# Patient Record
Sex: FEMALE | Race: BLACK OR AFRICAN AMERICAN | Employment: FULL TIME | URBAN - METROPOLITAN AREA
[De-identification: names, ages, dates, MRNs, and addresses within clinical notes are randomized per-mention and may not be internally consistent; named-entity substitution may affect disease eponyms.]

---

## 2017-09-07 ENCOUNTER — OFFICE VISIT (OUTPATIENT)
Dept: INTERNAL MEDICINE CLINIC | Age: 52
End: 2017-09-07

## 2017-09-07 VITALS
WEIGHT: 141 LBS | BODY MASS INDEX: 25.95 KG/M2 | RESPIRATION RATE: 18 BRPM | SYSTOLIC BLOOD PRESSURE: 122 MMHG | TEMPERATURE: 98.4 F | HEIGHT: 62 IN | DIASTOLIC BLOOD PRESSURE: 80 MMHG | OXYGEN SATURATION: 98 % | HEART RATE: 85 BPM

## 2017-09-07 DIAGNOSIS — M62.830 BACK SPASM: ICD-10-CM

## 2017-09-07 DIAGNOSIS — J45.20 MILD INTERMITTENT ASTHMA WITHOUT COMPLICATION: ICD-10-CM

## 2017-09-07 DIAGNOSIS — Z00.00 ROUTINE GENERAL MEDICAL EXAMINATION AT A HEALTH CARE FACILITY: Primary | ICD-10-CM

## 2017-09-07 DIAGNOSIS — Z12.11 COLON CANCER SCREENING: ICD-10-CM

## 2017-09-07 DIAGNOSIS — Z12.31 VISIT FOR SCREENING MAMMOGRAM: ICD-10-CM

## 2017-09-07 DIAGNOSIS — M25.562 KNEE MENISCUS PAIN, LEFT: ICD-10-CM

## 2017-09-07 DIAGNOSIS — M41.9 SCOLIOSIS, UNSPECIFIED SCOLIOSIS TYPE, UNSPECIFIED SPINAL REGION: ICD-10-CM

## 2017-09-07 PROBLEM — M25.569 KNEE MENISCUS PAIN: Status: ACTIVE | Noted: 2017-09-07

## 2017-09-07 RX ORDER — FLUTICASONE PROPIONATE 220 UG/1
1 AEROSOL, METERED RESPIRATORY (INHALATION) 2 TIMES DAILY
Qty: 1 INHALER | Refills: 12 | Status: SHIPPED | OUTPATIENT
Start: 2017-09-07 | End: 2018-11-02 | Stop reason: SDUPTHER

## 2017-09-07 RX ORDER — ALBUTEROL SULFATE 90 UG/1
2 AEROSOL, METERED RESPIRATORY (INHALATION)
Qty: 1 INHALER | Refills: 5 | Status: SHIPPED | OUTPATIENT
Start: 2017-09-07 | End: 2018-05-29 | Stop reason: ALTCHOICE

## 2017-09-07 RX ORDER — FLUTICASONE PROPIONATE 220 UG/1
AEROSOL, METERED RESPIRATORY (INHALATION)
COMMUNITY
End: 2017-09-07 | Stop reason: SDUPTHER

## 2017-09-07 NOTE — PROGRESS NOTES
Complete Physical       HPI:  Katey Herr is a 45y.o. year old female who is here to establish care. She  had her medical care: Works at [employer]  Not been doctor 6 years  PCT   Living in [facility] and moved here for brother- throat cancer  Sister 2015- lung cancer. Brother- lung cancer      She reports the following history and medical concerns:      Asthma- using flovent. Left torn meniscus s/p repair. Draining. Baker's cyst.  Tried compression. Can't take NSAIDS. Back pain- scoliosis. Pain on left side tightness. Assessment and Plan        1. Routine general medical examination at a health care facility  Well exam.  Back and feet issues her main issue. Needs preventive tests done. - CBC WITH AUTOMATED DIFF  - TSH REFLEX TO T4  - METABOLIC PANEL, COMPREHENSIVE  - VITAMIN D, 25 HYDROXY  - UA/M W/RFLX CULTURE, ROUTINE  - LIPID PANEL    2. Mild intermittent asthma without complication  Use albuterol for rescue only. flovent as prevention. - albuterol (PROVENTIL HFA, VENTOLIN HFA, PROAIR HFA) 90 mcg/actuation inhaler; Take 2 Puffs by inhalation every four (4) hours as needed for Wheezing. Dispense: 1 Inhaler; Refill: 5  - fluticasone (FLOVENT HFA) 220 mcg/actuation inhaler; Take 1 Puff by inhalation two (2) times a day. Dispense: 1 Inhaler; Refill: 12    3. Colon cancer screening  Referral given to patient and emphasized importance for patient to schedule the appointment by calling the number on the paperwork. If there is any difficulty getting an appointment, please let us know. Not making this appointment can have serious consequences of delayed diagnosis or irreversible health defects.   - REFERRAL FOR COLONOSCOPY    4. Visit for screening mammogram  ordered  - [facility] MAMMO BI SCREENING INCL CAD; Future    5. Knee meniscus pain, left  There is some instability on exam and swelling palpated in back - suspect cyst.  Pt uses lidocaine and it helps.   I strongly suggest getting knee looked at again.   - lidocaine-vit e-aloe vera-vipul 2 % topical gel; Apply  to affected area daily as needed. Dispense: 28.33 g; Refill: 0  - REFERRAL TO ORTHOPEDICS    6. Scoliosis, unspecified scoliosis type, unspecified spinal region  Etiology for possible back pain but she also has spasm. 7. Back spasm  TIME OUT performed immediately prior to start of procedure:   I, Lauren Pathak MD, have performed the following reviews on Saskia Varela   prior to the start of the procedure:     * Patient was identified by name and date of birth   * Agreement on procedure being performed was verified   * Risks and Benefits explained to the patient   * Procedure site verified and marked as necessary   * Patient was positioned for comfort   * Consent was given by patient    Date of procedure: 9/7/2017  Procedure performed by:Sheldon Wiley MD   Patient assisted by: self   How tolerated by patient: tolerated the procedure well with no complications   Comments: none         Patient explained the risks and benefits of acupuncture to help with the acute problem. There can be some soreness afterwards and the effect can be immediate to slightly delayed (2-3 days). If the problem persists or gets worse, please call back or send a my chart message. If you experience shortness of breath, please let me know immediately. Patient agreed to proceed with treatment. Seirin packaged needle used No. 5 (0.25) 30 mm  QTY 2  Points palpated and inserted 5-10 mm at marked points  Patient tolerated procedure and felt great relief.                 Visit Vitals    /80 (BP 1 Location: Left arm, BP Patient Position: Sitting)    Pulse 85    Temp 98.4 °F (36.9 °C) (Oral)    Resp 18    Ht 5' 2\" (1.575 m)    Wt 141 lb (64 kg)    SpO2 98%    BMI 25.79 kg/m2       Historical Data    Past Medical History:   Diagnosis Date    Asthma     SOB    Chronic pain     joint pain    Knee meniscus pain 9/7/2017    Mild intermittent asthma without complication 4/5/2864    Sinus congestion        Past Surgical History:   Procedure Laterality Date    HX GI      recloser cloystomy       Outpatient Encounter Prescriptions as of 9/7/2017   Medication Sig Dispense Refill    albuterol (PROVENTIL HFA, VENTOLIN HFA, PROAIR HFA) 90 mcg/actuation inhaler Take 2 Puffs by inhalation every four (4) hours as needed for Wheezing. 1 Inhaler 5    fluticasone (FLOVENT HFA) 220 mcg/actuation inhaler Take 1 Puff by inhalation two (2) times a day. 1 Inhaler 12    lidocaine-vit e-aloe vera-vipul 2 % topical gel Apply  to affected area daily as needed. 28.33 g 0    [DISCONTINUED] fluticasone (FLOVENT HFA) 220 mcg/actuation inhaler Take  by inhalation. No facility-administered encounter medications on file as of 9/7/2017. Allergies   Allergen Reactions    Nsaids (Non-Steroidal Anti-Inflammatory Drug) Hives, Itching and Nausea Only        Social History     Social History    Marital status: SINGLE     Spouse name: N/A    Number of children: N/A    Years of education: N/A     Occupational History    Not on file. Social History Main Topics    Smoking status: Never Smoker    Smokeless tobacco: Never Used    Alcohol use Yes      Comment: social     Drug use: No    Sexual activity: No     Other Topics Concern    Not on file     Social History Narrative    No narrative on file        family history is not on file. Review of Systems   Constitutional: Negative for weight loss. Eyes: Negative for blurred vision. Respiratory: Negative for shortness of breath. Cardiovascular: Negative for chest pain. Gastrointestinal: Negative for abdominal pain. Genitourinary: Negative for dysuria and frequency. Musculoskeletal: Positive for back pain and neck pain. Negative for falls and joint pain. Skin: Negative for rash. Neurological: Negative for dizziness, focal weakness, weakness and headaches.    Endo/Heme/Allergies: Negative for environmental allergies. Does not bruise/bleed easily. Psychiatric/Behavioral: Negative for depression. The patient is not nervous/anxious. Physical Exam   Musculoskeletal:        Lumbar back: She exhibits deformity, pain and spasm. She exhibits normal range of motion, no tenderness, no bony tenderness, no swelling and no edema. Back:       Ortho Exam       Orders Placed This Encounter    [facility] MAMMO BI SCREENING INCL CAD     Standing Status:   Future     Standing Expiration Date:   10/7/2018     Order Specific Question:   Reason for Exam     Answer:   screening    CBC WITH AUTOMATED DIFF    TSH REFLEX TO T4    METABOLIC PANEL, COMPREHENSIVE    VITAMIN D, 25 HYDROXY    UA/M W/RFLX CULTURE, ROUTINE    LIPID PANEL    REFERRAL FOR COLONOSCOPY     Referral Priority:   Routine     Referral Type:   Consultation     Referral Reason:   Specialty Services Required     Referral Location:   [facility]     Referred to Provider:   Magnolia Hollis MD    REFERRAL TO ORTHOPEDICS     Referral Priority:   Routine     Referral Type:   Consultation     Referral Reason:   Specialty Services Required     Referral Location:   [facility]     Referred to Provider:   Joleen Heredia MD    DISCONTD: fluticasone (FLOVENT HFA) 220 mcg/actuation inhaler     Sig: Take  by inhalation.  albuterol (PROVENTIL HFA, VENTOLIN HFA, PROAIR HFA) 90 mcg/actuation inhaler     Sig: Take 2 Puffs by inhalation every four (4) hours as needed for Wheezing. Dispense:  1 Inhaler     Refill:  5    fluticasone (FLOVENT HFA) 220 mcg/actuation inhaler     Sig: Take 1 Puff by inhalation two (2) times a day. Dispense:  1 Inhaler     Refill:  12    lidocaine-vit e-aloe vera-viupl 2 % topical gel     Sig: Apply  to affected area daily as needed. Dispense:  28.33 g     Refill:  0        I have reviewed the patient's medical history in detail and updated the computerized patient record.      We had a prolonged discussion about these complex clinical issues and went over the various important aspects to consider. All questions were answered. Advised her to call back or return to office if symptoms do not improve, change in nature, or persist.    She was given an after visit summary or informed of [facility] which includes patient instructions, diagnoses, current medications, & vitals. She expressed understanding with the diagnosis and plan.

## 2017-09-07 NOTE — MR AVS SNAPSHOT
Visit Information Date & Time Provider Department Dept. Phone Encounter #  
 9/7/2017 12:30 PM Lars Luke MD [facility] [phone] Follow-up Instructions Return in about 8 weeks (around 11/2/2017) for Follow up. Upcoming Health Maintenance Date Due Hepatitis C Screening 1965 DTaP/Tdap/Td series (1 - Tdap) 5/3/1984 PAP AKA CERVICAL CYTOLOGY 5/3/1984 BREAST CANCER SCRN MAMMOGRAM 5/3/2013 FOBT Q 1 YEAR AGE 50-75 5/3/2013 INFLUENZA AGE 9 TO ADULT 8/1/2017 Allergies as of 9/7/2017  Review Complete On: 9/7/2017 By: Lars Luke MD  
  
 Severity Noted Reaction Type Reactions Nsaids (Non-steroidal Anti-inflammatory Drug) Medium 09/07/2017    Hives, Itching, Nausea Only Current Immunizations  Never Reviewed No immunizations on file. Not reviewed this visit You Were Diagnosed With   
  
 Codes Comments Routine general medical examination at a health care facility    -  Primary ICD-10-CM: Z00.00 ICD-9-CM: V70.0 Mild intermittent asthma without complication     ZII-38-QQ: J45.20 ICD-9-CM: 493.90 Colon cancer screening     ICD-10-CM: Z12.11 ICD-9-CM: V76.51 Visit for screening mammogram     ICD-10-CM: Z12.31 
ICD-9-CM: V76.12 Knee meniscus pain, left     ICD-10-CM: W24.035 ICD-9-CM: 719.46 Vitals BP Pulse Temp Resp Height(growth percentile) Weight(growth percentile) 122/80 (BP 1 Location: Left arm, BP Patient Position: Sitting) 85 98.4 °F (36.9 °C) (Oral) 18 5' 2\" (1.575 m) 141 lb (64 kg) SpO2 BMI OB Status Smoking Status 98% 25.79 kg/m2 Postmenopausal Never Smoker Vitals History BMI and BSA Data Body Mass Index Body Surface Area 25.79 kg/m 2 1.67 m 2 Preferred Pharmacy Pharmacy Name Phone [facility] [phone] Your Updated Medication List  
  
   
 This list is accurate as of: 9/7/17 12:47 PM.  Always use your most recent med list.  
  
  
  
  
 albuterol 90 mcg/actuation inhaler Commonly known as:  PROVENTIL HFA, VENTOLIN HFA, PROAIR HFA Take 2 Puffs by inhalation every four (4) hours as needed for Wheezing. fluticasone 220 mcg/actuation inhaler Commonly known as:  FLOVENT HFA Take 1 Puff by inhalation two (2) times a day. lidocaine-vit e-aloe vera-vipul 2 % topical gel Apply  to affected area daily as needed. Prescriptions Sent to Pharmacy Refills  
 albuterol (PROVENTIL HFA, VENTOLIN HFA, PROAIR HFA) 90 mcg/actuation inhaler 5 Sig: Take 2 Puffs by inhalation every four (4) hours as needed for Wheezing. Class: Normal  
 [facility] [address] Ph #: [phone] Route: Inhalation  
 fluticasone (FLOVENT HFA) 220 mcg/actuation inhaler 12 Sig: Take 1 Puff by inhalation two (2) times a day. Class: Normal  
 [facility] [address] Ph #: [phone] Route: Inhalation  
 lidocaine-vit e-aloe vera-vipul 2 % topical gel 0 Sig: Apply  to affected area daily as needed. Class: Normal  
 Pharmacy: [place], [address] Ph #: [phone] Route: Topical  
  
We Performed the Following CBC WITH AUTOMATED DIFF [30456 CPT(R)] LIPID PANEL [59580 CPT(R)] METABOLIC PANEL, COMPREHENSIVE [59514 CPT(R)] REFERRAL FOR COLONOSCOPY [[id number] Custom] REFERRAL TO ORTHOPEDICS [[id number] Custom] Comments:  
 Please evaluate patient for left knee pain. TSH REFLEX TO T4 [[id number] Custom] UA/M W/RFLX CULTURE, ROUTINE [[id number] Custom] VITAMIN D, 25 HYDROXY [id number] CPT(R)] Follow-up Instructions Return in about 8 weeks (around 11/2/2017) for Follow up. To-Do List   
 09/07/2017   Imaging:  [facility] MAMMO BI SCREENING INCL CAD   
  
  
 Referral Information Referral ID Referred By Referred To  
  
 [id number] LEATHA, [address]   
   [address] [address] [facility] [place] [address], [address] Visits Status Start Date End Date 1 New Request 9/7/17 9/7/18 If your referral has a status of pending review or denied, additional information will be sent to support the outcome of this decision. Referral ID Referred By Referred To  
 [id number] [facility] [place], [address] Phone: [phone] Fax: [phone] Visits Status Start Date End Date 1 New Request 9/7/17 9/7/18 If your referral has a status of pending review or denied, additional information will be sent to support the outcome of this decision. Patient Instructions [facility] for sleep. 
 
orin Sauceda [employer] Introducing \[facility] [place] [facility]\"" & HEALTH SERVICES! Dear Nilam Osorio: Thank you for requesting a [employer] account. Our records indicate that you already have an active [employer] account. You can access your account anytime at [email or web address] Did you know that you can access your hospital and ER discharge instructions at any time in [employer]? You can also review all of your test results from your hospital stay or ER visit. Additional Information If you have questions, please visit the Frequently Asked Questions section of the [employer] website at [email or web address] Remember, [employer] is NOT to be used for urgent needs. For medical emergencies, dial 911. Now available from your iPhone and Android! Please provide this summary of care documentation to your next provider. If you have any questions after today's visit, please call [phone].

## 2017-09-07 NOTE — PROGRESS NOTES
Chief Complaint   Patient presents with    Complete Physical        1. Have you been to the ER, urgent care clinic since your last visit? No  Hospitalized since your last visit? No    2. Have you seen or consulted any other health care providers outside of the [facility] [place] [facility] since your last visit? No  Include any pap smears or colon screening.  No

## 2017-09-08 ENCOUNTER — TELEPHONE (OUTPATIENT)
Dept: INTERNAL MEDICINE CLINIC | Age: 52
End: 2017-09-08

## 2017-09-08 LAB
25(OH)D3+25(OH)D2 SERPL-MCNC: 39.6 NG/ML (ref 30–100)
ALBUMIN SERPL-MCNC: 4.4 G/DL (ref 3.5–5.5)
ALBUMIN/GLOB SERPL: 1.5 {RATIO} (ref 1.2–2.2)
ALP SERPL-CCNC: 91 IU/L (ref 39–117)
ALT SERPL-CCNC: 21 IU/L (ref 0–32)
APPEARANCE UR: CLEAR
AST SERPL-CCNC: 21 IU/L (ref 0–40)
BACTERIA #/AREA URNS HPF: ABNORMAL /[HPF]
BASOPHILS # BLD AUTO: 0.1 X10E3/UL (ref 0–0.2)
BASOPHILS NFR BLD AUTO: 1 %
BILIRUB SERPL-MCNC: 0.2 MG/DL (ref 0–1.2)
BILIRUB UR QL STRIP: NEGATIVE
BUN SERPL-MCNC: 10 MG/DL (ref 6–24)
BUN/CREAT SERPL: 14 (ref 9–23)
CALCIUM SERPL-MCNC: 9.5 MG/DL (ref 8.7–10.2)
CASTS URNS QL MICRO: ABNORMAL /LPF
CHLORIDE SERPL-SCNC: 100 MMOL/L (ref 96–106)
CHOLEST SERPL-MCNC: 209 MG/DL (ref 100–199)
CO2 SERPL-SCNC: 25 MMOL/L (ref 18–29)
COLOR UR: YELLOW
CREAT SERPL-MCNC: 0.74 MG/DL (ref 0.57–1)
EOSINOPHIL # BLD AUTO: 0.2 X10E3/UL (ref 0–0.4)
EOSINOPHIL NFR BLD AUTO: 4 %
EPI CELLS #/AREA URNS HPF: ABNORMAL /HPF
ERYTHROCYTE [DISTWIDTH] IN BLOOD BY AUTOMATED COUNT: 14.8 % (ref 12.3–15.4)
GLOBULIN SER CALC-MCNC: 3 G/DL (ref 1.5–4.5)
GLUCOSE SERPL-MCNC: 81 MG/DL (ref 65–99)
GLUCOSE UR QL: NEGATIVE
HCT VFR BLD AUTO: 40 % (ref 34–46.6)
HDLC SERPL-MCNC: 59 MG/DL
HGB BLD-MCNC: 13.8 G/DL (ref 11.1–15.9)
HGB UR QL STRIP: ABNORMAL
IMM GRANULOCYTES # BLD: 0 X10E3/UL (ref 0–0.1)
IMM GRANULOCYTES NFR BLD: 0 %
INTERPRETATION, 910389: NORMAL
KETONES UR QL STRIP: NEGATIVE
LDLC SERPL CALC-MCNC: 107 MG/DL (ref 0–99)
LEUKOCYTE ESTERASE UR QL STRIP: NEGATIVE
LYMPHOCYTES # BLD AUTO: 2 X10E3/UL (ref 0.7–3.1)
LYMPHOCYTES NFR BLD AUTO: 32 %
MCH RBC QN AUTO: 33.7 PG (ref 26.6–33)
MCHC RBC AUTO-ENTMCNC: 34.5 G/DL (ref 31.5–35.7)
MCV RBC AUTO: 98 FL (ref 79–97)
MICRO URNS: ABNORMAL
MONOCYTES # BLD AUTO: 0.5 X10E3/UL (ref 0.1–0.9)
MONOCYTES NFR BLD AUTO: 8 %
MUCOUS THREADS URNS QL MICRO: PRESENT
NEUTROPHILS # BLD AUTO: 3.5 X10E3/UL (ref 1.4–7)
NEUTROPHILS NFR BLD AUTO: 55 %
NITRITE UR QL STRIP: NEGATIVE
PH UR STRIP: 6.5 [PH] (ref 5–7.5)
PLATELET # BLD AUTO: 270 X10E3/UL (ref 150–379)
POTASSIUM SERPL-SCNC: 4.2 MMOL/L (ref 3.5–5.2)
PROT SERPL-MCNC: 7.4 G/DL (ref 6–8.5)
PROT UR QL STRIP: NEGATIVE
RBC # BLD AUTO: 4.1 X10E6/UL (ref 3.77–5.28)
RBC #/AREA URNS HPF: ABNORMAL /HPF
SODIUM SERPL-SCNC: 142 MMOL/L (ref 134–144)
SP GR UR: 1.02 (ref 1–1.03)
TRIGL SERPL-MCNC: 215 MG/DL (ref 0–149)
TSH SERPL DL<=0.005 MIU/L-ACNC: 0.96 UIU/ML (ref 0.45–4.5)
URINALYSIS REFLEX, 377202: ABNORMAL
UROBILINOGEN UR STRIP-MCNC: 0.2 MG/DL (ref 0.2–1)
VLDLC SERPL CALC-MCNC: 43 MG/DL (ref 5–40)
WBC # BLD AUTO: 6.2 X10E3/UL (ref 3.4–10.8)
WBC #/AREA URNS HPF: ABNORMAL /HPF

## 2017-09-08 NOTE — TELEPHONE ENCOUNTER
Pharmacist from [facility] pharmacy called needing clarification on script for lidocaine that was sent yesterday. Please call back at [phone]

## 2017-09-09 NOTE — PROGRESS NOTES
Your labs looked good but you had a little blood in your urine. Have you ever had a kidney stone? Have you noticed any vaginal bleeding? Your cholesterol was ok but your triglycerides were a little high, meaning you are eating too many carbs at night.   Message sent to patient via [employer]:  September 9, 2017

## 2017-09-11 NOTE — TELEPHONE ENCOUNTER
Inform patient to ask pharmacy to show her something over the counter that has some lidocaine for itching. She doesn't need a prescription for what she wants it to do.

## 2017-09-11 NOTE — TELEPHONE ENCOUNTER
Patient advised and expressed understanding. She reports that back pain is gone since acupuncture however she now has pain in right shoulder. She states that the site she got the acupuncture in in swollen and tight. Patient reports no redness or fever.  Please advise

## 2017-09-11 NOTE — TELEPHONE ENCOUNTER
[facility]'s pharmacy called wanting to know if they could have an alternate medication to the lidocaine as they are unable to get it. Please call the pharmacy back at [phone]

## 2017-09-12 ENCOUNTER — TELEPHONE (OUTPATIENT)
Dept: INTERNAL MEDICINE CLINIC | Age: 52
End: 2017-09-12

## 2017-09-12 NOTE — TELEPHONE ENCOUNTER
Yes ok to do.   Tell her to ask pharmacist to direct her to the otc cream for hemorrhoids that can be used for itching

## 2017-09-12 NOTE — TELEPHONE ENCOUNTER
Please advise if pt can use just Lidocaine as an alternative for the Lidocaine with Vit. E & aloe vera.

## 2017-09-12 NOTE — TELEPHONE ENCOUNTER
Zackary Tse 1965     Pharmacy called stating that they do not get the lidocaine with Vitamin E and Aloe Vera and would like to know if they can give the patient just lidocaine. Pharmacist states that they have called 3 times and the patient has been waiting since Thursday for script.

## 2017-09-12 NOTE — TELEPHONE ENCOUNTER
Call to pharmacy - spoke to Bridgett/pharmacist and advised per Dr. Gualberto Padgett it was ok to use the Lidocaine 2% topical gel and also requested the pt be provided with the OTC cream for hemorrhoid itching. Therese Isaac notes she will direct the pt to the Preparation H cream. Pharmacist acknowledged understanding & has no further questions and/or concerns at this time.

## 2017-09-13 ENCOUNTER — TELEPHONE (OUTPATIENT)
Dept: INTERNAL MEDICINE CLINIC | Age: 52
End: 2017-09-13

## 2017-09-13 NOTE — TELEPHONE ENCOUNTER
Attempted to contact to schedule appointment without success.  Left voicemail message requesting a call back to the office

## 2017-09-13 NOTE — TELEPHONE ENCOUNTER
Pt wants  To know  When she has to come in to have accupunture on her shoulder  Can someone call pt back at [phone] she is at this # until 7:00pm today

## 2017-09-25 ENCOUNTER — HOSPITAL ENCOUNTER (OUTPATIENT)
Dept: MAMMOGRAPHY | Age: 52
Discharge: HOME | End: 2017-09-25
Attending: INTERNAL MEDICINE
Payer: COMMERCIAL

## 2017-09-25 DIAGNOSIS — Z12.31 VISIT FOR SCREENING MAMMOGRAM: ICD-10-CM

## 2017-09-25 PROCEDURE — 77067 SCR MAMMO BI INCL CAD: CPT

## 2017-09-27 DIAGNOSIS — R31.9 HEMATURIA: Primary | ICD-10-CM

## 2017-09-27 NOTE — PROGRESS NOTES
I sent the patient a note but she did not respond. She has to repeat her urine again since there was blood.

## 2017-09-28 ENCOUNTER — TELEPHONE (OUTPATIENT)
Dept: INTERNAL MEDICINE CLINIC | Age: 52
End: 2017-09-28

## 2017-09-28 NOTE — PROGRESS NOTES
Patient stated she did see the note regarding her lab and urine. Patient states she is not having symptoms. She will repeat urine on November visit. Gave her the number to [facility].

## 2017-12-02 ENCOUNTER — APPOINTMENT (OUTPATIENT)
Dept: CT IMAGING | Age: 52
End: 2017-12-02
Attending: EMERGENCY MEDICINE
Payer: COMMERCIAL

## 2017-12-02 ENCOUNTER — HOSPITAL ENCOUNTER (EMERGENCY)
Age: 52
Discharge: HOME | End: 2017-12-02
Attending: EMERGENCY MEDICINE
Payer: COMMERCIAL

## 2017-12-02 VITALS
RESPIRATION RATE: 16 BRPM | TEMPERATURE: 98.6 F | SYSTOLIC BLOOD PRESSURE: 174 MMHG | BODY MASS INDEX: 28.7 KG/M2 | OXYGEN SATURATION: 100 % | HEART RATE: 84 BPM | HEIGHT: 60 IN | WEIGHT: 146.2 LBS | DIASTOLIC BLOOD PRESSURE: 101 MMHG

## 2017-12-02 DIAGNOSIS — L02.01 FACIAL ABSCESS: Primary | ICD-10-CM

## 2017-12-02 LAB
ALBUMIN SERPL-MCNC: 3.8 G/DL (ref 3.5–5)
ALBUMIN/GLOB SERPL: 0.8 {RATIO} (ref 1.1–2.2)
ALP SERPL-CCNC: 89 U/L (ref 45–117)
ALT SERPL-CCNC: 21 U/L (ref 12–78)
ANION GAP SERPL CALC-SCNC: 2 MMOL/L (ref 5–15)
AST SERPL-CCNC: 47 U/L (ref 15–37)
BASOPHILS # BLD: 0 K/UL (ref 0–0.1)
BASOPHILS NFR BLD: 0 % (ref 0–1)
BILIRUB SERPL-MCNC: 0.6 MG/DL (ref 0.2–1)
BUN SERPL-MCNC: 16 MG/DL (ref 6–20)
BUN/CREAT SERPL: 18 (ref 12–20)
CALCIUM SERPL-MCNC: 9.3 MG/DL (ref 8.5–10.1)
CHLORIDE SERPL-SCNC: 104 MMOL/L (ref 97–108)
CO2 SERPL-SCNC: 28 MMOL/L (ref 21–32)
CREAT SERPL-MCNC: 0.9 MG/DL (ref 0.55–1.02)
CRP SERPL-MCNC: 3.13 MG/DL (ref 0–0.6)
EOSINOPHIL # BLD: 0.2 K/UL (ref 0–0.4)
EOSINOPHIL NFR BLD: 2 % (ref 0–7)
ERYTHROCYTE [DISTWIDTH] IN BLOOD BY AUTOMATED COUNT: 13.6 % (ref 11.5–14.5)
ERYTHROCYTE [SEDIMENTATION RATE] IN BLOOD: 39 MM/HR (ref 0–30)
GLOBULIN SER CALC-MCNC: 5 G/DL (ref 2–4)
GLUCOSE SERPL-MCNC: 83 MG/DL (ref 65–100)
HCT VFR BLD AUTO: 40.4 % (ref 35–47)
HGB BLD-MCNC: 13.5 G/DL (ref 11.5–16)
LYMPHOCYTES # BLD: 3 K/UL (ref 0.8–3.5)
LYMPHOCYTES NFR BLD: 41 % (ref 12–49)
MCH RBC QN AUTO: 29.9 PG (ref 26–34)
MCHC RBC AUTO-ENTMCNC: 33.4 G/DL (ref 30–36.5)
MCV RBC AUTO: 89.4 FL (ref 80–99)
MONOCYTES # BLD: 0.6 K/UL (ref 0–1)
MONOCYTES NFR BLD: 8 % (ref 5–13)
NEUTS SEG # BLD: 3.4 K/UL (ref 1.8–8)
NEUTS SEG NFR BLD: 49 % (ref 32–75)
PLATELET # BLD AUTO: 339 K/UL (ref 150–400)
POTASSIUM SERPL-SCNC: 6.4 MMOL/L (ref 3.5–5.1)
PROT SERPL-MCNC: 8.8 G/DL (ref 6.4–8.2)
RBC # BLD AUTO: 4.52 M/UL (ref 3.8–5.2)
SODIUM SERPL-SCNC: 134 MMOL/L (ref 136–145)
WBC # BLD AUTO: 7.2 K/UL (ref 3.6–11)

## 2017-12-02 PROCEDURE — 75810000289 HC I&D ABSCESS SIMP/COMP/MULT

## 2017-12-02 PROCEDURE — 74011000250 HC RX REV CODE- 250: Performed by: EMERGENCY MEDICINE

## 2017-12-02 PROCEDURE — 70487 CT MAXILLOFACIAL W/DYE: CPT

## 2017-12-02 PROCEDURE — 74011636320 HC RX REV CODE- 636/320: Performed by: EMERGENCY MEDICINE

## 2017-12-02 PROCEDURE — 99283 EMERGENCY DEPT VISIT LOW MDM: CPT

## 2017-12-02 PROCEDURE — 80053 COMPREHEN METABOLIC PANEL: CPT | Performed by: EMERGENCY MEDICINE

## 2017-12-02 PROCEDURE — 36415 COLL VENOUS BLD VENIPUNCTURE: CPT | Performed by: EMERGENCY MEDICINE

## 2017-12-02 PROCEDURE — 74011000258 HC RX REV CODE- 258: Performed by: EMERGENCY MEDICINE

## 2017-12-02 PROCEDURE — 85025 COMPLETE CBC W/AUTO DIFF WBC: CPT | Performed by: EMERGENCY MEDICINE

## 2017-12-02 PROCEDURE — 85652 RBC SED RATE AUTOMATED: CPT | Performed by: EMERGENCY MEDICINE

## 2017-12-02 PROCEDURE — 86140 C-REACTIVE PROTEIN: CPT | Performed by: EMERGENCY MEDICINE

## 2017-12-02 PROCEDURE — 74011250637 HC RX REV CODE- 250/637: Performed by: EMERGENCY MEDICINE

## 2017-12-02 RX ORDER — SULFAMETHOXAZOLE AND TRIMETHOPRIM 800; 160 MG/1; MG/1
1 TABLET ORAL 2 TIMES DAILY
Qty: 14 TAB | Refills: 0 | Status: SHIPPED | OUTPATIENT
Start: 2017-12-02 | End: 2017-12-12

## 2017-12-02 RX ORDER — LIDOCAINE HYDROCHLORIDE AND EPINEPHRINE 10; 10 MG/ML; UG/ML
1.5 INJECTION, SOLUTION INFILTRATION; PERINEURAL ONCE
Status: COMPLETED | OUTPATIENT
Start: 2017-12-02 | End: 2017-12-02

## 2017-12-02 RX ORDER — OXYCODONE AND ACETAMINOPHEN 5; 325 MG/1; MG/1
1 TABLET ORAL
Status: COMPLETED | OUTPATIENT
Start: 2017-12-02 | End: 2017-12-02

## 2017-12-02 RX ORDER — ACETAMINOPHEN 500 MG
1000 TABLET ORAL
Status: DISCONTINUED | OUTPATIENT
Start: 2017-12-02 | End: 2017-12-02

## 2017-12-02 RX ORDER — SULFAMETHOXAZOLE AND TRIMETHOPRIM 800; 160 MG/1; MG/1
1 TABLET ORAL
Status: COMPLETED | OUTPATIENT
Start: 2017-12-02 | End: 2017-12-02

## 2017-12-02 RX ORDER — HYDROCODONE BITARTRATE AND ACETAMINOPHEN 5; 325 MG/1; MG/1
1 TABLET ORAL
Qty: 12 TAB | Refills: 0 | Status: SHIPPED | OUTPATIENT
Start: 2017-12-02 | End: 2018-05-29 | Stop reason: ALTCHOICE

## 2017-12-02 RX ORDER — SODIUM CHLORIDE 0.9 % (FLUSH) 0.9 %
10 SYRINGE (ML) INJECTION
Status: COMPLETED | OUTPATIENT
Start: 2017-12-02 | End: 2017-12-02

## 2017-12-02 RX ADMIN — Medication 2 ML: at 08:24

## 2017-12-02 RX ADMIN — SULFAMETHOXAZOLE AND TRIMETHOPRIM 1 TABLET: 800; 160 TABLET ORAL at 10:13

## 2017-12-02 RX ADMIN — SODIUM CHLORIDE 100 ML: 900 INJECTION, SOLUTION INTRAVENOUS at 08:51

## 2017-12-02 RX ADMIN — OXYCODONE HYDROCHLORIDE AND ACETAMINOPHEN 1 TABLET: 5; 325 TABLET ORAL at 10:13

## 2017-12-02 RX ADMIN — Medication 10 ML: at 08:51

## 2017-12-02 RX ADMIN — LIDOCAINE HYDROCHLORIDE,EPINEPHRINE BITARTRATE 15 MG: 10; .01 INJECTION, SOLUTION INFILTRATION; PERINEURAL at 09:40

## 2017-12-02 RX ADMIN — IOPAMIDOL 100 ML: 612 INJECTION, SOLUTION INTRAVENOUS at 08:51

## 2017-12-02 NOTE — ED TRIAGE NOTES
Triage: Patient arrives ambulatory from home with c/o abscess and swelling to left eye area extending into forehead. Patient reports noticing this on Wednesday and as of Thursday her vision has been obstructed. Area hot to touch.

## 2017-12-02 NOTE — ED PROVIDER NOTES
HPI Comments: 45 y.o. female with past medical history significant for chronic pain, asthma who presents from home with chief complaint of abscess. Pt complains of worsening abscess to the L side of her nose onset 3 days ago, accompanied by L eye swelling. Pt reports that the abscess is itchy. Denies that it has been draining any fluid. Pt denies being on abx recently. Denies taking medicine to try to relieve sx. There are no other acute medical concerns at this time. Social hx: denies tobacco use, +EtOH    PCP: Riri Mcbride MD    Note written by Maxwell Álvarez, as dictated by Iva Means MD 7:15 AM      The history is provided by the patient. No  was used. Past Medical History:   Diagnosis Date    Asthma     SOB    Chronic pain     joint pain    Knee meniscus pain 9/7/2017    Mild intermittent asthma without complication 7/5/2471    Sinus congestion        Past Surgical History:   Procedure Laterality Date    HX GI      recloser cloystomy         History reviewed. No pertinent family history. Social History     Social History    Marital status: SINGLE     Spouse name: N/A    Number of children: N/A    Years of education: N/A     Occupational History    Not on file. Social History Main Topics    Smoking status: Never Smoker    Smokeless tobacco: Never Used    Alcohol use Yes      Comment: social     Drug use: No    Sexual activity: No     Other Topics Concern    Not on file     Social History Narrative         ALLERGIES: Nsaids (non-steroidal anti-inflammatory drug)    Review of Systems   Constitutional: Negative for fever. HENT: Positive for facial swelling. Negative for dental problem. Eyes: Negative for visual disturbance. Respiratory: Negative for chest tightness. Cardiovascular: Negative for chest pain. Gastrointestinal: Negative for abdominal pain. Genitourinary: Negative for dysuria.    Musculoskeletal: Negative for arthralgias. Skin: Negative for rash. Neurological: Negative for dizziness. Hematological: Negative for adenopathy. Psychiatric/Behavioral: Negative for suicidal ideas. Vitals:    12/02/17 0647   BP: (!) 157/99   Pulse: 88   Resp: 18   Temp: 99 °F (37.2 °C)   SpO2: 99%   Weight: 66.3 kg (146 lb 3.2 oz)   Height: 5' (1.524 m)            Physical Exam   Constitutional: She is oriented to person, place, and time. She appears well-developed and well-nourished. No distress. HENT:   Head: Normocephalic and atraumatic. Mouth/Throat: Oropharynx is clear and moist.   2 cm area of erythema and fluctuance on the L nose bridge. Mild swelling around the L eye. Eyes: Pupils are equal, round, and reactive to light. No scleral icterus. Neck: Normal range of motion. Neck supple. No thyromegaly present. Cardiovascular: Normal rate, regular rhythm, normal heart sounds and intact distal pulses. No murmur heard. Pulmonary/Chest: Effort normal and breath sounds normal. No respiratory distress. Abdominal: Soft. Bowel sounds are normal. She exhibits no distension. There is no tenderness. Musculoskeletal: Normal range of motion. She exhibits no edema. Neurological: She is alert and oriented to person, place, and time. Skin: Skin is warm and dry. No rash noted. She is not diaphoretic. Nursing note and vitals reviewed. Note written by Maxwell Carrillo, as dictated by Shawnee Verde MD 7:22 AM        [facility]  ED Course       Procedures    A:  Small abscess near bridge of nose with assoc swelling around L eye. VS stable. Ct shows pre-septal swelling. P:  I&D  Discharge on Bactrim  Hydrocodone for pain    Procedure Note - Incision and Drainage:   Performed by Shawnee Verde MD .     Verbal consent was obtained. Immediately prior to the procedure, the patient was reevaluated and found suitable for the planned procedure and any planned medications.     Immediately prior to the procedure a time out was called to verify the correct patient, procedure, equipment, staff, and marking as appropriate. The site prepped with ChloraPrep. Anesthesia was obtained via local infiltration with 1% lidocaine and with epinephrine. A 1 cm incision was made using a #11 scalpel blade to incise the abscess cavity. Minimal amount of purulent drainage was expressed. A packing of None was placed. The procedure was tolerated well.

## 2017-12-02 NOTE — LETTER
[address] 
[address] [facility] [address] [phone] 
[phone] Work/School Note Date: 12/2/2017 To Whom It May concern: 
 
Alba Tierney was seen and treated today in the emergency room by the following provider(s): 
Attending Provider: Poornima Williamson MD.   
 
Alba Tierney may return to work on 12/5/17.  
 
Sincerely, 
 
 
 
 
Poornima Williamson MD

## 2017-12-02 NOTE — DISCHARGE INSTRUCTIONS
We hope that we have addressed all of your medical concerns. The examination and treatment you received in the Emergency Department were for an emergent problem and were not intended as complete care. It is important that you follow up with your healthcare provider(s) for ongoing care. If your symptoms worsen or do not improve as expected, and you are unable to reach your usual health care provider(s), you should return to the Emergency Department. Today's healthcare is undergoing tremendous change, and patient satisfaction surveys are one of the many tools to assess the quality of medical care. You may receive a survey from the [employer] organization regarding your experience in the Emergency Department. I hope that your experience has been completely positive, particularly the medical care that I provided. As such, please participate in the survey; anything less than excellent does not meet my expectations or intentions. [facility] and 8 [facility] participate in nationally recognized quality of care measures. If your blood pressure is greater than 120/80, as reported below, we urge that you seek medical care to address the potential of high blood pressure, commonly known as hypertension. Hypertension can be hereditary or can be caused by certain medical conditions, pain, stress, or \"white coat syndrome. \"       Please make an appointment with your health care provider(s) for follow up of your Emergency Department visit. VITALS:   Patient Vitals for the past 8 hrs:   Temp Pulse Resp BP SpO2   12/02/17 0647 99 °F (37.2 °C) 88 18 (!) 157/99 99 %          Thank you for allowing us to provide you with medical care today. We realize that you have many choices for your emergency care needs. Please choose us in the future for any continued health care needs.       Danny Guallpa MD    [facility]   Office: [phone]            Recent Results (from the past 24 hour(s))   CBC WITH AUTOMATED DIFF    Collection Time: 12/02/17  7:25 AM   Result Value Ref Range    WBC 7.2 3.6 - 11.0 K/uL    RBC 4.52 3.80 - 5.20 M/uL    HGB 13.5 11.5 - 16.0 g/dL    HCT 40.4 35.0 - 47.0 %    MCV 89.4 80.0 - 99.0 FL    MCH 29.9 26.0 - 34.0 PG    MCHC 33.4 30.0 - 36.5 g/dL    RDW 13.6 11.5 - 14.5 %    PLATELET 969 607 - 450 K/uL    NEUTROPHILS 49 32 - 75 %    LYMPHOCYTES 41 12 - 49 %    MONOCYTES 8 5 - 13 %    EOSINOPHILS 2 0 - 7 %    BASOPHILS 0 0 - 1 %    ABS. NEUTROPHILS 3.4 1.8 - 8.0 K/UL    ABS. LYMPHOCYTES 3.0 0.8 - 3.5 K/UL    ABS. MONOCYTES 0.6 0.0 - 1.0 K/UL    ABS. EOSINOPHILS 0.2 0.0 - 0.4 K/UL    ABS. BASOPHILS 0.0 0.0 - 0.1 K/UL   METABOLIC PANEL, COMPREHENSIVE    Collection Time: 12/02/17  7:25 AM   Result Value Ref Range    Sodium 134 (L) 136 - 145 mmol/L    Potassium 6.4 (H) 3.5 - 5.1 mmol/L    Chloride 104 97 - 108 mmol/L    CO2 28 21 - 32 mmol/L    Anion gap 2 (L) 5 - 15 mmol/L    Glucose 83 65 - 100 mg/dL    BUN 16 6 - 20 MG/DL    Creatinine 0.90 0.55 - 1.02 MG/DL    BUN/Creatinine ratio 18 12 - 20      GFR est AA >60 >60 ml/min/1.73m2    GFR est non-AA >60 >60 ml/min/1.73m2    Calcium 9.3 8.5 - 10.1 MG/DL    Bilirubin, total 0.6 0.2 - 1.0 MG/DL    ALT (SGPT) 21 12 - 78 U/L    AST (SGOT) 47 (H) 15 - 37 U/L    Alk. phosphatase 89 45 - 117 U/L    Protein, total 8.8 (H) 6.4 - 8.2 g/dL    Albumin 3.8 3.5 - 5.0 g/dL    Globulin 5.0 (H) 2.0 - 4.0 g/dL    A-G Ratio 0.8 (L) 1.1 - 2.2     SED RATE (ESR)    Collection Time: 12/02/17  7:25 AM   Result Value Ref Range    Sed rate, automated 39 (H) 0 - 30 mm/hr   C REACTIVE PROTEIN, QT    Collection Time: 12/02/17  7:25 AM   Result Value Ref Range    C-Reactive protein 3.13 (H) 0.00 - 0.60 mg/dL       Ct Maxillofacial W Cont    Result Date: 12/2/2017  EXAM:  CT MAXILLOFACIAL W CONT INDICATION:   pls eval for orbital cellulitis COMPARISON:  None.  CONTRAST:   100 mL of Isovue-300 TECHNIQUE:  Multislice helical CT of the facial bones was performed in the axial plane during uneventful rapid bolus intravenous contrast administration. Coronal and sagittal reformations were generated. CT dose reduction was achieved through use of a standardized protocol tailored for this examination and automatic exposure control for dose modulation. FINDINGS: There is no facial fracture or other osseous abnormality. The visualized paranasal sinuses and mastoid air cells are clear. The globes, optic nerves and extraocular muscles are normal. There is, however, subcutaneous soft tissue swelling, skin thickening and a small fluid collection in the subcutaneous tissues medial to the left orbit, likely corresponding to the physical findings. This is accompanied by mild skin thickening throughout the preseptal space. The soft tissue mass described above measures 1.3 x 0.8 cm, and the central low density fluid collection measures 0.7 x 0.5 cm. Prominent lymph nodes in the cervical and submandibular chains. No abnormalities are identified within the visualized portions of the brain or nasopharynx. IMPRESSION: 1. Small abscess collection in the subcutaneous tissues medial to the left orbit, confined to the preseptal space.

## 2017-12-02 NOTE — PROGRESS NOTES
Prior to Admission Medications   Prescriptions Last Dose Informant Patient Reported? Taking? MULTIVITAMIN WITH MINERALS (HAIR,SKIN AND NAILS PO) 12/1/2017 at 0600  Yes Yes   Sig: Take 1 Tab by mouth. albuterol (PROVENTIL HFA, VENTOLIN HFA, PROAIR HFA) 90 mcg/actuation inhaler Not Taking at Unknown time  No No   Sig: Take 2 Puffs by inhalation every four (4) hours as needed for Wheezing. fluticasone (FLOVENT HFA) 220 mcg/actuation inhaler Not Taking at Unknown time  No No   Sig: Take 1 Puff by inhalation two (2) times a day. Facility-Administered Medications: None   Self: List updated per patient interview. Skin, hair, nail vitamin added. Lidocaine gel removed.

## 2018-05-25 ENCOUNTER — OFFICE VISIT (OUTPATIENT)
Dept: FAMILY MEDICINE CLINIC | Age: 53
End: 2018-05-25

## 2018-05-25 VITALS
HEART RATE: 85 BPM | HEIGHT: 60 IN | RESPIRATION RATE: 16 BRPM | BODY MASS INDEX: 29.72 KG/M2 | OXYGEN SATURATION: 95 % | WEIGHT: 151.4 LBS | TEMPERATURE: 97.8 F | SYSTOLIC BLOOD PRESSURE: 177 MMHG | DIASTOLIC BLOOD PRESSURE: 109 MMHG

## 2018-05-25 DIAGNOSIS — M54.50 ACUTE MIDLINE LOW BACK PAIN WITHOUT SCIATICA: Primary | ICD-10-CM

## 2018-05-25 RX ORDER — CYCLOBENZAPRINE HCL 10 MG
10 TABLET ORAL
Qty: 30 TAB | Refills: 0 | Status: SHIPPED | OUTPATIENT
Start: 2018-05-25 | End: 2018-05-31

## 2018-05-25 NOTE — PATIENT INSTRUCTIONS
Learning About Relief for Back Pain  What is back tension and strain? Back strain happens when you overstretch, or pull, a muscle in your back. You may hurt your back in an accident or when you exercise or lift something. Most back pain will get better with rest and time. You can take care of yourself at home to help your back heal.  What can you do first to relieve back pain? When you first feel back pain, try these steps:  · Walk. Take a short walk (10 to 20 minutes) on a level surface (no slopes, hills, or stairs) every 2 to 3 hours. Walk only distances you can manage without pain, especially leg pain. · Relax. Find a comfortable position for rest. Some people are comfortable on the floor or a medium-firm bed with a small pillow under their head and another under their knees. Some people prefer to lie on their side with a pillow between their knees. Don't stay in one position for too long. · Try heat or ice. Try using a heating pad on a low or medium setting, or take a warm shower, for 15 to 20 minutes every 2 to 3 hours. Or you can buy single-use heat wraps that last up to 8 hours. You can also try an ice pack for 10 to 15 minutes every 2 to 3 hours. You can use an ice pack or a bag of frozen vegetables wrapped in a thin towel. There is not strong evidence that either heat or ice will help, but you can try them to see if they help. You may also want to try switching between heat and cold. · Take pain medicine exactly as directed. ¨ If the doctor gave you a prescription medicine for pain, take it as prescribed. ¨ If you are not taking a prescription pain medicine, ask your doctor if you can take an over-the-counter medicine. What else can you do? · Stretch and exercise. Exercises that increase flexibility may relieve your pain and make it easier for your muscles to keep your spine in a good, neutral position. And don't forget to keep walking. · Do self-massage.  You can use self-massage to unwind after work or school or to energize yourself in the morning. You can easily massage your feet, hands, or neck. Self-massage works best if you are in comfortable clothes and are sitting or lying in a comfortable position. Use oil or lotion to massage bare skin. · Reduce stress. Back pain can lead to a vicious [place]: Distress about the pain tenses the muscles in your back, which in turn causes more pain. Learn how to relax your mind and your muscles to lower your stress. Where can you learn more? Go to http://les-francisco.info/. Enter D694 in the search box to learn more about \"Learning About Relief for Back Pain. \"  Current as of: March 21, 2017  Content Version: 11.4  © 0361-1776 [employer]wise, [employer]. Care instructions adapted under license by [employer] (which disclaims liability or warranty for this information). If you have questions about a medical condition or this instruction, always ask your healthcare professional. [place] [address] any warranty or liability for your use of this information.

## 2018-05-25 NOTE — MR AVS SNAPSHOT
[address] [facility], [place] 
 
 
 [address] [facility], [facility] [address] 1400 [address] [place] [address] 
[phone] Patient: Siobhan Ugalde MRN: [id number] ZOK:[birth year] Visit Information Date & Time Provider Department Dept. Phone Encounter #  
 5/25/2018  5:30 PM Margaret [place], [address] [facility] [phone] [id number] Follow-up Instructions Return if symptoms worsen or fail to improve. Upcoming Health Maintenance Date Due Hepatitis C Screening 1965 Pneumococcal 19-64 Medium Risk (1 of 1 - PPSV23) 5/3/1982 DTaP/Tdap/Td series (1 - Tdap) 5/3/1984 PAP AKA CERVICAL CYTOLOGY 5/3/1984 FOBT Q 1 YEAR AGE 50-75 5/3/2013 Influenza Age 5 to Adult 8/1/2018 BREAST CANCER SCRN MAMMOGRAM 9/25/2019 Allergies as of 5/25/2018  Review Complete On: 5/25/2018 By: Que Kong LPN Severity Noted Reaction Type Reactions Nsaids (Non-steroidal Anti-inflammatory Drug) Medium 09/07/2017    Hives, Itching, Nausea Only Current Immunizations  Never Reviewed No immunizations on file. Not reviewed this visit You Were Diagnosed With   
  
 Codes Comments Acute midline low back pain without sciatica    -  Primary ICD-10-CM: M54.5 ICD-9-CM: 724.2 Vitals BP Pulse Temp Resp Height(growth percentile) Weight(growth percentile) (!) 177/109 (BP 1 Location: Left arm, BP Patient Position: Sitting) 85 97.8 °F (36.6 °C) (Oral) 16 5' (1.524 m) 151 lb 6.4 oz (68.7 kg) SpO2 BMI OB Status Smoking Status 95% 29.57 kg/m2 Postmenopausal Never Smoker BMI and BSA Data Body Mass Index Body Surface Area  
 29.57 kg/m 2 1.71 m 2 Preferred Pharmacy Pharmacy Name Phone [facility] [phone] Your Updated Medication List  
  
   
This list is accurate as of 5/25/18  5:48 PM.  Always use your most recent med list.  
  
  
  
  
 albuterol 90 mcg/actuation inhaler Commonly known as:  PROVENTIL HFA, VENTOLIN HFA, PROAIR HFA Take 2 Puffs by inhalation every four (4) hours as needed for Wheezing. cyclobenzaprine 10 mg tablet Commonly known as:  FLEXERIL Take 1 Tab by mouth three (3) times daily as needed for Muscle Spasm(s). fluticasone 220 mcg/actuation inhaler Commonly known as:  FLOVENT HFA Take 1 Puff by inhalation two (2) times a day. HAIR,SKIN AND NAILS PO Take 1 Tab by mouth. HYDROcodone-acetaminophen 5-325 mg per tablet Commonly known as:  [place] Take 1 Tab by mouth every four (4) hours as needed for Pain. Max Daily Amount: 6 Tabs. Prescriptions Sent to Pharmacy Refills  
 cyclobenzaprine (FLEXERIL) 10 mg tablet 0 Sig: Take 1 Tab by mouth three (3) times daily as needed for Muscle Spasm(s). Class: Normal  
 Pharmacy: [place], [address]  #: [phone] Route: Oral  
  
Follow-up Instructions Return if symptoms worsen or fail to improve. Patient Instructions Learning About Relief for Back Pain What is back tension and strain? Back strain happens when you overstretch, or pull, a muscle in your back. You may hurt your back in an accident or when you exercise or lift something. Most back pain will get better with rest and time. You can take care of yourself at home to help your back heal. 
What can you do first to relieve back pain? When you first feel back pain, try these steps: 
· Walk. Take a short walk (10 to 20 minutes) on a level surface (no slopes, hills, or stairs) every 2 to 3 hours. Walk only distances you can manage without pain, especially leg pain. · Relax. Find a comfortable position for rest. Some people are comfortable on the floor or a medium-firm bed with a small pillow under their head and another under their knees.  Some people prefer to lie on their side with a pillow between their knees. Don't stay in one position for too long. · Try heat or ice. Try using a heating pad on a low or medium setting, or take a warm shower, for 15 to 20 minutes every 2 to 3 hours. Or you can buy single-use heat wraps that last up to 8 hours. You can also try an ice pack for 10 to 15 minutes every 2 to 3 hours. You can use an ice pack or a bag of frozen vegetables wrapped in a thin towel. There is not strong evidence that either heat or ice will help, but you can try them to see if they help. You may also want to try switching between heat and cold. · Take pain medicine exactly as directed. ¨ If the doctor gave you a prescription medicine for pain, take it as prescribed. ¨ If you are not taking a prescription pain medicine, ask your doctor if you can take an over-the-counter medicine. What else can you do? · Stretch and exercise. Exercises that increase flexibility may relieve your pain and make it easier for your muscles to keep your spine in a good, neutral position. And don't forget to keep walking. · Do self-massage. You can use self-massage to unwind after work or school or to energize yourself in the morning. You can easily massage your feet, hands, or neck. Self-massage works best if you are in comfortable clothes and are sitting or lying in a comfortable position. Use oil or lotion to massage bare skin. · Reduce stress. Back pain can lead to a vicious [place]: Distress about the pain tenses the muscles in your back, which in turn causes more pain. Learn how to relax your mind and your muscles to lower your stress. Where can you learn more? Go to http://les-francisco.info/. Enter B853 in the search box to learn more about \"Learning About Relief for Back Pain. \" Current as of: March 21, 2017 Content Version: 11.4 © 7295-8419 [employer].  Care instructions adapted under license by [employer] (which disclaims liability or warranty for this information). If you have questions about a medical condition or this instruction, always ask your healthcare professional. [address] any warranty or liability for your use of this information. Introducing [facility]! Dear Tosha Saul: Thank you for requesting a Practice Management e-Tools account. Our records indicate that you already have an active Practice Management e-Tools account. You can access your account anytime at [email or web address] Did you know that you can access your hospital and ER discharge instructions at any time in Practice Management e-Tools? You can also review all of your test results from your hospital stay or ER visit. Additional Information If you have questions, please visit the Frequently Asked Questions section of the Practice Management e-Tools website at [email or web address] Remember, Practice Management e-Tools is NOT to be used for urgent needs. For medical emergencies, dial 911. Now available from your iPhone and Android! Please provide this summary of care documentation to your next provider. Your primary care clinician is listed as Delroy Alvarez. If you have any questions after today's visit, please call [phone].

## 2018-05-25 NOTE — PROGRESS NOTES
Subjective: Shelby Weiss is a 52 y.o. female who complains of low back pain for 1 hour, positional with bending or lifting, without radiation down the legs. Precipitating factors: recent injury at work. 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 She immediately needed to sit down, and after sitting for 45 min she walked over here with a co worker. Prior history of back problems: no prior back problems. There is no numbness in the legs. Symptoms are worst: continuously 8/10. Alleviating factors identifiable by patient are standing/walking. Exacerbating factors identifiable by patient are sitting. Patient Active Problem List   Diagnosis Code    Mild intermittent asthma without complication H90.89    Knee meniscus pain M25.569    Scoliosis M41.9     Patient Active Problem List    Diagnosis Date Noted    Mild intermittent asthma without complication 69/74/1065    Knee meniscus pain 09/07/2017    Scoliosis 09/07/2017     Current Outpatient Prescriptions   Medication Sig Dispense Refill    cyclobenzaprine (FLEXERIL) 10 mg tablet Take 1 Tab by mouth three (3) times daily as needed for Muscle Spasm(s). 30 Tab 0    albuterol (PROVENTIL HFA, VENTOLIN HFA, PROAIR HFA) 90 mcg/actuation inhaler Take 2 Puffs by inhalation every four (4) hours as needed for Wheezing. 1 Inhaler 5    MULTIVITAMIN WITH MINERALS (HAIR,SKIN AND NAILS PO) Take 1 Tab by mouth.  HYDROcodone-acetaminophen (NORCO) 5-325 mg per tablet Take 1 Tab by mouth every four (4) hours as needed for Pain. Max Daily Amount: 6 Tabs. 12 Tab 0    fluticasone (FLOVENT HFA) 220 mcg/actuation inhaler Take 1 Puff by inhalation two (2) times a day.  1 Inhaler 12     Allergies   Allergen Reactions    Nsaids (Non-Steroidal Anti-Inflammatory Drug) Hives, Itching and Nausea Only     Past Medical History:   Diagnosis Date    Asthma     SOB  Chronic pain     joint pain    Knee meniscus pain 9/7/2017    Mild intermittent asthma without complication 1/5/4819    Sinus congestion      Past Surgical History:   Procedure Laterality Date    HX GI      recloser cloystomy     Family History   Problem Relation Age of Onset    Liver Disease Mother     Heart Attack Father      Social History   Substance Use Topics    Smoking status: Never Smoker    Smokeless tobacco: Never Used    Alcohol use Yes      Comment: social         Review of Systems  A comprehensive review of systems was negative except for that written in the HPI. Objective:     Visit Vitals    BP (!) 177/109 (BP 1 Location: Left arm, BP Patient Position: Sitting)    Pulse 85    Temp 97.8 °F (36.6 °C) (Oral)    Resp 16    Ht 5' (1.524 m)    Wt 151 lb 6.4 oz (68.7 kg)    SpO2 95%    BMI 29.57 kg/m2      Patient appears to be in mild to moderate pain, antalgic gait noted. Lumbosacral spine area reveals no local tenderness or mass. Painful and reduced LS ROM noted. Straight leg raise is unable to be assessed due to pain. DTR's, motor strength and sensation normal, including heel and toe gait. Peripheral pulses are palpable. X-Ray: not indicated. Assessment/Plan:     lumbar strain    For acute pain, rest, intermittent application of heat (do not sleep on heating pad), analgesics and muscle relaxants are recommended. Discussed longer term treatment plan of prn muscle relaxant and discussed a home back care exercise program with flexion exercise routine. Proper lifting with avoidance of heavy lifting discussed. Consider Physical Therapy and XRay studies if not improving. Call or return to clinic prn if these symptoms worsen or fail to improve as anticipated. ICD-10-CM ICD-9-CM    1. Acute midline low back pain without sciatica M54.5 724.2 cyclobenzaprine (FLEXERIL) 10 mg tablet   Elevated blood pressure discussed, patient advised to go to the ed for immediate care.  Her pain level is 8/10 currently and she is tearful. She states she will have family member recheck her blood pressure, and if not significantly improving, she is aware of risk of stroke. She is aware and declined to go to the ED now.

## 2018-05-25 NOTE — PROGRESS NOTES
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

## 2018-05-29 ENCOUNTER — OFFICE VISIT (OUTPATIENT)
Dept: FAMILY MEDICINE CLINIC | Age: 53
End: 2018-05-29

## 2018-05-29 VITALS
BODY MASS INDEX: 29.64 KG/M2 | RESPIRATION RATE: 12 BRPM | OXYGEN SATURATION: 99 % | HEIGHT: 60 IN | HEART RATE: 85 BPM | DIASTOLIC BLOOD PRESSURE: 86 MMHG | TEMPERATURE: 96.9 F | SYSTOLIC BLOOD PRESSURE: 147 MMHG | WEIGHT: 151 LBS

## 2018-05-29 DIAGNOSIS — R03.0 ELEVATED BLOOD PRESSURE READING: ICD-10-CM

## 2018-05-29 DIAGNOSIS — S33.5XXD SPRAIN OF LOW BACK, SUBSEQUENT ENCOUNTER: Primary | ICD-10-CM

## 2018-05-29 RX ORDER — PREDNISONE 10 MG/1
10 TABLET ORAL SEE ADMIN INSTRUCTIONS
Qty: 21 TAB | Refills: 0 | Status: SHIPPED | OUTPATIENT
Start: 2018-05-29 | End: 2018-07-18 | Stop reason: ALTCHOICE

## 2018-05-29 RX ORDER — HYDROCODONE BITARTRATE AND ACETAMINOPHEN 5; 325 MG/1; MG/1
TABLET ORAL
Refills: 0 | COMMUNITY
Start: 2018-05-27 | End: 2018-05-31 | Stop reason: ALTCHOICE

## 2018-05-29 RX ORDER — LIDOCAINE 50 MG/G
1 PATCH TOPICAL EVERY 12 HOURS
Qty: 3 EACH | Refills: 0 | Status: SHIPPED | OUTPATIENT
Start: 2018-05-29 | End: 2018-06-05 | Stop reason: SDUPTHER

## 2018-05-29 NOTE — MR AVS SNAPSHOT
[facility] 
 
 
 [address] [facility], [facility] [place] 104 [facility] 13 
[phone] Patient: Aurelio Celeste MRN: [id number] CAM:8[birth year] Visit Information Date & Time Provider Department Dept. Phone Encounter #  
 5/29/2018  8:30 AM Andriy Vargas NP [address] [facility] [phone] [id number] Follow-up Instructions Return in about 3 days (around 6/1/2018). Upcoming Health Maintenance Date Due Hepatitis C Screening 1965 Pneumococcal 19-64 Medium Risk (1 of 1 - PPSV23) 5/3/1982 DTaP/Tdap/Td series (1 - Tdap) 5/3/1984 PAP AKA CERVICAL CYTOLOGY 5/3/1984 FOBT Q 1 YEAR AGE 50-75 5/3/2013 Influenza Age 5 to Adult 8/1/2018 BREAST CANCER SCRN MAMMOGRAM 9/25/2019 Allergies as of 5/29/2018  Review Complete On: 5/29/2018 By: Andriy Vargas NP Severity Noted Reaction Type Reactions Nsaids (Non-steroidal Anti-inflammatory Drug) Medium 09/07/2017    Hives, Itching, Nausea Only Current Immunizations  Never Reviewed No immunizations on file. Not reviewed this visit You Were Diagnosed With   
  
 Codes Comments Sprain of low back, subsequent encounter    -  Primary ICD-10-CM: S33. 9XXD ICD-9-CM: V58.89, 846.9 Vitals BP Pulse Temp Resp Height(growth percentile) Weight(growth percentile) (!) 161/96 85 96.9 °F (36.1 °C) (Oral) 12 5' (1.524 m) 151 lb (68.5 kg) SpO2 BMI OB Status Smoking Status 99% 29.49 kg/m2 Postmenopausal Never Smoker Vitals History BMI and BSA Data Body Mass Index Body Surface Area  
 29.49 kg/m 2 1.7 m 2 Preferred Pharmacy Pharmacy Name Phone [facility] [phone] Your Updated Medication List  
  
   
This list is accurate as of 5/29/18 10:17 AM.  Always use your most recent med list.  
  
  
  
  
 cyclobenzaprine 10 mg tablet Commonly known as:  FLEXERIL  
 Take 1 Tab by mouth three (3) times daily as needed for Muscle Spasm(s). fluticasone 220 mcg/actuation inhaler Commonly known as:  FLOVENT HFA Take 1 Puff by inhalation two (2) times a day. HAIR,SKIN AND NAILS PO Take 1 Tab by mouth. HYDROcodone-acetaminophen 5-325 mg per tablet Commonly known as:  NORCO  
TAKE 1 TABLET BY MOUTH EVERY 4 HOURS AS NEEDED FOR PAIN  
  
 lidocaine 5 % Commonly known as:  LIDODERM  
1 Patch by TransDERmal route every twelve (12) hours. Apply patch to the affected area for 12 hours a day and remove for 12 hours a day. predniSONE 10 mg dose pack Commonly known as:  STERAPRED DS Take 1 Tab by mouth See Admin Instructions. See administration instruction per 10mg dose pack Prescriptions Sent to Pharmacy Refills  
 predniSONE (STERAPRED DS) 10 mg dose pack 0 Sig: Take 1 Tab by mouth See Admin Instructions. See administration instruction per 10mg dose pack Class: Normal  
 [facility] #: [phone] Route: Oral  
 lidocaine (LIDODERM) 5 % 0 Si Patch by TransDERmal route every twelve (12) hours. Apply patch to the affected area for 12 hours a day and remove for 12 hours a day. Class: Normal  
 [facility] #: [phone] Route: TransDERmal  
  
Follow-up Instructions Return in about 3 days (around 2018). Patient Instructions Back Pain: Care Instructions Your Care Instructions Back pain has many possible causes. It is often related to problems with muscles and ligaments of the back. It may also be related to problems with the nerves, discs, or bones of the back. Moving, lifting, standing, sitting, or sleeping in an awkward way can strain the back. Sometimes you don't notice the injury until later. Arthritis is another common cause of back pain. Although it may hurt a lot, back pain usually improves on its own within several weeks. Most people recover in 12 weeks or less. Using good home treatment and being careful not to stress your back can help you feel better sooner. Follow-up care is a key part of your treatment and safety. Be sure to make and go to all appointments, and call your doctor if you are having problems. It's also a good idea to know your test results and keep a list of the medicines you take. How can you care for yourself at home? · Sit or lie in positions that are most comfortable and reduce your pain. Try one of these positions when you lie down: ¨ Lie on your back with your knees bent and supported by large pillows. ¨ Lie on the floor with your legs on the seat of a sofa or chair. [place] on your side with your knees and hips bent and a pillow between your legs. ¨ Lie on your stomach if it does not make pain worse. · Do not sit up in bed, and avoid soft couches and twisted positions. Bed rest can help relieve pain at first, but it delays healing. Avoid bed rest after the first day of back pain. · Change positions every 30 minutes. If you must sit for long periods of time, take breaks from sitting. Get up and walk around, or lie in a comfortable position. · Try using a heating pad on a low or medium setting for 15 to 20 minutes every 2 or 3 hours. Try a warm shower in place of one session with the heating pad. · You can also try an ice pack for 10 to 15 minutes every 2 to 3 hours. Put a thin cloth between the ice pack and your skin. · Take pain medicines exactly as directed. ¨ If the doctor gave you a prescription medicine for pain, take it as prescribed. ¨ If you are not taking a prescription pain medicine, ask your doctor if you can take an over-the-counter medicine. · Take short walks several times a day. You can start with 5 to 10 minutes, 3 or 4 times a day, and work up to longer walks.  Walk on level surfaces and avoid hills and stairs until your back is better. · Return to work and other activities as soon as you can. Continued rest without activity is usually not good for your back. · To prevent future back pain, do exercises to stretch and strengthen your back and stomach. Learn how to use good posture, safe lifting techniques, and proper body mechanics. When should you call for help? Call your doctor now or seek immediate medical care if: 
? · You have new or worsening numbness in your legs. ? · You have new or worsening weakness in your legs. (This could make it hard to stand up.) ? · You lose control of your bladder or bowels. ? Watch closely for changes in your health, and be sure to contact your doctor if: 
? · Your pain gets worse. ? · You are not getting better after 2 weeks. Where can you learn more? Go to http://les-francisco.info/. Enter N931 in the search box to learn more about \"Back Pain: Care Instructions. \" Current as of: March 21, 2017 Content Version: 11.4 © 2788-6911 [employer]. Care instructions adapted under license by Semm[employer] (which disclaims liability or warranty for this information). If you have questions about a medical condition or this instruction, always ask your healthcare professional. [address] any warranty or liability for your use of this information. Introducing [facility]! Dear Tatum Wagner: Thank you for requesting a CartoDB account. Our records indicate that you already have an active CartoDB account. You can access your account anytime at [email or web address] Did you know that you can access your hospital and ER discharge instructions at any time in CartoDB? You can also review all of your test results from your hospital stay or ER visit. Additional Information If you have questions, please visit the Frequently Asked Questions section of the Votigo website at [email or web address]. New [email or web address] Remember, Votigo is NOT to be used for urgent needs. For medical emergencies, dial 911. Now available from your iPhone and Android! Please provide this summary of care documentation to your next provider. Your primary care clinician is listed as Tequila Weiss. If you have any questions after today's visit, please call [phone].

## 2018-05-29 NOTE — PROGRESS NOTES
Subjective: This note will not be viewable in [address] Shelby Weiss is a 52 y.o. female who complains of low back pain for 4 days, positional with bending or lifting, without radiation down the legs. Precipitating factors: recent heavy lifting. Pt injured her back 4 days ago while sliding a patient from a stretcher to the bed. 4 people were assisting with the transfer but pt was resisting. She pushed the pt and felt pain to right lower back. Prior history of back problems: no prior back problems. There is not numbness in the legs. Denies any loss of bowel/bladder. Denies any weakness in lower extremities. Pt states she's in severe pain today. 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 Pt was seen here in clinic last week for evaluation. She was started on flexeril which pt states only causes drowsiness. She also tried hydrocodone for pain but didn't seem to help, only caused drowsiness. She is allergic to NSAID's - hives/nausea. Review of Systems  Pertinent items are noted in HPI. Objective:     Visit Vitals    BP (!) 161/96  Comment: pt is in pain    Pulse 85    Temp 96.9 °F (36.1 °C) (Oral)    Resp 12    Ht 5' (1.524 m)    Wt 151 lb (68.5 kg)    SpO2 99%    BMI 29.49 kg/m2      Patient appears to be in moderate pain, antalgic gait noted. Tearful at times. Lumbosacral spine area reveals localized  Tenderness to lumbar paraspinal muscles, no mass. Painful and reduced LS ROM noted. Straight leg raise is positive at 15 degrees on bilateral. DTR's, motor strength and sensation normal, including heel and toe gait. Peripheral pulses are palpable. Assessment/Plan:       ICD-10-CM ICD-9-CM    1. Sprain of low back, subsequent encounter S33. 9XXD V58.89      846.9    2. Elevated blood pressure reading R03.0 796.2      BP noted, likely due to acute pain. Repeat /86. Pt denies hx HTN. Will recheck at next o.v. In 48-72 hours.   Orders Placed This Encounter    predniSONE (STERAPRED DS) 10 mg dose pack    lidocaine (LIDODERM) 5 %   Pt would like to try lidocaine patch. For acute pain, rest, intermittent application of heat (do not sleep on heating pad), analgesics (Tylenol ES) and muscle relaxants (decrease Flexeril to 5mg TID prn) are recommended. Discussed longer term treatment plan of prn NSAID's and discussed a home back care exercise program with flexion exercise routine. Proper lifting with avoidance of heavy lifting discussed. Consider Physical Therapy and XRay studies if not improving. No work for next 2 days at least, WSR completed. Follow-up here in 2-3 days. Lenore Bernheim, NP  This note will not be viewable in [address]

## 2018-05-29 NOTE — PATIENT INSTRUCTIONS

## 2018-05-29 NOTE — PROGRESS NOTES
Chief Complaint   Patient presents with    Back Injury     follow up for back injured at work on 5/25/18, still in pain

## 2018-05-31 ENCOUNTER — OFFICE VISIT (OUTPATIENT)
Dept: FAMILY MEDICINE CLINIC | Age: 53
End: 2018-05-31

## 2018-05-31 VITALS
DIASTOLIC BLOOD PRESSURE: 95 MMHG | HEART RATE: 94 BPM | TEMPERATURE: 98.7 F | SYSTOLIC BLOOD PRESSURE: 163 MMHG | HEIGHT: 60 IN | WEIGHT: 150.4 LBS | OXYGEN SATURATION: 99 % | BODY MASS INDEX: 29.53 KG/M2 | RESPIRATION RATE: 16 BRPM

## 2018-05-31 DIAGNOSIS — M62.830 SPASM OF MUSCLE OF LOWER BACK: Primary | ICD-10-CM

## 2018-05-31 DIAGNOSIS — M62.838 TRAPEZIUS MUSCLE SPASM: ICD-10-CM

## 2018-05-31 RX ORDER — METAXALONE 800 MG/1
800 TABLET ORAL 3 TIMES DAILY
Qty: 60 TAB | Refills: 1 | Status: SHIPPED | OUTPATIENT
Start: 2018-05-31 | End: 2018-07-18 | Stop reason: ALTCHOICE

## 2018-05-31 RX ORDER — TRAMADOL HYDROCHLORIDE 50 MG/1
50 TABLET ORAL 3 TIMES DAILY
Qty: 20 TAB | Refills: 0 | Status: SHIPPED | OUTPATIENT
Start: 2018-05-31 | End: 2018-06-05 | Stop reason: SDUPTHER

## 2018-05-31 NOTE — PROGRESS NOTES
Chief Complaint   Patient presents with    Follow-up     Follow up from back injury on May 25, 2018.  Back pain at 5 and now with swelling of bialteral shoulders

## 2018-05-31 NOTE — MR AVS SNAPSHOT
[address] [facility], [place] 
 
 
 [address], [place] [address] 13 
[phone] Patient: Kike Macedo MRN: [id number] BQC:[birth year] Visit Information Date & Time Provider Department Dept. Phone Encounter #  
 5/31/2018  1:30 PM Malika Laureano, [address] [facility] [phone] [id number] Your Appointments 6/1/2018  8:45 AM  
Workers Comp F/U with Leidy Snyder, CHRISTEN [address] [facility] ([facility]) Appt Note: Follow-up/WC/Back/5.25.18  
 [address], [place] [address] [facility] [address]  
[phone] [address] [facility] [place] . [address] Upcoming Health Maintenance Date Due Hepatitis C Screening 1965 Pneumococcal 19-64 Medium Risk (1 of 1 - PPSV23) 5/3/1982 DTaP/Tdap/Td series (1 - Tdap) 5/3/1984 PAP AKA CERVICAL CYTOLOGY 5/3/1984 FOBT Q 1 YEAR AGE 50-75 5/3/2013 Influenza Age 5 to Adult 8/1/2018 BREAST CANCER SCRN MAMMOGRAM 9/25/2019 Allergies as of 5/31/2018  Review Complete On: 5/31/2018 By: Reva Ken LPN Severity Noted Reaction Type Reactions Nsaids (Non-steroidal Anti-inflammatory Drug) Medium 09/07/2017    Hives, Itching, Nausea Only Current Immunizations  Never Reviewed No immunizations on file. Not reviewed this visit You Were Diagnosed With   
  
 Codes Comments Spasm of muscle of lower back    -  Primary ICD-10-CM: F87.535 ICD-9-CM: 724.8 Trapezius muscle spasm     ICD-10-CM: L73.064 ICD-9-CM: 728.85 Vitals BP Pulse Temp Resp Height(growth percentile) Weight(growth percentile) (!) 163/95 94 98.7 °F (37.1 °C) (Oral) 16 5' (1.524 m) 150 lb 6.4 oz (68.2 kg) SpO2 BMI OB Status Smoking Status 99% 29.37 kg/m2 Postmenopausal Never Smoker BMI and BSA Data Body Mass Index Body Surface Area  
 29.37 kg/m 2 1.7 m 2 Preferred Pharmacy Pharmacy Name Phone [facility] [phone] Your Updated Medication List  
  
   
This list is accurate as of 5/31/18  2:11 PM.  Always use your most recent med list.  
  
  
  
  
 fluticasone 220 mcg/actuation inhaler Commonly known as:  FLOVENT HFA Take 1 Puff by inhalation two (2) times a day. HAIR,SKIN AND NAILS PO Take 1 Tab by mouth.  
  
 lidocaine 5 % Commonly known as:  LIDODERM  
1 Patch by TransDERmal route every twelve (12) hours. Apply patch to the affected area for 12 hours a day and remove for 12 hours a day. metaxalone 800 mg tablet Commonly known as:  SKELAXIN Take 1 Tab by mouth three (3) times daily. AS NEEDED FOR SPASM  
  
 predniSONE 10 mg dose pack Commonly known as:  STERAPRED DS Take 1 Tab by mouth See Admin Instructions. See administration instruction per 10mg dose pack  
  
 traMADol 50 mg tablet Commonly known as:  ULTRAM  
Take 1 Tab by mouth three (3) times daily. Max Daily Amount: 150 mg. As needed for pain Prescriptions Printed Refills  
 traMADol (ULTRAM) 50 mg tablet 0 Sig: Take 1 Tab by mouth three (3) times daily. Max Daily Amount: 150 mg. As needed for pain  
 Class: Print Route: Oral  
  
Prescriptions Sent to Pharmacy Refills  
 metaxalone (SKELAXIN) 800 mg tablet 1 Sig: Take 1 Tab by mouth three (3) times daily. AS NEEDED FOR SPASM Class: Normal  
 Pharmacy: [place] [facility] Ph #: [phone] Route: Oral  
  
We Performed the Following REFERRAL TO PHYSICAL THERAPY [GMP99 Custom] Referral Information Referral ID Referred By Referred To  
  
 [id number] [facility] OP PT [facility]   
   [address], [address] Phone: [phone] Fax: [phone] Visits Status Start Date End Date 1 New Request 5/31/18 5/31/19 If your referral has a status of pending review or denied, additional information will be sent to support the outcome of this decision. Introducing [facility] SERVICES! Dear Arturo Chang: Thank you for requesting a Bookmate account. Our records indicate that you already have an active Bookmate account. You can access your account anytime at [email or web address] Did you know that you can access your hospital and ER discharge instructions at any time in Bookmate? You can also review all of your test results from your hospital stay or ER visit. Additional Information If you have questions, please visit the Frequently Asked Questions section of the Bookmate website at [email or web address] Remember, Bookmate is NOT to be used for urgent needs. For medical emergencies, dial 911. Now available from your iPhone and Android! Please provide this summary of care documentation to your next provider. Your primary care clinician is listed as Nicole Mensah. If you have any questions after today's visit, please call [phone].

## 2018-05-31 NOTE — PROGRESS NOTES
HISTORY OF PRESENT ILLNESS  Barbara Bojorquez is a 52 y.o. female. HPI  Patient was seen initially on 5/25 and given flexeril for low back strain/spasms  Returned on 5/31 with progressing sx, given pred pack, lidoderm patch and took some of her own norco she had at home that has not helped much  Back has been so tight she has not been able to lay in the bed and sleeping in recliner  Woke up this am and upper shoulders gelacio so tight it brings her to tears, could not get ready this am due to pain with raising her arms  No weakness of gelacio arms noted, pushing and pulling motion hurts the shoulders/upper back gelacio  Denies any new injury or heavy lifting at home, \"all I have done is lay around and sleep due to the fatigue of the flexeril\". ROS  A comprehensive review of system was obtained and negative except findings in the HPI    Visit Vitals    BP (!) 163/95    Pulse 94    Temp 98.7 °F (37.1 °C) (Oral)    Resp 16    Ht 5' (1.524 m)    Wt 150 lb 6.4 oz (68.2 kg)    SpO2 99%    BMI 29.37 kg/m2     Physical Exam   Constitutional: She is oriented to person, place, and time. She appears well-developed and well-nourished. Neck: No JVD present. Cardiovascular: Normal rate, regular rhythm and intact distal pulses. Exam reveals no gallop and no friction rub. No murmur heard. Pulmonary/Chest: Effort normal and breath sounds normal. No respiratory distress. She has no wheezes. Musculoskeletal: She exhibits tenderness. She exhibits no edema. Pain on ROM of the gelacio shoulders; located at the upper trapezius gelacio, pain on even light touch of the upper back and shoulders, equal  gelacio hands, pain with flexion/extension of the gelacio arms at the shoulder but equal strength noted   Neurological: She is alert and oriented to person, place, and time. Skin: Skin is warm. Nursing note and vitals reviewed. ASSESSMENT and PLAN  Encounter Diagnoses   Name Primary?     Spasm of muscle of lower back Yes    Trapezius muscle spasm      Orders Placed This Encounter    [facility] [place]    metaxalone (SKELAXIN) 800 mg tablet    traMADol (ULTRAM) 50 mg tablet     Change flexeril to skelaxin for improved fatigue during the day from med s/e  Add tramadol to use sparingly and reviewed precautions - allergic to all NSAIDS  Cont use of pred pack and lidoderm  Order for PT given as well for therapy  Recheck 6/11/18 for progress    I have discussed the diagnosis with the patient and the intended plan as seen in the above orders. The patient has received an after-visit summary and questions were answered concerning future plans. Patient conveyed understanding of the plan at the time of the visit.     Clifford Enriquez, MSN, ANP  5/31/2018

## 2018-06-05 ENCOUNTER — TELEPHONE (OUTPATIENT)
Dept: FAMILY MEDICINE CLINIC | Age: 53
End: 2018-06-05

## 2018-06-05 ENCOUNTER — HOSPITAL ENCOUNTER (OUTPATIENT)
Dept: GENERAL RADIOLOGY | Age: 53
Discharge: HOME | End: 2018-06-05
Payer: OTHER MISCELLANEOUS

## 2018-06-05 ENCOUNTER — OFFICE VISIT (OUTPATIENT)
Dept: FAMILY MEDICINE CLINIC | Age: 53
End: 2018-06-05

## 2018-06-05 VITALS
HEIGHT: 60 IN | TEMPERATURE: 97 F | SYSTOLIC BLOOD PRESSURE: 144 MMHG | HEART RATE: 95 BPM | OXYGEN SATURATION: 96 % | RESPIRATION RATE: 16 BRPM | DIASTOLIC BLOOD PRESSURE: 105 MMHG | BODY MASS INDEX: 30.31 KG/M2 | WEIGHT: 154.4 LBS

## 2018-06-05 DIAGNOSIS — M62.830 SPASM OF MUSCLE OF LOWER BACK: ICD-10-CM

## 2018-06-05 DIAGNOSIS — R03.0 ELEVATED BLOOD PRESSURE READING: ICD-10-CM

## 2018-06-05 DIAGNOSIS — M54.2 NECK PAIN: Primary | ICD-10-CM

## 2018-06-05 DIAGNOSIS — M62.838 TRAPEZIUS MUSCLE SPASM: ICD-10-CM

## 2018-06-05 DIAGNOSIS — M54.50 ACUTE BILATERAL LOW BACK PAIN WITHOUT SCIATICA: ICD-10-CM

## 2018-06-05 DIAGNOSIS — M54.2 NECK PAIN: ICD-10-CM

## 2018-06-05 PROCEDURE — 72100 X-RAY EXAM L-S SPINE 2/3 VWS: CPT

## 2018-06-05 PROCEDURE — 72050 X-RAY EXAM NECK SPINE 4/5VWS: CPT

## 2018-06-05 RX ORDER — TRAMADOL HYDROCHLORIDE 50 MG/1
50 TABLET ORAL 3 TIMES DAILY
Qty: 20 TAB | Refills: 0 | Status: SHIPPED | OUTPATIENT
Start: 2018-06-05 | End: 2018-07-18 | Stop reason: ALTCHOICE

## 2018-06-05 RX ORDER — LIDOCAINE 50 MG/G
1 PATCH TOPICAL EVERY 12 HOURS
Qty: 6 EACH | Refills: 1 | Status: SHIPPED | OUTPATIENT
Start: 2018-06-05 | End: 2018-07-18 | Stop reason: ALTCHOICE

## 2018-06-05 NOTE — TELEPHONE ENCOUNTER
----- Message from Talia Brooke NP sent at 6/5/2018  5:19 PM EDT -----  Please inform pt that there is no acute abnormality on cervical spine xray. There is spondylosis C5-6 (age related degenerative changes). Thanks.

## 2018-06-05 NOTE — PATIENT INSTRUCTIONS
Neck Pain: Care Instructions  Your Care Instructions    You can have neck pain anywhere from the bottom of your head to the top of your shoulders. It can spread to the upper back or arms. Injuries, painting a ceiling, sleeping with your neck twisted, staying in one position for too long, and many other activities can cause neck pain. Most neck pain gets better with home care. Your doctor may recommend medicine to relieve pain or relax your muscles. He or she may suggest exercise and physical therapy to increase flexibility and relieve stress. You may need to wear a special (cervical) collar to support your neck for a day or two. Follow-up care is a key part of your treatment and safety. Be sure to make and go to all appointments, and call your doctor if you are having problems. It's also a good idea to know your test results and keep a list of the medicines you take. How can you care for yourself at home? · Try using a heating pad on a low or medium setting for 15 to 20 minutes every 2 or 3 hours. Try a warm shower in place of one session with the heating pad. · You can also try an ice pack for 10 to 15 minutes every 2 to 3 hours. Put a thin cloth between the ice and your skin. · Take pain medicines exactly as directed. ¨ If the doctor gave you a prescription medicine for pain, take it as prescribed. ¨ If you are not taking a prescription pain medicine, ask your doctor if you can take an over-the-counter medicine. · If your doctor recommends a cervical collar, wear it exactly as directed. When should you call for help? Call your doctor now or seek immediate medical care if:  ? · You have new or worsening numbness in your arms, buttocks or legs. ? · You have new or worsening weakness in your arms or legs. (This could make it hard to stand up.)   ? · You lose control of your bladder or bowels. ? Watch closely for changes in your health, and be sure to contact your doctor if:  ? · Your neck pain is getting worse. ? · You are not getting better after 1 week. ? · You do not get better as expected. Where can you learn more? Go to http://les-francisco.info/. Enter [id number] in the search box to learn more about \"Neck Pain: Care Instructions. \"  Current as of: March 21, 2017  Content Version: 11.4  © 8530-7997 [employer]. Care instructions adapted under license by Deltek (which disclaims liability or warranty for this information). If you have questions about a medical condition or this instruction, always ask your healthcare professional. [address] any warranty or liability for your use of this information.

## 2018-06-05 NOTE — PROGRESS NOTES
Chief Complaint   Patient presents with    Follow-up     Follow up from injury.  Still with pain in back and shoulders

## 2018-06-05 NOTE — MR AVS SNAPSHOT
[address] [facility] 
 
 
 [address], [address] [place] [address] 
[phone] Patient: Catalino Vargas MRN: [id number] XNL:3/4/8136 Visit Information Date & Time Provider Department Dept. Phone Encounter #  
 6/5/2018  2:30 [place] [address] [facility] [phone] [id number] Follow-up Instructions Return in about 6 days (around 6/11/2018). Your Appointments 6/11/2018 10:00 AM  
Workers [place] [address] [facility] ([facility]) Appt Note: Follow-up/ WC/Back/5/25/18  
 [address], [address] [facility] [address]  
[phone]  
  
   
 [address] [facility], [facility] [address] Upcoming Health Maintenance Date Due Hepatitis C Screening 1965 Pneumococcal 19-64 Medium Risk (1 of 1 - PPSV23) 5/3/1982 DTaP/Tdap/Td series (1 - Tdap) 5/3/1984 PAP AKA CERVICAL CYTOLOGY 5/3/1984 FOBT Q 1 YEAR AGE 50-75 5/3/2013 Influenza Age 5 to Adult 8/1/2018 BREAST CANCER SCRN MAMMOGRAM 9/25/2019 Allergies as of 6/5/2018  Review Complete On: 6/5/2018 By: José Quick NP Severity Noted Reaction Type Reactions Nsaids (Non-steroidal Anti-inflammatory Drug) Medium 09/07/2017    Hives, Itching, Nausea Only Current Immunizations  Never Reviewed No immunizations on file. Not reviewed this visit You Were Diagnosed With   
  
 Codes Comments Neck pain    -  Primary ICD-10-CM: M54.2 ICD-9-CM: 723.1 Trapezius muscle spasm     ICD-10-CM: M13.019 ICD-9-CM: 728.85 Acute bilateral low back pain without sciatica     ICD-10-CM: M54.5 ICD-9-CM: 724.2, 338.19 Vitals BP Pulse Temp Resp Height(growth percentile) Weight(growth percentile) (!) 165/102 (BP 1 Location: Right arm, BP Patient Position: Sitting) 91 97 °F (36.1 °C) (Oral) 16 5' (1.524 m) 154 lb 6.4 oz (70 kg) SpO2 BMI OB Status Smoking Status 96% 30.15 kg/m2 Postmenopausal Never Smoker BMI and BSA Data Body Mass Index Body Surface Area  
 30.15 kg/m 2 1.72 m 2 Preferred Pharmacy Pharmacy Name Phone [facility] [phone] Your Updated Medication List  
  
   
This list is accurate as of 18  2:58 PM.  Always use your most recent med list.  
  
  
  
  
 fluticasone 220 mcg/actuation inhaler Commonly known as:  FLOVENT HFA Take 1 Puff by inhalation two (2) times a day. HAIR,SKIN AND NAILS PO Take 1 Tab by mouth.  
  
 lidocaine 5 % Commonly known as:  LIDODERM  
1 Patch by TransDERmal route every twelve (12) hours. Apply patch to the affected area for 12 hours a day and remove for 12 hours a day. metaxalone 800 mg tablet Commonly known as:  SKELAXIN Take 1 Tab by mouth three (3) times daily. AS NEEDED FOR SPASM  
  
 predniSONE 10 mg dose pack Commonly known as:  STERAPRED DS Take 1 Tab by mouth See Admin Instructions. See administration instruction per 10mg dose pack  
  
 traMADol 50 mg tablet Commonly known as:  ULTRAM  
Take 1 Tab by mouth three (3) times daily. Max Daily Amount: 150 mg. As needed for pain Prescriptions Sent to Pharmacy Refills  
 lidocaine (LIDODERM) 5 % 1 Si Patch by TransDERmal route every twelve (12) hours. Apply patch to the affected area for 12 hours a day and remove for 12 hours a day. Class: Normal  
 Pharmacy: [place], [address]  #: [phone] Route: TransDERmal  
  
Follow-up Instructions Return in about 6 days (around 2018). To-Do List   
 2018 Imaging:  XR SPINE CERV FLEX/EXT MAX 3 V   
  
 2018 Imaging:  XR SPINE LUMB 2 OR 3 V   
  
 06/15/2018 9:00 AM  
  Appointment with Johny Rowland at [facility] ([phone]) Patient Instructions Neck Pain: Care Instructions Your Care Instructions You can have neck pain anywhere from the bottom of your head to the top of your shoulders. It can spread to the upper back or arms. Injuries, painting a ceiling, sleeping with your neck twisted, staying in one position for too long, and many other activities can cause neck pain. Most neck pain gets better with home care. Your doctor may recommend medicine to relieve pain or relax your muscles. He or she may suggest exercise and physical therapy to increase flexibility and relieve stress. You may need to wear a special (cervical) collar to support your neck for a day or two. Follow-up care is a key part of your treatment and safety. Be sure to make and go to all appointments, and call your doctor if you are having problems. It's also a good idea to know your test results and keep a list of the medicines you take. How can you care for yourself at home? · Try using a heating pad on a low or medium setting for 15 to 20 minutes every 2 or 3 hours. Try a warm shower in place of one session with the heating pad. · You can also try an ice pack for 10 to 15 minutes every 2 to 3 hours. Put a thin cloth between the ice and your skin. · Take pain medicines exactly as directed. ¨ If the doctor gave you a prescription medicine for pain, take it as prescribed. ¨ If you are not taking a prescription pain medicine, ask your doctor if you can take an over-the-counter medicine. · If your doctor recommends a cervical collar, wear it exactly as directed. When should you call for help? Call your doctor now or seek immediate medical care if: 
? · You have new or worsening numbness in your arms, buttocks or legs. ? · You have new or worsening weakness in your arms or legs. (This could make it hard to stand up.) ? · You lose control of your bladder or bowels. ? Watch closely for changes in your health, and be sure to contact your doctor if: 
? · Your neck pain is getting worse. ? · You are not getting better after 1 week. ? · You do not get better as expected. Where can you learn more? Go to http://les-francisco.info/. Enter [id number] in the search box to learn more about \"Neck Pain: Care Instructions. \" Current as of: March 21, 2017 Content Version: 11.4 © 5609-1035 [employer]. Care instructions adapted under license by [employer] (which disclaims liability or warranty for this information). If you have questions about a medical condition or this instruction, always ask your healthcare professional. [place] [address] any warranty or liability for your use of this information. Introducing [facility]! Dear Mariano Maria: Thank you for requesting a [employer] account. Our records indicate that you already have an active [employer] account. You can access your account anytime at [email or web address] Did you know that you can access your hospital and ER discharge instructions at any time in [employer]? You can also review all of your test results from your hospital stay or ER visit. Additional Information If you have questions, please visit the Frequently Asked Questions section of the [employer] website at [email or web address] Remember, [employer] is NOT to be used for urgent needs. For medical emergencies, dial 911. Now available from your iPhone and Android! Please provide this summary of care documentation to your next provider. Your primary care clinician is listed as Ulysses Guillermo. If you have any questions after today's visit, please call [phone].

## 2018-06-05 NOTE — TELEPHONE ENCOUNTER
----- Message from Wanda Cardozo NP sent at 6/5/2018  5:20 PM EDT -----  Please inform pt that lumbar spine xray shows no acute process. Mild multilevel lumbar degenerative disc disease (age related degenerative changes). Was she able to get her meds today? Thanks.

## 2018-06-05 NOTE — PROGRESS NOTES
Please inform pt that lumbar spine xray shows no acute process. Mild multilevel lumbar degenerative disc disease (age related degenerative changes). Was she able to get her meds today? Thanks.

## 2018-06-05 NOTE — PROGRESS NOTES
Subjective: Gavin Resendiz is a 52 y.o. female who presents today at the request of her work comp , [employer]. Office visit notes reviewed. Pt has been having severe pain and apparently has been unable to get her meds as prescribed at the last office visit on 5/31. Pt was prescribed tramadol and skelaxin but there was a problem filling the prescriptions through the [facility] insurance. Pt returned the tramadol rx here stating she didn't want it so rx was destroyed. She was also referred to PT but hasn't been scheduled for this yet. Pt is crying today with complaints mostly of neck pain (bilateral trapezius area). She also has complaints of bilateral low back pain. However the neck pain is the most severe. C/o tight muscles to neck. Denies any radiation of back pain, numbness or tingling. She's using lidocaine patches to both sides of neck and low back. States it helps some. She's also taking the flexeril three times daily which only causes drowsiness and still taking steroids. She was driven here today. Past Medical History:   Diagnosis Date    Asthma     SOB    Chronic pain     joint pain    Knee meniscus pain 9/7/2017    Mild intermittent asthma without complication 3/2/1012    Sinus congestion      Past Surgical History:   Procedure Laterality Date    HX GI      recloser cloystomy     Allergies   Allergen Reactions    Nsaids (Non-Steroidal Anti-Inflammatory Drug) Hives, Itching and Nausea Only        Review of Systems  Pertinent items are noted in HPI. Objective:     Visit Vitals    BP (!) 165/102 (BP 1 Location: Right arm, BP Patient Position: Sitting)    Pulse 91    Temp 97 °F (36.1 °C) (Oral)    Resp 16    Ht 5' (1.524 m)    Wt 154 lb 6.4 oz (70 kg)    SpO2 96%    BMI 30.15 kg/m2      Patient appears to be in mild to moderate pain (crying at times), antalgic gait noted. Limited exam due to pain.   Pt has to lay back on side then roll to back due to pain in neck and back. Lumbosacral spine area reveals no bilateral lumbar tenderness, no mass. Painful and reduced LS ROM noted. Straight leg raise is positive at 15 degrees on bilateral. DTR's intact. Peripheral pulses are palpable. Neck with prominent trapezius muscles, very tender to touch, decreased ROM. X-Ray: ordered, pending. Assessment/Plan:       ICD-10-CM ICD-9-CM    1. Neck pain M54.2 723.1 CANCELED: XR SPINE CERV FLEX/EXT MAX 3 V   2. Trapezius muscle spasm M62.838 728.85 traMADol (ULTRAM) 50 mg tablet   3. Acute bilateral low back pain without sciatica M54.5 724.2 XR SPINE LUMB 2 OR 3 V     338.19    4. Spasm of muscle of lower back M62.830 724.8 traMADol (ULTRAM) 50 mg tablet   5. Elevated blood pressure reading R03.0 796.2    -noted, in acute pain today      Pt was given new rx for tramadol and will try again to get skelaxin filled. Discussed with , can change meds if needed to an alternative as pt needs something to alleviate her pain. CM and/or pt will let me know if any changes to meds is needed. CM will also help facilitate PT referral which was given on 5/31. No work at this time, [employer] completed and will see her here in f/u in 6 days for evaluation to RTW modified duty. Laura Thompson NP  This note will not be viewable in [address]

## 2018-06-05 NOTE — PROGRESS NOTES
Please inform pt that there is no acute abnormality on cervical spine xray. There is spondylosis C5-6 (age related degenerative changes). Thanks.

## 2018-06-07 ENCOUNTER — HOSPITAL ENCOUNTER (OUTPATIENT)
Dept: PHYSICAL THERAPY | Age: 53
Discharge: HOME | End: 2018-06-07
Payer: OTHER MISCELLANEOUS

## 2018-06-07 PROCEDURE — 97162 PT EVAL MOD COMPLEX 30 MIN: CPT | Performed by: PHYSICAL THERAPIST

## 2018-06-07 PROCEDURE — 97110 THERAPEUTIC EXERCISES: CPT | Performed by: PHYSICAL THERAPIST

## 2018-06-07 NOTE — PROGRESS NOTES
[place] [place] [address] (MOB IV), [address] [facility]  [address]  Phone: [phone] Fax: [phone]    Plan of Care/Statement of Necessity for Physical Therapy Services  2-15    Patient name: Sanford Mcknight  : 1965  Provider#: [id number]  Referral source: Shu Adame NP      Medical/Treatment Diagnosis: Neck pain [M54.2]  Low back pain [M54.5]     Prior Hospitalization: see medical history     Comorbidities: asthma, back pain, BMI over 30, HA  Prior Level of Function: 20min of exercise seldom or never  Medications: Verified on Patient Summary List    Start of Care: 18      Onset Date: s/p injury 18       The Plan of Care and following information is based on the information from the initial evaluation. 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 She had noticeable and palpable lumbar paraspinal muscle spasms, and very tight left upper trap. She has significantly decreased cervical ROM, shoulder ROM, and lumbar ROM (particularly with forward flexion). X-rays show some DDD in both cervical and lumbar spine, but she has no radicular symptoms and appears to be more muscular in nature. The patient will benefit from guided therapeutic interventions such as therex for strengthening and neuromuscular re-eduction, manual techniques for joint mobility and soft tissue extensibility, and modalities for pain management in order to improve functional mobility with daily activities.     Evaluation Complexity History MEDIUM  Complexity : 1-2 comorbidities / personal factors will impact the outcome/ POC ; Examination HIGH Complexity : 4+ Standardized tests and measures addressing body structure, function, activity limitation and / or participation in recreation  ;Presentation MEDIUM Complexity : Evolving with changing characteristics  ; Clinical Decision Making MEDIUM Complexity : FOTO score of 26-74  Overall Complexity Rating: MEDIUM    Problem List: pain affecting function, decrease ROM, decrease strength, edema affecting function, impaired gait/ balance, decrease ADL/ functional abilitiies, decrease activity tolerance, decrease flexibility/ joint mobility and decrease transfer abilities   Treatment Plan may include any combination of the following: Therapeutic exercise, Therapeutic activities, Neuromuscular re-education, Physical agent/modality, Gait/balance training, Manual therapy, Patient education, Self Care training and Functional mobility training  Patient / Family readiness to learn indicated by: asking questions, trying to perform skills and interest  Persons(s) to be included in education: patient (P)  Barriers to Learning/Limitations: None  Patient Goal (s): no more tension in my shoulder and back  Patient Self Reported Health Status: fair  Rehabilitation Potential: fair/good    Short Term Goals: To be accomplished in 2 treatments:   1.) The patient will be independent with her HEP consistently for at least one week. Long Term Goals: To be accomplished in 12 treatments:   1.) The patient will improve her cervical AROM rotation to at least 45 degrees bilaterally to assist with driving and daily activities. 2.) The patient will have at most 4/10 lumbar pain with daily activities. 3.) The patient will be able to lift her left arm overhead to get dressed with at most 4/10 pain. 4.) The patient will improve her FOTO score from 47 to at least 53 to show improvement in functional mobility. Frequency / Duration: Patient to be seen 2 times per week for 12 treatments.     Patient/ Caregiver education and instruction: self care, activity modification and exercises    [x]  Plan of care has been reviewed with YURIDIA Marsh PT 6/7/2018 11:41 AM    ________________________________________________________________________    I certify that the above Therapy Services are being furnished while the patient is under my care. I agree with the treatment plan and certify that this therapy is necessary.     [de-identified] Signature:____________________  Date:____________Time: _________

## 2018-06-07 NOTE — PROGRESS NOTES
PT INITIAL EVALUATION NOTE 2-15    Patient Name: Kalyn Seth  Date:2018  : 1965  [x]  Patient  Verified  Payor: Shiraz Burnhammaribell Ohgerald [id number] / Plan: [address] / Product Type: Workers Comp /    In time:  7:35am  Out time:8:35am  Total Treatment Time (min): 60  Visit #: 1     Treatment Area: Neck pain [M54.2]  Low back pain [M54.5]    SUBJECTIVE  Pain Level (0-10 scale): neck: 8 (5-9/10); back: 4/10 (4-9/10)  Any medication changes, allergies to medications, adverse drug reactions, diagnosis change, or new procedure performed?: [] No    [x] Yes (see summary sheet for update)  Subjective: The patient reports that 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 Her left upper trap also hurts a lot. Laying on her back is worse than sitting, it hurts a little while driving (neck is worse with that), and getting dressed hurts. OBJECTIVE    Posture: Forward head, scapular protraction bilaterally  Other Observations: guarded posture; increased lumbar lordosis  Gait and Functional Mobility:  Slight antalgic  Palpation: very tender to palpate lumbar paraspinals and cervical/UTs        Lumbar AROM:          R  L    Flexion    To thighs (25%), p! Extension   Limited, decreased pain      Side Bending   Limited, p! Bilaterally with a stretch      Rotation   Limited bilaterally, pulling in low back            Cervical AROM:        R   L    Flexion    25, p! Extension   25, p! Side Bending   nt   nt    Rotation   20, p! On left  30, p!  On right      Shoulder Flexion= 125 on left with pain; 130 on right with pain    LOWER QUARTER   MUSCLE STRENGTH  KEY       R  L  0 - No Contraction  L1, L2 Psoas  4  4  1 - Trace   L3 Quads  5  5  2 - Poor   L4 Tib Ant  5  5  3 - Fair    L5 EHL  5  5  4 - Good   S1 Peroneals  5  5  5 - Normal   S2 Hams  5  5      UPPER QUARTER   MUSCLE STRENGTH  KEY       R  L  0 - No Contraction  C1, C2 Neck Flex nt  nt  1 - Trace   C3 Side Flex  nt  nt  2 - Poor   C4 Sh Elev  4,p!  4,p!  3 - Fair    C5 Deltoid/Biceps 4,p!  4,p!  4 - Good   C6 Wrist Ext  nt  nt  5 - Normal   C7 Triceps  5,p!  5,p!      C8 Thumb Ext  nt  nt      T1 Hand Inst  nt  nt    Flexibility: tight paraspinals and UT  Mobility Assessment: hypomobile lumbar and cervical spine      MMT:               HIP Abd: 3+/5 bilaterally  Neurological: Reflexes / Sensations: WFL  Special Tests:    H.S. SLR: + for lumbar pain bilaterally         30 min Therapeutic Exercise:  [x] See flow sheet :   Rationale: increase ROM, increase strength and improve coordination to improve the patients ability to perform daily activities.           With   [x] TE   [] TA   [] neuro   [] other: Patient Education: [x] Review HEP    [x] Progressed/Changed HEP based on:   [x] positioning   [x] body mechanics   [] transfers   [] heat/ice application    [] other:        Other Objective/Functional Measures: FOTO= 47    Pain Level (0-10 scale) post treatment: neck/lumbar: 10/7      ASSESSMENT:      [x]  See Plan of [address], PT 6/7/2018  7:39 AM

## 2018-06-08 ENCOUNTER — DOCUMENTATION ONLY (OUTPATIENT)
Dept: FAMILY MEDICINE CLINIC | Age: 53
End: 2018-06-08

## 2018-06-11 ENCOUNTER — OFFICE VISIT (OUTPATIENT)
Dept: FAMILY MEDICINE CLINIC | Age: 53
End: 2018-06-11

## 2018-06-11 VITALS
DIASTOLIC BLOOD PRESSURE: 97 MMHG | OXYGEN SATURATION: 98 % | HEART RATE: 101 BPM | TEMPERATURE: 97.7 F | RESPIRATION RATE: 16 BRPM | HEIGHT: 60 IN | BODY MASS INDEX: 29.53 KG/M2 | WEIGHT: 150.4 LBS | SYSTOLIC BLOOD PRESSURE: 166 MMHG

## 2018-06-11 DIAGNOSIS — Z76.5 MALINGERING: Primary | ICD-10-CM

## 2018-06-11 DIAGNOSIS — M54.50 ACUTE BILATERAL LOW BACK PAIN WITHOUT SCIATICA: ICD-10-CM

## 2018-06-11 NOTE — PROGRESS NOTES
Chief Complaint   Patient presents with    Follow-up     Follow up for back pain from injury at work.

## 2018-06-11 NOTE — PROGRESS NOTES
Catalino Vargas is a 52 y.o. female   Chief Complaint   Patient presents with    Follow-up     Follow up for back pain from injury at work. Shoulder swelling gone down but pain still in back with pulling more in mid-upper back. 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 States she was pulling the patient and pt felt she was falling, this pt states she was at the patient she was moving's hip region pulling her. States she felt a pulling in her low back b/l. States the mattress \"folded up\"  States the mattress is on velcro, then states the patient grabbed the bed and caused the mattress to fold up. Pt immediately stopped pulling and went to the nurses station and sat down. Pt states Antonieta Mace one of the RN's on the unit and [facility]. Pt walked down to the clinic and was seen, this note from 5/25 was reviewed. Pt was given flexeril and gave her dry mouth and made her sleepy and states was still in pain but did not take tylenol or anything for the pain. Did not use any topicals. Pt returned on the 29th and states she came back because she was having swelling in her shoulders. States she does not know if this was from her back bothering. Pt states she just laid in bed all day between the 25th and 29th. Pt flexeril was lowered to 5mg due to side effects and given a prednisone taper back and states no difference. Lidocaine patches did help. Pt returned on 5/31 and had advised CHRISTEN Medina that she had taken some norco she had left over and was taking this tid as the bottle said from a prior ED visit for a different issue and states this did not help at all. Pt was also given skelaxin and thinks it has helped her shoulders some. Pt was given tramadol and was not taking this with the norco and states this did not help either. Pt saw PT one time and has another appt scheduled for the 13th. Pt has been compliant with treatments.     Pt did go to one day of PT on 6/7/18 and was told to  her scapulas toward each other and now her low thoracic back is hurting worse pointing to both sides. Pt denies if any shooting pains down legs and states \"not yet. \"  When questioned what she means by not yet she states she does not want this to happen. Pt was also given exercises to do at home and states has been doing these everyday. States these have been making her hurt even more. Pt states her pain is an 8/10 b/l paraspinal region low thoracic upper lumbar, however she does not appear in pain at all. Pt was sitting comfortably. Then later asked  Patient is not sure if it radiates. Pt states it is better of she sleeps in a fetal position. Pt is sleeping in a recliner. Pt states she tries to do the exercises in her bed but makes her uncomfortable so gets in her recliner and pot spends the majority of her day sitting in the recliner trying to get comfortable. Pt does walk but this is just going to the bathroom or to her room etc.  Thus far the lidocaine patches have been the only thing that helped her pain. Pt has a listed NSAID allergy and states naprosyn, asa, and ibuprofen make her \"throw up and break out in bumps. \"  Neck pain is a 5/10 currently. We did go over her x-ray reports and these do show mild arthritis at cervical and lumbar levels however this is likely age related and not related to her work occurrence. she is a 52y.o. year old female who presents for evalution. Reviewed PmHx, RxHx, FmHx, SocHx, AllgHx and updated and dated in the chart.     Review of Systems - negative except as listed above in the HPI    Objective:     Vitals:    06/11/18 1502   BP: (!) 166/97   Pulse: (!) 101   Resp: 16   Temp: 97.7 °F (36.5 °C)   TempSrc: Oral   SpO2: 98%   Weight: 150 lb 6.4 oz (68.2 kg)   Height: 5' (1.524 m)       Current Outpatient Prescriptions   Medication Sig    lidocaine (LIDODERM) 5 % 1 Patch by TransDERmal route every twelve (12) hours. Apply patch to the affected area for 12 hours a day and remove for 12 hours a day.  traMADol (ULTRAM) 50 mg tablet Take 1 Tab by mouth three (3) times daily. Max Daily Amount: 150 mg. As needed for pain    metaxalone (SKELAXIN) 800 mg tablet Take 1 Tab by mouth three (3) times daily. AS NEEDED FOR SPASM    predniSONE (STERAPRED DS) 10 mg dose pack Take 1 Tab by mouth See Admin Instructions. See administration instruction per 10mg dose pack    MULTIVITAMIN WITH MINERALS (HAIR,SKIN AND NAILS PO) Take 1 Tab by mouth.  fluticasone (FLOVENT HFA) 220 mcg/actuation inhaler Take 1 Puff by inhalation two (2) times a day. No current facility-administered medications for this visit. Physical Examination: General appearance - alert, well appearing, and in no distress  Chest - clear to auscultation, no wheezes, rales or rhonchi, symmetric air entry  Heart - normal rate, regular rhythm, normal S1, S2, no murmurs, rubs, clicks or gallops  Musculoskeletal - + Nani sign, Using my stethescope I did deep palpation while auscultating lungs and there was no pain response elicited, however once I used my fingers to lightly palpate over same spot on b/l trapezius pt began to writhe in pain. With extremely light palpation not even enough to indent the skin on b/l paraspinals from T10 region to L5 region pt was again moving away from my hand indicating I was causing and stating I was inflicting severe muscle pain. However, I had never had the opportunity to palpate to her muscle level. This was the same over her superior iliac crests and over her inferior latissimus dorsi region b/l. Pt also had a pos SLR b/l at approx 5-210degrees of hip flexion with mild dorsiflexion b/l stating I was causing pain to shoot into her back. Although this was repeated as a sitting test and pt was afvised I was just checking ROm of her knees b/l and the sitting slr test was completely negative b/l.       Assessment/ Plan: Diagnoses and all orders for this visit:    1. Acute bilateral low back pain without sciatica     suspect malingering return to work full duty. No further work up at this time is indicated. Pt stated after I told her that I suspected malingering that she would \"I will just get hurt at work again then. \"  Follow-up Disposition:  Return if symptoms worsen or fail to improve. I have discussed the diagnosis with the patient and the intended plan as seen in the above orders. The patient has received an after-visit summary and questions were answered concerning future plans. Pt conveyed understanding of plan.     Medication Side Effects and Warnings were discussed with patient      Michelle Miller DO

## 2018-06-20 ENCOUNTER — APPOINTMENT (OUTPATIENT)
Dept: PHYSICAL THERAPY | Age: 53
End: 2018-06-20
Payer: OTHER MISCELLANEOUS

## 2018-06-25 ENCOUNTER — APPOINTMENT (OUTPATIENT)
Dept: PHYSICAL THERAPY | Age: 53
End: 2018-06-25
Payer: OTHER MISCELLANEOUS

## 2018-06-27 ENCOUNTER — APPOINTMENT (OUTPATIENT)
Dept: PHYSICAL THERAPY | Age: 53
End: 2018-06-27
Payer: OTHER MISCELLANEOUS

## 2018-06-28 ENCOUNTER — OFFICE VISIT (OUTPATIENT)
Dept: INTERNAL MEDICINE CLINIC | Age: 53
End: 2018-06-28

## 2018-06-28 VITALS
TEMPERATURE: 97.1 F | DIASTOLIC BLOOD PRESSURE: 110 MMHG | HEIGHT: 60 IN | SYSTOLIC BLOOD PRESSURE: 190 MMHG | OXYGEN SATURATION: 98 % | HEART RATE: 84 BPM | BODY MASS INDEX: 30.22 KG/M2 | RESPIRATION RATE: 15 BRPM | WEIGHT: 153.9 LBS

## 2018-06-28 DIAGNOSIS — M62.830 BACK SPASM: ICD-10-CM

## 2018-06-28 DIAGNOSIS — I10 ESSENTIAL HYPERTENSION: Primary | ICD-10-CM

## 2018-06-28 RX ORDER — AMLODIPINE BESYLATE 5 MG/1
5 TABLET ORAL DAILY
Qty: 90 TAB | Refills: 1 | Status: SHIPPED | OUTPATIENT
Start: 2018-06-28 | End: 2018-07-18 | Stop reason: SDUPTHER

## 2018-06-28 RX ORDER — LIDOCAINE HCL 3 %
LOTION (ML) TOPICAL
Qty: 1 BOTTLE | Refills: 0 | Status: SHIPPED | OUTPATIENT
Start: 2018-06-28 | End: 2018-07-18 | Stop reason: ALTCHOICE

## 2018-06-28 NOTE — PROGRESS NOTES
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 1. Have you been to the ER, urgent care clinic since your last visit? Hospitalized since your last visit? No    2. Have you seen or consulted any other health care providers outside of the [address] [place] [address] since your last visit? Include any pap smears or colon screening.  No

## 2018-06-28 NOTE — PROGRESS NOTES
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 year old female who is here for an acute care visit and has the following concerns:    5/25 hurt back at work    [employer] flexeril, skelaxin, tramadol and it didn't help    Repeat /100          Assessment and Plan        1. Essential hypertension  The risks and benefits of the new medication were discussed as well as possible side effects. Patient is instructed to call if any new symptoms arise that are listed in the AVS printed or available on [employer] or discussed:  Swelling and fatigue. Call with readings measured by nurse at work. It may not be enough and will have to double if still up in 2 days. High risk for stroke and pt aware of the dangers of this BP. Repeat BP reading still high after her pain was relieved. - amLODIPine (NORVASC) 5 mg tablet; Take 1 Tab by mouth daily. Indications: hypertension  Dispense: 90 Tab; Refill: 1    2. Back spasm  TIME OUT performed immediately prior to start of procedure:   I, Paris Garcia MD, have performed the following reviews on Zackary Tse   prior to the start of the procedure:     * Patient was identified by name and date of birth   * Agreement on procedure being performed was verified   * Risks and Benefits explained to the patient   * Procedure site verified and marked as necessary   * Patient was positioned for comfort   * Consent was given by patient    Date of procedure: 6/28/2018  Procedure performed by:Sheldon Drummond MD   Patient assisted by: self   How tolerated by patient: tolerated the procedure well with no complications   Comments: none         Patient explained the risks and benefits of acupuncture to help with the acute problem. There can be some soreness afterwards and the effect can be immediate to slightly delayed (2-3 days).   If the problem persists or gets worse, please call back or send a my chart message. If you experience shortness of breath, please let me know immediately. Patient agreed to proceed with treatment. Seirin packaged needle used No. 5 (0.25) 30 mm  QTY 10  Points palpated and inserted 5-10 mm at marked points  Patient tolerated procedure and felt marked  relief. - lidocaine hcl 3 % lotion; Apply  to affected area two (2) times daily as needed. Dispense: 1 Bottle; Refill: 0      Pt did bend completely down to floor without pain after treatment. Visit Vitals    BP (!) 190/110 (BP 1 Location: Left arm, BP Patient Position: Sitting)    Pulse 84    Temp 97.1 °F (36.2 °C) (Oral)    Resp 15    Ht 5' (1.524 m)    Wt 153 lb 14.4 oz (69.8 kg)    SpO2 98%    BMI 30.06 kg/m2       Historical Data    Past Medical History:   Diagnosis Date    Asthma     SOB    Chronic pain     joint pain    Knee meniscus pain 9/7/2017    Mild intermittent asthma without complication 5/4/2868    Sinus congestion        Past Surgical History:   Procedure Laterality Date    HX GI      recloser cloystomy       Outpatient Encounter Prescriptions as of 6/28/2018   Medication Sig Dispense Refill    lidocaine (LIDODERM) 5 % 1 Patch by TransDERmal route every twelve (12) hours. Apply patch to the affected area for 12 hours a day and remove for 12 hours a day. 6 Each 1    MULTIVITAMIN WITH MINERALS (HAIR,SKIN AND NAILS PO) Take 1 Tab by mouth.  fluticasone (FLOVENT HFA) 220 mcg/actuation inhaler Take 1 Puff by inhalation two (2) times a day. 1 Inhaler 12    traMADol (ULTRAM) 50 mg tablet Take 1 Tab by mouth three (3) times daily. Max Daily Amount: 150 mg. As needed for pain 20 Tab 0    metaxalone (SKELAXIN) 800 mg tablet Take 1 Tab by mouth three (3) times daily. AS NEEDED FOR SPASM 60 Tab 1    predniSONE (STERAPRED DS) 10 mg dose pack Take 1 Tab by mouth See Admin Instructions.  See administration instruction per 10mg dose pack 21 Tab 0     No facility-administered encounter medications on file as of 6/28/2018. Allergies   Allergen Reactions    Nsaids (Non-Steroidal Anti-Inflammatory Drug) Hives, Itching and Nausea Only        Social History     Social History    Marital status: SINGLE     Spouse name: N/A    Number of children: N/A    Years of education: N/A     Occupational History    Not on file. Social History Main Topics    Smoking status: Never Smoker    Smokeless tobacco: Never Used    Alcohol use Yes      Comment: social     Drug use: No    Sexual activity: No     Other Topics Concern    Not on file     Social History Narrative        family history includes Heart Attack in her father; Liver Disease in her mother. Review of Systems   Constitutional: Negative for weight loss. Eyes: Negative for blurred vision. Respiratory: Negative for shortness of breath. Cardiovascular: Negative for chest pain. Gastrointestinal: Negative for abdominal pain. Genitourinary: Negative for dysuria and frequency. Skin: Negative for rash. Neurological: Negative for dizziness, focal weakness, weakness and headaches. Endo/Heme/Allergies: Negative for environmental allergies. Does not bruise/bleed easily. Physical Exam   Constitutional: She is oriented to person, place, and time. She appears distressed. Neck: Neck supple. Cardiovascular: Normal rate and regular rhythm. Musculoskeletal: She exhibits tenderness. She exhibits no edema. Lumbar back: She exhibits tenderness and spasm. She exhibits normal range of motion and no edema. Back:    Lymphadenopathy:     She has no cervical adenopathy. Neurological: She is oriented to person, place, and time. Skin: Skin is warm and dry. Ortho Exam           I have reviewed the patient's medical history in detail and updated the computerized patient record.      We had a prolonged discussion about these complex clinical issues and went over the various important aspects to consider. All questions were answered. Advised her to call back or return to office if symptoms do not improve, change in nature, or persist.    She was given an after visit summary or informed of [facility] which includes patient instructions, diagnoses, current medications, & vitals. She expressed understanding with the diagnosis and plan.

## 2018-06-29 ENCOUNTER — TELEPHONE (OUTPATIENT)
Dept: INTERNAL MEDICINE CLINIC | Age: 53
End: 2018-06-29

## 2018-06-29 NOTE — TELEPHONE ENCOUNTER
Regarding: Update Medical Information  Contact: [phone]  ----- Message from Alma Michel MD sent at 6/29/2018  9:26 AM EDT -----       ----- Message from Brice Pascual to Alma Michel MD sent at 6/29/2018  7:10 AM -----   [address] [facility]  DR. ZHANG TOOK MY BP. [email or web address] RIGHT ARM /85,LEFT ARM /81 BUT NOW I'M  HAVING HEADACES

## 2018-06-29 NOTE — TELEPHONE ENCOUNTER
Contacted patient for further detail  Patient states that she is feeling better just tiered. She states that she read the information that came with the medication from the pharmacy and the warning states that fatigue may be a side effect. She will take medication at night instead of in the mornings from now on. Any issues with chest pain or shortness of breath patient will report to urgent care, contact provider on call,or emergency room.

## 2018-06-29 NOTE — TELEPHONE ENCOUNTER
Regarding: Update Medical Information  Contact: [phone]  ----- Message from Jazmín Frazier MD sent at 6/29/2018  9:26 AM EDT -----       ----- Message from Moisés Martinez to Jazmín Frazier MD sent at 6/29/2018  7:10 AM -----   [address] [facility]  DR. ZHANG TOOK MY BP. LEIGHTON [email or web address] BP RIGHT ARM /85,LEFT ARM /81 BUT NOW I'M  HAVING HEADACES

## 2018-07-18 ENCOUNTER — OFFICE VISIT (OUTPATIENT)
Dept: INTERNAL MEDICINE CLINIC | Age: 53
End: 2018-07-18

## 2018-07-18 VITALS
RESPIRATION RATE: 16 BRPM | BODY MASS INDEX: 30.02 KG/M2 | TEMPERATURE: 98.2 F | HEART RATE: 82 BPM | OXYGEN SATURATION: 97 % | SYSTOLIC BLOOD PRESSURE: 132 MMHG | WEIGHT: 152.9 LBS | DIASTOLIC BLOOD PRESSURE: 82 MMHG | HEIGHT: 60 IN

## 2018-07-18 DIAGNOSIS — Z12.11 COLON CANCER SCREENING: ICD-10-CM

## 2018-07-18 DIAGNOSIS — J45.909 UNCOMPLICATED ASTHMA, UNSPECIFIED ASTHMA SEVERITY, UNSPECIFIED WHETHER PERSISTENT: Primary | ICD-10-CM

## 2018-07-18 DIAGNOSIS — I10 ESSENTIAL HYPERTENSION: ICD-10-CM

## 2018-07-18 RX ORDER — ALBUTEROL SULFATE 90 UG/1
2 AEROSOL, METERED RESPIRATORY (INHALATION)
Qty: 1 INHALER | Refills: 5 | Status: SHIPPED | OUTPATIENT
Start: 2018-07-18 | End: 2018-11-02 | Stop reason: SDUPTHER

## 2018-07-18 RX ORDER — PREDNISONE 20 MG/1
20 TABLET ORAL
Qty: 7 TAB | Refills: 0 | Status: SHIPPED | OUTPATIENT
Start: 2018-07-18 | End: 2019-03-21

## 2018-07-18 RX ORDER — AMLODIPINE BESYLATE 5 MG/1
5 TABLET ORAL DAILY
Qty: 90 TAB | Refills: 3 | Status: SHIPPED | OUTPATIENT
Start: 2018-07-18 | End: 2018-08-24 | Stop reason: DRUGHIGH

## 2018-07-18 NOTE — PROGRESS NOTES
Hypertension (2nweek f/up ); Cough (2 weeks ); and Fatigue (2 weeks )       HPI:  Ifeanyi Meza is a 52y.o. year old female who is here for a follow up visit. She was last seen by me on 6/28/2018. She reports the following:    Cough (phlegm) 2 weeks  Clear. Hx of asthma    Acupuncture really helped    Hypertension    Patient has a history of HTN. The patient is taking hypertensive medications compliantly without side effects. Denies chest pain, dyspnea, edema, or symptoms of TIA's. BP Readings from Last 3 Encounters:   07/18/18 132/82   06/28/18 (!) 190/110   06/11/18 (!) 166/97             Assessment and Plan        1. Essential hypertension  Continue present management. No changes made to regimen. Tolerating medication. Denies dizziness that is positional, SOB, or chest pain. Understands the importance of compliance to reduce risk of future heart failure. Agreed to call if any of above symptoms develop and  stay on current regimen of      - amLODIPine (NORVASC) 5 mg tablet; Take 1 Tab by mouth daily. Indications: hypertension  Dispense: 90 Tab; Refill: 3    2. Uncomplicated asthma, unspecified asthma severity, unspecified whether persistent  Prednisone 20 mg once daily. Use albuterol prn instead of her steroid inhaler. Call if not better. No wheezing at visit. 3. Colon cancer screening  Referral given to patient and emphasized importance for patient to schedule the appointment by calling the number on the paperwork. If there is any difficulty getting an appointment, please let us know.   Not making this appointment can have serious consequences of delayed diagnosis or irreversible health defects.   - REFERRAL TO GASTROENTEROLOGY          Visit Vitals    /82 (BP 1 Location: Left arm, BP Patient Position: Sitting)    Pulse 82    Temp 98.2 °F (36.8 °C) (Oral)    Resp 16    Ht 5' (1.524 m)    Wt 152 lb 14.4 oz (69.4 kg)    SpO2 97%    BMI 29.86 kg/m2       Historical Data    Past Medical History:   Diagnosis Date    Asthma     SOB    Chronic pain     joint pain    Knee meniscus pain 9/7/2017    Mild intermittent asthma without complication 2/4/2574    Sinus congestion        Past Surgical History:   Procedure Laterality Date    HX GI      recloser cloystomy       Outpatient Encounter Prescriptions as of 7/18/2018   Medication Sig Dispense Refill    guaifenesin/dextromethorphan (ROBITUSSIN DM MAX PO) Take  by mouth.  amLODIPine (NORVASC) 5 mg tablet Take 1 Tab by mouth daily. Indications: hypertension 90 Tab 1    MULTIVITAMIN WITH MINERALS (HAIR,SKIN AND NAILS PO) Take 1 Tab by mouth.  fluticasone (FLOVENT HFA) 220 mcg/actuation inhaler Take 1 Puff by inhalation two (2) times a day. 1 Inhaler 12    lidocaine hcl 3 % lotion Apply  to affected area two (2) times daily as needed. 1 Bottle 0    lidocaine (LIDODERM) 5 % 1 Patch by TransDERmal route every twelve (12) hours. Apply patch to the affected area for 12 hours a day and remove for 12 hours a day. 6 Each 1    traMADol (ULTRAM) 50 mg tablet Take 1 Tab by mouth three (3) times daily. Max Daily Amount: 150 mg. As needed for pain 20 Tab 0    metaxalone (SKELAXIN) 800 mg tablet Take 1 Tab by mouth three (3) times daily. AS NEEDED FOR SPASM 60 Tab 1    predniSONE (STERAPRED DS) 10 mg dose pack Take 1 Tab by mouth See Admin Instructions. See administration instruction per 10mg dose pack 21 Tab 0     No facility-administered encounter medications on file as of 7/18/2018. Allergies   Allergen Reactions    Nsaids (Non-Steroidal Anti-Inflammatory Drug) Hives, Itching and Nausea Only        Social History     Social History    Marital status: SINGLE     Spouse name: N/A    Number of children: N/A    Years of education: N/A     Occupational History    Not on file. Social History Main Topics    Smoking status: Never Smoker    Smokeless tobacco: Never Used    Alcohol use Yes      Comment: social     Drug use:  No  Sexual activity: No     Other Topics Concern    Not on file     Social History Narrative        family history includes Heart Attack in her father; Liver Disease in her mother. Review of Systems   Constitutional: Negative for weight loss. Eyes: Negative for blurred vision. Respiratory: Positive for cough. Negative for sputum production and shortness of breath. Cardiovascular: Negative for chest pain. Gastrointestinal: Negative for abdominal pain. Genitourinary: Negative for dysuria and frequency. Skin: Negative for rash. Neurological: Negative for dizziness, focal weakness, weakness and headaches. Endo/Heme/Allergies: Negative for environmental allergies. Does not bruise/bleed easily. Physical Exam   Constitutional: She is oriented to person, place, and time. She appears well-nourished. No distress. Neck: Carotid bruit is not present. No thyromegaly present. Cardiovascular: Normal rate, regular rhythm and normal heart sounds. Pulmonary/Chest: Effort normal and breath sounds normal. No respiratory distress. She has no wheezes. Abdominal: Soft. Bowel sounds are normal. She exhibits no mass. There is no tenderness. Musculoskeletal: She exhibits no edema or tenderness. Lymphadenopathy:     She has no cervical adenopathy. Neurological: She is alert and oriented to person, place, and time. Skin: Skin is warm and dry. No rash noted. No erythema. Psychiatric: She has a normal mood and affect. Thought content normal.   Nursing note and vitals reviewed. Ortho Exam      Orders Placed This Encounter    guaifenesin/dextromethorphan (ROBITUSSIN DM MAX PO)     Sig: Take  by mouth. I have reviewed the patient's medical history in detail and updated the computerized patient record. We had a prolonged discussion about these complex clinical issues and went over the various important aspects to consider. All questions were answered.      Advised her to call back or return to office if symptoms do not improve, change in nature, or persist.    She was given an after visit summary or informed of [facility] [facility] which includes patient instructions, diagnoses, current medications, & vitals. She expressed understanding with the diagnosis and plan.

## 2018-07-18 NOTE — MR AVS SNAPSHOT
[address] [facility], [address] [place] [address] 
[phone] Patient: Melania Baig MRN: [id number] VGN:5[birth year]9/[birth year]11 Visit Information Date & Time Provider Department Dept. Phone Encounter #  
 7/18/2018  8:45 AM MD [facility] [phone] Follow-up Instructions Return in about 6 months (around 1/18/2019) for Follow up 15 minutes. Upcoming Health Maintenance Date Due Hepatitis C Screening 1965 Pneumococcal 19-64 Medium Risk (1 of 1 - PPSV23) 5/3/1982 DTaP/Tdap/Td series (1 - Tdap) 5/3/1984 PAP AKA CERVICAL CYTOLOGY 5/3/1984 FOBT Q 1 YEAR AGE 50-75 5/3/2013 Influenza Age 5 to Adult 8/1/2018 BREAST CANCER SCRN MAMMOGRAM 9/25/2019 Allergies as of 7/18/2018  Review Complete On: 7/18/2018 By: Shruti Barnett RN Severity Noted Reaction Type Reactions Nsaids (Non-steroidal Anti-inflammatory Drug) Medium 09/07/2017    Hives, Itching, Nausea Only Current Immunizations  Never Reviewed No immunizations on file. Not reviewed this visit You Were Diagnosed With   
  
 Codes Comments Uncomplicated asthma, unspecified asthma severity, unspecified whether persistent    -  Primary ICD-10-CM: J45.909 ICD-9-CM: 493.90 Essential hypertension     ICD-10-CM: I10 
ICD-9-CM: 401.9 Vitals BP Pulse Temp Resp Height(growth percentile) Weight(growth percentile) 132/82 (BP 1 Location: Left arm, BP Patient Position: Sitting) 82 98.2 °F (36.8 °C) (Oral) 16 5' (1.524 m) 152 lb 14.4 oz (69.4 kg) SpO2 BMI OB Status Smoking Status 97% 29.86 kg/m2 Postmenopausal Never Smoker Vitals History BMI and BSA Data Body Mass Index Body Surface Area  
 29.86 kg/m 2 1.71 m 2 Preferred Pharmacy Pharmacy Name Phone [facility] [phone] Your Updated Medication List  
  
   
This list is accurate as of 7/18/18  9:23 AM.  Always use your most recent med list.  
  
  
  
  
 albuterol 90 mcg/actuation inhaler Commonly known as:  PROVENTIL HFA, VENTOLIN HFA, PROAIR HFA Take 2 Puffs by inhalation every four (4) hours as needed for Wheezing. amLODIPine 5 mg tablet Commonly known as:  Sourav Chafe Take 1 Tab by mouth daily. Indications: hypertension  
  
 fluticasone 220 mcg/actuation inhaler Commonly known as:  FLOVENT HFA Take 1 Puff by inhalation two (2) times a day. HAIR,SKIN AND NAILS PO Take 1 Tab by mouth.  
  
 predniSONE 20 mg tablet Commonly known as:  Leslie Mons Take 1 Tab by mouth daily (with breakfast). ROBITUSSIN DM MAX PO Take  by mouth. Prescriptions Sent to Pharmacy Refills  
 amLODIPine (NORVASC) 5 mg tablet 3 Sig: Take 1 Tab by mouth daily. Indications: hypertension Class: Normal  
 Pharmacy: [address] #: [phone] Route: Oral  
 albuterol (PROVENTIL HFA, VENTOLIN HFA, PROAIR HFA) 90 mcg/actuation inhaler 5 Sig: Take 2 Puffs by inhalation every four (4) hours as needed for Wheezing. Class: Normal  
 Pharmacy: [place], [address] Ph #: [phone] Route: Inhalation  
 predniSONE (DELTASONE) 20 mg tablet 0 Sig: Take 1 Tab by mouth daily (with breakfast). Class: Normal  
 Pharmacy: [place], [address] Ph #: [phone] Route: Oral  
  
Follow-up Instructions Return in about 6 months (around 1/18/2019) for Follow up 15 minutes. Introducing [facility]! Dear Rainer Boykin: Thank you for requesting a [employer] account. Our records indicate that you already have an active [employer] account. You can access your account anytime at [email or web address] Did you know that you can access your hospital and ER discharge instructions at any time in [employer]? You can also review all of your test results from your hospital stay or ER visit. Additional Information If you have questions, please visit the Frequently Asked Questions section of the [employer] website at [email or web address]pre[email or web address]/[email or web address])/. Remember, [employer] is NOT to be used for urgent needs. For medical emergencies, dial 911. Now available from your iPhone and Android! Please provide this summary of care documentation to your next provider. Your primary care clinician is listed as Riri Mcbride. If you have any questions after today's visit, please call [phone].

## 2018-07-18 NOTE — PROGRESS NOTES
Reviewed record in preparation for visit and have obtained necessary documentation. Identified pt with two pt identifiers(name and ).     Chief Complaint   Patient presents with    Hypertension     2nweek f/up     Cough     2 weeks     Fatigue     2 weeks        Vitals:    18 0841   BP: 132/82   Pulse: 82   Resp: 16   Temp: 98.2 °F (36.8 °C)   TempSrc: Oral   SpO2: 97%   Weight: 152 lb 14.4 oz (69.4 kg)   Height: 5' (1.524 m)   PainSc:   0 - No pain       Coordination of Care Questionnaire:  :     1) Have you been to an emergency room, urgent care clinic since your last visit? no   Hospitalized since your last visit? no             2) Have you seen or consulted any other health care providers outside of [facility] since your last visit? no  (Include any pap smears or colon screenings in this section.)    Health Maintenance Due   Topic Date Due    Hepatitis C Screening  1965    Pneumococcal 19-64 Medium Risk (1 of 1 - PPSV23) 1982    DTaP/Tdap/Td series (1 - Tdap) 1984    PAP AKA CERVICAL CYTOLOGY  1984    FOBT Q 1 YEAR AGE 50-75  2013         Carmelita Sosa RN

## 2018-07-27 ENCOUNTER — TELEPHONE (OUTPATIENT)
Dept: INTERNAL MEDICINE CLINIC | Age: 53
End: 2018-07-27

## 2018-07-27 NOTE — TELEPHONE ENCOUNTER
----- Message from Thiago Quinonez sent at 7/27/2018  2:43 PM EDT -----  Regarding: Dr. Guy Grace  : Sharyn Michaud, is requesting the status for a order form that was faxed on 07/24/18  for pt back brace. Best contact [phone].

## 2018-08-24 DIAGNOSIS — I10 ESSENTIAL HYPERTENSION: ICD-10-CM

## 2018-08-24 RX ORDER — AMLODIPINE BESYLATE 10 MG/1
10 TABLET ORAL DAILY
Qty: 90 TAB | Refills: 1 | Status: SHIPPED | OUTPATIENT
Start: 2018-08-24 | End: 2020-05-06 | Stop reason: SDUPTHER

## 2018-08-24 RX ORDER — AMLODIPINE BESYLATE 5 MG/1
5 TABLET ORAL DAILY
Qty: 90 TAB | Refills: 3 | Status: CANCELLED | OUTPATIENT
Start: 2018-08-24

## 2018-08-24 NOTE — TELEPHONE ENCOUNTER
Requested Prescriptions     Pending Prescriptions Disp Refills    amLODIPine (NORVASC) 5 mg tablet 90 Tab 3     Sig: Take 1 Tab by mouth daily. Indications: hypertension     Patient sent  an email stating that she had been taking two at night instead of one.  She would like to dosage increased to 10 mg since that seems to work better    06/28/2018  No upcoming

## 2018-09-28 ENCOUNTER — OFFICE VISIT (OUTPATIENT)
Dept: INTERNAL MEDICINE CLINIC | Age: 53
End: 2018-09-28

## 2018-09-28 VITALS
RESPIRATION RATE: 18 BRPM | DIASTOLIC BLOOD PRESSURE: 82 MMHG | HEIGHT: 60 IN | OXYGEN SATURATION: 98 % | SYSTOLIC BLOOD PRESSURE: 138 MMHG | TEMPERATURE: 98.2 F | WEIGHT: 167.6 LBS | BODY MASS INDEX: 32.9 KG/M2 | HEART RATE: 85 BPM

## 2018-09-28 DIAGNOSIS — M79.89 LEFT LEG SWELLING: Primary | ICD-10-CM

## 2018-09-28 DIAGNOSIS — G47.9 DIFFICULTY SLEEPING: ICD-10-CM

## 2018-09-28 DIAGNOSIS — Z23 ENCOUNTER FOR IMMUNIZATION: ICD-10-CM

## 2018-09-28 RX ORDER — MELOXICAM 15 MG/1
15 TABLET ORAL DAILY
Qty: 20 TAB | Refills: 0 | Status: SHIPPED | OUTPATIENT
Start: 2018-09-28 | End: 2019-03-21 | Stop reason: SINTOL

## 2018-09-28 NOTE — PROGRESS NOTES
Primary Care Doctor is:  Rosalinda Mcdaniel MD 
 
Back Pain and Insomnia LAST VISIT: 7/18/2018 HPI: 
Sheri Elizondo is a 52y.o. year old female who is here for an acute care visit and has the following concerns: 
 
Left knee pain- epps's cyst.  Dr. Lucero Dumont. Had injection and it helped 2 days. She did not give him feedback. His PA injected knee Back pain was gone with acupuncture. Issues falling asleep. Staying asleep is ok. Discussed routine before bedtime Assessment and Plan 1. Left leg swelling Hx of DVT. Low suspicion for DVT. Leg is more swollen since injection and is warm to touch. No calf tenderness TIME OUT performed immediately prior to start of procedure:  
Speedy South MD, have performed the following reviews on Sheri Elizondo 
 prior to the start of the procedure:  
 
* Patient was identified by name and date of birth * Agreement on procedure being performed was verified * Risks and Benefits explained to the patient * Procedure site verified and marked as necessary * Patient was positioned for comfort * Consent was given by patient Date of procedure: 9/28/2018 Procedure performed by:Sheldon Nguyen MD  
Patient assisted by: self How tolerated by patient: tolerated the procedure well with no complications Comments: none Patient explained the risks and benefits of acupuncture to help with the acute problem. There can be some soreness afterwards and the effect can be immediate to slightly delayed (2-3 days). If the problem persists or gets worse, please call back or send a my chart message. If you experience shortness of breath, please let me know immediately. Patient agreed to proceed with treatment. Seirin packaged needle used No. 5 (0.25) 30 mm  QTY 2 Points palpated and inserted 5-10 mm at marked points Patient tolerated procedure and felt partial relief. - DUPLEX LOWER EXT VENOUS LEFT; Future - meloxicam (MOBIC) 15 mg tablet; Take 1 Tab by mouth daily. Dispense: 20 Tab; Refill: 0 Watch for possible reaction but had only had localized hives 2. Difficulty sleeping Try max greco milk 3. Encounter for immunization Immunization given. Discussed risks and benefits. Side effects. VIS given through visit summary via Mychart or paper copy if not on Mychart - Influenza virus vaccine (QUADRIVALENT PRES FREE SYRINGE) IM ([id number]) - MD IMMUNIZ ADMIN,1 SINGLE/COMB VAC/TOXOID Visit Vitals  /82 (BP 1 Location: Left arm, BP Patient Position: Sitting)  Pulse 85  Temp 98.2 °F (36.8 °C) (Oral)  Resp 18  Ht 5' (1.524 m)  Wt 167 lb 9.6 oz (76 kg)  SpO2 98%  BMI 32.73 kg/m2 Historical Data Past Medical History:  
Diagnosis Date  Asthma SOB  Chronic pain   
 joint pain  Knee meniscus pain 9/7/2017  Mild intermittent asthma without complication 2/6/9933  Sinus congestion Past Surgical History:  
Procedure Laterality Date  HX GI    
 recloser cloystomy Outpatient Encounter Prescriptions as of 9/28/2018 Medication Sig Dispense Refill  amLODIPine (NORVASC) 10 mg tablet Take 1 Tab by mouth daily. 90 Tab 1  
 albuterol (PROVENTIL HFA, VENTOLIN HFA, PROAIR HFA) 90 mcg/actuation inhaler Take 2 Puffs by inhalation every four (4) hours as needed for Wheezing. 1 Inhaler 5  MULTIVITAMIN WITH MINERALS (HAIR,SKIN AND NAILS PO) Take 1 Tab by mouth.  fluticasone (FLOVENT HFA) 220 mcg/actuation inhaler Take 1 Puff by inhalation two (2) times a day. 1 Inhaler 12  
 guaifenesin/dextromethorphan (ROBITUSSIN DM MAX PO) Take  by mouth.  predniSONE (DELTASONE) 20 mg tablet Take 1 Tab by mouth daily (with breakfast). 7 Tab 0 No facility-administered encounter medications on file as of 9/28/2018. Allergies Allergen Reactions  Nsaids (Non-Steroidal Anti-Inflammatory Drug) Hives, Itching and Nausea Only Social History Social History  Marital status: SINGLE Spouse name: N/A  
 Number of children: N/A  
 Years of education: N/A Occupational History  Not on file. Social History Main Topics  Smoking status: Never Smoker  Smokeless tobacco: Never Used  Alcohol use Yes Comment: social   
 Drug use: No  
 Sexual activity: No  
 
Other Topics Concern  Not on file Social History Narrative  
  
 
family history includes Heart Attack in her father; Liver Disease in her mother. Review of Systems Constitutional: Negative for weight loss. Eyes: Negative for blurred vision. Respiratory: Negative for cough and shortness of breath. Cardiovascular: Positive for leg swelling. Negative for chest pain and palpitations. Gastrointestinal: Negative for abdominal pain. Genitourinary: Negative for dysuria and frequency. Musculoskeletal: Positive for joint pain. Skin: Negative for rash. Neurological: Negative for dizziness, focal weakness, weakness and headaches. Endo/Heme/Allergies: Negative for environmental allergies. Does not bruise/bleed easily. Psychiatric/Behavioral: Negative for depression. The patient has insomnia. The patient is not nervous/anxious. Physical Exam  
Constitutional: She is oriented to person, place, and time and well-developed, well-nourished, and in no distress. Vital signs are normal. She appears not dehydrated. She appears healthy. Non-toxic appearance. She does not have a sickly appearance. No distress. Eyes: Conjunctivae are normal.  
Cardiovascular: Normal rate and regular rhythm. Pulmonary/Chest: Effort normal and breath sounds normal.  
Abdominal: Soft. Bowel sounds are normal. She exhibits no distension. There is no tenderness. Musculoskeletal: She exhibits no edema or deformity. Left knee: Lateral joint line tenderness noted. Neurological: She is alert and oriented to person, place, and time.  Gait normal.  
 Skin: Skin is warm and dry. Psychiatric: Mood and affect normal.  
 
Left Knee Exam  
Swelling: Moderate Effusion: Yes Tenderness The patient is experiencing tenderness in the lateral joint line. Comments:  Knee and calf swelling, warm to touch. No redness. I have reviewed the patient's medical history in detail and updated the computerized patient record. We had a prolonged discussion about these complex clinical issues and went over the various important aspects to consider. All questions were answered. Advised her to call back or return to office if symptoms do not improve, change in nature, or persist. 
 
She was given an after visit summary or informed of [employer] Access which includes patient instructions, diagnoses, current medications, & vitals. She expressed understanding with the diagnosis and plan.

## 2018-09-28 NOTE — MR AVS SNAPSHOT
[address] [facility], [address] [facility] [address] 
[phone] Patient: Douglas Orta MRN: [id number] FBX:9/7/3318 Visit Information Date & Time Provider Department Dept. Phone Encounter #  
 9/28/2018  3:30 PM MD [facility] [phone] [id number] Upcoming Health Maintenance Date Due Hepatitis C Screening 1965 Pneumococcal 19-64 Medium Risk (1 of 1 - PPSV23) 5/3/1982 DTaP/Tdap/Td series (1 - Tdap) 5/3/1984 PAP AKA CERVICAL CYTOLOGY 5/3/1984 Shingrix Vaccine Age 50> (1 of 2) 5/3/2013 FOBT Q 1 YEAR AGE 50-75 5/3/2013 Influenza Age 5 to Adult 8/1/2018 BREAST CANCER SCRN MAMMOGRAM 9/25/2019 Allergies as of 9/28/2018  Review Complete On: 9/28/2018 By: Sharri Jay LPN Severity Noted Reaction Type Reactions Nsaids (Non-steroidal Anti-inflammatory Drug) Medium 09/07/2017    Hives, Itching, Nausea Only Current Immunizations  Never Reviewed No immunizations on file. Not reviewed this visit You Were Diagnosed With   
  
 Codes Comments Left leg swelling    -  Primary ICD-10-CM: M79.89 ICD-9-CM: 729.81 Vitals BP Pulse Temp Resp Height(growth percentile) Weight(growth percentile) 138/82 (BP 1 Location: Left arm, BP Patient Position: Sitting) 85 98.2 °F (36.8 °C) (Oral) 18 5' (1.524 m) 167 lb 9.6 oz (76 kg) SpO2 BMI OB Status Smoking Status 98% 32.73 kg/m2 Postmenopausal Never Smoker Vitals History BMI and BSA Data Body Mass Index Body Surface Area 32.73 kg/m 2 1.79 m 2 Preferred Pharmacy Pharmacy Name Phone [facility] [phone] Your Updated Medication List  
  
   
This list is accurate as of 9/28/18  4:03 PM.  Always use your most recent med list.  
  
  
  
  
 albuterol 90 mcg/actuation inhaler Commonly known as:  PROVENTIL HFA, VENTOLIN HFA, PROAIR HFA Take 2 Puffs by inhalation every four (4) hours as needed for Wheezing. amLODIPine 10 mg tablet Commonly known as:  Choudhury Oscar Take 1 Tab by mouth daily. fluticasone 220 mcg/actuation inhaler Commonly known as:  FLOVENT HFA Take 1 Puff by inhalation two (2) times a day. HAIR,SKIN AND NAILS PO Take 1 Tab by mouth.  
  
 meloxicam 15 mg tablet Commonly known as:  MOBIC Take 1 Tab by mouth daily. predniSONE 20 mg tablet Commonly known as:  Reading Freddy Take 1 Tab by mouth daily (with breakfast). ROBITUSSIN DM MAX PO Take  by mouth. Prescriptions Sent to Pharmacy Refills  
 meloxicam (MOBIC) 15 mg tablet 0 Sig: Take 1 Tab by mouth daily. Class: Normal  
 Pharmacy: [place], [address]  #: [phone] Route: Oral  
  
To-Do List   
 09/28/2018 Imaging:  DUPLEX LOWER EXT VENOUS LEFT Patient Instructions Meloxicam (By mouth) Meloxicam ([id number]-WA-[id number]-brandon) Treats symptoms of osteoarthritis (OA) and rheumatoid arthritis (RA). This medicine is an NSAID. Brand Name(s): Comfort Pac with Meloxicam, Mobic, Vivlodex There may be other brand names for this medicine. When This Medicine Should Not Be Used: This medicine is not right for everyone. Do not use it if you had an allergic reaction to meloxicam or another NSAID drug, including aspirin. How to Use This Medicine:  
Capsule, Liquid, Tablet · Take your medicine as directed. Your dose may need to be changed several times to find what works best for you. · You may take this medicine with or without food. If this medicine upsets your stomach, take it with food or milk. · Capsule or tablet: Swallow whole. Do not crush, break, or chew it. · Oral liquid: Shake the bottle gently before use.  Measure the oral liquid medicine with a marked measuring spoon, oral syringe, or medicine cup. · This medicine should come with a Medication Guide. Ask your pharmacist for a copy if you do not have one. · Missed dose: Take a dose as soon as you remember. If it is almost time for your next dose, wait until then and take a regular dose. Do not take extra medicine to make up for a missed dose. · Store the medicine in a closed container at room temperature, away from heat, moisture, and direct light. Drugs and Foods to Avoid: Ask your doctor or pharmacist before using any other medicine, including over-the-counter medicines, vitamins, and herbal products. · Do not use aspirin or any other NSAID medicine (including diflunisal, salsalate) unless your doctor says it is okay. · Do not take the oral liquid together with sodium polystyrene sulfonate. Meloxicam oral liquid contains sorbitol, which may cause a serious bowel problem if taken with sodium polystyrene sulfonate. · Some medicines can affect how meloxicam works. Tell your doctor if you are using any of the following: ¨ Cholestyramine, cyclosporine, digoxin, lithium, methotrexate, pemetrexed ¨ Blood pressure medicine ¨ Blood thinner (including warfarin) ¨ Diuretic (water pill) ¨ Medicine to treat depression ¨ Steroid medicine (including hydrocortisone, methylprednisolone, prednisolone, prednisone) Warnings While Using This Medicine: · Tell your doctor if you are pregnant or breastfeeding. Do not use this medicine during the later part of a pregnancy unless your doctor tells you to. · Tell your doctor if you have kidney disease, liver disease, asthma, blood circulation problems, heart disease, high blood pressure, heart failure, or a history of ulcers or other stomach problems. Tell your doctor if you smoke or drink alcohol regularly. · This medicine may cause the following problems: 
¨ High risk of blood clots, heart attack, stroke, or heart failure ¨ High risk of stomach or bowel bleeding ¨ High blood pressure ¨ Liver or kidney problems ¨ High potassium levels in the blood ¨ Serious skin reactions · Tell your doctor if you are trying to get pregnant. Some women who take this medicine have trouble getting pregnant. · Your doctor will do lab tests at regular visits to check on the effects of this medicine. Keep all appointments. · Keep all medicine out of the reach of children. Never share your medicine with anyone. Possible Side Effects While Using This Medicine:  
Call your doctor right away if you notice any of these side effects: · Allergic reaction: Itching or hives, swelling in your face or hands, swelling or tingling in your mouth or throat, chest tightness, trouble breathing · Blistering, peeling, red skin rash · Change in how much or how often you urinate, painful urination · Chest pain, trouble breathing, coughing up blood · Dark urine or pale stools, nausea, vomiting, loss of appetite, stomach pain, yellow skin or eyes · Numbness or weakness on one side of your body, sudden or severe headache, problems with vision, speech, or walking · Rapid weight gain, swelling in your hands, ankles, or feet · Severe stomach pain, vomiting blood or material that looks like coffee grounds, bloody or black, tarry stools · Unusual bleeding, bruising, or weakness If you notice these less serious side effects, talk with your doctor: · Mild nausea, diarrhea, heartburn If you notice other side effects that you think are caused by this medicine, tell your doctor. Call your doctor for medical advice about side effects. You may report side effects to FDA at 3-837-NZN-5625 © 2017 [address] Information is for End User's use only and may not be sold, redistributed or otherwise used for commercial purposes. The above information is an  only.  It is not intended as medical advice for individual conditions or treatments. Talk to your doctor, nurse or pharmacist before following any medical regimen to see if it is safe and effective for you. Introducing [facility]! Dear Ernst Hernandez: Thank you for requesting a [employer] account. Our records indicate that you already have an active [employer] account. You can access your account anytime at [email or web address] Did you know that you can access your hospital and ER discharge instructions at any time in [employer]? You can also review all of your test results from your hospital stay or ER visit. Additional Information If you have questions, please visit the Frequently Asked Questions section of the [employer] website at [email or web address] Remember, [employer] is NOT to be used for urgent needs. For medical emergencies, dial 911. Now available from your iPhone and Android! Please provide this summary of care documentation to your next provider. Your primary care clinician is listed as Angelica Hogan. If you have any questions after today's visit, please call [phone].

## 2018-09-28 NOTE — PATIENT INSTRUCTIONS
Meloxicam (By mouth) Meloxicam ([id number]-m-brandon) Treats symptoms of osteoarthritis (OA) and rheumatoid arthritis (RA). This medicine is an NSAID. Brand Name(s): Comfort Pac with Meloxicam, Mobic, Vivlodex There may be other brand names for this medicine. When This Medicine Should Not Be Used: This medicine is not right for everyone. Do not use it if you had an allergic reaction to meloxicam or another NSAID drug, including aspirin. How to Use This Medicine:  
Capsule, Liquid, Tablet · Take your medicine as directed. Your dose may need to be changed several times to find what works best for you. · You may take this medicine with or without food. If this medicine upsets your stomach, take it with food or milk. · Capsule or tablet: Swallow whole. Do not crush, break, or chew it. · Oral liquid: Shake the bottle gently before use. Measure the oral liquid medicine with a marked measuring spoon, oral syringe, or medicine cup. · This medicine should come with a Medication Guide. Ask your pharmacist for a copy if you do not have one. · Missed dose: Take a dose as soon as you remember. If it is almost time for your next dose, wait until then and take a regular dose. Do not take extra medicine to make up for a missed dose. · Store the medicine in a closed container at room temperature, away from heat, moisture, and direct light. Drugs and Foods to Avoid: Ask your doctor or pharmacist before using any other medicine, including over-the-counter medicines, vitamins, and herbal products. · Do not use aspirin or any other NSAID medicine (including diflunisal, salsalate) unless your doctor says it is okay. · Do not take the oral liquid together with sodium polystyrene sulfonate. Meloxicam oral liquid contains sorbitol, which may cause a serious bowel problem if taken with sodium polystyrene sulfonate. · Some medicines can affect how meloxicam works. Tell your doctor if you are using any of the following: ¨ Cholestyramine, cyclosporine, digoxin, lithium, methotrexate, pemetrexed ¨ Blood pressure medicine ¨ Blood thinner (including warfarin) ¨ Diuretic (water pill) ¨ Medicine to treat depression ¨ Steroid medicine (including hydrocortisone, methylprednisolone, prednisolone, prednisone) Warnings While Using This Medicine: · Tell your doctor if you are pregnant or breastfeeding. Do not use this medicine during the later part of a pregnancy unless your doctor tells you to. · Tell your doctor if you have kidney disease, liver disease, asthma, blood circulation problems, heart disease, high blood pressure, heart failure, or a history of ulcers or other stomach problems. Tell your doctor if you smoke or drink alcohol regularly. · This medicine may cause the following problems: 
¨ High risk of blood clots, heart attack, stroke, or heart failure ¨ High risk of stomach or bowel bleeding ¨ High blood pressure ¨ Liver or kidney problems ¨ High potassium levels in the blood ¨ Serious skin reactions · Tell your doctor if you are trying to get pregnant. Some women who take this medicine have trouble getting pregnant. · Your doctor will do lab tests at regular visits to check on the effects of this medicine. Keep all appointments. · Keep all medicine out of the reach of children. Never share your medicine with anyone. Possible Side Effects While Using This Medicine:  
Call your doctor right away if you notice any of these side effects: · Allergic reaction: Itching or hives, swelling in your face or hands, swelling or tingling in your mouth or throat, chest tightness, trouble breathing · Blistering, peeling, red skin rash · Change in how much or how often you urinate, painful urination · Chest pain, trouble breathing, coughing up blood · Dark urine or pale stools, nausea, vomiting, loss of appetite, stomach pain, yellow skin or eyes · Numbness or weakness on one side of your body, sudden or severe headache, problems with vision, speech, or walking · Rapid weight gain, swelling in your hands, ankles, or feet · Severe stomach pain, vomiting blood or material that looks like coffee grounds, bloody or black, tarry stools · Unusual bleeding, bruising, or weakness If you notice these less serious side effects, talk with your doctor: · Mild nausea, diarrhea, heartburn If you notice other side effects that you think are caused by this medicine, tell your doctor. Call your doctor for medical advice about side effects. You may report side effects to FDA at 0-320-NRT-9254 © 2017 [address]  Information is for End User's use only and may not be sold, redistributed or otherwise used for commercial purposes. The above information is an  only. It is not intended as medical advice for individual conditions or treatments. Talk to your doctor, nurse or pharmacist before following any medical regimen to see if it is safe and effective for you. Vaccine Information Statement Influenza (Flu) Vaccine (Inactivated or Recombinant): What you need to know Many Vaccine Information Statements are available in [place] and other languages. See www.immunize.org/vis Hojas de Información Sobre Vacunas están disponibles en Español y en muchos otros idiomas. Visite www.immunize.org/vis 1. Why get vaccinated? Influenza (flu) is a contagious disease that spreads around the [place] every year, usually between October and May. Flu is caused by influenza viruses, and is spread mainly by coughing, sneezing, and close contact. Anyone can get flu. Flu strikes suddenly and can last several days. Symptoms vary by age, but can include: 
 fever/chills  sore throat  muscle aches  fatigue  cough  headache  runny or stuffy nose Flu can also lead to pneumonia and blood infections, and cause diarrhea and seizures in children. If you have a medical condition, such as heart or lung disease, flu can make it worse. Flu is more dangerous for some people. Infants and young children, people 72years of age and older, pregnant women, and people with certain health conditions or a weakened immune system are at greatest risk. Each year thousands of people in the [facility] die from flu, and many more are hospitalized. Flu vaccine can: 
 keep you from getting flu, 
 make flu less severe if you do get it, and 
 keep you from spreading flu to your family and other people. 2. Inactivated and recombinant flu vaccines A dose of flu vaccine is recommended every flu season. Children 6 months through 6years of age may need two doses during the same flu season. Everyone else needs only one dose each flu season. Some inactivated flu vaccines contain a very small amount of a mercury-based preservative called thimerosal. Studies have not shown thimerosal in vaccines to be harmful, but flu vaccines that do not contain thimerosal are available. There is no live flu virus in flu shots. They cannot cause the flu. There are many flu viruses, and they are always changing. Each year a new flu vaccine is made to protect against three or four viruses that are likely to cause disease in the upcoming flu season. But even when the vaccine doesnt exactly match these viruses, it may still provide some protection Flu vaccine cannot prevent: 
 flu that is caused by a virus not covered by the vaccine, or 
 illnesses that look like flu but are not. It takes about 2 weeks for protection to develop after vaccination, and protection lasts through the flu season. 3. Some people should not get this vaccine Tell the person who is giving you the vaccine:  If you have any severe, life-threatening allergies.    
If you ever had a life-threatening allergic reaction after a dose of flu vaccine, or have a severe allergy to any part of this vaccine, you may be advised not to get vaccinated. Most, but not all, types of flu vaccine contain a small amount of egg protein.  If you ever had Guillain-Barré Syndrome (also called GBS). Some people with a history of GBS should not get this vaccine. This should be discussed with your doctor.  If you are not feeling well. It is usually okay to get flu vaccine when you have a mild illness, but you might be asked to come back when you feel better. 4. Risks of a vaccine reaction With any medicine, including vaccines, there is a chance of reactions. These are usually mild and go away on their own, but serious reactions are also possible. Most people who get a flu shot do not have any problems with it. Minor problems following a flu shot include:  
 soreness, redness, or swelling where the shot was given  hoarseness  sore, red or itchy eyes  cough  fever  aches  headache  itching  fatigue If these problems occur, they usually begin soon after the shot and last 1 or 2 days. More serious problems following a flu shot can include the following:  There may be a small increased risk of Guillain-Barré Syndrome (GBS) after inactivated flu vaccine. This risk has been estimated at 1 or 2 additional cases per million people vaccinated. This is much lower than the risk of severe complications from flu, which can be prevented by flu vaccine.  Young children who get the flu shot along with pneumococcal vaccine (PCV13) and/or DTaP vaccine at the same time might be slightly more likely to have a seizure caused by fever. Ask your doctor for more information. Tell your doctor if a child who is getting flu vaccine has ever had a seizure. Problems that could happen after any injected vaccine:  People sometimes faint after a medical procedure, including vaccination. Sitting or lying down for about 15 minutes can help prevent fainting, and injuries caused by a fall. Tell your doctor if you feel dizzy, or have vision changes or ringing in the ears.  Some people get severe pain in the shoulder and have difficulty moving the arm where a shot was given. This happens very rarely.  Any medication can cause a severe allergic reaction. Such reactions from a vaccine are very rare, estimated at about 1 in a million doses, and would happen within a few minutes to a few hours after the vaccination. As with any medicine, there is a very remote chance of a vaccine causing a serious injury or death. The safety of vaccines is always being monitored. For more information, visit: www.cdc.gov/vaccinesafety/ 
 
 
The [facility] Vaccine Injury Compensation Program (VICP) is a federal program that was created to compensate people who may have been injured by certain vaccines.  
 
Persons who believe they may have been injured by a vaccine can learn about the program and about filing a claim by calling 4-325.356.7225 or visiting the [address] website at [email or web address].gov/vaccinecompensation. There is a time limit to file a claim for compensation. 7. How can I learn more?  Ask your healthcare provider. He or she can give you the vaccine package insert or suggest other sources of information.  Call your local or state health department.  Contact the Centers for Disease Control and Prevention (CDC): 
- Call 9-417.570.3261 (1-800-CDC-INFO) or 
- Visit CDCs website at www.cdc.gov/flu Vaccine Information Statement Inactivated Influenza Vaccine 8/7/2015 
42 SONIDO Xavier [id number]-81 Department of [facility] [employer] Centers for Disease Control and Prevention Office Use Only

## 2018-09-28 NOTE — PROGRESS NOTES
Reviewed record in preparation for visit and have obtained necessary documentation. Identified pt with two pt identifiers(name and ). Health Maintenance Due Topic  Hepatitis C Screening  Pneumococcal 19-64 Medium Risk (1 of 1 - PPSV23)  DTaP/Tdap/Td series (1 - Tdap)  PAP AKA CERVICAL CYTOLOGY  Shingrix Vaccine Age 50> (1 of 2)  FOBT Q 1 YEAR AGE 50-75  Influenza Age 5 to Adult Chief Complaint Patient presents with  Back Pain  Insomnia Wt Readings from Last 3 Encounters:  
18 167 lb 9.6 oz (76 kg) 18 152 lb 14.4 oz (69.4 kg) 18 153 lb 14.4 oz (69.8 kg) Temp Readings from Last 3 Encounters:  
18 98.2 °F (36.8 °C) (Oral) 18 97.1 °F (36.2 °C) (Oral) 18 97.7 °F (36.5 °C) (Oral) BP Readings from Last 3 Encounters:  
18 132/82  
18 (!) 190/110  
18 (!) 166/97 Pulse Readings from Last 3 Encounters:  
18 82  
18 84  
18 (!) 101 Learning Assessment: 
:  
 
Learning Assessment 2018 PRIMARY LEARNER Patient Patient HIGHEST LEVEL OF EDUCATION - PRIMARY LEARNER  4 YEARS OF COLLEGE -  
BARRIERS PRIMARY LEARNER NONE -  
CO-LEARNER CAREGIVER No - PRIMARY LANGUAGE ENGLISH ENGLISH  
LEARNER PREFERENCE PRIMARY DEMONSTRATION DEMONSTRATION  
ANSWERED BY patient  Patient RELATIONSHIP SELF SELF Depression Screening: 
:  
 
PHQ over the last two weeks 2018 Little interest or pleasure in doing things Not at all Feeling down, depressed, irritable, or hopeless Not at all Total Score PHQ 2 0 Fall Risk Assessment: 
:  
 
Fall Risk Assessment, last 12 mths 2018 Able to walk? Yes Fall in past 12 months? No  
 
 
Abuse Screening: 
:  
 
Abuse Screening Questionnaire 2018 Do you ever feel afraid of your partner? Munir Douglass Are you in a relationship with someone who physically or mentally threatens you?  Munir Douglass  
 Is it safe for you to go home? Claudia Decker Coordination of Care Questionnaire: 
:  
 
1) Have you been to an emergency room, urgent care clinic since your last visit? no  
Hospitalized since your last visit? no          
 
2) Have you seen or consulted any other health care providers outside of [address] [place] [address] since your last visit? no  (Include any pap smears or colon screenings in this section.) 3) Do you have an Advance Directive on file? no 
 
4) Are you interested in receiving information on Advance Directives? NO Patient is accompanied by self I have received verbal consent from Felicita Zaragoza to discuss any/all medical information while they are present in the room.

## 2018-10-03 ENCOUNTER — HOSPITAL ENCOUNTER (OUTPATIENT)
Dept: VASCULAR SURGERY | Age: 53
Discharge: HOME | End: 2018-10-03
Attending: INTERNAL MEDICINE
Payer: COMMERCIAL

## 2018-10-03 ENCOUNTER — HOSPITAL ENCOUNTER (OUTPATIENT)
Dept: MAMMOGRAPHY | Age: 53
Discharge: HOME | End: 2018-10-03
Attending: INTERNAL MEDICINE
Payer: COMMERCIAL

## 2018-10-03 DIAGNOSIS — M79.89 LEFT LEG SWELLING: ICD-10-CM

## 2018-10-03 DIAGNOSIS — Z12.39 SCREENING BREAST EXAMINATION: ICD-10-CM

## 2018-10-03 PROCEDURE — 77067 SCR MAMMO BI INCL CAD: CPT

## 2018-10-03 PROCEDURE — 93971 EXTREMITY STUDY: CPT

## 2018-10-03 NOTE — PROCEDURES
[facility]  *** FINAL REPORT ***    Name: Janeth Ching  MRN: [id number]    Outpatient  : 03 May 1965  HIS Order #: [id number]  [address] [facility] Visit #: [id number]  Date: 03 Oct 2018    TYPE OF TEST: Peripheral Venous Testing    REASON FOR TEST  Pain in limb, Limb swelling    Left Leg:-  Deep venous thrombosis:           No  Superficial venous thrombosis:    No  Deep venous insufficiency:        Not examined  Superficial venous insufficiency: Not examined      INTERPRETATION/FINDINGS  PROCEDURE:  Color duplex ultrasound imaging of lower extremity veins. FINDINGS:       Right: The common femoral vein is patent and without evidence of  thrombus. Phasic flow is observed. This extremity was not otherwise  evaluated. Left:   The common femoral, deep femoral, femoral, popliteal,  posterior tibial, peroneal, and great saphenous are patent and without   evidence of thrombus;  each is fully compressible and there is no  narrowing of the flow channel on color Doppler imaging. Phasic flow  is observed in the common femoral vein. There is an incidental  finding of a popliteal fossa fluid collection measuring 1.6cm x 1.2cm. IMPRESSION:  No evidence of left lower extremity vein thrombosis. There is an incidental finding of a left popliteal fossa fluid  collection as described above. ADDITIONAL COMMENTS    I have personally reviewed the data relevant to the interpretation of  this  study.     TECHNOLOGIST: Renetta Salter  Signed: 10/03/2018 05:18 PM    PHYSICIAN: Geovanni Anne MD  Signed: 10/03/2018 05:40 PM

## 2018-10-04 NOTE — PROGRESS NOTES
The test showed no blood clot. It did show only the cyst behind the knee Message sent to patient via [employer]: 
October 3, 2018

## 2018-10-09 ENCOUNTER — TELEPHONE (OUTPATIENT)
Dept: INTERNAL MEDICINE CLINIC | Age: 53
End: 2018-10-09

## 2018-10-09 NOTE — TELEPHONE ENCOUNTER
Spoke with patient. She took meloxicam for 2 days and notice it was causing whelps on her neck. She took benadryl and the whelps went away. She has not taken any medication since 10/1/18.  She would like Dr Paris Giron to prescribe something that doesn't have any Asprin in it to help with the pain she is having in her back

## 2018-10-09 NOTE — TELEPHONE ENCOUNTER
It was mostly for her leg and all of the anti-inflammatories can cause that reaction to reduce the inflammation in the cyst in her knee. Has she ever tried tramadol, it is part of the narcotic family but it doesn't have any aspirin. It can be a little sedating.

## 2018-10-09 NOTE — TELEPHONE ENCOUNTER
Has tried tramadol in the past and it did not help. She has see orthopedics and done physical therapy with no relief. Wants to know if she can have cyst cut out. I advised her I would send a message to Dr Shashi Grimes and see how he feels about generating a referral to a surgeon.

## 2018-10-09 NOTE — TELEPHONE ENCOUNTER
Patient called stating that she is taking the medication that  prescribed for her but she is having welts on the back of her neck. She would like something prescribed for the pain. Please call her back at [phone]

## 2018-10-10 NOTE — TELEPHONE ENCOUNTER
I sent her a mychart reply to her message that it is better for her to contact ortho about the cyst as medications she can't tolerate to help it.

## 2018-11-02 DIAGNOSIS — J45.20 MILD INTERMITTENT ASTHMA WITHOUT COMPLICATION: ICD-10-CM

## 2018-11-02 NOTE — TELEPHONE ENCOUNTER
Requested Prescriptions     Pending Prescriptions Disp Refills    fluticasone (FLOVENT HFA) 220 mcg/actuation inhaler 1 Inhaler 12     Sig: Take 1 Puff by inhalation two (2) times a day.  albuterol (PROVENTIL HFA, VENTOLIN HFA, PROAIR HFA) 90 mcg/actuation inhaler 1 Inhaler 5     Sig: Take 2 Puffs by inhalation every four (4) hours as needed for Wheezing.      07/18/18  No upcoming appt    [address]es, [address] [place] [address] W

## 2018-11-03 RX ORDER — FLUTICASONE PROPIONATE 220 UG/1
1 AEROSOL, METERED RESPIRATORY (INHALATION) 2 TIMES DAILY
Qty: 1 INHALER | Refills: 12 | Status: SHIPPED | OUTPATIENT
Start: 2018-11-03 | End: 2020-05-06 | Stop reason: SDUPTHER

## 2018-11-03 RX ORDER — ALBUTEROL SULFATE 90 UG/1
2 AEROSOL, METERED RESPIRATORY (INHALATION)
Qty: 1 INHALER | Refills: 5 | Status: SHIPPED | OUTPATIENT
Start: 2018-11-03 | End: 2019-03-21 | Stop reason: SDUPTHER

## 2018-11-05 NOTE — ANCILLARY DISCHARGE INSTRUCTIONS
[facility]  [address] [facility] [address] [address] [facility] Chiara Marquez  Phone: [phone] Fax: [phone]    Discharge Summary  2-15    Patient name: Meeta Loomis  : 1965  Provider#: [id number]  Referral source: [facility], NP      Medical/Treatment Diagnosis: Neck pain [M54.2]  Low back pain [M54.5]     Prior Hospitalization: see medical history     Comorbidities: See Plan of Care  Prior Level of Function:See Plan of Care  Medications: Verified on Patient Summary List    Start of Care: 18      Onset Date:s/p injury 18   Visits from Start of Care: 1     Missed Visits: 1  Reporting Period : 18 to 18      ASSESSMENT/SUMMARY OF CARE: Pt. No-showed her one scheduled follow up appointment after the initial evaluation. Pt is discharged today, 2018, as they have stopped attending therapy. Final objective data and outcomes were unable to be obtained.       RECOMMENDATIONS:  [x]Discontinue therapy: []Patient has reached or is progressing toward set goals      [x]Patient is non-compliant or has abdicated      []Due to lack of appreciable progress towards set goals      []Danyel Lopes PT 2018 4:03 PM

## 2018-11-16 ENCOUNTER — TELEPHONE (OUTPATIENT)
Dept: INTERNAL MEDICINE CLINIC | Age: 53
End: 2018-11-16

## 2018-11-16 NOTE — TELEPHONE ENCOUNTER
Hilary Encarnacion, [address] [facility]             Pt is requesting to know if you could fax her job a copy of her flu shot she had a few weeks ago. Pts number is work [phone] or cell [phone] and work fax [phone] att [address] [place] [address].

## 2018-12-31 ENCOUNTER — OFFICE VISIT (OUTPATIENT)
Dept: INTERNAL MEDICINE CLINIC | Age: 53
End: 2018-12-31

## 2018-12-31 VITALS
RESPIRATION RATE: 16 BRPM | HEART RATE: 94 BPM | HEIGHT: 60 IN | DIASTOLIC BLOOD PRESSURE: 110 MMHG | TEMPERATURE: 99.6 F | BODY MASS INDEX: 31.39 KG/M2 | SYSTOLIC BLOOD PRESSURE: 180 MMHG | WEIGHT: 159.9 LBS | OXYGEN SATURATION: 98 %

## 2018-12-31 DIAGNOSIS — R49.0 HOARSENESS: ICD-10-CM

## 2018-12-31 DIAGNOSIS — J40 BRONCHITIS: Primary | ICD-10-CM

## 2018-12-31 RX ORDER — FLUTICASONE PROPIONATE 50 MCG
2 SPRAY, SUSPENSION (ML) NASAL DAILY
Qty: 1 BOTTLE | Refills: 0 | Status: SHIPPED | OUTPATIENT
Start: 2018-12-31 | End: 2019-03-21 | Stop reason: SDDI

## 2018-12-31 RX ORDER — METHYLPREDNISOLONE 4 MG/1
TABLET ORAL
Qty: 1 DOSE PACK | Refills: 0 | Status: SHIPPED | OUTPATIENT
Start: 2018-12-31 | End: 2019-03-21

## 2018-12-31 NOTE — PROGRESS NOTES
Chief Complaint   Patient presents with    Croup    Cough     mostly at night     Reviewed record in preparation for visit and have obtained necessary documentation. Identified pt with two pt identifiers(name and ). Health Maintenance Due   Topic    Hepatitis C Screening     Pneumococcal 19-64 Medium Risk (1 of 1 - PPSV23)    DTaP/Tdap/Td series (1 - Tdap)    PAP AKA CERVICAL CYTOLOGY     Shingrix Vaccine Age 50> (1 of 2)    FOBT Q 1 YEAR AGE 50-75          Chief Complaint   Patient presents with    Croup    Cough     mostly at night        Wt Readings from Last 3 Encounters:   18 159 lb 14.4 oz (72.5 kg)   18 167 lb 9.6 oz (76 kg)   18 152 lb 14.4 oz (69.4 kg)     Temp Readings from Last 3 Encounters:   18 99.6 °F (37.6 °C) (Oral)   18 98.2 °F (36.8 °C) (Oral)   18 98.2 °F (36.8 °C) (Oral)     BP Readings from Last 3 Encounters:   18 (!) 180/110   18 138/82   18 132/82     Pulse Readings from Last 3 Encounters:   18 94   18 85   18 82           Learning Assessment:  :     Learning Assessment 2018   PRIMARY LEARNER Patient Patient   HIGHEST LEVEL OF EDUCATION - PRIMARY LEARNER  4 YEARS OF COLLEGE -   BARRIERS PRIMARY LEARNER NONE -   CO-LEARNER CAREGIVER No -   PRIMARY LANGUAGE ENGLISH ENGLISH   LEARNER PREFERENCE PRIMARY DEMONSTRATION DEMONSTRATION   ANSWERED BY patient  Patient   RELATIONSHIP SELF SELF       Depression Screening:  :     PHQ over the last two weeks 2018   Little interest or pleasure in doing things Not at all   Feeling down, depressed, irritable, or hopeless Not at all   Total Score PHQ 2 0       Fall Risk Assessment:  :     Fall Risk Assessment, last 12 mths 2018   Able to walk? Yes   Fall in past 12 months? No       Abuse Screening:  :     Abuse Screening Questionnaire 2018   Do you ever feel afraid of your partner?  N N   Are you in a relationship with someone who physically or mentally threatens you? N N   Is it safe for you to go home? Y Y       Coordination of Care Questionnaire:  :     1) Have you been to an emergency room, urgent care clinic since your last visit? no   Hospitalized since your last visit? no             2) Have you seen or consulted any other health care providers outside of [address] [place] [address] since your last visit? no  (Include any pap smears or colon screenings in this section.)    3) Do you have an Advance Directive on file? no    4) Are you interested in receiving information on Advance Directives? NO      Patient is accompanied by self I have received verbal consent from Colleen Vanegas to discuss any/all medical information while they are present in the room. Reviewed record in preparation for visit and have obtained necessary documentation. Identified pt with two pt identifiers(name and ). Reviewed record  In preparation for visit and have obtained necessary documentation.        Health Maintenance Due   Topic    Hepatitis C Screening     Pneumococcal 19-64 Medium Risk (1 of 1 - PPSV23)    DTaP/Tdap/Td series (1 - Tdap)    PAP AKA CERVICAL CYTOLOGY     Shingrix Vaccine Age 50> (1 of 2)    FOBT Q 1 YEAR AGE 50-75          Chief Complaint   Patient presents with    Croup    Cough     mostly at night        Wt Readings from Last 3 Encounters:   18 159 lb 14.4 oz (72.5 kg)   18 167 lb 9.6 oz (76 kg)   18 152 lb 14.4 oz (69.4 kg)     Temp Readings from Last 3 Encounters:   18 99.6 °F (37.6 °C) (Oral)   18 98.2 °F (36.8 °C) (Oral)   18 98.2 °F (36.8 °C) (Oral)     BP Readings from Last 3 Encounters:   18 138/82   18 132/82   18 (!) 190/110     Pulse Readings from Last 3 Encounters:   18 85   18 82   18 84           Learning Assessment:  :     Learning Assessment 2018   PRIMARY LEARNER Patient Patient   HIGHEST LEVEL OF EDUCATION - PRIMARY LEARNER [facility] PRIMARY LEARNER NONE -   CO-LEARNER CAREGIVER No -   PRIMARY LANGUAGE ENGLISH ENGLISH   LEARNER PREFERENCE PRIMARY DEMONSTRATION DEMONSTRATION   ANSWERED BY patient  Patient   RELATIONSHIP SELF SELF       Depression Screening:  :     PHQ over the last two weeks 9/28/2018   Little interest or pleasure in doing things Not at all   Feeling down, depressed, irritable, or hopeless Not at all   Total Score PHQ 2 0       Fall Risk Assessment:  :     Fall Risk Assessment, last 12 mths 9/28/2018   Able to walk? Yes   Fall in past 12 months? No       Abuse Screening:  :     Abuse Screening Questionnaire 7/18/2018 9/7/2017   Do you ever feel afraid of your partner? N N   Are you in a relationship with someone who physically or mentally threatens you? N N   Is it safe for you to go home? Y Y       Coordination of Care Questionnaire:  :     1) Have you been to an emergency room, urgent care clinic since your last visit? no   Hospitalized since your last visit? no             2) Have you seen or consulted any other health care providers outside of [address] [place] [address] since your last visit? no  (Include any pap smears or colon screenings in this section.)    3) Do you have an Advance Directive on file? no    4) Are you interested in receiving information on Advance Directives? NO      Patient is accompanied by self I have received verbal consent from Corrinne Money to discuss any/all medical information while they are present in the room. Reviewed record  In preparation for visit and have obtained necessary documentation.

## 2018-12-31 NOTE — PATIENT INSTRUCTIONS
Bronchitis: Care Instructions  Your Care Instructions    Bronchitis is inflammation of the bronchial tubes, which carry air to the lungs. The tubes swell and produce mucus, or phlegm. The mucus and inflamed bronchial tubes make you cough. You may have trouble breathing. Most cases of bronchitis are caused by viruses like those that cause colds. Antibiotics usually do not help and they may be harmful. Bronchitis usually develops rapidly and lasts about 2 to 3 weeks in otherwise healthy people. Follow-up care is a key part of your treatment and safety. Be sure to make and go to all appointments, and call your doctor if you are having problems. It's also a good idea to know your test results and keep a list of the medicines you take. How can you care for yourself at home? · Take all medicines exactly as prescribed. Call your doctor if you think you are having a problem with your medicine. · Get some extra rest.  · Take an over-the-counter pain medicine, such as acetaminophen (Tylenol), ibuprofen (Advil, Motrin), or naproxen (Aleve) to reduce fever and relieve body aches. Read and follow all instructions on the label. · Do not take two or more pain medicines at the same time unless the doctor told you to. Many pain medicines have acetaminophen, which is Tylenol. Too much acetaminophen (Tylenol) can be harmful. · Take an over-the-counter cough medicine that contains dextromethorphan to help quiet a dry, hacking cough so that you can sleep. Avoid cough medicines that have more than one active ingredient. Read and follow all instructions on the label. · Breathe moist air from a humidifier, hot shower, or sink filled with hot water. The heat and moisture will thin mucus so you can cough it out. · Do not smoke. Smoking can make bronchitis worse. If you need help quitting, talk to your doctor about stop-smoking programs and medicines. These can increase your chances of quitting for good.   When should you call for help? Call 911 anytime you think you may need emergency care. For example, call if:    · You have severe trouble breathing.    Call your doctor now or seek immediate medical care if:    · You have new or worse trouble breathing.     · You cough up dark brown or bloody mucus (sputum).     · You have a new or higher fever.     · You have a new rash.    Watch closely for changes in your health, and be sure to contact your doctor if:    · You cough more deeply or more often, especially if you notice more mucus or a change in the color of your mucus.     · You are not getting better as expected. Where can you learn more? Go to http://les-francisco.info/. Enter H333 in the search box to learn more about \"Bronchitis: Care Instructions. \"  Current as of: December 6, 2017  Content Version: 11.8  © 4561-8967 [employer]. Care instructions adapted under license by [employer] (which disclaims liability or warranty for this information). If you have questions about a medical condition or this instruction, always ask your healthcare professional. [address] any warranty or liability for your use of this information.

## 2018-12-31 NOTE — PROGRESS NOTES
HISTORY OF PRESENT ILLNESS  Althea Fragoso is a 52 y.o. female. Patient reports hoarseness and dry cough for a couple weeks. She has some nasal congestion and occasionally produces cloudy sputum. No fever, chills, or body aches. She is overall feeling better, but hoarseness and cough will not go away. She has noticed wheezing and shortness of breath at times. She has been using her albuterol, but she has been out of her flovent. She plans to  a refill today. She has used OTC cough syrup, ipratropium nasal spray, honey, and nasal saline with no relief. She has not taken her blood pressure medication for a few days, but was picking up her refill today. She has history of asthma. Visit Vitals  BP (!) 180/110 (BP 1 Location: Left arm, BP Patient Position: Sitting)   Pulse 94   Temp 99.6 °F (37.6 °C) (Oral)   Resp 16   Ht 5' (1.524 m)   Wt 159 lb 14.4 oz (72.5 kg)   SpO2 98%   BMI 31.23 kg/m²       HPI    Review of Systems   Respiratory: Positive for cough and wheezing. Physical Exam   Constitutional: She is oriented to person, place, and time. She appears well-developed and well-nourished. HENT:   Head: Normocephalic. Right Ear: Hearing, tympanic membrane, external ear and ear canal normal.   Left Ear: Hearing, tympanic membrane, external ear and ear canal normal.   Nose: Mucosal edema and rhinorrhea present. Mouth/Throat: Uvula is midline, oropharynx is clear and moist and mucous membranes are normal.   Neck: Normal range of motion. Neck supple. Cardiovascular: Normal rate, regular rhythm and normal heart sounds. Pulmonary/Chest: She has wheezes in the right upper field and the right middle field. Neurological: She is alert and oriented to person, place, and time. Skin: Skin is warm and dry. Psychiatric: She has a normal mood and affect. ASSESSMENT and PLAN    ICD-10-CM ICD-9-CM    1. Bronchitis J40 490    2.  Hoarseness R49.0 784.42      Orders Placed This Encounter    methylPREDNISolone (MEDROL DOSEPACK) 4 mg tablet    fluticasone (FLONASE) 50 mcg/actuation nasal spray   monitor blood pressure at home-take medication ASAP; follow-up if no improvement after taking medication  Follow-up if no improvement over the next week or fever develops

## 2019-01-18 ENCOUNTER — TELEPHONE (OUTPATIENT)
Dept: INTERNAL MEDICINE CLINIC | Age: 54
End: 2019-01-18

## 2019-01-18 NOTE — TELEPHONE ENCOUNTER
Patient called stating that she finished the steroids that chino gave her. She says that she has had numbness off and on since taking the steroids. She also said that her throat was tingling. I offered her an appointment with Hari Vaughn on Monday and with  on Thursday. She declined both appointments due to her work schedule.

## 2019-01-22 ENCOUNTER — OFFICE VISIT (OUTPATIENT)
Dept: INTERNAL MEDICINE CLINIC | Age: 54
End: 2019-01-22

## 2019-01-22 VITALS
SYSTOLIC BLOOD PRESSURE: 130 MMHG | DIASTOLIC BLOOD PRESSURE: 84 MMHG | OXYGEN SATURATION: 98 % | WEIGHT: 167.6 LBS | BODY MASS INDEX: 32.9 KG/M2 | RESPIRATION RATE: 16 BRPM | TEMPERATURE: 98.1 F | HEIGHT: 60 IN | HEART RATE: 98 BPM

## 2019-01-22 DIAGNOSIS — M71.22 BAKER'S CYST OF KNEE, LEFT: Primary | ICD-10-CM

## 2019-01-22 DIAGNOSIS — M62.838 MUSCLE SPASMS OF NECK: ICD-10-CM

## 2019-01-22 DIAGNOSIS — R20.2 NUMBNESS AND TINGLING IN LEFT HAND: ICD-10-CM

## 2019-01-22 DIAGNOSIS — R07.9 CHEST PAIN, UNSPECIFIED TYPE: ICD-10-CM

## 2019-01-22 DIAGNOSIS — R20.0 NUMBNESS AND TINGLING IN LEFT HAND: ICD-10-CM

## 2019-01-22 RX ORDER — CYCLOBENZAPRINE HCL 10 MG
10 TABLET ORAL
Qty: 12 TAB | Refills: 0 | Status: SHIPPED | OUTPATIENT
Start: 2019-01-22 | End: 2019-03-21

## 2019-01-22 NOTE — PATIENT INSTRUCTIONS
Neck: Exercises  Your Care Instructions  Here are some examples of typical rehabilitation exercises for your condition. Start each exercise slowly. Ease off the exercise if you start to have pain. Your doctor or physical therapist will tell you when you can start these exercises and which ones will work best for you. How to do the exercises  Neck stretch    1. This stretch works best if you keep your shoulder down as you lean away from it. To help you remember to do this, start by relaxing your shoulders and lightly holding on to your thighs or your chair. 2. Tilt your head toward your shoulder and hold for 15 to 30 seconds. Let the weight of your head stretch your muscles. 3. If you would like a little added stretch, use your hand to gently and steadily pull your head toward your shoulder. For example, keeping your right shoulder down, lean your head to the left. 4. Repeat 2 to 4 times toward each shoulder. Diagonal neck stretch    1. Turn your head slightly toward the direction you will be stretching, and tilt your head diagonally toward your chest and hold for 15 to 30 seconds. 2. If you would like a little added stretch, use your hand to gently and steadily pull your head forward on the diagonal.  3. Repeat 2 to 4 times toward each side. Dorsal glide stretch    1. Sit or stand tall and look straight ahead. 2. Slowly tuck your chin as you glide your head backward over your body  3. Hold for a count of 6, and then relax for up to 10 seconds. 4. Repeat 8 to 12 times. Chest and shoulder stretch    1. Sit or stand tall and glide your head backward as in the dorsal glide stretch. 2. Raise both arms so that your hands are next to your ears. 3. Take a deep breath, and as you breathe out, lower your elbows down and behind your back. You will feel your shoulder blades slide down and together, and at the same time you will feel a stretch across your chest and the front of your shoulders.   4. Hold for about 6 seconds, and then relax for up to 10 seconds. 5. Repeat 8 to 12 times. Strengthening: Hands on head    1. Move your head backward, forward, and side to side against gentle pressure from your hands, holding each position for about 6 seconds. 2. Repeat 8 to 12 times. Follow-up care is a key part of your treatment and safety. Be sure to make and go to all appointments, and call your doctor if you are having problems. It's also a good idea to know your test results and keep a list of the medicines you take. Where can you learn more? Go to http://les-francisco.info/. Enter P975 in the search box to learn more about \"Neck: Exercises. \"  Current as of: September 20, 2018  Content Version: 11.9  © 8620-8966 [employer]. Care instructions adapted under license by [employer] (which disclaims liability or warranty for this information). If you have questions about a medical condition or this instruction, always ask your healthcare professional. [place] [address] any warranty or liability for your use of this information. Baker's Cyst: Care Instructions  Your Care Instructions    A Baker's cyst is a swelling behind the knee. It may cause pain or stiffness when you bend your knee or straighten it all the way. Baker's cysts are also called popliteal cysts. If you have arthritis or another condition that is the cause of the Baker's cyst, your doctor may treat that condition. A Baker's cyst may go away on its own. If not, or if it is causing a lot of discomfort, your doctor may drain the fluid that has built up behind the knee. In some cases, a Baker's cyst is removed in surgery. There are things you can do at home, such as staying off your leg, to reduce the swelling and pain. Follow-up care is a key part of your treatment and safety. Be sure to make and go to all appointments, and call your doctor if you are having problems.  It's also a good idea to know your test results and keep a list of the medicines you take. How can you care for yourself at home? · Rest your knee as much as possible. · Ask your doctor if you can take an over-the-counter pain medicine, such as acetaminophen (Tylenol), ibuprofen (Advil, Motrin), or naproxen (Aleve). Be safe with medicines. Read and follow all instructions on the label. · Use a cane, a crutch, a walker, or another device if you need help to get around. These can help rest your knees. · If you have an elastic bandage, make sure it is snug but not so tight that your leg is numb, tingles, or swells below the bandage. Ask your doctor if you can loosen the bandage if it is too tight. · Follow your doctor's instructions about how much weight you can put on your knee. · Stay at a healthy weight. Being overweight puts extra strain on your knee. When should you call for help? Call 911 anytime you think you may need emergency care. For example, call if:    · You have chest pain, are short of breath, or you cough up blood.    Call your doctor now or seek immediate medical care if:    · You have new or worse pain.     · Your foot is cool or pale or changes color.     · You have tingling, weakness, or numbness in your foot or toes.     · You have signs of a blood clot in your leg (called a deep vein thrombosis), such as:  ? Pain in your calf, back of the knee, thigh, or groin. ? Redness or swelling in your leg.    Watch closely for changes in your health, and be sure to contact your doctor if:    · You do not get better as expected. Where can you learn more? Go to http://les-francisco.info/. Enter B846 in the search box to learn more about \"Baker's Cyst: Care Instructions. \"  Current as of: September 20, 2018  Content Version: 11.9  © 3927-1647 [employer].  Care instructions adapted under license by [employer] (which disclaims liability or warranty for this information). If you have questions about a medical condition or this instruction, always ask your healthcare professional. [place] [address] any warranty or liability for your use of this information.

## 2019-01-22 NOTE — PROGRESS NOTES
HISTORY OF PRESENT ILLNESS  Gavin Resendiz is a 52 y.o. female. Patient reports severe pain behind left knee with pain radiating to left foot due to worsening baker's cyst over the last month. She had steroid injection 3 months ago by Dr. Corinne Forts. She reports having it drained about 5 years ago, but it is significantly worse now. She also reports tingling of left hand and spasms of neck muscles x 3 weeks. She has tried ice/heat with no relief. Patient has also noticed left sided epigastric pain x 2-3 weeks since URI. She reports no known cause of pain, but did have a lot of coughing prior to the start of this pain. She reports the pain did not start until she completed the course of steroids. She does not attribute eating or movement to making it worse. Pain occurs a few times per week and lasts only a few seconds. HPI    Review of Systems   Cardiovascular: Positive for chest pain. Musculoskeletal: Positive for neck pain. Left knee pain       Physical Exam   Constitutional: She is oriented to person, place, and time. She appears well-developed and well-nourished. Cardiovascular: Normal rate, regular rhythm and normal heart sounds. Pulmonary/Chest: Effort normal and breath sounds normal.   Musculoskeletal:   Enlarged, painful cyst noted behind left knee; pain radiates to anterior portion of left foot; left and right paraspinal cervical pain into trapezius; very tender to palpation with spasms noted, worse on left; no affect on range of motion of neck   Neurological: She is alert and oriented to person, place, and time. Skin: Skin is warm and dry. Psychiatric: She has a normal mood and affect. ASSESSMENT and PLAN    ICD-10-CM ICD-9-CM    1. Baker's cyst of knee, left M71.22 727.51    2. Chest pain, unspecified type R07.9 786.50 AMB POC EKG ROUTINE W/ 12 LEADS, INTER & REP   3. Numbness and tingling in left hand R20.0 782.0     R20.2     4.  Muscle spasms of neck M62.838 728.85      Orders Placed This Encounter    AMB POC EKG ROUTINE W/ 12 LEADS, INTER & REP    cyclobenzaprine (FLEXERIL) 10 mg tablet   consider epigastric pain may be muscular vs. GERD  Will trial zantac this week and see if it improves  Appointment scheduled tomorrow with [facility] to evaluate baker's cyst  Continue ice/heat to neck  Stretching exercises advised

## 2019-01-22 NOTE — PROGRESS NOTES
Chief Complaint   Patient presents with    Tingling     hands    Leg Swelling     left  popping,      Reviewed record in preparation for visit and have obtained necessary documentation. Identified pt with two pt identifiers(name and ). Health Maintenance Due   Topic    Hepatitis C Screening     Pneumococcal 19-64 Medium Risk (1 of 1 - PPSV23)    DTaP/Tdap/Td series (1 - Tdap)    PAP AKA CERVICAL CYTOLOGY     Shingrix Vaccine Age 50> (1 of 2)    FOBT Q 1 YEAR AGE 50-75          Chief Complaint   Patient presents with    Tingling     hands    Leg Swelling     left  popping,         Wt Readings from Last 3 Encounters:   19 167 lb 9.6 oz (76 kg)   18 159 lb 14.4 oz (72.5 kg)   18 167 lb 9.6 oz (76 kg)     Temp Readings from Last 3 Encounters:   19 98.1 °F (36.7 °C) (Oral)   18 99.6 °F (37.6 °C) (Oral)   18 98.2 °F (36.8 °C) (Oral)     BP Readings from Last 3 Encounters:   19 130/84   18 (!) 180/110   18 138/82     Pulse Readings from Last 3 Encounters:   19 98   18 94   18 85           Learning Assessment:  :     Learning Assessment 2019   PRIMARY LEARNER Patient Patient Patient   HIGHEST LEVEL OF EDUCATION - PRIMARY LEARNER  4 YEARS OF COLLEGE 4 YEARS OF COLLEGE -   BARRIERS PRIMARY LEARNER NONE NONE -   CO-LEARNER CAREGIVER No No -   PRIMARY LANGUAGE ENGLISH ENGLISH ENGLISH   LEARNER PREFERENCE PRIMARY DEMONSTRATION DEMONSTRATION DEMONSTRATION   ANSWERED BY patient patient  Patient   RELATIONSHIP SELF SELF SELF       Depression Screening:  :     PHQ over the last two weeks 2018   Little interest or pleasure in doing things Not at all   Feeling down, depressed, irritable, or hopeless Not at all   Total Score PHQ 2 0       Fall Risk Assessment:  :     Fall Risk Assessment, last 12 mths 2018   Able to walk? Yes   Fall in past 12 months?  No       Abuse Screening:  :     Abuse Screening Questionnaire 1/22/2019 7/18/2018 9/7/2017   Do you ever feel afraid of your partner? N N N   Are you in a relationship with someone who physically or mentally threatens you? N N N   Is it safe for you to go home? Y Y Y       Coordination of Care Questionnaire:  :     1) Have you been to an emergency room, urgent care clinic since your last visit? no   Hospitalized since your last visit? no             2) Have you seen or consulted any other health care providers outside of [address] [place] [address] since your last visit? no  (Include any pap smears or colon screenings in this section.)    3) Do you have an Advance Directive on file? no    4) Are you interested in receiving information on Advance Directives? NO      Patient is accompanied by self I have received verbal consent from Halle Frank to discuss any/all medical information while they are present in the room. Reviewed record  In preparation for visit and have obtained necessary documentation.

## 2019-03-21 ENCOUNTER — OFFICE VISIT (OUTPATIENT)
Dept: INTERNAL MEDICINE CLINIC | Age: 54
End: 2019-03-21

## 2019-03-21 VITALS
DIASTOLIC BLOOD PRESSURE: 108 MMHG | WEIGHT: 167 LBS | OXYGEN SATURATION: 97 % | TEMPERATURE: 99.2 F | BODY MASS INDEX: 32.79 KG/M2 | HEIGHT: 60 IN | RESPIRATION RATE: 18 BRPM | SYSTOLIC BLOOD PRESSURE: 180 MMHG | HEART RATE: 112 BPM

## 2019-03-21 DIAGNOSIS — R68.89 FLU-LIKE SYMPTOMS: Primary | ICD-10-CM

## 2019-03-21 DIAGNOSIS — J06.9 URI WITH COUGH AND CONGESTION: ICD-10-CM

## 2019-03-21 DIAGNOSIS — R03.0 BLOOD PRESSURE ELEVATED WITHOUT HISTORY OF HTN: ICD-10-CM

## 2019-03-21 LAB
FLUAV+FLUBV AG NOSE QL IA.RAPID: NEGATIVE POS/NEG
FLUAV+FLUBV AG NOSE QL IA.RAPID: NEGATIVE POS/NEG
VALID INTERNAL CONTROL?: YES

## 2019-03-21 RX ORDER — AZITHROMYCIN 250 MG/1
250 TABLET, FILM COATED ORAL SEE ADMIN INSTRUCTIONS
Qty: 6 TAB | Refills: 0 | Status: SHIPPED | OUTPATIENT
Start: 2019-03-21 | End: 2019-03-26

## 2019-03-21 RX ORDER — GUAIFENESIN 600 MG/1
600 TABLET, EXTENDED RELEASE ORAL 2 TIMES DAILY
Qty: 20 TAB | Refills: 0 | Status: SHIPPED | OUTPATIENT
Start: 2019-03-21 | End: 2019-03-31

## 2019-03-21 RX ORDER — ALBUTEROL SULFATE 90 UG/1
2 AEROSOL, METERED RESPIRATORY (INHALATION)
Qty: 1 INHALER | Refills: 5 | Status: SHIPPED | OUTPATIENT
Start: 2019-03-21 | End: 2020-05-06 | Stop reason: SDUPTHER

## 2019-03-21 NOTE — PATIENT INSTRUCTIONS
1. Mucinex (Guaifenesen) plain-Blue Box 600 mg. Take one twice daily with full glass water   Take for 10 days    2. Saline Nasal Spray - use liberally to flush post-nasal area; use as many times a day as desired. Keep spraying with head tilted back until you feel need to swallow    3. Drink lots of fluids (mainly water) to keep mucus thinner    4. If needed, for cough, we recommend Robitussin cough syrup    5. Decongestants should not be used as they actually contribute to overdrying/thickening of the mucus, and can raise the BP and overstimulate the heart    6. Tylenol as needed for fever and aching    7.  Warm salt water gargle - 1/4 tsp salt in 1 cup warm water

## 2019-03-21 NOTE — PROGRESS NOTES
Reviewed record in preparation for visit and have obtained necessary documentation. Identified pt with two pt identifiers(name and ). Health Maintenance Due   Topic    Hepatitis C Screening     Pneumococcal 0-64 years (1 of 1 - PPSV23)    DTaP/Tdap/Td series (1 - Tdap)    PAP AKA CERVICAL CYTOLOGY     Shingrix Vaccine Age 50> (1 of 2)    FOBT Q 1 YEAR AGE 54-65          Chief Complaint   Patient presents with    Sore Throat     Patient state x 1 day    Generalized Body Aches     X 1 week         Wt Readings from Last 3 Encounters:   19 167 lb (75.8 kg)   19 167 lb 9.6 oz (76 kg)   18 159 lb 14.4 oz (72.5 kg)     Temp Readings from Last 3 Encounters:   19 98.1 °F (36.7 °C) (Oral)   18 99.6 °F (37.6 °C) (Oral)   18 98.2 °F (36.8 °C) (Oral)     BP Readings from Last 3 Encounters:   19 130/84   18 (!) 180/110   18 138/82     Pulse Readings from Last 3 Encounters:   19 98   18 94   18 85           Learning Assessment:  :     Learning Assessment 2019   PRIMARY LEARNER Patient Patient Patient   HIGHEST LEVEL OF EDUCATION - PRIMARY LEARNER  4 YEARS OF COLLEGE 4 YEARS OF COLLEGE -   BARRIERS PRIMARY LEARNER NONE NONE -   CO-LEARNER CAREGIVER No No -   PRIMARY LANGUAGE ENGLISH ENGLISH ENGLISH   LEARNER PREFERENCE PRIMARY DEMONSTRATION DEMONSTRATION DEMONSTRATION   ANSWERED BY patient patient  Patient   RELATIONSHIP SELF SELF SELF       Depression Screening:  :     3 most recent PHQ Screens 3/21/2019   Little interest or pleasure in doing things Not at all   Feeling down, depressed, irritable, or hopeless Not at all   Total Score PHQ 2 0       Fall Risk Assessment:  :     Fall Risk Assessment, last 12 mths 2018   Able to walk? Yes   Fall in past 12 months? No       Abuse Screening:  :     Abuse Screening Questionnaire 2019   Do you ever feel afraid of your partner?  N N N   Are you in a relationship with someone who physically or mentally threatens you? N N N   Is it safe for you to go home? Y Y Y       Coordination of Care Questionnaire:  :     1) Have you been to an emergency room, urgent care clinic since your last visit? no   Hospitalized since your last visit? no             2) Have you seen or consulted any other health care providers outside of [address] [place] [address] since your last visit? no  (Include any pap smears or colon screenings in this section.)    3) Do you have an Advance Directive on file? no    4) Are you interested in receiving information on Advance Directives? NO      Patient is accompanied by self I have received verbal consent from [place] to discuss any/all medical information while they are present in the room.
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
## 2020-05-06 ENCOUNTER — VIRTUAL VISIT (OUTPATIENT)
Dept: INTERNAL MEDICINE CLINIC | Age: 55
End: 2020-05-06

## 2020-05-06 VITALS — WEIGHT: 140 LBS | BODY MASS INDEX: 27.48 KG/M2 | HEIGHT: 60 IN

## 2020-05-06 DIAGNOSIS — M25.562 PAIN AND SWELLING OF LEFT KNEE: ICD-10-CM

## 2020-05-06 DIAGNOSIS — I10 HYPERTENSION, UNSPECIFIED TYPE: ICD-10-CM

## 2020-05-06 DIAGNOSIS — G47.00 INSOMNIA, UNSPECIFIED TYPE: ICD-10-CM

## 2020-05-06 DIAGNOSIS — J45.20 MILD INTERMITTENT ASTHMA WITHOUT COMPLICATION: ICD-10-CM

## 2020-05-06 DIAGNOSIS — Z00.00 ENCOUNTER FOR MEDICAL EXAMINATION TO ESTABLISH CARE: Primary | ICD-10-CM

## 2020-05-06 DIAGNOSIS — M25.462 PAIN AND SWELLING OF LEFT KNEE: ICD-10-CM

## 2020-05-06 DIAGNOSIS — M62.830 BACK SPASM: ICD-10-CM

## 2020-05-06 DIAGNOSIS — M62.838 MUSCLE SPASMS OF NECK: ICD-10-CM

## 2020-05-06 DIAGNOSIS — M71.22 BAKER'S CYST OF KNEE, LEFT: ICD-10-CM

## 2020-05-06 RX ORDER — ALBUTEROL SULFATE 90 UG/1
2 AEROSOL, METERED RESPIRATORY (INHALATION)
Qty: 1 INHALER | Refills: 5 | Status: SHIPPED | OUTPATIENT
Start: 2020-05-06 | End: 2021-07-09

## 2020-05-06 RX ORDER — ZOLPIDEM TARTRATE 5 MG/1
5 TABLET ORAL
Qty: 30 TAB | Refills: 1 | Status: SHIPPED | OUTPATIENT
Start: 2020-05-06 | End: 2020-06-02 | Stop reason: SDUPTHER

## 2020-05-06 RX ORDER — TIZANIDINE 4 MG/1
4 TABLET ORAL 2 TIMES DAILY
Qty: 60 TAB | Refills: 0 | Status: SHIPPED | OUTPATIENT
Start: 2020-05-06 | End: 2020-06-11

## 2020-05-06 RX ORDER — FLUTICASONE PROPIONATE 220 UG/1
1 AEROSOL, METERED RESPIRATORY (INHALATION) 2 TIMES DAILY
Qty: 1 INHALER | Refills: 12 | Status: SHIPPED | OUTPATIENT
Start: 2020-05-06 | End: 2021-07-09

## 2020-05-06 RX ORDER — AMLODIPINE BESYLATE 10 MG/1
10 TABLET ORAL DAILY
Qty: 90 TAB | Refills: 1 | Status: SHIPPED | OUTPATIENT
Start: 2020-05-06 | End: 2020-08-26
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
## 2020-06-01 ENCOUNTER — HOSPITAL ENCOUNTER (OUTPATIENT)
Dept: GENERAL RADIOLOGY | Age: 55
Discharge: HOME | End: 2020-06-01
Payer: MEDICAID

## 2020-06-01 DIAGNOSIS — M25.562 PAIN AND SWELLING OF LEFT KNEE: ICD-10-CM

## 2020-06-01 DIAGNOSIS — M25.462 PAIN AND SWELLING OF LEFT KNEE: ICD-10-CM

## 2020-06-01 PROCEDURE — 73562 X-RAY EXAM OF KNEE 3: CPT

## 2020-06-02 DIAGNOSIS — G47.00 INSOMNIA, UNSPECIFIED TYPE: ICD-10-CM

## 2020-06-04 LAB
ALBUMIN SERPL-MCNC: 4.3 G/DL (ref 3.8–4.9)
ALBUMIN/GLOB SERPL: 1.4 {RATIO} (ref 1.2–2.2)
ALP SERPL-CCNC: 97 IU/L (ref 39–117)
ALT SERPL-CCNC: 8 IU/L (ref 0–32)
AST SERPL-CCNC: 11 IU/L (ref 0–40)
BASOPHILS # BLD AUTO: 0 X10E3/UL (ref 0–0.2)
BASOPHILS NFR BLD AUTO: 1 %
BILIRUB SERPL-MCNC: <0.2 MG/DL (ref 0–1.2)
BUN SERPL-MCNC: 11 MG/DL (ref 6–24)
BUN/CREAT SERPL: 12 (ref 9–23)
CALCIUM SERPL-MCNC: 9.8 MG/DL (ref 8.7–10.2)
CHLORIDE SERPL-SCNC: 103 MMOL/L (ref 96–106)
CHOLEST SERPL-MCNC: 234 MG/DL (ref 100–199)
CO2 SERPL-SCNC: 21 MMOL/L (ref 20–29)
CREAT SERPL-MCNC: 0.94 MG/DL (ref 0.57–1)
EOSINOPHIL # BLD AUTO: 0.1 X10E3/UL (ref 0–0.4)
EOSINOPHIL NFR BLD AUTO: 2 %
ERYTHROCYTE [DISTWIDTH] IN BLOOD BY AUTOMATED COUNT: 13.3 % (ref 11.7–15.4)
EST. AVERAGE GLUCOSE BLD GHB EST-MCNC: 120 MG/DL
GLOBULIN SER CALC-MCNC: 3 G/DL (ref 1.5–4.5)
GLUCOSE SERPL-MCNC: 98 MG/DL (ref 65–99)
HBA1C MFR BLD: 5.8 % (ref 4.8–5.6)
HCT VFR BLD AUTO: 40 % (ref 34–46.6)
HDLC SERPL-MCNC: 69 MG/DL
HGB BLD-MCNC: 12.8 G/DL (ref 11.1–15.9)
IMM GRANULOCYTES # BLD AUTO: 0.1 X10E3/UL (ref 0–0.1)
IMM GRANULOCYTES NFR BLD AUTO: 1 %
INTERPRETATION, 910389: NORMAL
LDLC SERPL CALC-MCNC: 134 MG/DL (ref 0–99)
LYMPHOCYTES # BLD AUTO: 2.6 X10E3/UL (ref 0.7–3.1)
LYMPHOCYTES NFR BLD AUTO: 45 %
MCH RBC QN AUTO: 29.4 PG (ref 26.6–33)
MCHC RBC AUTO-ENTMCNC: 32 G/DL (ref 31.5–35.7)
MCV RBC AUTO: 92 FL (ref 79–97)
MONOCYTES # BLD AUTO: 0.3 X10E3/UL (ref 0.1–0.9)
MONOCYTES NFR BLD AUTO: 6 %
NEUTROPHILS # BLD AUTO: 2.6 X10E3/UL (ref 1.4–7)
NEUTROPHILS NFR BLD AUTO: 45 %
PLATELET # BLD AUTO: 317 X10E3/UL (ref 150–450)
POTASSIUM SERPL-SCNC: 4.4 MMOL/L (ref 3.5–5.2)
PROT SERPL-MCNC: 7.3 G/DL (ref 6–8.5)
RBC # BLD AUTO: 4.36 X10E6/UL (ref 3.77–5.28)
SODIUM SERPL-SCNC: 143 MMOL/L (ref 134–144)
TRIGL SERPL-MCNC: 154 MG/DL (ref 0–149)
TSH SERPL DL<=0.005 MIU/L-ACNC: 2.02 UIU/ML (ref 0.45–4.5)
VLDLC SERPL CALC-MCNC: 31 MG/DL (ref 5–40)
WBC # BLD AUTO: 5.8 X10E3/UL (ref 3.4–10.8)

## 2020-06-04 RX ORDER — ZOLPIDEM TARTRATE 5 MG/1
5 TABLET ORAL
Qty: 30 TAB | Refills: 1 | Status: SHIPPED | OUTPATIENT
Start: 2020-06-04 | End: 2020-07-20

## 2020-06-04 NOTE — PROGRESS NOTES
X ray normal. There is an osteophyte, but it would not contributed to the swelling.  See is she would like ortho

## 2020-06-04 NOTE — PROGRESS NOTES
Lipids are high. Will start changes to diet with low carb, low salt and processed foods  A1c is elevated slightly.  Needs to watch sugar and car intake

## 2020-06-08 ENCOUNTER — TELEPHONE (OUTPATIENT)
Dept: INTERNAL MEDICINE CLINIC | Age: 55
End: 2020-06-08

## 2020-06-10 DIAGNOSIS — M62.830 BACK SPASM: ICD-10-CM

## 2020-06-11 RX ORDER — TIZANIDINE 4 MG/1
TABLET ORAL
Qty: 60 TAB | Refills: 0 | Status: SHIPPED | OUTPATIENT
Start: 2020-06-11 | End: 2020-07-09 | Stop reason: SDUPTHER

## 2020-07-09 ENCOUNTER — OFFICE VISIT (OUTPATIENT)
Dept: INTERNAL MEDICINE CLINIC | Age: 55
End: 2020-07-09

## 2020-07-09 VITALS
OXYGEN SATURATION: 97 % | HEIGHT: 60 IN | BODY MASS INDEX: 30.27 KG/M2 | RESPIRATION RATE: 18 BRPM | SYSTOLIC BLOOD PRESSURE: 122 MMHG | DIASTOLIC BLOOD PRESSURE: 84 MMHG | WEIGHT: 154.2 LBS | HEART RATE: 84 BPM

## 2020-07-09 DIAGNOSIS — J45.20 MILD INTERMITTENT ASTHMA WITHOUT COMPLICATION: ICD-10-CM

## 2020-07-09 DIAGNOSIS — M25.562 PAIN OF MENISCUS OF LEFT KNEE: ICD-10-CM

## 2020-07-09 DIAGNOSIS — M62.830 BACK SPASM: Primary | ICD-10-CM

## 2020-07-09 DIAGNOSIS — M54.50 LOW BACK PAIN WITHOUT SCIATICA, UNSPECIFIED BACK PAIN LATERALITY, UNSPECIFIED CHRONICITY: ICD-10-CM

## 2020-07-09 RX ORDER — OXYCODONE AND ACETAMINOPHEN 5; 325 MG/1; MG/1
1 TABLET ORAL
Qty: 21 TAB | Refills: 0 | Status: SHIPPED | OUTPATIENT
Start: 2020-07-09 | End: 2020-07-16

## 2020-07-09 RX ORDER — TIZANIDINE 4 MG/1
TABLET ORAL
Qty: 60 TAB | Refills: 0 | Status: SHIPPED | OUTPATIENT
Start: 2020-07-09 | End: 2020-07-20

## 2020-07-09 NOTE — PROGRESS NOTES
Health Maintenance Due   Topic Date Due    Hepatitis C Screening  1965    Pneumococcal 0-64 years (1 of 1 - PPSV23) 05/03/1969    DTaP/Tdap/Td series (1 - Tdap) 05/03/1984    PAP AKA CERVICAL CYTOLOGY  05/03/1984    Shingrix Vaccine Age 50> (1 of 2) 05/03/2013    FOBT Q1Y Age 50-75  05/03/2013       Chief Complaint   Patient presents with    Hypertension    Back Pain     mid back 8/10       1. Have you been to the ER, urgent care clinic since your last visit? Hospitalized since your last visit? No    2. Have you seen or consulted any other health care providers outside of the [address] [place] [address] since your last visit? Include any pap smears or colon screening. No    3) Do you have an Advance Directive on file? no    4) Are you interested in receiving information on Advance Directives? NO      Patient is accompanied by self I have received verbal consent from Duyen Horne to discuss any/all medical information while they are present in the room.

## 2020-07-09 NOTE — LETTER
NOTIFICATION RETURN TO WORK / SCHOOL 
 
7/9/2020 11:48 AM 
 
Ms. Neal Aase [address] [facility] [address] [facility] [address] To Whom It May Concern: 
 
Neal Aase is currently under the care of [address]. She will return to work/school on: 7/27/20 If there are questions or concerns please have the patient contact our office. Sincerely, Leona Horne NP

## 2020-07-09 NOTE — PROGRESS NOTES
HISTORY OF PRESENT ILLNESS  Sandee Frankel is a 60 y.o. female. Patient was seen today for a follow up. Patient reports that her BP and headaches are doing well and less. Reports that her back pain has become worse and sometimes inhibits her doing her job. States that the Zanaflex helped for the spasm, but not the pain in the mid back. Describes it as someone\"pulling her back\". Denies any numbness or tingling   Denies CP, SOB  Is able to bend, but not lean forward   Insomnia: has improved.  Sleeping well     Visit Vitals  /84 (BP 1 Location: Left arm, BP Patient Position: Sitting)   Pulse 84   Resp 18   Ht 5' (1.524 m)   Wt 154 lb 3.2 oz (69.9 kg)   SpO2 97%   BMI 30.12 kg/m²     Past Medical History:   Diagnosis Date    Asthma     SOB    Chronic pain     joint pain    Knee meniscus pain 9/7/2017    Mild intermittent asthma without complication 8/9/0419    Sinus congestion      Past Surgical History:   Procedure Laterality Date    HX GI      recloser cloystomy     Social History     Socioeconomic History    Marital status: SINGLE     Spouse name: Not on file    Number of children: Not on file    Years of education: Not on file    Highest education level: Not on file   Occupational History    Not on file   Social Needs    Financial resource strain: Not on file    Food insecurity     Worry: Not on file     Inability: Not on file   [employer] needs     Medical: Not on file     Non-medical: Not on file   Tobacco Use    Smoking status: Never Smoker    Smokeless tobacco: Never Used   Substance and Sexual Activity    Alcohol use: Yes     Comment: social     Drug use: No    Sexual activity: Never   Lifestyle    Physical activity     Days per week: Not on file     Minutes per session: Not on file    Stress: Not on file   Relationships    Social connections     Talks on phone: Not on file     Gets together: Not on file     Attends Yazidi service: Not on file     Active member of club or organization: Not on file     Attends meetings of clubs or organizations: Not on file     Relationship status: Not on file    Intimate partner violence     Fear of current or ex partner: Not on file     Emotionally abused: Not on file     Physically abused: Not on file     Forced sexual activity: Not on file   Other Topics Concern    Not on file   Social History Narrative    Not on file     Family History   Problem Relation Age of Onset    Liver Disease Mother     No Known Problems Father     Lung Cancer Sister     Lung Cancer Brother      Outpatient Encounter Medications as of 7/9/2020   Medication Sig Dispense Refill    tiZANidine (ZANAFLEX) 4 mg tablet TAKE 1 TABLET BY MOUTH TWICE A DAY 60 Tab 0    oxyCODONE-acetaminophen (PERCOCET) 5-325 mg per tablet Take 1 Tab by mouth every eight (8) hours as needed for Pain for up to 7 days. Max Daily Amount: 3 Tabs. 21 Tab 0    zolpidem (AMBIEN) 5 mg tablet Take 1 Tab by mouth nightly as needed for Sleep. Max Daily Amount: 5 mg. 30 Tab 1    amLODIPine (NORVASC) 10 mg tablet Take 1 Tab by mouth daily. 90 Tab 1    albuterol (PROVENTIL HFA, VENTOLIN HFA, PROAIR HFA) 90 mcg/actuation inhaler Take 2 Puffs by inhalation every four (4) hours as needed for Wheezing. 1 Inhaler 5    multivitamin (Hair,Skin and Nails) tablet Take 1 Tab by mouth daily. 90 Tab 0    fluticasone propionate (Flovent HFA) 220 mcg/actuation inhaler Take 1 Puff by inhalation two (2) times a day. 1 Inhaler 12    guaifenesin/dextromethorphan (ROBITUSSIN DM MAX PO) Take  by mouth.  [DISCONTINUED] tiZANidine (ZANAFLEX) 4 mg tablet TAKE 1 TABLET BY MOUTH TWICE A DAY 60 Tab 0     No facility-administered encounter medications on file as of 7/9/2020. HPI    Review of Systems   Constitutional: Negative for chills and fever. Respiratory: Negative. Cardiovascular: Negative. Gastrointestinal: Negative. Genitourinary: Negative. Musculoskeletal: Positive for back pain and joint pain. Negative for falls. Neurological: Negative. Physical Exam  Vitals signs and nursing note reviewed. Constitutional:       Appearance: Normal appearance. HENT:      Head: Normocephalic and atraumatic. Neck:      Musculoskeletal: Neck supple. Cardiovascular:      Rate and Rhythm: Normal rate and regular rhythm. Pulmonary:      Effort: Pulmonary effort is normal.      Breath sounds: Normal breath sounds. Abdominal:      General: Bowel sounds are normal.      Palpations: Abdomen is soft. Tenderness: There is no right CVA tenderness or left CVA tenderness. Musculoskeletal:      Thoracic back: She exhibits pain. Lumbar back: She exhibits pain. She exhibits no spasm. Right lower leg: No edema. Left lower leg: No edema. Skin:     General: Skin is warm. Neurological:      Mental Status: She is oriented to person, place, and time. Motor: No weakness. Gait: Gait normal.   Psychiatric:         Mood and Affect: Mood normal.         ASSESSMENT and PLAN  Diagnoses and all orders for this visit:    1. Other chronic pain    2. Back spasm  -     tiZANidine (ZANAFLEX) 4 mg tablet; TAKE 1 TABLET BY MOUTH TWICE A DAY    3. Mild intermittent asthma without complication    4. Pain of meniscus of left knee    5. Low back pain without sciatica, unspecified back pain laterality, unspecified chronicity  -     oxyCODONE-acetaminophen (PERCOCET) 5-325 mg per tablet; Take 1 Tab by mouth every eight (8) hours as needed for Pain for up to 7 days. Max Daily Amount: 3 Tabs.           current treatment plan is effective, no change in therapy  reviewed diet, exercise and weight control  reviewed medications and side effects in detail

## 2020-07-18 DIAGNOSIS — G47.00 INSOMNIA, UNSPECIFIED TYPE: ICD-10-CM

## 2020-07-18 DIAGNOSIS — M62.830 BACK SPASM: ICD-10-CM

## 2020-07-20 DIAGNOSIS — M54.50 LOW BACK PAIN WITHOUT SCIATICA, UNSPECIFIED BACK PAIN LATERALITY, UNSPECIFIED CHRONICITY: Primary | ICD-10-CM

## 2020-07-20 RX ORDER — TIZANIDINE 4 MG/1
TABLET ORAL
Qty: 60 TAB | Refills: 0 | Status: SHIPPED | OUTPATIENT
Start: 2020-07-20 | End: 2020-08-25 | Stop reason: SDUPTHER

## 2020-07-20 RX ORDER — LIDOCAINE 50 MG/G
1 PATCH TOPICAL EVERY 24 HOURS
Qty: 15 EACH | Refills: 2 | Status: SHIPPED | OUTPATIENT
Start: 2020-07-20 | End: 2020-08-25 | Stop reason: SDUPTHER

## 2020-07-20 RX ORDER — ZOLPIDEM TARTRATE 5 MG/1
5 TABLET ORAL
Qty: 30 TAB | Refills: 1 | Status: SHIPPED | OUTPATIENT
Start: 2020-07-20 | End: 2020-08-25 | Stop reason: SDUPTHER

## 2020-07-28 ENCOUNTER — VIRTUAL VISIT (OUTPATIENT)
Dept: INTERNAL MEDICINE CLINIC | Age: 55
End: 2020-07-28

## 2020-07-28 DIAGNOSIS — M54.50 LOW BACK PAIN WITHOUT SCIATICA, UNSPECIFIED BACK PAIN LATERALITY, UNSPECIFIED CHRONICITY: Primary | ICD-10-CM

## 2020-07-28 DIAGNOSIS — I10 HYPERTENSION, UNSPECIFIED TYPE: ICD-10-CM

## 2020-07-28 DIAGNOSIS — J45.20 MILD INTERMITTENT ASTHMA WITHOUT COMPLICATION: ICD-10-CM

## 2020-07-28 RX ORDER — PREDNISONE 10 MG/1
10 TABLET ORAL DAILY
Qty: 10 TAB | Refills: 0 | Status: SHIPPED | OUTPATIENT
Start: 2020-07-28 | End: 2020-08-03 | Stop reason: ALTCHOICE

## 2020-07-28 NOTE — PROGRESS NOTES
Hieu Salazar is a 60 y.o. female who was seen by synchronous (real-time) audio-video technology on 7/28/2020 for Back Pain (lower left side knot/ swollen, painful)        Assessment & Plan:   Diagnoses and all orders for this visit:    1. Low back pain without sciatica, unspecified back pain laterality, unspecified chronicity  -     predniSONE (DELTASONE) 10 mg tablet; Take 10 mg by mouth daily. 2. Hypertension, unspecified type    3. Mild intermittent asthma without complication          Subjective:     Saw patient for a follow up today. Reports that she is having some back swelling and pain. Has been using the muscle relaxer and it helps. Is going to  the lidocaine patches today. Reports that she is going to see a Chiropractor Aug 4 th through work and disability. Denies any CP, SOB, fever     HTN:stable and taking all medications and monitoring salt     Insomnia has become a lot better since we last spoke     Prior to Admission medications    Medication Sig Start Date End Date Taking? Authorizing Provider   tiZANidine (ZANAFLEX) 4 mg tablet TAKE 1 TABLET BY MOUTH TWICE A DAY 7/20/20  Yes Ritu Orr NP   zolpidem (AMBIEN) 5 mg tablet TAKE 1 TAB BY MOUTH NIGHTLY AS NEEDED FOR SLEEP. MAX DAILY AMOUNT: 5 MG. 7/20/20  Yes Jerica Orr NP   lidocaine (LIDODERM) 5 % 1 Patch by TransDERmal route every twenty-four (24) hours. Apply patch to the affected area for 12 hours a day and remove for 12 hours a day. 7/20/20  Yes Jerica Orr NP   amLODIPine (NORVASC) 10 mg tablet Take 1 Tab by mouth daily. 5/6/20  Yes Jerica Orr NP   albuterol (PROVENTIL HFA, VENTOLIN HFA, PROAIR HFA) 90 mcg/actuation inhaler Take 2 Puffs by inhalation every four (4) hours as needed for Wheezing. 5/6/20  Yes Jerica Orr NP   multivitamin (Hair,Skin and Nails) tablet Take 1 Tab by mouth daily.  5/6/20  Yes Jerica Orr NP   fluticasone propionate (Flovent HFA) 220 mcg/actuation inhaler Take 1 Puff by inhalation two (2) times a day. 20  Yes Stefan Orr NP   guaifenesin/dextromethorphan (ROBITUSSIN DM MAX PO) Take  by mouth. Provider, Historical     Patient Active Problem List   Diagnosis Code    Mild intermittent asthma without complication U84.72    Knee meniscus pain M25.569    Scoliosis M41.9     Patient Active Problem List    Diagnosis Date Noted    Mild intermittent asthma without complication     Knee meniscus pain 2017    Scoliosis 2017     Current Outpatient Medications   Medication Sig Dispense Refill    predniSONE (DELTASONE) 10 mg tablet Take 10 mg by mouth daily. 10 Tab 0    tiZANidine (ZANAFLEX) 4 mg tablet TAKE 1 TABLET BY MOUTH TWICE A DAY 60 Tab 0    zolpidem (AMBIEN) 5 mg tablet TAKE 1 TAB BY MOUTH NIGHTLY AS NEEDED FOR SLEEP. MAX DAILY AMOUNT: 5 MG. 30 Tab 1    lidocaine (LIDODERM) 5 % 1 Patch by TransDERmal route every twenty-four (24) hours. Apply patch to the affected area for 12 hours a day and remove for 12 hours a day. 15 Each 2    amLODIPine (NORVASC) 10 mg tablet Take 1 Tab by mouth daily. 90 Tab 1    albuterol (PROVENTIL HFA, VENTOLIN HFA, PROAIR HFA) 90 mcg/actuation inhaler Take 2 Puffs by inhalation every four (4) hours as needed for Wheezing. 1 Inhaler 5    multivitamin (Hair,Skin and Nails) tablet Take 1 Tab by mouth daily. 90 Tab 0    fluticasone propionate (Flovent HFA) 220 mcg/actuation inhaler Take 1 Puff by inhalation two (2) times a day. 1 Inhaler 12    guaifenesin/dextromethorphan (ROBITUSSIN DM MAX PO) Take  by mouth.        Allergies   Allergen Reactions    Meloxicam Hives    Nsaids (Non-Steroidal Anti-Inflammatory Drug) Hives, Itching and Nausea Only     Past Medical History:   Diagnosis Date    Asthma     SOB    Chronic pain     joint pain    Knee meniscus pain 2017    Mild intermittent asthma without complication     Sinus congestion      Past Surgical History:   Procedure Laterality Date    HX GI      recloser cloystomy     Family History   Problem Relation Age of Onset    Liver Disease Mother     No Known Problems Father     Lung Cancer Sister     Lung Cancer Brother      Social History     Tobacco Use    Smoking status: Never Smoker    Smokeless tobacco: Never Used   Substance Use Topics    Alcohol use: Yes     Comment: social  1/2 on weekends       Review of Systems   Constitutional: Negative for chills and fever. Respiratory: Negative. Cardiovascular: Negative. Genitourinary: Negative. Musculoskeletal: Positive for back pain and joint pain. Negative for falls. Neurological: Negative for dizziness and headaches. Psychiatric/Behavioral: Negative.         Objective:     Patient-Reported Vitals 7/28/2020   Patient-Reported Systolic  111   Patient-Reported Diastolic 60        [INSTRUCTIONS:  \"[x]\" Indicates a positive item  \"[]\" Indicates a negative item  -- DELETE ALL ITEMS NOT EXAMINED]    Constitutional: [x] Appears well-developed and well-nourished [x] No apparent distress      [] Abnormal -     Mental status: [x] Alert and awake  [x] Oriented to person/place/time [x] Able to follow commands    [] Abnormal -       Neck: [x] No visualized mass [] Abnormal -     Pulmonary/Chest: [x] Respiratory effort normal   [x] No visualized signs of difficulty breathing or respiratory distress        [] Abnormal -      Musculoskeletal:   [x] Normal gait with no signs of ataxia         [x] Normal range of motion of neck        [] Abnormal -     Neurological:        [x] No Facial Asymmetry (Cranial nerve 7 motor function) (limited exam due to video visit)          [x] No gaze palsy        [] Abnormal -          Skin:        [x] No significant exanthematous lesions or discoloration noted on facial skin         [] Abnormal -            Psychiatric:       [x] Normal Affect [] Abnormal -        [x] No Hallucinations    Other pertinent observable physical exam findings:-        We discussed the expected course, resolution and complications of the diagnosis(es) in detail. Medication risks, benefits, costs, interactions, and alternatives were discussed as indicated. I advised her to contact the office if her condition worsens, changes or fails to improve as anticipated. She expressed understanding with the diagnosis(es) and plan. Carin Little, who was evaluated through a patient-initiated, synchronous (real-time) audio-video encounter, and/or her healthcare decision maker, is aware that it is a billable service, with coverage as determined by her insurance carrier. She provided verbal consent to proceed: Yes, and patient identification was verified. It was conducted pursuant to the emergency declaration under the [address] [place] [address] authority and the [employer] Act. A caregiver was present when appropriate. Ability to conduct physical exam was limited. I was in the office. The patient was at home.       Olga Magdaleno NP

## 2020-07-28 NOTE — PROGRESS NOTES
Chief Complaint   Patient presents with    Back Pain     lower left side knot/ swollen, painful       1. Have you been to the ER, urgent care clinic since your last visit? Hospitalized since your last visit? no    2. Have you seen or consulted any other health care providers outside of the [address] [place] [address] since your last visit? Include any pap smears or colon screening.   no

## 2020-08-03 ENCOUNTER — VIRTUAL VISIT (OUTPATIENT)
Dept: INTERNAL MEDICINE CLINIC | Age: 55
End: 2020-08-03
Payer: MEDICAID

## 2020-08-03 DIAGNOSIS — T78.40XA ALLERGIC REACTION, INITIAL ENCOUNTER: Primary | ICD-10-CM

## 2020-08-03 DIAGNOSIS — J45.20 MILD INTERMITTENT ASTHMA WITHOUT COMPLICATION: ICD-10-CM

## 2020-08-03 DIAGNOSIS — M54.50 ACUTE MIDLINE LOW BACK PAIN, UNSPECIFIED WHETHER SCIATICA PRESENT: ICD-10-CM

## 2020-08-03 DIAGNOSIS — Z12.39 SCREENING BREAST EXAMINATION: ICD-10-CM

## 2020-08-03 PROCEDURE — 99214 OFFICE O/P EST MOD 30 MIN: CPT | Performed by: INTERNAL MEDICINE

## 2020-08-03 RX ORDER — HYDROXYZINE 25 MG/1
25 TABLET, FILM COATED ORAL
Qty: 30 TAB | Refills: 0 | Status: SHIPPED | OUTPATIENT
Start: 2020-08-03 | End: 2020-09-14

## 2020-08-03 RX ORDER — DIPHENHYDRAMINE HCL 25 MG
25 CAPSULE ORAL
COMMUNITY
End: 2020-08-03 | Stop reason: ALTCHOICE

## 2020-08-03 NOTE — PROGRESS NOTES
Petra Conrad is a 60 y.o. female who was seen by synchronous (real-time) audio-video technology on 8/3/2020 for Medication Reaction (hives; itching and painful; tried Benadryl oral and topical with no relief); Rash (& itching); and Back Pain        Assessment & Plan:   Diagnoses and all orders for this visit:    1. Allergic reaction, initial encounter  She is allergic to prednisone. Prednisone is discontinued. She was also not able to tolerate Benadryl. Will try,    -     hydrOXYzine HCL (ATARAX) 25 mg tablet; Take 1 Tab by mouth three (3) times daily as needed for Itching (hives) for up to 10 days. She needs to see allergy specialist for further testing. 2. Screening breast examination    Will order,  -     XIMENA MAMMO BI SCREENING INCL CAD; Future    3. Lower back pain    Prednisone discontinued. Advised her to take Zanaflex 3 times a day as needed along with Lidoderm patch. She needs to do heating pad and massage. I spent 25 minutes for coordinated care. Subjective:     Ms. Nghia Arana reports hives all over her trunk for past 3 days. She was started on prednisone for lower back pain since she started on prednisone she started noticing hives. Hives spreading all over her trunk. She is itching a lot. She tried Benadryl which did not help, made it little worse. No shortness of breath no throat closing. Back pain has improved since patient is on lidocaine patch and muscle relaxer. Not able to tolerate NSAID, causing heartburn. Prednisone was discontinued. Prior to Admission medications    Medication Sig Start Date End Date Taking? Authorizing Provider   hydrOXYzine HCL (ATARAX) 25 mg tablet Take 1 Tab by mouth three (3) times daily as needed for Itching (hives) for up to 10 days. 8/3/20 8/13/20 Yes Kathrin Alvarez MD   tiZANidine (ZANAFLEX) 4 mg tablet TAKE 1 TABLET BY MOUTH TWICE A DAY 7/20/20  Yes Emma Orr NP   zolpidem (AMBIEN) 5 mg tablet TAKE 1 TAB BY MOUTH NIGHTLY AS NEEDED FOR SLEEP. MAX DAILY AMOUNT: 5 MG. 7/20/20  Yes Michelle Orr NP   lidocaine (LIDODERM) 5 % 1 Patch by TransDERmal route every twenty-four (24) hours. Apply patch to the affected area for 12 hours a day and remove for 12 hours a day. 7/20/20  Yes Michelle Orr NP   amLODIPine (NORVASC) 10 mg tablet Take 1 Tab by mouth daily. 5/6/20  Yes Michelle Orr NP   albuterol (PROVENTIL HFA, VENTOLIN HFA, PROAIR HFA) 90 mcg/actuation inhaler Take 2 Puffs by inhalation every four (4) hours as needed for Wheezing. 5/6/20  Yes Michelle Orr NP   multivitamin (Hair,Skin and Nails) tablet Take 1 Tab by mouth daily. 5/6/20  Yes Michelle Orr NP   fluticasone propionate (Flovent HFA) 220 mcg/actuation inhaler Take 1 Puff by inhalation two (2) times a day. 5/6/20  Yes Michelle Orr NP   diphenhydrAMINE (BenadryL) 25 mg capsule Take 25 mg by mouth every six (6) hours as needed. 8/3/20  Provider, Historical   predniSONE (DELTASONE) 10 mg tablet Take 10 mg by mouth daily. 7/28/20 8/3/20  Michelle Orr NP   guaifenesin/dextromethorphan (ROBITUSSIN DM MAX PO) Take  by mouth. Provider, Historical     Past Medical History:   Diagnosis Date    Asthma     SOB    Chronic pain     joint pain    Knee meniscus pain 9/7/2017    Mild intermittent asthma without complication 3/0/1551    Sinus congestion        ROS significant for hives and itching.     Objective:     Patient-Reported Vitals 8/3/2020   Patient-Reported Weight 154lb   Patient-Reported Height 5f   Patient-Reported Systolic  -   Patient-Reported Diastolic -        Constitutional: [x] Appears well-developed and well-nourished [x] No apparent distress      [] Abnormal -     Mental status: [x] Alert and awake  [x] Oriented to person/place/time [x] Able to follow commands    [] Abnormal -     HENT: [x] Normocephalic, atraumatic  [] Abnormal -   [x] Mouth/Throat: Mucous membranes are moist    External Ears [x] Normal  [] Abnormal -    Neck: [x] No visualized mass [] Abnormal -     Pulmonary/Chest: [x] Respiratory effort normal   [x] No visualized signs of difficulty breathing or respiratory distress        [] Abnormal -      Musculoskeletal:   Lower back: Spine looks okay. Pain present. Muscle stiffness present. Range of motion restricted. Neurological:        [x] No Facial Asymmetry (Cranial nerve 7 motor function) (limited exam due to video visit)          [x] No gaze palsy        [] Abnormal -          Skin:      Hives all over  trunk itching. Psychiatric:       [x] Normal Affect [] Abnormal -        [x] No Hallucinations    Other pertinent observable physical exam findings:-        We discussed the expected course, resolution and complications of the diagnosis(es) in detail. Medication risks, benefits, costs, interactions, and alternatives were discussed as indicated. I advised her to contact the office if her condition worsens, changes or fails to improve as anticipated. She expressed understanding with the diagnosis(es) and plan. Jodie Lizarraga, who was evaluated through a patient-initiated, synchronous (real-time) audio-video encounter, and/or her healthcare decision maker, is aware that it is a billable service, with coverage as determined by her insurance carrier. She provided verbal consent to proceed: Yes, and patient identification was verified. It was conducted pursuant to the emergency declaration under the [address] [place] [address] authority and the [employer] General Act. A caregiver was present when appropriate. Ability to conduct physical exam was limited. I was in the office. The patient was at home.       Olga Hart MD

## 2020-08-03 NOTE — PROGRESS NOTES
Health Maintenance Due   Topic Date Due    Hepatitis C Screening  1965    Pneumococcal 0-64 years (1 of 1 - PPSV23) 05/03/1969    DTaP/Tdap/Td series (1 - Tdap) 05/03/1984    PAP AKA CERVICAL CYTOLOGY  05/03/1984    Shingrix Vaccine Age 50> (1 of 2) 05/03/2013    FOBT Q1Y Age 54-65  05/03/2013    Influenza Age 5 to Adult  08/01/2020       Chief Complaint   Patient presents with    Medication Reaction       1. Have you been to the ER, urgent care clinic since your last visit? Hospitalized since your last visit? No    2. Have you seen or consulted any other health care providers outside of the [address] [place] [address] since your last visit? Include any pap smears or colon screening. No    3) Do you have an Advance Directive on file? no    4) Are you interested in receiving information on Advance Directives? NO      Patient is accompanied by self I have received verbal consent from Jose Carlos Nava to discuss any/all medical information while they are present in the room.

## 2020-08-25 ENCOUNTER — VIRTUAL VISIT (OUTPATIENT)
Dept: INTERNAL MEDICINE CLINIC | Age: 55
End: 2020-08-25

## 2020-08-25 DIAGNOSIS — M62.830 BACK SPASM: ICD-10-CM

## 2020-08-25 DIAGNOSIS — M54.50 LOW BACK PAIN WITHOUT SCIATICA, UNSPECIFIED BACK PAIN LATERALITY, UNSPECIFIED CHRONICITY: Primary | ICD-10-CM

## 2020-08-25 DIAGNOSIS — M54.50 LOW BACK PAIN WITHOUT SCIATICA, UNSPECIFIED BACK PAIN LATERALITY, UNSPECIFIED CHRONICITY: ICD-10-CM

## 2020-08-25 DIAGNOSIS — I10 HYPERTENSION, UNSPECIFIED TYPE: ICD-10-CM

## 2020-08-25 DIAGNOSIS — M50.20 SLIPPED CERVICAL DISC: ICD-10-CM

## 2020-08-25 PROCEDURE — 99213 OFFICE O/P EST LOW 20 MIN: CPT | Performed by: INTERNAL MEDICINE

## 2020-08-25 RX ORDER — TIZANIDINE 4 MG/1
TABLET ORAL
Qty: 60 TAB | Refills: 0 | Status: SHIPPED | OUTPATIENT
Start: 2020-08-25 | End: 2020-09-30

## 2020-08-25 RX ORDER — LIDOCAINE 50 MG/G
2 PATCH TOPICAL EVERY 24 HOURS
Qty: 30 EACH | Refills: 2 | Status: SHIPPED | OUTPATIENT
Start: 2020-08-25 | End: 2020-11-25

## 2020-08-25 NOTE — PROGRESS NOTES
Myke Sanchez is a 60 y.o. female who was seen by synchronous (real-time) audio-video technology on 8/25/2020 for Hand Pain (left hand tingling) and Back Pain        Assessment & Plan:   Diagnoses and all orders for this visit:    1. Low back pain without sciatica, unspecified back pain laterality, unspecified chronicity    2. Hypertension, unspecified type    3. Slipped cervical disc  -     REFERRAL TO ORTHOPEDIC SURGERY        Subjective:     Patient was seen for complaints of ongoing back pain. Has been seeing the chiropractor and is out on disability. Reports that she was seen a [place] for the pain and had a scan that showed a herniated and slipped disc. Reports that she is now having tingling in the right arm. States that it is not painful, but nagging. There is still heat to the arm no coolness. Reports that prednisone, flexeril help a little but not able to bend and turn at times. Denies any CP, SOB,fever, headaches or vision concerns     Prior to Admission medications    Medication Sig Start Date End Date Taking? Authorizing Provider   tiZANidine (ZANAFLEX) 4 mg tablet TAKE 1 TABLET BY MOUTH TWICE A DAY 7/20/20  Yes Ritu Orr NP   zolpidem (AMBIEN) 5 mg tablet TAKE 1 TAB BY MOUTH NIGHTLY AS NEEDED FOR SLEEP. MAX DAILY AMOUNT: 5 MG. 7/20/20  Yes Mackenzie Orr NP   lidocaine (LIDODERM) 5 % 1 Patch by TransDERmal route every twenty-four (24) hours. Apply patch to the affected area for 12 hours a day and remove for 12 hours a day. 7/20/20  Yes Mackenzie Orr NP   amLODIPine (NORVASC) 10 mg tablet Take 1 Tab by mouth daily. 5/6/20  Yes Mackenzie Orr NP   albuterol (PROVENTIL HFA, VENTOLIN HFA, PROAIR HFA) 90 mcg/actuation inhaler Take 2 Puffs by inhalation every four (4) hours as needed for Wheezing. 5/6/20  Yes Mackenzie Orr NP   multivitamin (Hair,Skin and Nails) tablet Take 1 Tab by mouth daily.  5/6/20  Yes Mackenzie Orr NP   fluticasone propionate (Flovent HFA) 220 mcg/actuation inhaler Take 1 Puff by inhalation two (2) times a day. 5/6/20  Yes Bubba Orr NP   guaifenesin/dextromethorphan (ROBITUSSIN DM MAX PO) Take  by mouth. Provider, Historical     Patient Active Problem List   Diagnosis Code    Mild intermittent asthma without complication Y86.74    Knee meniscus pain M25.569    Scoliosis M41.9     Patient Active Problem List    Diagnosis Date Noted    Mild intermittent asthma without complication 21/53/8980    Knee meniscus pain 09/07/2017    Scoliosis 09/07/2017     Current Outpatient Medications   Medication Sig Dispense Refill    tiZANidine (ZANAFLEX) 4 mg tablet TAKE 1 TABLET BY MOUTH TWICE A DAY 60 Tab 0    zolpidem (AMBIEN) 5 mg tablet TAKE 1 TAB BY MOUTH NIGHTLY AS NEEDED FOR SLEEP. MAX DAILY AMOUNT: 5 MG. 30 Tab 1    lidocaine (LIDODERM) 5 % 1 Patch by TransDERmal route every twenty-four (24) hours. Apply patch to the affected area for 12 hours a day and remove for 12 hours a day. 15 Each 2    amLODIPine (NORVASC) 10 mg tablet Take 1 Tab by mouth daily. 90 Tab 1    albuterol (PROVENTIL HFA, VENTOLIN HFA, PROAIR HFA) 90 mcg/actuation inhaler Take 2 Puffs by inhalation every four (4) hours as needed for Wheezing. 1 Inhaler 5    multivitamin (Hair,Skin and Nails) tablet Take 1 Tab by mouth daily. 90 Tab 0    fluticasone propionate (Flovent HFA) 220 mcg/actuation inhaler Take 1 Puff by inhalation two (2) times a day. 1 Inhaler 12    guaifenesin/dextromethorphan (ROBITUSSIN DM MAX PO) Take  by mouth.        Allergies   Allergen Reactions    Meloxicam Hives    Nsaids (Non-Steroidal Anti-Inflammatory Drug) Hives, Itching and Nausea Only    Prednisone Hives, Rash and Itching     Past Medical History:   Diagnosis Date    Asthma     SOB    Chronic pain     joint pain    Knee meniscus pain 9/7/2017    Mild intermittent asthma without complication 0/9/6523    Sinus congestion      Past Surgical History:   Procedure Laterality Date    HX GI recloser cloystomy     Family History   Problem Relation Age of Onset    Liver Disease Mother     No Known Problems Father     Lung Cancer Sister     Lung Cancer Brother      Social History     Tobacco Use    Smoking status: Never Smoker    Smokeless tobacco: Never Used   Substance Use Topics    Alcohol use: Yes     Comment: social  1/2 on weekends       Review of Systems   Constitutional: Negative for chills and fever. Respiratory: Negative. Cardiovascular: Negative. Genitourinary: Negative. Musculoskeletal: Positive for back pain and joint pain. Negative for falls and neck pain. Neurological: Positive for tingling. Negative for dizziness and sensory change. Psychiatric/Behavioral: Negative.         Objective:     Patient-Reported Vitals 8/3/2020   Patient-Reported Weight 154lb   Patient-Reported Height 5f   Patient-Reported Systolic  -   Patient-Reported Diastolic -        [INSTRUCTIONS:  \"[x]\" Indicates a positive item  \"[]\" Indicates a negative item  -- DELETE ALL ITEMS NOT EXAMINED]    Constitutional: [x] Appears well-developed and well-nourished [x] No apparent distress      [] Abnormal -     Mental status: [x] Alert and awake  [x] Oriented to person/place/time [x] Able to follow commands    [] Abnormal -     Neck: [x] No visualized mass [] Abnormal -     Pulmonary/Chest: [x] Respiratory effort normal   [x] No visualized signs of difficulty breathing or respiratory distress        [] Abnormal -      Musculoskeletal:   [x] Normal gait with no signs of ataxia         [x] Normal range of motion of neck        [] Abnormal -     Neurological:        [x] No Facial Asymmetry (Cranial nerve 7 motor function) (limited exam due to video visit)          [x] No gaze palsy        [] Abnormal -          Skin:        [x] No significant exanthematous lesions or discoloration noted on facial skin         [] Abnormal -            Psychiatric:       [x] Normal Affect [] Abnormal -        [x] No Hallucinations    Other pertinent observable physical exam findings:-        We discussed the expected course, resolution and complications of the diagnosis(es) in detail. Medication risks, benefits, costs, interactions, and alternatives were discussed as indicated. I advised her to contact the office if her condition worsens, changes or fails to improve as anticipated. She expressed understanding with the diagnosis(es) and plan. Corie Mcgrath, who was evaluated through a patient-initiated, synchronous (real-time) audio-video encounter, and/or her healthcare decision maker, is aware that it is a billable service, with coverage as determined by her insurance carrier. She provided verbal consent to proceed: Yes, and patient identification was verified. It was conducted pursuant to the emergency declaration under the [address] [place] [address] authority and the [employer] General Act. A caregiver was present when appropriate. Ability to conduct physical exam was limited. I was in the office. The patient was at home.       Joseph Delatorre NP

## 2020-08-25 NOTE — PROGRESS NOTES
Health Maintenance Due   Topic Date Due    Hepatitis C Screening  1965    Pneumococcal 0-64 years (1 of 1 - PPSV23) 05/03/1969    DTaP/Tdap/Td series (1 - Tdap) 05/03/1984    PAP AKA CERVICAL CYTOLOGY  05/03/1984    Shingrix Vaccine Age 50> (1 of 2) 05/03/2013    FOBT Q1Y Age 50-75  05/03/2013       Chief Complaint   Patient presents with    Hand Pain     left hand tingling    Back Pain       1. Have you been to the ER, urgent care clinic since your last visit? Hospitalized since your last visit? No    2. Have you seen or consulted any other health care providers outside of the [address] [place] [address] since your last visit? Include any pap smears or colon screening. No    3) Do you have an Advance Directive on file? no    4) Are you interested in receiving information on Advance Directives? NO      Patient is accompanied by self I have received verbal consent from Maxwell Mccullough to discuss any/all medical information while they are present in the room.

## 2020-08-26 DIAGNOSIS — I10 HYPERTENSION, UNSPECIFIED TYPE: ICD-10-CM

## 2020-08-26 RX ORDER — AMLODIPINE BESYLATE 10 MG/1
TABLET ORAL
Qty: 90 TAB | Refills: 1 | Status: SHIPPED | OUTPATIENT
Start: 2020-08-26 | End: 2021-09-07 | Stop reason: SDUPTHER

## 2020-09-14 DIAGNOSIS — T78.40XA ALLERGIC REACTION, INITIAL ENCOUNTER: ICD-10-CM

## 2020-09-14 DIAGNOSIS — M54.50 LOW BACK PAIN WITHOUT SCIATICA, UNSPECIFIED BACK PAIN LATERALITY, UNSPECIFIED CHRONICITY: ICD-10-CM

## 2020-09-14 RX ORDER — HYDROXYZINE 25 MG/1
25 TABLET, FILM COATED ORAL
Qty: 30 TAB | Refills: 0 | Status: SHIPPED | OUTPATIENT
Start: 2020-09-14 | End: 2020-10-29

## 2020-09-14 RX ORDER — PREDNISONE 10 MG/1
10 TABLET ORAL DAILY
Qty: 10 TAB | Refills: 0 | OUTPATIENT
Start: 2020-09-14

## 2020-09-15 NOTE — TELEPHONE ENCOUNTER
I called and spoke with pt, She states the request for prednisone was an accident. She does not wants that.

## 2020-09-30 DIAGNOSIS — M62.830 BACK SPASM: ICD-10-CM

## 2020-09-30 RX ORDER — TIZANIDINE 4 MG/1
TABLET ORAL
Qty: 60 TAB | Refills: 0 | Status: SHIPPED | OUTPATIENT
Start: 2020-09-30 | End: 2020-10-29

## 2020-10-28 ENCOUNTER — TRANSCRIBE ORDER (OUTPATIENT)
Dept: SCHEDULING | Age: 55
End: 2020-10-28

## 2020-10-28 DIAGNOSIS — M54.50 LOW BACK PAIN WITHOUT SCIATICA: Primary | ICD-10-CM

## 2020-10-28 DIAGNOSIS — M51.36 DDD (DEGENERATIVE DISC DISEASE), LUMBAR: ICD-10-CM

## 2020-10-28 DIAGNOSIS — T78.40XA ALLERGIC REACTION, INITIAL ENCOUNTER: ICD-10-CM

## 2020-10-29 RX ORDER — HYDROXYZINE 25 MG/1
25 TABLET, FILM COATED ORAL
Qty: 30 TAB | Refills: 0 | Status: SHIPPED | OUTPATIENT
Start: 2020-10-29 | End: 2020-11-08

## 2020-11-14 ENCOUNTER — HOSPITAL ENCOUNTER (OUTPATIENT)
Dept: MAMMOGRAPHY | Age: 55
Discharge: HOME | End: 2020-11-14
Attending: INTERNAL MEDICINE
Payer: MEDICAID

## 2020-11-14 ENCOUNTER — HOSPITAL ENCOUNTER (OUTPATIENT)
Dept: MRI IMAGING | Age: 55
Discharge: HOME | End: 2020-11-14
Attending: PHYSICIAN ASSISTANT
Payer: MEDICAID

## 2020-11-14 DIAGNOSIS — M54.50 LOW BACK PAIN WITHOUT SCIATICA: ICD-10-CM

## 2020-11-14 DIAGNOSIS — Z12.39 SCREENING BREAST EXAMINATION: ICD-10-CM

## 2020-11-14 DIAGNOSIS — M51.36 DDD (DEGENERATIVE DISC DISEASE), LUMBAR: ICD-10-CM

## 2020-11-14 PROCEDURE — 72148 MRI LUMBAR SPINE W/O DYE: CPT

## 2020-11-25 DIAGNOSIS — M54.50 LOW BACK PAIN WITHOUT SCIATICA, UNSPECIFIED BACK PAIN LATERALITY, UNSPECIFIED CHRONICITY: ICD-10-CM

## 2020-11-25 DIAGNOSIS — M62.830 BACK SPASM: ICD-10-CM

## 2020-11-25 RX ORDER — LIDOCAINE 50 MG/G
2 PATCH TOPICAL EVERY 24 HOURS
Qty: 30 PATCH | Refills: 2 | Status: SHIPPED | OUTPATIENT
Start: 2020-11-25 | End: 2021-01-27 | Stop reason: SDUPTHER

## 2020-11-25 RX ORDER — TIZANIDINE 4 MG/1
TABLET ORAL
Qty: 60 TAB | Refills: 0 | Status: SHIPPED | OUTPATIENT
Start: 2020-11-25 | End: 2020-12-28

## 2020-11-28 DIAGNOSIS — G47.00 INSOMNIA, UNSPECIFIED TYPE: ICD-10-CM

## 2020-11-28 RX ORDER — ZOLPIDEM TARTRATE 5 MG/1
5 TABLET ORAL
Qty: 30 TAB | Refills: 0 | Status: SHIPPED | OUTPATIENT
Start: 2020-11-28 | End: 2020-12-28

## 2020-12-28 DIAGNOSIS — M62.830 BACK SPASM: ICD-10-CM

## 2020-12-28 RX ORDER — TIZANIDINE 4 MG/1
TABLET ORAL
Qty: 60 TAB | Refills: 0 | Status: SHIPPED | OUTPATIENT
Start: 2020-12-28 | End: 2021-02-09

## 2020-12-30 ENCOUNTER — TELEPHONE (OUTPATIENT)
Dept: INTERNAL MEDICINE CLINIC | Age: 55
End: 2020-12-30

## 2020-12-30 NOTE — TELEPHONE ENCOUNTER
Pt stated that she needed 30 tabs of the Ambien instead of 15. Please advise.      Pt would also like to have her prescriptions sent to a pharmacy closer to her temporary address:     [address]    If there are any questions, please give her a call back at [phone]

## 2021-01-10 DIAGNOSIS — G47.00 INSOMNIA, UNSPECIFIED TYPE: ICD-10-CM

## 2021-01-13 RX ORDER — ZOLPIDEM TARTRATE 5 MG/1
5 TABLET ORAL
Qty: 30 TAB | Refills: 0 | Status: SHIPPED | OUTPATIENT
Start: 2021-01-13 | End: 2021-01-14 | Stop reason: SDUPTHER

## 2021-01-14 RX ORDER — ZOLPIDEM TARTRATE 5 MG/1
5 TABLET ORAL
Qty: 30 TAB | Refills: 0 | Status: SHIPPED | OUTPATIENT
Start: 2021-01-14 | End: 2021-02-11

## 2021-01-19 ENCOUNTER — VIRTUAL VISIT (OUTPATIENT)
Dept: INTERNAL MEDICINE CLINIC | Age: 56
End: 2021-01-19
Payer: MEDICAID

## 2021-01-19 DIAGNOSIS — R20.0 NUMBNESS AND TINGLING IN LEFT HAND: Primary | ICD-10-CM

## 2021-01-19 DIAGNOSIS — R20.2 NUMBNESS AND TINGLING IN LEFT HAND: Primary | ICD-10-CM

## 2021-01-19 DIAGNOSIS — I10 HYPERTENSION, UNSPECIFIED TYPE: ICD-10-CM

## 2021-01-19 DIAGNOSIS — M50.20 SLIPPED CERVICAL DISC: ICD-10-CM

## 2021-01-19 PROCEDURE — 99214 OFFICE O/P EST MOD 30 MIN: CPT | Performed by: INTERNAL MEDICINE

## 2021-01-19 RX ORDER — GABAPENTIN 300 MG/1
300 CAPSULE ORAL 2 TIMES DAILY
Qty: 60 CAP | Refills: 1 | Status: SHIPPED | OUTPATIENT
Start: 2021-01-19 | End: 2021-03-10

## 2021-01-19 NOTE — PROGRESS NOTES
Health Maintenance Due   Topic Date Due    Hepatitis C Screening  1965    Pneumococcal 0-64 years (1 of 1 - PPSV23) 05/03/1969    DTaP/Tdap/Td series (1 - Tdap) 05/03/1984    PAP AKA CERVICAL CYTOLOGY  05/03/1984    Shingrix Vaccine Age 50> (1 of 2) 05/03/2013    Colorectal Cancer Screening Combo  05/03/2013    Flu Vaccine (1) 09/01/2020    Breast Cancer Screen Mammogram  10/03/2020       Chief Complaint   Patient presents with    Side Pain     Left side     Knee Pain    Ankle Pain       1. Have you been to the ER, urgent care clinic since your last visit? Hospitalized since your last visit? No    2. Have you seen or consulted any other health care providers outside of the [address] [place] [address] since your last visit? Include any pap smears or colon screening. No    3) Do you have an Advance Directive on file? no    4) Are you interested in receiving information on Advance Directives? NO      Patient is accompanied by self I have received verbal consent from Alvaro Riddle to discuss any/all medical information while they are present in the room.

## 2021-01-19 NOTE — PROGRESS NOTES
Shad Lazo is a 60 y.o. female who was seen by synchronous (real-time) audio-video technology on 1/19/2021 for Side Pain (Left side ), Knee Pain, and Ankle Pain        Assessment & Plan:   Diagnoses and all orders for this visit:    1. Numbness and tingling in left hand  -     gabapentin (NEURONTIN) 300 mg capsule; Take 1 Cap by mouth two (2) times a day. Max Daily Amount: 600 mg.    2. Slipped cervical disc    3. Hypertension, unspecified type      Recommending take gabapentin 2 times a day. If this does not help will see neurologist     Subjective:     Patient saw Bella Galeana last week and got an injection and fluids removed from the knee. Reports that the back pain is better, but now her hand is numb and tingling. Reports pain below the shin. No swelling, no tenderness and SOB/ CP. Patient reports that she was placed on gabapentin 300 mg daily for her back by a neurologist. States that it helped but got better once the injection occurred. States that the gabapentin was started when the numbness and tingling began several months ago. Denies any trauma or falls and only to the left hand. Denies any CP, SOB, fever or loss of sensation     Prior to Admission medications    Medication Sig Start Date End Date Taking? Authorizing Provider   zolpidem (AMBIEN) 5 mg tablet Take 1 Tab by mouth nightly as needed for Sleep. Max Daily Amount: 5 mg. 1/14/21  Yes Loyd Orr NP   tiZANidine (ZANAFLEX) 4 mg tablet TAKE 1 TABLET BY MOUTH TWICE A DAY 12/28/20  Yes Nicci Hendricks NP   lidocaine (LIDODERM) 5 % 2 PATCHES BY TRANSDERMAL ROUTE EVERY TWENTY-FOUR (24) HOURS. APPLY PATCH TO THE AFFECTED AREA FOR 12 HOURS A DAY AND REMOVE FOR 12 HOURS A DAY.  11/25/20  Yes Nicci Hendricks NP   amLODIPine (NORVASC) 10 mg tablet TAKE 1 TABLET BY MOUTH EVERY DAY 8/26/20  Yes Loyd Orr NP   albuterol (PROVENTIL HFA, VENTOLIN HFA, PROAIR HFA) 90 mcg/actuation inhaler Take 2 Puffs by inhalation every four (4) hours as needed for Wheezing. 5/6/20  Yes David Orr, NP   multivitamin (Hair,Skin and Nails) tablet Take 1 Tab by mouth daily. 5/6/20  Yes David Orr, CHRISTEN   fluticasone propionate (Flovent HFA) 220 mcg/actuation inhaler Take 1 Puff by inhalation two (2) times a day. 5/6/20  Yes David Orr NP   guaifenesin/dextromethorphan (ROBITUSSIN DM MAX PO) Take  by mouth. Provider, Historical     Patient Active Problem List   Diagnosis Code    Mild intermittent asthma without complication M51.53    Knee meniscus pain M25.569    Scoliosis M41.9     Patient Active Problem List    Diagnosis Date Noted    Mild intermittent asthma without complication 26/67/1592    Knee meniscus pain 09/07/2017    Scoliosis 09/07/2017     Current Outpatient Medications   Medication Sig Dispense Refill    gabapentin (NEURONTIN) 300 mg capsule Take 1 Cap by mouth two (2) times a day. Max Daily Amount: 600 mg. 60 Cap 1    zolpidem (AMBIEN) 5 mg tablet Take 1 Tab by mouth nightly as needed for Sleep. Max Daily Amount: 5 mg. 30 Tab 0    tiZANidine (ZANAFLEX) 4 mg tablet TAKE 1 TABLET BY MOUTH TWICE A DAY 60 Tab 0    lidocaine (LIDODERM) 5 % 2 PATCHES BY TRANSDERMAL ROUTE EVERY TWENTY-FOUR (24) HOURS. APPLY PATCH TO THE AFFECTED AREA FOR 12 HOURS A DAY AND REMOVE FOR 12 HOURS A DAY. 30 Patch 2    amLODIPine (NORVASC) 10 mg tablet TAKE 1 TABLET BY MOUTH EVERY DAY 90 Tab 1    albuterol (PROVENTIL HFA, VENTOLIN HFA, PROAIR HFA) 90 mcg/actuation inhaler Take 2 Puffs by inhalation every four (4) hours as needed for Wheezing. 1 Inhaler 5    multivitamin (Hair,Skin and Nails) tablet Take 1 Tab by mouth daily. 90 Tab 0    fluticasone propionate (Flovent HFA) 220 mcg/actuation inhaler Take 1 Puff by inhalation two (2) times a day. 1 Inhaler 12    guaifenesin/dextromethorphan (ROBITUSSIN DM MAX PO) Take  by mouth.        Allergies   Allergen Reactions    Meloxicam Hives    Nsaids (Non-Steroidal Anti-Inflammatory Drug) Hives, Itching and Nausea Only    Prednisone Hives, Rash and Itching     Past Medical History:   Diagnosis Date    Asthma     SOB    Chronic pain     joint pain    Knee meniscus pain 9/7/2017    Mild intermittent asthma without complication 6/0/4521    Sinus congestion      Past Surgical History:   Procedure Laterality Date    HX GI      recloser cloystomy     Family History   Problem Relation Age of Onset    Liver Disease Mother     No Known Problems Father     Lung Cancer Sister     Lung Cancer Brother      Social History     Tobacco Use    Smoking status: Never Smoker    Smokeless tobacco: Never Used   Substance Use Topics    Alcohol use: Yes     Comment: social  1/2 on weekends       Review of Systems   Constitutional: Negative. Respiratory: Negative. Cardiovascular: Negative. Gastrointestinal: Negative. Musculoskeletal: Positive for joint pain. Neurological: Positive for tingling. Negative for weakness.        Objective:     Patient-Reported Vitals 8/3/2020   Patient-Reported Weight 154lb   Patient-Reported Height 5f   Patient-Reported Systolic  -   Patient-Reported Diastolic -        [INSTRUCTIONS:  \"[x]\" Indicates a positive item  \"[]\" Indicates a negative item  -- DELETE ALL ITEMS NOT EXAMINED]    Constitutional: [x] Appears well-developed and well-nourished [x] No apparent distress      [] Abnormal -     Mental status: [x] Alert and awake  [x] Oriented to person/place/time [x] Able to follow commands    [] Abnormal -     Eyes:   EOM    [x]  Normal    [] Abnormal -   Sclera  [x]  Normal    [] Abnormal -          Discharge [x]  None visible   [] Abnormal -     HENT: [x] Normocephalic, atraumatic  [] Abnormal -   [x] Mouth/Throat: Mucous membranes are moist    External Ears [x] Normal  [] Abnormal -    Neck: [x] No visualized mass [] Abnormal -     Pulmonary/Chest: [x] Respiratory effort normal   [x] No visualized signs of difficulty breathing or respiratory distress        [] Abnormal - Musculoskeletal:   [x] Normal gait with no signs of ataxia         [x] Normal range of motion of neck        [] Abnormal -     Neurological:        [x] No Facial Asymmetry (Cranial nerve 7 motor function) (limited exam due to video visit)          [x] No gaze palsy        [] Abnormal -          Skin:        [x] No significant exanthematous lesions or discoloration noted on facial skin         [] Abnormal -            Psychiatric:       [x] Normal Affect [] Abnormal -        [x] No Hallucinations    Other pertinent observable physical exam findings:-        We discussed the expected course, resolution and complications of the diagnosis(es) in detail. Medication risks, benefits, costs, interactions, and alternatives were discussed as indicated. I advised her to contact the office if her condition worsens, changes or fails to improve as anticipated. She expressed understanding with the diagnosis(es) and plan. Mario Alberto Parker, who was evaluated through a patient-initiated, synchronous (real-time) audio-video encounter, and/or her healthcare decision maker, is aware that it is a billable service, with coverage as determined by her insurance carrier. She provided verbal consent to proceed: Yes, and patient identification was verified. It was conducted pursuant to the emergency declaration under the [facility], [address] [place] [address] authority and the [employer] Act. A caregiver was present when appropriate. Ability to conduct physical exam was limited. I was in the office. The patient was at home.       Nguyễn Argueta NP

## 2021-02-08 DIAGNOSIS — M62.830 BACK SPASM: ICD-10-CM

## 2021-02-09 RX ORDER — TIZANIDINE 4 MG/1
TABLET ORAL
Qty: 60 TAB | Refills: 0 | Status: SHIPPED | OUTPATIENT
Start: 2021-02-09 | End: 2021-04-28 | Stop reason: SDUPTHER

## 2021-02-11 DIAGNOSIS — G47.00 INSOMNIA, UNSPECIFIED TYPE: ICD-10-CM

## 2021-02-11 RX ORDER — ZOLPIDEM TARTRATE 5 MG/1
5 TABLET ORAL
Qty: 30 TAB | Refills: 0 | Status: SHIPPED | OUTPATIENT
Start: 2021-02-11 | End: 2021-03-12 | Stop reason: SDUPTHER

## 2021-03-10 DIAGNOSIS — R20.0 NUMBNESS AND TINGLING IN LEFT HAND: ICD-10-CM

## 2021-03-10 DIAGNOSIS — R20.2 NUMBNESS AND TINGLING IN LEFT HAND: ICD-10-CM

## 2021-03-10 RX ORDER — GABAPENTIN 300 MG/1
300 CAPSULE ORAL 2 TIMES DAILY
Qty: 60 CAP | Refills: 1 | Status: SHIPPED | OUTPATIENT
Start: 2021-03-10 | End: 2021-04-28 | Stop reason: SDUPTHER

## 2021-03-12 DIAGNOSIS — G47.00 INSOMNIA, UNSPECIFIED TYPE: ICD-10-CM

## 2021-03-12 RX ORDER — ZOLPIDEM TARTRATE 5 MG/1
5 TABLET ORAL
Qty: 30 TAB | Refills: 0 | Status: SHIPPED | OUTPATIENT
Start: 2021-03-12 | End: 2021-04-06 | Stop reason: SDUPTHER

## 2021-04-05 DIAGNOSIS — M54.50 LOW BACK PAIN WITHOUT SCIATICA, UNSPECIFIED BACK PAIN LATERALITY, UNSPECIFIED CHRONICITY: ICD-10-CM

## 2021-04-05 RX ORDER — LIDOCAINE 50 MG/G
2 PATCH TOPICAL EVERY 24 HOURS
Qty: 60 PATCH | Refills: 1 | Status: SHIPPED | OUTPATIENT
Start: 2021-04-05 | End: 2021-06-01

## 2021-04-06 DIAGNOSIS — G47.00 INSOMNIA, UNSPECIFIED TYPE: ICD-10-CM

## 2021-04-07 RX ORDER — ZOLPIDEM TARTRATE 5 MG/1
5 TABLET ORAL
Qty: 30 TAB | Refills: 0 | Status: SHIPPED | OUTPATIENT
Start: 2021-04-07 | End: 2021-05-10

## 2021-04-28 DIAGNOSIS — M62.830 BACK SPASM: ICD-10-CM

## 2021-04-28 DIAGNOSIS — R20.2 NUMBNESS AND TINGLING IN LEFT HAND: ICD-10-CM

## 2021-04-28 DIAGNOSIS — R20.0 NUMBNESS AND TINGLING IN LEFT HAND: ICD-10-CM

## 2021-04-28 RX ORDER — GABAPENTIN 300 MG/1
300 CAPSULE ORAL 2 TIMES DAILY
Qty: 60 CAP | Refills: 1 | Status: SHIPPED | OUTPATIENT
Start: 2021-04-28 | End: 2021-07-09 | Stop reason: DRUGHIGH

## 2021-04-28 RX ORDER — TIZANIDINE 4 MG/1
TABLET ORAL
Qty: 60 TAB | Refills: 0 | Status: SHIPPED | OUTPATIENT
Start: 2021-04-28 | End: 2021-07-09 | Stop reason: DRUGHIGH

## 2021-05-09 DIAGNOSIS — G47.00 INSOMNIA, UNSPECIFIED TYPE: ICD-10-CM

## 2021-05-10 RX ORDER — ZOLPIDEM TARTRATE 5 MG/1
5 TABLET ORAL
Qty: 30 TAB | Refills: 0 | Status: SHIPPED | OUTPATIENT
Start: 2021-05-10 | End: 2021-06-13 | Stop reason: SDUPTHER

## 2021-06-01 DIAGNOSIS — M54.50 LOW BACK PAIN WITHOUT SCIATICA, UNSPECIFIED BACK PAIN LATERALITY, UNSPECIFIED CHRONICITY: ICD-10-CM

## 2021-06-01 RX ORDER — LIDOCAINE 50 MG/G
2 PATCH TOPICAL EVERY 24 HOURS
Qty: 60 PATCH | Refills: 1 | Status: SHIPPED | OUTPATIENT
Start: 2021-06-01 | End: 2021-08-11

## 2021-06-13 DIAGNOSIS — G47.00 INSOMNIA, UNSPECIFIED TYPE: ICD-10-CM

## 2021-06-14 RX ORDER — ZOLPIDEM TARTRATE 5 MG/1
5 TABLET ORAL
Qty: 30 TABLET | Refills: 0 | Status: SHIPPED | OUTPATIENT
Start: 2021-06-14 | End: 2021-07-09 | Stop reason: SDUPTHER

## 2021-07-09 ENCOUNTER — VIRTUAL VISIT (OUTPATIENT)
Dept: INTERNAL MEDICINE CLINIC | Age: 56
End: 2021-07-09
Payer: MEDICAID

## 2021-07-09 ENCOUNTER — TELEPHONE (OUTPATIENT)
Dept: INTERNAL MEDICINE CLINIC | Age: 56
End: 2021-07-09

## 2021-07-09 DIAGNOSIS — G62.9 NEUROPATHY: ICD-10-CM

## 2021-07-09 DIAGNOSIS — M50.20 SLIPPED CERVICAL DISC: ICD-10-CM

## 2021-07-09 DIAGNOSIS — M62.838 MUSCLE SPASM: ICD-10-CM

## 2021-07-09 DIAGNOSIS — M19.90 ARTHRITIS: Primary | ICD-10-CM

## 2021-07-09 DIAGNOSIS — G89.29 OTHER CHRONIC PAIN: ICD-10-CM

## 2021-07-09 DIAGNOSIS — G47.00 INSOMNIA, UNSPECIFIED TYPE: ICD-10-CM

## 2021-07-09 DIAGNOSIS — R20.2 LEFT HAND PARESTHESIA: ICD-10-CM

## 2021-07-09 PROCEDURE — 99442 PR PHYS/QHP TELEPHONE EVALUATION 11-20 MIN: CPT | Performed by: NURSE PRACTITIONER

## 2021-07-09 RX ORDER — GABAPENTIN 400 MG/1
400 CAPSULE ORAL 2 TIMES DAILY
Qty: 60 CAPSULE | Refills: 1 | Status: SHIPPED | OUTPATIENT
Start: 2021-07-09 | End: 2021-08-13 | Stop reason: SINTOL

## 2021-07-09 RX ORDER — ACETAMINOPHEN 500 MG
TABLET ORAL DAILY
COMMUNITY
End: 2022-04-28

## 2021-07-09 RX ORDER — ZOLPIDEM TARTRATE 5 MG/1
5 TABLET ORAL
Qty: 30 TABLET | Refills: 1 | Status: SHIPPED | OUTPATIENT
Start: 2021-07-09 | End: 2021-09-07

## 2021-07-09 RX ORDER — TIZANIDINE 2 MG/1
2 TABLET ORAL
Qty: 30 TABLET | Refills: 0 | Status: SHIPPED | OUTPATIENT
Start: 2021-07-09 | End: 2021-09-07 | Stop reason: SDUPTHER

## 2021-07-09 NOTE — TELEPHONE ENCOUNTER
advised pt labs weren't received . Pt states she will fax over recent labs on Monday. There was verbalizing understanding.

## 2021-07-09 NOTE — PROGRESS NOTES
Jeny Arce is a 60 y.o. female, evaluated via audio-only technology on 7/9/2021 for Pain (Chronic), Leg Swelling, and Medication Refill  . Assessment & Plan:   Diagnoses and all orders for this visit:    1. Arthritis  Continue to follow with orthopedics  Low impact exercise as tolerated. 2. Other chronic pain  As above. 3. Neuropathy  Will increase,  -     gabapentin (NEURONTIN) 400 mg capsule; Take 1 Capsule by mouth two (2) times a day. Max Daily Amount: 800 mg, #60, 1 refill  Discussed medication and possible side effects in detail. Do not take in combination with other medications/substances that can cause drowsiness. Advised not to take in combination with tizanidine or Ambien. Patient acknowledged understanding. [facility]  reviewed and appropriate. 4. Muscle spasm  Will refill:  Tizanidine 2 mg po bid PRN muscle spasms, #30, 0 refills  Discussed medication and possible side effects in detail. Do not take in combination with other medications/substances that can cause drowsiness. Advised not to take in combination with Ambien or gabapentin. Patient acknowledged understanding. 5. Insomnia, unspecified type  Will refill:  -     zolpidem (AMBIEN) 5 mg tablet; Take 1 Tablet by mouth nightly as needed for Sleep. Max Daily Amount: 5 mg, #30, 1 refills  Discussed medication and possible side effects in detail. Do not take in combination with other medications/substances that can cause drowsiness. Advised not to take in combination with gabapentin or tizanidine. Patient acknowledged understanding. 6. Slipped cervical disc  As above. 7. Left hand paresthesia  History of slipped cervical disc. Discussed possibility of carpal tunnel, as well. May see orthopedic hand specialist.    Patient encouraged to call or return to office if symptoms do not improve or worsen. Reviewed medications and side effects in detail.   Reviewed plan of care with patient who acknowledges understanding and agrees. 12  Subjective: This patient of Daniel Castrejon NP is seen today for follow-up and medication refill. The patient's history includes arthritis with chronic pain. Patient says she has ongoing pain to bilateral knees. She has seen orthopedics at [facility] in [address] [place] [address], she says, and was advised she needs two knee replacements. She is trying to hold off on surgery. She has been trying to lose weight. She has been using lidocaine patches PRN, which do not seem to help much anymore. She is unable to take NSAIDs and steroids due to allergy. She is taking gabapentin 300 mg po bid which has helped some. She has a history of insomnia, but also has some difficulty sleeping due to pain. She uses Ambien 5 mg po prn for insomnia and requests refill. Patient also uses tizanidine PRN for muscle spasms and would like refill. Patient also notes ongoing numbness and tingling to her left hand for some time. She does have history of neck arthritis. Patient has recently seen GYN and had labs completed. She says her cholesterol was elevated and vitamin D was low. HgbA1c was elevated to 6.5. She has started vitamin D supplement and calcium. Prior to Admission medications    Medication Sig Start Date End Date Taking? Authorizing Provider   cholecalciferol (Vitamin D3) (2,000 UNITS /50 MCG) cap capsule Take  by mouth daily. Yes Provider, Historical   gabapentin (NEURONTIN) 400 mg capsule Take 1 Capsule by mouth two (2) times a day. Max Daily Amount: 800 mg. 7/9/21  Yes Maco Guerrero NP   zolpidem (AMBIEN) 5 mg tablet Take 1 Tablet by mouth nightly as needed for Sleep. Max Daily Amount: 5 mg. 7/9/21  Yes Maco Guerrero NP   lidocaine (LIDODERM) 5 % 2 PATCHES BY TRANSDERMAL ROUTE EVERY TWENTY-FOUR (24) HOURS.  APPLY PATCH TO THE AFFECTED AREA FOR 12 HOURS A DAY AND REMOVE FOR 12 HOURS A DAY. 6/1/21  Yes Ritu Orr NP   tiZANidine (ZANAFLEX) 4 mg tablet TAKE 1 TABLET BY MOUTH TWICE A DAY 4/28/21  Yes Constantine Orr NP   amLODIPine (NORVASC) 10 mg tablet TAKE 1 TABLET BY MOUTH EVERY DAY 8/26/20  Yes Constantine Orr NP   albuterol (PROVENTIL HFA, VENTOLIN HFA, PROAIR HFA) 90 mcg/actuation inhaler Take 2 Puffs by inhalation every four (4) hours as needed for Wheezing. 5/6/20  Yes Constantine Orr NP   fluticasone propionate (Flovent HFA) 220 mcg/actuation inhaler Take 1 Puff by inhalation two (2) times a day. 5/6/20  Yes Constantine Orr NP   zolpidem (AMBIEN) 5 mg tablet Take 1 Tablet by mouth nightly as needed for Sleep. Max Daily Amount: 5 mg. 6/14/21 7/9/21  Jennye Buerger, NP   gabapentin (NEURONTIN) 300 mg capsule Take 1 Cap by mouth two (2) times a day. Max Daily Amount: 600 mg. 4/28/21 7/9/21  Jennye Buerger, NP   multivitamin (Hair,Skin and Nails) tablet Take 1 Tab by mouth daily. 5/6/20   Constantine Orr NP   guaifenesin/dextromethorphan (ROBITUSSIN DM MAX PO) Take  by mouth. Patient not taking: Reported on 7/9/2021 7/9/21  Provider, Historical     Allergies   Allergen Reactions    Meloxicam Hives    Nsaids (Non-Steroidal Anti-Inflammatory Drug) Hives, Itching and Nausea Only    Prednisone Hives, Rash and Itching     Past Medical History:   Diagnosis Date    Asthma     SOB    Chronic pain     joint pain    Knee meniscus pain 9/7/2017    Mild intermittent asthma without complication 7/8/4555    Sinus congestion     Vitamin D deficiency      Past Surgical History:   Procedure Laterality Date    HX GI      recloser cloystomy       Review of Systems   Constitutional: Negative. HENT: Negative. Respiratory: Negative. Cardiovascular: Negative for chest pain and palpitations. Gastrointestinal: Negative. Genitourinary: Negative. Musculoskeletal: Positive for joint pain and myalgias. Skin: Negative for rash. Neurological: Positive for tingling. Negative for tremors, sensory change, speech change and headaches. Endo/Heme/Allergies: Negative. Psychiatric/Behavioral: Negative. Patient-Reported Vitals 7/9/2021   Patient-Reported Weight 167 LBS   Patient-Reported Height -   Patient-Reported Pulse 69   Patient-Reported Systolic  096   Patient-Reported Diastolic 70   Patient-Reported LMP Menopause        Jessica Duron, who was evaluated through a patient-initiated, synchronous (real-time) audio only encounter, and/or her healthcare decision maker, is aware that it is a billable service, with coverage as determined by her insurance carrier. She provided verbal consent to proceed: Yes. She has not had a related appointment within my department in the past 7 days or scheduled within the next 24 hours.       Total Time: minutes: 11-20 minutes    Waylon Albert NP

## 2021-07-09 NOTE — PROGRESS NOTES
Health Maintenance Due   Topic Date Due    Hepatitis C Screening  Never done    Pneumococcal 0-64 years (1 of 2 - PPSV23) Never done    DTaP/Tdap/Td series (1 - Tdap) Never done    PAP AKA CERVICAL CYTOLOGY  Never done    Colorectal Cancer Screening Combo  Never done    Shingrix Vaccine Age 50> (1 of 2) Never done    Breast Cancer Screen Mammogram  10/03/2020       Chief Complaint   Patient presents with    Pain (Chronic)    Leg Swelling    Medication Refill       1. Have you been to the ER, urgent care clinic since your last visit? Hospitalized since your last visit? No    2. Have you seen or consulted any other health care providers outside of the [address] [place] [address] since your last visit? Include any pap smears or colon screening. No    3) Do you have an Advance Directive on file? no    4) Are you interested in receiving information on Advance Directives? NO      Patient is accompanied by self I have received verbal consent from Alaina Gonzalez to discuss any/all medical information while they are present in the room.

## 2021-07-19 ENCOUNTER — TELEPHONE (OUTPATIENT)
Dept: INTERNAL MEDICINE CLINIC | Age: 56
End: 2021-07-19

## 2021-07-19 NOTE — TELEPHONE ENCOUNTER
Pt called this morning, states that she was prescribed Gabapentin. She started taking the medication Friday night states she woke up with her lip arm swollen and itching all over. Pt states she is in [address] I have advised pt to stop the gabapentin, she states she did. She reports no SOB or difficulty breathing. I have advised pt to been seen at urgent care for further evaluation.

## 2021-07-23 ENCOUNTER — TELEPHONE (OUTPATIENT)
Dept: INTERNAL MEDICINE CLINIC | Age: 56
End: 2021-07-23

## 2021-07-23 RX ORDER — FAMOTIDINE 20 MG/1
20 TABLET, FILM COATED ORAL
Qty: 10 TABLET | Refills: 0 | Status: SHIPPED | OUTPATIENT
Start: 2021-07-23 | End: 2021-07-30 | Stop reason: SDUPTHER

## 2021-07-23 RX ORDER — METHYLPREDNISOLONE 4 MG/1
TABLET ORAL
Qty: 1 DOSE PACK | Refills: 0 | Status: SHIPPED | OUTPATIENT
Start: 2021-07-23 | End: 2021-08-13 | Stop reason: ALTCHOICE

## 2021-07-23 NOTE — TELEPHONE ENCOUNTER
Call placed to patient, confirmed name and date of birth. At time of previous visit, patient's gabapentin was increased from 300 mg to 400 mg. Patient states she took two doses of 400 mg gabapentin on last Friday and then woke up Saturday morning with hives to her stomach and legs. She also noted some lip swelling. She has been using OTC Benadryl and taking oatmeal baths for relief. Lip swelling has gone down. She continues with itching and hives to her legs. She has no shortness of breath or wheezing. She denies any other new medications or foods recently. Gabapentin has been discontinued by patient  Patient requests steroid to help. She states the last time she took  Prednisone for back pain she experienced itching and it was added to her allergy list.  She wonders if something else caused the allergy, not prednisone. She says she had previously tolerated it okay. She did not experience any wheezing or swelling of mouth/lips with prednisone. She mentions that she would like to try this or medrol for current itching. She has used medrol in the past without concern. Discussed risk of allergy and immediate stop of steroid if worsening symptoms and presentation to ER for emergency. Patient acknowledged understanding. Will order  Medrol dose pack. May add Pepcid 20 mg po daily. May continue OTC Benadryl PRN, as per packaging instructions. If experiencing severe shortness of breath, chest pain, or other emergency present to the ER.

## 2021-07-23 NOTE — TELEPHONE ENCOUNTER
----- Message from Aman Donald sent at 7/23/2021  8:22 AM EDT -----  Regarding: FW: Visit Follow-Up Question  Contact: [phone]  Please advise   ----- Message -----  From: Pedro Garsia  Sent: 7/23/2021   8:15 AM EDT  To: Norbert Snyder Pool  Subject: Visit Follow-Up Question                         You prescribe the gabapentin 400 mg for me it broke me out and has I been itching now for 2 weeks call the office they told me go to urgent Care Wednesday are they telling me to go back to my primary care doctor I still have the house on my body as of today [phone] I'm sending you this at 8:15 a.m. can you please give me something for this itching in the house thank you

## 2021-07-30 ENCOUNTER — PATIENT MESSAGE (OUTPATIENT)
Dept: INTERNAL MEDICINE CLINIC | Age: 56
End: 2021-07-30

## 2021-07-30 RX ORDER — FAMOTIDINE 20 MG/1
20 TABLET, FILM COATED ORAL
Qty: 30 TABLET | Refills: 0 | Status: SHIPPED | OUTPATIENT
Start: 2021-07-30 | End: 2022-04-28 | Stop reason: ALTCHOICE

## 2021-07-30 RX ORDER — HYDROXYZINE 25 MG/1
25 TABLET, FILM COATED ORAL
Qty: 30 TABLET | Refills: 0 | Status: SHIPPED | OUTPATIENT
Start: 2021-07-30 | End: 2021-08-11

## 2021-07-30 NOTE — TELEPHONE ENCOUNTER
From: Drake Negrete  To: Katiana Smith NP  Sent: 7/30/2021 7:33 AM EDT  Subject: Prescription Question    Good morning Miss Morrison this is Deb Saenz I finished taking the steroids. This morning I noticed when I woke up and I took a shower the hives are back on my side and on my left upper arm.  Am I allowed to get any more it did stop the itching and in the house went away but when I finish taking the medicine they came back can you call me please [phone]

## 2021-07-30 NOTE — TELEPHONE ENCOUNTER
Placed call to patient without answer. Left message for patient to return call to office. Do not recommend any further steroids at this time, as medrol dosepack just completed. May try hydroxyzine PRN. Order placed. Do not take hydroxyzine in combination with Benadryl or other medications/ substances that can cause drowsiness. May continue Famotidine, as well. Patient encouraged to call or return to office if symptoms do not improve or worsen. If experiencing severe shortness of breath or chest pain, present to the ER.

## 2021-08-03 ENCOUNTER — TELEPHONE (OUTPATIENT)
Dept: INTERNAL MEDICINE CLINIC | Age: 56
End: 2021-08-03

## 2021-08-03 NOTE — TELEPHONE ENCOUNTER
Pt requesting a call back from [facility]  about a personal matter.    Phone: [phone]

## 2021-08-03 NOTE — TELEPHONE ENCOUNTER
Call placed to patient and she states that she faxed over some l;ab results from an outside provider on 7/9/2021 and hasnt heard anything back from [facility], will confer with provider

## 2021-08-13 ENCOUNTER — OFFICE VISIT (OUTPATIENT)
Dept: INTERNAL MEDICINE CLINIC | Age: 56
End: 2021-08-13
Payer: MEDICAID

## 2021-08-13 VITALS
BODY MASS INDEX: 31.22 KG/M2 | HEART RATE: 97 BPM | WEIGHT: 159 LBS | TEMPERATURE: 97.9 F | HEIGHT: 60 IN | DIASTOLIC BLOOD PRESSURE: 84 MMHG | SYSTOLIC BLOOD PRESSURE: 142 MMHG | RESPIRATION RATE: 18 BRPM | OXYGEN SATURATION: 97 %

## 2021-08-13 DIAGNOSIS — I10 BENIGN ESSENTIAL HTN: ICD-10-CM

## 2021-08-13 DIAGNOSIS — R20.2 LEFT HAND PARESTHESIA: Primary | ICD-10-CM

## 2021-08-13 DIAGNOSIS — T42.6X5S: ICD-10-CM

## 2021-08-13 DIAGNOSIS — R73.03 PREDIABETES: ICD-10-CM

## 2021-08-13 DIAGNOSIS — M54.2 CHRONIC NECK PAIN: ICD-10-CM

## 2021-08-13 DIAGNOSIS — G89.29 CHRONIC NECK PAIN: ICD-10-CM

## 2021-08-13 DIAGNOSIS — L50.9 FULL BODY HIVES: ICD-10-CM

## 2021-08-13 PROCEDURE — 99214 OFFICE O/P EST MOD 30 MIN: CPT | Performed by: INTERNAL MEDICINE

## 2021-08-13 RX ORDER — AMITRIPTYLINE HYDROCHLORIDE 25 MG/1
25 TABLET, FILM COATED ORAL
Qty: 30 TABLET | Refills: 1 | Status: SHIPPED | OUTPATIENT
Start: 2021-08-13 | End: 2021-09-08

## 2021-08-13 NOTE — PROGRESS NOTES
HISTORY OF PRESENT ILLNESS  Logan Castillo is a 60 y.o. female. Pt. comes in for f/u. Has a few chronic medical issues as documented including HTN, prediabetes, obesity, neck pain left hand numbness/tingling. Patient reports having generalized hives after her gabapentin was increased recently. Also reports some lip swelling. No difficulty breathing. Seen by our NP. Gabapentin has been stopped. Received steroids and antihistamines. Feeling better now. Has had increased left hand numbness and tingling since stopping gabapentin. Asking what else can be done. She is obese with BMI of 31. Denies any other focal neurological issues. Denies any Covid related issues. PMH/PSH/Allergies/Social History/medication list and most recent studies reviewed with patient. Tobacco use: No  Alcohol use: Social    Reports compliance with medications and diet. Trying to be active physically to control weight. Reports no other new c/o. HPI    Review of Systems   Constitutional: Negative. HENT: Negative. Eyes: Negative. Negative for blurred vision. Respiratory: Negative. Negative for shortness of breath. Cardiovascular: Negative. Negative for chest pain and leg swelling. Gastrointestinal: Negative. Negative for abdominal pain and heartburn. Genitourinary: Negative. Negative for dysuria. Musculoskeletal: Positive for joint pain and neck pain. Negative for falls. Skin: Negative. Negative for itching and rash. Recent hives   Neurological: Positive for tingling and sensory change. Negative for dizziness, focal weakness and headaches. Endo/Heme/Allergies: Negative. Negative for polydipsia. Psychiatric/Behavioral: Negative. Negative for depression. The patient is not nervous/anxious and does not have insomnia. Physical Exam  Vitals and nursing note reviewed. Constitutional:       General: She is not in acute distress. Appearance: She is well-developed. She is obese. Comments: Pleasant lady   HENT:      Head: Normocephalic and atraumatic. Mouth/Throat:      Mouth: Mucous membranes are moist.   Eyes:      General: No scleral icterus. Conjunctiva/sclera: Conjunctivae normal.   Neck:      Thyroid: No thyromegaly. Vascular: No carotid bruit or JVD. Cardiovascular:      Rate and Rhythm: Normal rate and regular rhythm. Heart sounds: Normal heart sounds. No murmur heard. Pulmonary:      Effort: Pulmonary effort is normal. No respiratory distress. Breath sounds: Normal breath sounds. No wheezing. Abdominal:      General: Bowel sounds are normal. There is no distension. Palpations: Abdomen is soft. Tenderness: There is no abdominal tenderness. Musculoskeletal:         General: Tenderness (Neck/trapezius muscles) present. Cervical back: Normal range of motion and neck supple. Right lower leg: No edema. Left lower leg: No edema. Lymphadenopathy:      Cervical: No cervical adenopathy. Skin:     General: Skin is warm and dry. Findings: No erythema or rash. Neurological:      Mental Status: She is alert and oriented to person, place, and time. Cranial Nerves: No cranial nerve deficit. Motor: No weakness. Coordination: Coordination normal.      Comments: Negative Tinel's and Phalen's tests   Psychiatric:         Behavior: Behavior normal.         ASSESSMENT and PLAN  Diagnoses and all orders for this visit:    1. Left hand paresthesia  -     REFERRAL TO NEUROLOGY    2. Full body hives  -     REFERRAL TO ALLERGY    3. Adverse effect of gabapentin, sequela  -     REFERRAL TO ALLERGY    4. Chronic neck pain    5. Benign essential HTN    6. Prediabetes    Other orders  -     Start amitriptyline (ELAVIL) 25 mg tablet; Take 1 Tablet by mouth nightly. Follow-up and Dispositions    · Return in about 4 weeks (around 9/10/2021).      lab results and schedule of future lab studies reviewed with patient  reviewed diet, exercise and weight control  reviewed medications and side effects in detail  F/u with other MD's as scheduled  Advised patient to lose weight by watching diet (decreasing sugars/carbs/fat, increasing fruits/vegetables), exercising at least 30 minutes daily, getting 7-8 hours of sleep daily, drinking plenty of water, and decreasing stress  COVID-19 precautions discussed with pt

## 2021-08-13 NOTE — PROGRESS NOTES
Health Maintenance Due   Topic Date Due    Hepatitis C Screening  Never done    DTaP/Tdap/Td series (1 - Tdap) Never done    PAP AKA CERVICAL CYTOLOGY  Never done    Colorectal Cancer Screening Combo  Never done    Shingrix Vaccine Age 50> (1 of 2) Never done    Breast Cancer Screen Mammogram  10/03/2020       Chief Complaint   Patient presents with    Allergic Reaction    Hypertension    Other     prediabetes       1. Have you been to the ER, urgent care clinic since your last visit? Hospitalized since your last visit? No    2. Have you seen or consulted any other health care providers outside of the [address] [place] [address] since your last visit? Include any pap smears or colon screening. No    3) Do you have an Advance Directive on file? no    4) Are you interested in receiving information on Advance Directives? NO      Patient is accompanied by self I have received verbal consent from Lissett Keller to discuss any/all medical information while they are present in the room.

## 2021-08-30 RX ORDER — TIZANIDINE 4 MG/1
TABLET ORAL
Qty: 30 TABLET | Refills: 0 | Status: SHIPPED | OUTPATIENT
Start: 2021-08-30 | End: 2021-12-13 | Stop reason: ALTCHOICE

## 2021-09-07 DIAGNOSIS — G47.00 INSOMNIA, UNSPECIFIED TYPE: ICD-10-CM

## 2021-09-07 DIAGNOSIS — I10 HYPERTENSION, UNSPECIFIED TYPE: ICD-10-CM

## 2021-09-07 RX ORDER — AMLODIPINE BESYLATE 10 MG/1
10 TABLET ORAL DAILY
Qty: 90 TABLET | Refills: 1 | Status: SHIPPED | OUTPATIENT
Start: 2021-09-07 | End: 2021-12-13

## 2021-09-07 RX ORDER — ZOLPIDEM TARTRATE 5 MG/1
5 TABLET ORAL
Qty: 30 TABLET | Refills: 0 | Status: SHIPPED | OUTPATIENT
Start: 2021-09-07 | End: 2021-10-05 | Stop reason: SDUPTHER

## 2021-09-08 RX ORDER — AMITRIPTYLINE HYDROCHLORIDE 25 MG/1
TABLET, FILM COATED ORAL
Qty: 30 TABLET | Refills: 1 | Status: SHIPPED | OUTPATIENT
Start: 2021-09-08 | End: 2021-10-04

## 2021-10-04 DIAGNOSIS — M62.838 MUSCLE SPASM: ICD-10-CM

## 2021-10-04 RX ORDER — AMITRIPTYLINE HYDROCHLORIDE 25 MG/1
TABLET, FILM COATED ORAL
Qty: 30 TABLET | Refills: 1 | Status: SHIPPED | OUTPATIENT
Start: 2021-10-04 | End: 2021-12-13 | Stop reason: SDUPTHER

## 2021-10-05 DIAGNOSIS — G47.00 INSOMNIA, UNSPECIFIED TYPE: ICD-10-CM

## 2021-10-05 RX ORDER — ZOLPIDEM TARTRATE 5 MG/1
5 TABLET ORAL
Qty: 30 TABLET | Refills: 0 | Status: SHIPPED | OUTPATIENT
Start: 2021-10-05 | End: 2021-11-06

## 2021-10-05 RX ORDER — TIZANIDINE 2 MG/1
2 TABLET ORAL
Qty: 30 TABLET | Refills: 0 | Status: SHIPPED | OUTPATIENT
Start: 2021-10-05 | End: 2021-12-06

## 2021-11-06 DIAGNOSIS — G47.00 INSOMNIA, UNSPECIFIED TYPE: ICD-10-CM

## 2021-11-06 RX ORDER — ZOLPIDEM TARTRATE 5 MG/1
5 TABLET ORAL
Qty: 30 TABLET | Refills: 0 | Status: SHIPPED | OUTPATIENT
Start: 2021-11-06 | End: 2021-12-06

## 2021-12-02 DIAGNOSIS — M54.50 LOW BACK PAIN WITHOUT SCIATICA, UNSPECIFIED BACK PAIN LATERALITY, UNSPECIFIED CHRONICITY: ICD-10-CM

## 2021-12-03 RX ORDER — LIDOCAINE 50 MG/G
2 PATCH TOPICAL EVERY 24 HOURS
Qty: 60 PATCH | Refills: 1 | Status: SHIPPED | OUTPATIENT
Start: 2021-12-03 | End: 2022-02-04

## 2021-12-06 DIAGNOSIS — M62.838 MUSCLE SPASM: ICD-10-CM

## 2021-12-06 DIAGNOSIS — G47.00 INSOMNIA, UNSPECIFIED TYPE: ICD-10-CM

## 2021-12-06 RX ORDER — METHYLPREDNISOLONE 4 MG/1
TABLET ORAL
Qty: 1 DOSE PACK | OUTPATIENT
Start: 2021-12-06

## 2021-12-06 RX ORDER — TIZANIDINE 2 MG/1
2 TABLET ORAL
Qty: 20 TABLET | Refills: 0 | Status: SHIPPED | OUTPATIENT
Start: 2021-12-06 | End: 2021-12-13 | Stop reason: SDUPTHER

## 2021-12-06 RX ORDER — ZOLPIDEM TARTRATE 5 MG/1
5 TABLET ORAL
Qty: 10 TABLET | Refills: 0 | Status: SHIPPED | OUTPATIENT
Start: 2021-12-06 | End: 2021-12-13 | Stop reason: SDUPTHER

## 2021-12-13 ENCOUNTER — VIRTUAL VISIT (OUTPATIENT)
Dept: INTERNAL MEDICINE CLINIC | Age: 56
End: 2021-12-13
Payer: MEDICAID

## 2021-12-13 DIAGNOSIS — G89.29 CHRONIC PAIN OF LEFT KNEE: ICD-10-CM

## 2021-12-13 DIAGNOSIS — I10 HYPERTENSION, UNSPECIFIED TYPE: ICD-10-CM

## 2021-12-13 DIAGNOSIS — M54.50 LOW BACK PAIN WITHOUT SCIATICA, UNSPECIFIED BACK PAIN LATERALITY, UNSPECIFIED CHRONICITY: ICD-10-CM

## 2021-12-13 DIAGNOSIS — Z12.11 COLON CANCER SCREENING: ICD-10-CM

## 2021-12-13 DIAGNOSIS — M62.838 MUSCLE SPASM: ICD-10-CM

## 2021-12-13 DIAGNOSIS — G47.00 INSOMNIA, UNSPECIFIED TYPE: ICD-10-CM

## 2021-12-13 DIAGNOSIS — T42.6X5S: ICD-10-CM

## 2021-12-13 DIAGNOSIS — M25.562 CHRONIC PAIN OF LEFT KNEE: ICD-10-CM

## 2021-12-13 DIAGNOSIS — Z11.59 NEED FOR HEPATITIS C SCREENING TEST: ICD-10-CM

## 2021-12-13 DIAGNOSIS — R73.03 PREDIABETES: ICD-10-CM

## 2021-12-13 DIAGNOSIS — I10 BENIGN ESSENTIAL HTN: Primary | ICD-10-CM

## 2021-12-13 PROCEDURE — 99214 OFFICE O/P EST MOD 30 MIN: CPT | Performed by: INTERNAL MEDICINE

## 2021-12-13 RX ORDER — ZOLPIDEM TARTRATE 5 MG/1
5 TABLET ORAL
Qty: 30 TABLET | Refills: 1 | Status: SHIPPED | OUTPATIENT
Start: 2021-12-13 | End: 2022-02-08 | Stop reason: SDUPTHER

## 2021-12-13 RX ORDER — AMLODIPINE BESYLATE 10 MG/1
TABLET ORAL
Qty: 90 TABLET | Refills: 1 | Status: SHIPPED | OUTPATIENT
Start: 2021-12-13

## 2021-12-13 RX ORDER — TIZANIDINE 2 MG/1
2 TABLET ORAL
Qty: 60 TABLET | Refills: 1 | Status: SHIPPED | OUTPATIENT
Start: 2021-12-13 | End: 2022-01-06

## 2021-12-13 RX ORDER — AMITRIPTYLINE HYDROCHLORIDE 25 MG/1
25 TABLET, FILM COATED ORAL
Qty: 30 TABLET | Refills: 1 | Status: SHIPPED | OUTPATIENT
Start: 2021-12-13 | End: 2022-01-06

## 2021-12-13 NOTE — PROGRESS NOTES
Teresita Clemons is a 60 y.o. female who was seen by synchronous (real-time) audio-video technology on 12/13/2021 for Medication Evaluation and Knee Pain        Assessment & Plan:   Diagnoses and all orders for this visit:    1. Benign essential HTN  -     METABOLIC PANEL, COMPREHENSIVE; Future  -     CBC WITH AUTOMATED DIFF; Future  -     LIPID PANEL; Future    2. Low back pain without sciatica, unspecified back pain laterality, unspecified chronicity    3. Chronic pain of left knee        -     Will complete DMV  4. Prediabetes  -     HEMOGLOBIN A1C WITH EAG; Future    5. Need for hepatitis C screening test  -     HEPATITIS C AB; Future    6. Colon cancer screening  -     COLOGUARD TEST (FECAL DNA COLORECTAL CANCER SCREENING)    7. Adverse effect of gabapentin, sequela    8. Muscle spasm  -     tiZANidine (ZANAFLEX) 2 mg tablet; Take 1 Tablet by mouth two (2) times daily as needed for Muscle Spasm(s). 9. Insomnia, unspecified type  -     zolpidem (AMBIEN) 5 mg tablet; Take 1 Tablet by mouth nightly as needed for Sleep. Max Daily Amount: 5 mg.  -     amitriptyline (ELAVIL) 25 mg tablet; Take 1 Tablet by mouth nightly. Follow-up and Dispositions    · Return in 6 months (on 6/13/2022), or if symptoms worsen or fail to improve. Subjective:     Patient was seen for a follow up. States that she continues to travel back and forth for work. Was seen by [facility] and nothing much was done about her hand. continues to have some tingling to it. Stopped the gabapentin as she was having some hives. This has not resolved. Reports that the Zanaflex and the and the patches are helping some with the pain to her back and knees. Is having pain and swelling at times. Often has to rest. Would like a [address] card. Needs medication refills. States that she never got the cologuard to complete it.  No change sin bowels or a family history with concerns     Prior to Admission medications    Medication Sig Start Date End Date Taking? Authorizing Provider   tiZANidine (ZANAFLEX) 2 mg tablet Take 1 Tablet by mouth two (2) times daily as needed for Muscle Spasm(s). 12/13/21  Yes Margarita Orr NP   zolpidem (AMBIEN) 5 mg tablet Take 1 Tablet by mouth nightly as needed for Sleep. Max Daily Amount: 5 mg. 12/13/21  Yes Margarita Orr NP   amitriptyline (ELAVIL) 25 mg tablet Take 1 Tablet by mouth nightly. 12/13/21  Yes Ritu Orr NP   lidocaine (LIDODERM) 5 % 2 PATCHES BY TRANSDERMAL ROUTE EVERY TWENTY-FOUR (24) HOURS. APPLY PATCH TO THE AFFECTED AREA FOR 12 HOURS A DAY AND REMOVE FOR 12 HOURS A DAY. 12/3/21  Yes Navid Weller NP   amLODIPine (NORVASC) 10 mg tablet Take 1 Tablet by mouth daily. 9/7/21  Yes Margarita Orr NP   famotidine (PEPCID) 20 mg tablet Take 1 Tablet by mouth nightly as needed for Heartburn. 7/30/21  Yes Navid Weller NP   cholecalciferol (Vitamin D3) (2,000 UNITS /50 MCG) cap capsule Take  by mouth daily. Yes Provider, Carlos Eduardo   Flovent  mcg/actuation inhaler TAKE 1 PUFF BY INHALATION TWO (2) TIMES A DAY. 7/9/21  Yes Navid Weller NP   ProAir HFA 90 mcg/actuation inhaler INHALE 2 PUFFS BY MOUTH EVERY 4 HOURS AS NEEDED FOR WHEEZE 7/9/21  Yes Navid Weller NP   zolpidem (AMBIEN) 5 mg tablet TAKE 1 TABLET BY MOUTH NIGHTLY AS NEEDED FOR SLEEP. MAX DAILY AMOUNT: 5 MG. 12/6/21 12/13/21  Melida Orr NP   tiZANidine (ZANAFLEX) 2 mg tablet TAKE 1 TABLET BY MOUTH TWO (2) TIMES DAILY AS NEEDED FOR MUSCLE SPASM(S).  12/6/21 12/13/21  Margarita Orr NP   amitriptyline (ELAVIL) 25 mg tablet TAKE 1 TABLET BY MOUTH EVERY DAY AT NIGHT 10/4/21 12/13/21  Margarita Orr NP   tiZANidine (ZANAFLEX) 4 mg tablet TAKE 1 TABLET BY MOUTH TWICE A DAY 8/30/21 12/13/21  Kendrick Wen NP     Patient Active Problem List   Diagnosis Code    Mild intermittent asthma without complication W73.69    Knee meniscus pain M25.569    Scoliosis M41.9    Adverse effect of gabapentin, sequela T42.6X5S    Left hand paresthesia R20.2    Chronic neck pain M54.2, G89.29    Prediabetes R73.03    Benign essential HTN I10     Patient Active Problem List    Diagnosis Date Noted    Adverse effect of gabapentin, sequela 08/13/2021    Left hand paresthesia 08/13/2021    Chronic neck pain 08/13/2021    Prediabetes 08/13/2021    Benign essential HTN 08/13/2021    Mild intermittent asthma without complication 08/34/8317    Knee meniscus pain 09/07/2017    Scoliosis 09/07/2017     Current Outpatient Medications   Medication Sig Dispense Refill    tiZANidine (ZANAFLEX) 2 mg tablet Take 1 Tablet by mouth two (2) times daily as needed for Muscle Spasm(s). 60 Tablet 1    zolpidem (AMBIEN) 5 mg tablet Take 1 Tablet by mouth nightly as needed for Sleep. Max Daily Amount: 5 mg. 30 Tablet 1    amitriptyline (ELAVIL) 25 mg tablet Take 1 Tablet by mouth nightly. 30 Tablet 1    lidocaine (LIDODERM) 5 % 2 PATCHES BY TRANSDERMAL ROUTE EVERY TWENTY-FOUR (24) HOURS. APPLY PATCH TO THE AFFECTED AREA FOR 12 HOURS A DAY AND REMOVE FOR 12 HOURS A DAY. 60 Patch 1    amLODIPine (NORVASC) 10 mg tablet Take 1 Tablet by mouth daily. 90 Tablet 1    famotidine (PEPCID) 20 mg tablet Take 1 Tablet by mouth nightly as needed for Heartburn. 30 Tablet 0    cholecalciferol (Vitamin D3) (2,000 UNITS /50 MCG) cap capsule Take  by mouth daily.  Flovent  mcg/actuation inhaler TAKE 1 PUFF BY INHALATION TWO (2) TIMES A DAY.  12 Inhaler 1    ProAir HFA 90 mcg/actuation inhaler INHALE 2 PUFFS BY MOUTH EVERY 4 HOURS AS NEEDED FOR WHEEZE 8.5 Inhaler 1     Allergies   Allergen Reactions    Meloxicam Hives    Nsaids (Non-Steroidal Anti-Inflammatory Drug) Hives, Itching and Nausea Only    Gabapentin Other (comments)     Hives and itching    Prednisone Hives, Rash and Itching     Past Medical History:   Diagnosis Date    Asthma     SOB    Chronic pain     joint pain    Knee meniscus pain 9/7/2017    Mild intermittent asthma without complication 5/9/1261    Sinus congestion     Vitamin D deficiency      Past Surgical History:   Procedure Laterality Date    HX GI      recloser cloystomy     Family History   Problem Relation Age of Onset    Liver Disease Mother     No Known Problems Father     Lung Cancer Sister     Lung Cancer Brother      Social History     Tobacco Use    Smoking status: Never Smoker    Smokeless tobacco: Never Used   Substance Use Topics    Alcohol use: Yes     Comment: social  1/2 on weekends       Review of Systems   Constitutional: Negative. Respiratory: Negative. Cardiovascular: Negative. Gastrointestinal: Negative. Genitourinary: Negative. Musculoskeletal: Positive for back pain and joint pain. Negative for falls. Neurological: Negative. Psychiatric/Behavioral: Negative.         Objective:     Patient-Reported Vitals 12/13/2021   Patient-Reported Weight 164   Patient-Reported Height 5'0   Patient-Reported Pulse 76   Patient-Reported Temperature 98.6   Patient-Reported SpO2 NA   Patient-Reported Systolic  128   Patient-Reported Diastolic 60   Patient-Reported Peak Flow Na   Patient-Reported LMP NA        [INSTRUCTIONS:  \"[x]\" Indicates a positive item  \"[]\" Indicates a negative item  -- DELETE ALL ITEMS NOT EXAMINED]    Constitutional: [x] Appears well-developed and well-nourished [x] No apparent distress      [] Abnormal -     Mental status: [x] Alert and awake  [x] Oriented to person/place/time [x] Able to follow commands    [] Abnormal -     Eyes:   EOM    [x]  Normal    [] Abnormal -   Sclera  [x]  Normal    [] Abnormal -          Discharge [x]  None visible   [] Abnormal -     HENT: [x] Normocephalic, atraumatic  [] Abnormal -   [x] Mouth/Throat: Mucous membranes are moist    External Ears [x] Normal  [] Abnormal -    Neck: [x] No visualized mass [] Abnormal -     Pulmonary/Chest: [x] Respiratory effort normal   [x] No visualized signs of difficulty breathing or respiratory distress        [] Abnormal -      Musculoskeletal:   [x] Normal gait with no signs of ataxia         [x] Normal range of motion of neck        [] Abnormal -     Neurological:        [x] No Facial Asymmetry (Cranial nerve 7 motor function) (limited exam due to video visit)          [x] No gaze palsy        [] Abnormal -          Skin:        [x] No significant exanthematous lesions or discoloration noted on facial skin         [] Abnormal -            Psychiatric:       [x] Normal Affect [] Abnormal -        [x] No Hallucinations    Other pertinent observable physical exam findings:-        We discussed the expected course, resolution and complications of the diagnosis(es) in detail. Medication risks, benefits, costs, interactions, and alternatives were discussed as indicated. I advised her to contact the office if her condition worsens, changes or fails to improve as anticipated. She expressed understanding with the diagnosis(es) and plan. Brendon Russell, was evaluated through a synchronous (real-time) audio-video encounter. The patient (or guardian if applicable) is aware that this is a billable service. Verbal consent to proceed has been obtained within the past 12 months. The visit was conducted pursuant to the emergency declaration under the [address] [place] [address] authority and the [employer] General Act. Patient identification was verified, and a caregiver was present when appropriate. The patient was located in a state where the provider was credentialed to provide care.       James Rankin NP

## 2021-12-13 NOTE — PROGRESS NOTES
Health Maintenance Due   Topic Date Due    Hepatitis C Screening  Never done    DTaP/Tdap/Td series (1 - Tdap) Never done    Cervical cancer screen  Never done    Colorectal Cancer Screening Combo  Never done    Shingrix Vaccine Age 50> (1 of 2) Never done    Breast Cancer Screen Mammogram  10/03/2020    A1C test (Diabetic or Prediabetic)  06/01/2021    COVID-19 Vaccine (3 - Booster for Pfizer series) 10/16/2021       Chief Complaint   Patient presents with    Medication Evaluation    Knee Pain       1. Have you been to the ER, urgent care clinic since your last visit? Hospitalized since your last visit? No    2. Have you seen or consulted any other health care providers outside of the [address] [place] [address] since your last visit? Include any pap smears or colon screening. No    3) Do you have an Advance Directive on file? no    4) Are you interested in receiving information on Advance Directives? NO      Patient is accompanied by self I have received verbal consent from Octavia Daniel to discuss any/all medical information while they are present in the room.

## 2022-01-06 DIAGNOSIS — M62.838 MUSCLE SPASM: ICD-10-CM

## 2022-01-06 DIAGNOSIS — G47.00 INSOMNIA, UNSPECIFIED TYPE: ICD-10-CM

## 2022-01-06 RX ORDER — TIZANIDINE 2 MG/1
2 TABLET ORAL
Qty: 60 TABLET | Refills: 1 | Status: SHIPPED | OUTPATIENT
Start: 2022-01-06 | End: 2022-02-02

## 2022-01-06 RX ORDER — AMITRIPTYLINE HYDROCHLORIDE 25 MG/1
25 TABLET, FILM COATED ORAL
Qty: 30 TABLET | Refills: 1 | Status: SHIPPED | OUTPATIENT
Start: 2022-01-06 | End: 2022-02-02

## 2022-01-21 ENCOUNTER — TELEPHONE (OUTPATIENT)
Dept: INTERNAL MEDICINE CLINIC | Age: 57
End: 2022-01-21

## 2022-01-21 DIAGNOSIS — Z12.11 COLON CANCER SCREENING: Primary | ICD-10-CM

## 2022-02-02 DIAGNOSIS — M62.838 MUSCLE SPASM: ICD-10-CM

## 2022-02-02 DIAGNOSIS — G47.00 INSOMNIA, UNSPECIFIED TYPE: ICD-10-CM

## 2022-02-02 RX ORDER — AMITRIPTYLINE HYDROCHLORIDE 25 MG/1
25 TABLET, FILM COATED ORAL
Qty: 30 TABLET | Refills: 1 | Status: SHIPPED | OUTPATIENT
Start: 2022-02-02 | End: 2022-03-28

## 2022-02-02 RX ORDER — TIZANIDINE 2 MG/1
2 TABLET ORAL
Qty: 60 TABLET | Refills: 1 | Status: SHIPPED | OUTPATIENT
Start: 2022-02-02 | End: 2022-02-08 | Stop reason: SDUPTHER

## 2022-02-03 DIAGNOSIS — M54.50 LOW BACK PAIN WITHOUT SCIATICA, UNSPECIFIED BACK PAIN LATERALITY, UNSPECIFIED CHRONICITY: ICD-10-CM

## 2022-02-04 RX ORDER — LIDOCAINE 50 MG/G
2 PATCH TOPICAL EVERY 24 HOURS
Qty: 60 PATCH | Refills: 1 | Status: SHIPPED | OUTPATIENT
Start: 2022-02-04 | End: 2022-03-31

## 2022-02-08 DIAGNOSIS — M62.838 MUSCLE SPASM: ICD-10-CM

## 2022-02-08 DIAGNOSIS — G47.00 INSOMNIA, UNSPECIFIED TYPE: ICD-10-CM

## 2022-02-08 RX ORDER — ZOLPIDEM TARTRATE 5 MG/1
5 TABLET ORAL
Qty: 30 TABLET | Refills: 1 | Status: SHIPPED | OUTPATIENT
Start: 2022-02-08 | End: 2022-04-04 | Stop reason: SDUPTHER

## 2022-02-08 RX ORDER — TIZANIDINE 2 MG/1
2 TABLET ORAL
Qty: 60 TABLET | Refills: 1 | Status: SHIPPED | OUTPATIENT
Start: 2022-02-08 | End: 2022-03-02

## 2022-03-02 DIAGNOSIS — M62.838 MUSCLE SPASM: ICD-10-CM

## 2022-03-02 RX ORDER — TIZANIDINE 2 MG/1
2 TABLET ORAL
Qty: 60 TABLET | Refills: 1 | Status: SHIPPED | OUTPATIENT
Start: 2022-03-02 | End: 2022-03-28

## 2022-03-07 RX ORDER — TIZANIDINE 4 MG/1
TABLET ORAL
Qty: 30 TABLET | Refills: 0 | Status: SHIPPED | OUTPATIENT
Start: 2022-03-07 | End: 2022-04-28 | Stop reason: DRUGHIGH

## 2022-03-18 PROBLEM — M25.569 KNEE MENISCUS PAIN: Status: ACTIVE | Noted: 2017-09-07

## 2022-03-18 PROBLEM — R20.2 LEFT HAND PARESTHESIA: Status: ACTIVE | Noted: 2021-08-13

## 2022-03-18 PROBLEM — R73.03 PREDIABETES: Status: ACTIVE | Noted: 2021-08-13

## 2022-03-19 PROBLEM — J45.20 MILD INTERMITTENT ASTHMA WITHOUT COMPLICATION: Status: ACTIVE | Noted: 2017-09-07

## 2022-03-19 PROBLEM — M41.9 SCOLIOSIS: Status: ACTIVE | Noted: 2017-09-07

## 2022-03-19 PROBLEM — G89.29 CHRONIC NECK PAIN: Status: ACTIVE | Noted: 2021-08-13

## 2022-03-19 PROBLEM — T42.6X5S: Status: ACTIVE | Noted: 2021-08-13

## 2022-03-19 PROBLEM — M54.2 CHRONIC NECK PAIN: Status: ACTIVE | Noted: 2021-08-13

## 2022-03-19 PROBLEM — I10 BENIGN ESSENTIAL HTN: Status: ACTIVE | Noted: 2021-08-13

## 2022-03-28 DIAGNOSIS — G47.00 INSOMNIA, UNSPECIFIED TYPE: ICD-10-CM

## 2022-03-28 DIAGNOSIS — M62.838 MUSCLE SPASM: ICD-10-CM

## 2022-03-28 RX ORDER — AMITRIPTYLINE HYDROCHLORIDE 25 MG/1
25 TABLET, FILM COATED ORAL
Qty: 30 TABLET | Refills: 1 | Status: SHIPPED | OUTPATIENT
Start: 2022-03-28 | End: 2022-07-05 | Stop reason: SDUPTHER

## 2022-03-28 RX ORDER — TIZANIDINE 2 MG/1
2 TABLET ORAL
Qty: 60 TABLET | Refills: 1 | Status: SHIPPED | OUTPATIENT
Start: 2022-03-28 | End: 2022-04-28

## 2022-03-30 DIAGNOSIS — M54.50 LOW BACK PAIN WITHOUT SCIATICA, UNSPECIFIED BACK PAIN LATERALITY, UNSPECIFIED CHRONICITY: ICD-10-CM

## 2022-03-31 RX ORDER — LIDOCAINE 50 MG/G
2 PATCH TOPICAL EVERY 24 HOURS
Qty: 60 PATCH | Refills: 1 | Status: SHIPPED | OUTPATIENT
Start: 2022-03-31 | End: 2022-07-25

## 2022-04-04 DIAGNOSIS — G47.00 INSOMNIA, UNSPECIFIED TYPE: ICD-10-CM

## 2022-04-04 RX ORDER — ZOLPIDEM TARTRATE 5 MG/1
5 TABLET ORAL
Qty: 30 TABLET | Refills: 1 | Status: SHIPPED | OUTPATIENT
Start: 2022-04-04 | End: 2022-04-12 | Stop reason: SDUPTHER

## 2022-04-12 DIAGNOSIS — G47.00 INSOMNIA, UNSPECIFIED TYPE: ICD-10-CM

## 2022-04-12 RX ORDER — ZOLPIDEM TARTRATE 5 MG/1
5 TABLET ORAL
Qty: 30 TABLET | Refills: 1 | Status: SHIPPED | OUTPATIENT
Start: 2022-04-12 | End: 2022-06-05 | Stop reason: SDUPTHER

## 2022-04-12 NOTE — TELEPHONE ENCOUNTER
----- Message from Darius Garcia sent at 4/12/2022  9:34 AM EDT -----  Subject: Medication Problem    QUESTIONS  Name of Medication? zolpidem (AMBIEN) 5 mg tablet  Patient-reported dosage and instructions? 5mg 1 tablet nightly as needed   for sleep  What question or problem do you have with the medication? patient needs   prescription that was filled at [facility] in [place] cancelled and sent to [facility]   in [place], as she will be there until the end of month. Preferred Pharmacy? [facility]ACY #[id number] phone number (if available)? [phone]  Additional Information for Provider? patient would like prescription sent   to the above pharmacy as she is currently out of medication and having   trouble sleeping. please call anytime to follow up.   ---------------------------------------------------------------------------  --------------  [address]  What is the best way for the office to contact you? OK to leave message on   voicemail, OK to respond with electronic message via [employer] portal (only   for patients who have registered [employer] account)  Preferred Call Back Phone Number? [phone]  ---------------------------------------------------------------------------  --------------  SCRIPT ANSWERS  Relationship to Patient?  Self

## 2022-04-28 ENCOUNTER — OFFICE VISIT (OUTPATIENT)
Dept: INTERNAL MEDICINE CLINIC | Age: 57
End: 2022-04-28
Payer: MEDICAID

## 2022-04-28 VITALS
HEIGHT: 60 IN | TEMPERATURE: 98.5 F | OXYGEN SATURATION: 100 % | HEART RATE: 98 BPM | WEIGHT: 160 LBS | SYSTOLIC BLOOD PRESSURE: 138 MMHG | DIASTOLIC BLOOD PRESSURE: 88 MMHG | RESPIRATION RATE: 16 BRPM | BODY MASS INDEX: 31.41 KG/M2

## 2022-04-28 DIAGNOSIS — M54.50 LOW BACK PAIN WITHOUT SCIATICA, UNSPECIFIED BACK PAIN LATERALITY, UNSPECIFIED CHRONICITY: Primary | ICD-10-CM

## 2022-04-28 DIAGNOSIS — E55.9 VITAMIN D DEFICIENCY: ICD-10-CM

## 2022-04-28 DIAGNOSIS — R73.03 PREDIABETES: ICD-10-CM

## 2022-04-28 DIAGNOSIS — I10 BENIGN ESSENTIAL HTN: ICD-10-CM

## 2022-04-28 DIAGNOSIS — E78.2 MIXED HYPERLIPIDEMIA: ICD-10-CM

## 2022-04-28 DIAGNOSIS — G89.29 OTHER CHRONIC PAIN: ICD-10-CM

## 2022-04-28 PROCEDURE — 99214 OFFICE O/P EST MOD 30 MIN: CPT | Performed by: INTERNAL MEDICINE

## 2022-04-28 RX ORDER — HYDROCODONE BITARTRATE AND ACETAMINOPHEN 5; 325 MG/1; MG/1
1 TABLET ORAL
Qty: 30 TABLET | Refills: 0 | Status: SHIPPED | OUTPATIENT
Start: 2022-04-28 | End: 2022-05-13

## 2022-04-28 RX ORDER — SIMVASTATIN 5 MG/1
5 TABLET, FILM COATED ORAL
Qty: 90 TABLET | Refills: 1 | Status: SHIPPED | OUTPATIENT
Start: 2022-04-28

## 2022-04-28 RX ORDER — TIZANIDINE HYDROCHLORIDE 6 MG/1
CAPSULE, GELATIN COATED ORAL
Qty: 60 CAPSULE | Refills: 1 | Status: SHIPPED | OUTPATIENT
Start: 2022-04-28 | End: 2022-07-20

## 2022-04-28 RX ORDER — ERGOCALCIFEROL 1.25 MG/1
50000 CAPSULE ORAL
Qty: 12 CAPSULE | Refills: 0 | Status: SHIPPED | OUTPATIENT
Start: 2022-04-28

## 2022-04-28 NOTE — PROGRESS NOTES
ADVISED PATIENT OF THE FOLLOWING HEALTH MAINTAINCE DUE  Health Maintenance Due   Topic Date Due    Hepatitis C Screening  Never done    Pneumococcal 0-64 years (1 - PCV) Never done    Shingrix Vaccine Age 50> (1 of 2) Never done    COVID-19 Vaccine (3 - Booster for Felix Peter series) 09/16/2021      Chief Complaint   Patient presents with    Hypertension    Asthma    Pain (Chronic)       1. Have you been to the ER, urgent care clinic since your last visit? Hospitalized since your last visit? No    2. Have you seen or consulted any other health care providers outside of the [address] [place] [address] since your last visit? Include any DEXA scan, mammography  or colon screening. No    3. Do you have an Advance Directive on file? no    4. Do you have a DNR on file? no    Patient is accompanied by self I have received verbal consent from Cami Sena to discuss any/all medical information while they are present in the room. No flowsheet data found.       [id number] Odalis Ambrosio [facility] [address] [place] [address] AT [address] [place] [address]  Phone: [phone] Fax: [phone]    [address] [place] [address] [address]  [facility] [place] [address]  Phone: [phone] Fax: [phone]

## 2022-04-28 NOTE — PROGRESS NOTES
HISTORY OF PRESENT ILLNESS  Colby Chopra is a 60 y.o. female. Patient was seen for a follow up for her HTN, pre DM and pain. Is currently back and forth to [place] for work. Is working on a cruise ship. This has helped with money, but the pain to her knees and back are still apparent. Reports that wearing the TENS unit at [facility] has helped. Also reports hat she got 2 injections to the knees earlier this week from Darius Lucas. Is also taking muscle relaxer often. No falls. Shows me pictures of her reaction to gabapentin. It was hive like. Labs were done by her OB while back home. Cholesterol and a1c were elevated. She also continues to take her Ambien at night. This has been helpful while away. Visit Vitals  BP (!) 160/100 (BP 1 Location: Right arm, BP Patient Position: Sitting, BP Cuff Size: Adult)   Pulse 98   Temp 98.5 °F (36.9 °C) (Oral)   Resp 16   Ht 5' (1.524 m)   Wt 160 lb (72.6 kg)   SpO2 100%   BMI 31.25 kg/m²     Past Medical History:   Diagnosis Date    Asthma     SOB    Chronic pain     joint pain    Knee meniscus pain 9/7/2017    Mild intermittent asthma without complication 2/1/0283    Sinus congestion     Vitamin D deficiency      Past Surgical History:   Procedure Laterality Date    HX GI      recloser cloystomy     Family History   Problem Relation Age of Onset    Liver Disease Mother     No Known Problems Father     Lung Cancer Sister     Lung Cancer Brother      Outpatient Encounter Medications as of 4/28/2022   Medication Sig Dispense Refill    TURMERIC, BULK, by Does Not Apply route.  tiZANidine (Zanaflex) 6 mg capsule Take 1 tablet two times a day as needed 60 Capsule 1    ergocalciferol (ERGOCALCIFEROL) 1,250 mcg (50,000 unit) capsule Take 1 Capsule by mouth every seven (7) days. 12 Capsule 0    simvastatin (ZOCOR) 5 mg tablet Take 1 Tablet by mouth nightly.  90 Tablet 1    HYDROcodone-acetaminophen (NORCO) 5-325 mg per tablet Take 1 Tablet by mouth every twelve (12) hours as needed for Pain for up to 15 days. Max Daily Amount: 2 Tablets. 30 Tablet 0    zolpidem (AMBIEN) 5 mg tablet Take 1 Tablet by mouth nightly as needed for Sleep. Max Daily Amount: 5 mg. 30 Tablet 1    lidocaine (LIDODERM) 5 % 2 PATCHES BY TRANSDERMAL ROUTE EVERY TWENTY-FOUR (24) HOURS. APPLY PATCH TO THE AFFECTED AREA FOR 12 HOURS A DAY AND REMOVE FOR 12 HOURS A DAY. 60 Patch 1    amitriptyline (ELAVIL) 25 mg tablet TAKE 1 TABLET BY MOUTH NIGHTLY 30 Tablet 1    amLODIPine (NORVASC) 10 mg tablet TAKE 1 TABLET BY MOUTH EVERY DAY 90 Tablet 1    Flovent  mcg/actuation inhaler TAKE 1 PUFF BY INHALATION TWO (2) TIMES A DAY. 12 Inhaler 1    ProAir HFA 90 mcg/actuation inhaler INHALE 2 PUFFS BY MOUTH EVERY 4 HOURS AS NEEDED FOR WHEEZE 8.5 Inhaler 1    [DISCONTINUED] tiZANidine (ZANAFLEX) 2 mg tablet TAKE 1 TABLET BY MOUTH TWO (2) TIMES DAILY AS NEEDED FOR MUSCLE SPASM(S). 60 Tablet 1    [DISCONTINUED] tiZANidine (ZANAFLEX) 4 mg tablet TAKE 1 TABLET BY MOUTH TWICE A DAY 30 Tablet 0    [DISCONTINUED] famotidine (PEPCID) 20 mg tablet Take 1 Tablet by mouth nightly as needed for Heartburn. 30 Tablet 0    [DISCONTINUED] cholecalciferol (Vitamin D3) (2,000 UNITS /50 MCG) cap capsule Take  by mouth daily. No facility-administered encounter medications on file as of 4/28/2022. HPI    Review of Systems   Constitutional: Negative. Respiratory: Negative. Cardiovascular: Negative. Gastrointestinal: Negative. Musculoskeletal: Positive for back pain and joint pain. Negative for falls. Neurological: Negative. Psychiatric/Behavioral: Negative. Physical Exam  Vitals and nursing note reviewed. Cardiovascular:      Rate and Rhythm: Normal rate and regular rhythm. Pulmonary:      Effort: Pulmonary effort is normal.      Breath sounds: Normal breath sounds. Abdominal:      Palpations: Abdomen is soft. Musculoskeletal:      Comments: Has a TENS unit on to her upper back.  Pain when pressing on the area   Skin:     General: Skin is warm. Neurological:      Mental Status: She is alert and oriented to person, place, and time. Psychiatric:         Behavior: Behavior normal.         ASSESSMENT and PLAN  Diagnoses and all orders for this visit:    1. Low back pain without sciatica, unspecified back pain laterality, unspecified chronicity  -     tiZANidine (Zanaflex) 6 mg capsule; Take 1 tablet two times a day as needed    2. Benign essential HTN        -      DASH diet   3. Prediabetes        -      Labs reviewed from OB. Patient was highly encouraged to eat less sugar and carbohydrates   4. Other chronic pain  -     HYDROcodone-acetaminophen (NORCO) 5-325 mg per tablet; Take 1 Tablet by mouth every twelve (12) hours as needed for Pain for up to 15 days. Max Daily Amount: 2 Tablets. -      checked   5. Vitamin D deficiency  -     ergocalciferol (ERGOCALCIFEROL) 1,250 mcg (50,000 unit) capsule; Take 1 Capsule by mouth every seven (7) days. 6. Mixed hyperlipidemia  -     simvastatin (ZOCOR) 5 mg tablet; Take 1 Tablet by mouth nightly.       Follow-up and Dispositions    · Return in about 6 months (around 10/28/2022), or if symptoms worsen or fail to improve.       lab results and schedule of future lab studies reviewed with patient  reviewed diet, exercise and weight control  reviewed medications and side effects in detail

## 2022-04-29 ENCOUNTER — TELEPHONE (OUTPATIENT)
Dept: INTERNAL MEDICINE CLINIC | Age: 57
End: 2022-04-29

## 2022-04-29 NOTE — TELEPHONE ENCOUNTER
PA started and sent to plan. PA status: APPROVED.    [facility]unir Zayas [facility] - [id number]  HYDROcodone-Acetaminophen 5-325MG tablets       Status: PA Response - Approved    Created: April 28th, 2022 [phone]    Sent: April 29th, 2022

## 2022-05-03 ENCOUNTER — DOCUMENTATION ONLY (OUTPATIENT)
Dept: INTERNAL MEDICINE CLINIC | Age: 57
End: 2022-05-03

## 2022-05-03 NOTE — PROGRESS NOTES
Chief Complaint   Patient presents with    Prior Auth     HYDROcodone-Acetaminophen 5-325MG tablets        Approvedon April 29  Your PA request has been approved. Additional information will be provided in the approval communication.

## 2022-05-04 NOTE — TELEPHONE ENCOUNTER
Patient stated that [facility] pharmacy did not get any notification of the prior authorization being approved and they are still holding patients medication.      Please advise

## 2022-05-04 NOTE — TELEPHONE ENCOUNTER
Called and spoke with the pharmacist, the medication was filled and the pt has already picked up the RX.

## 2022-05-25 ENCOUNTER — TELEPHONE (OUTPATIENT)
Dept: INTERNAL MEDICINE CLINIC | Age: 57
End: 2022-05-25

## 2022-05-25 NOTE — TELEPHONE ENCOUNTER
Call placed to pt patient and spoke with her, she will send us the [facility] form to be completed.

## 2022-05-25 NOTE — TELEPHONE ENCOUNTER
----- Message from Elsie Nielsen sent at 5/25/2022  9:13 AM EDT -----  Subject: Message to Provider    QUESTIONS  Information for Provider? Patient needs her handicap pass for car and will   overnight the paperwork IF PCP can send it back overnight ( patient will   include a self addressed stamp envelope) IF PCP can overnight it. Can this   be overnighted asap? Please do not use [employer], only call on cell.   ---------------------------------------------------------------------------  --------------  CALL BACK INFO  What is the best way for the office to contact you? OK to leave message on   voicemail  Preferred Call Back Phone Number? [phone]  ---------------------------------------------------------------------------  --------------  SCRIPT ANSWERS  Relationship to Patient?  Self

## 2022-06-05 DIAGNOSIS — G47.00 INSOMNIA, UNSPECIFIED TYPE: ICD-10-CM

## 2022-06-06 RX ORDER — ZOLPIDEM TARTRATE 5 MG/1
5 TABLET ORAL
Qty: 30 TABLET | Refills: 1 | Status: SHIPPED | OUTPATIENT
Start: 2022-06-06 | End: 2022-09-29

## 2022-07-05 DIAGNOSIS — G47.00 INSOMNIA, UNSPECIFIED TYPE: ICD-10-CM

## 2022-07-05 RX ORDER — AMITRIPTYLINE HYDROCHLORIDE 25 MG/1
25 TABLET, FILM COATED ORAL
Qty: 30 TABLET | Refills: 1 | Status: SHIPPED | OUTPATIENT
Start: 2022-07-05 | End: 2022-07-20 | Stop reason: SDUPTHER

## 2022-07-05 NOTE — TELEPHONE ENCOUNTER
Requested Prescriptions     Pending Prescriptions Disp Refills    amitriptyline (ELAVIL) 25 mg tablet 30 Tablet 1     Sig: Take 1 Tablet by mouth nightly.          [facility]pharmacy #[id number] [facility] [address] [address] [facility]  Phone: [phone] Fax: [phone]     Visit date not found is LAST OFFICE VISIT     Future Appointments   Date Time Provider Trent Howe   10/20/2022  8:30 AM Duong Orr NP [facility]

## 2022-07-19 DIAGNOSIS — M54.50 LOW BACK PAIN WITHOUT SCIATICA, UNSPECIFIED BACK PAIN LATERALITY, UNSPECIFIED CHRONICITY: ICD-10-CM

## 2022-07-20 DIAGNOSIS — G47.00 INSOMNIA, UNSPECIFIED TYPE: ICD-10-CM

## 2022-07-20 RX ORDER — TIZANIDINE HYDROCHLORIDE 6 MG/1
CAPSULE, GELATIN COATED ORAL
Qty: 60 CAPSULE | Refills: 1 | Status: SHIPPED | OUTPATIENT
Start: 2022-07-20 | End: 2022-09-29 | Stop reason: SDUPTHER

## 2022-07-20 RX ORDER — AMITRIPTYLINE HYDROCHLORIDE 25 MG/1
25 TABLET, FILM COATED ORAL
Qty: 30 TABLET | Refills: 1 | Status: SHIPPED | OUTPATIENT
Start: 2022-07-20 | End: 2022-09-29 | Stop reason: SDUPTHER

## 2022-07-20 NOTE — TELEPHONE ENCOUNTER
Requested Prescriptions     Pending Prescriptions Disp Refills    amitriptyline (ELAVIL) 25 mg tablet 30 Tablet 1     Sig: Take 1 Tablet by mouth nightly.          [facility]/pharmacy #[id number] - [address] [place] [address]  Phone: [phone] Fax: [phone]       4/28/2022 is LAST OFFICE VISIT     Future Appointments   Date Time Provider Trent Howe   10/20/2022  8:30 AM Ekta Orr NP [facility]

## 2022-07-20 NOTE — TELEPHONE ENCOUNTER
amitriptyline (ELAVIL) 25 mg tablet [[id number]]     Order Details  Dose: 25 mg Route: Oral Frequency: EVERY BEDTIME   Dispense Quantity: 30 Tablet Refills: 1          Sig: Take 1 Tablet by mouth nightly. Mary Albrecht was called, pharmacy stated that she does NOT have refills.

## 2022-07-20 NOTE — TELEPHONE ENCOUNTER
----- Message from Jennie Hollingsworth sent at 7/20/2022  4:01 PM EDT -----  Subject: Refill Request    QUESTIONS  Name of Medication? amitriptyline (ELAVIL) 25 mg tablet  Patient-reported dosage and instructions? 25 mg  How many days do you have left? 0  [id number]  Pharmacy phone number (if available)? [phone]  ---------------------------------------------------------------------------  --------------  Sharath MCDANIELS  What is the best way for the office to contact you? OK to leave message on   voicemail  Preferred Call Back Phone Number? [phone]  ---------------------------------------------------------------------------  --------------  SCRIPT ANSWERS  Relationship to Patient?  Self

## 2022-07-25 DIAGNOSIS — M54.50 LOW BACK PAIN WITHOUT SCIATICA, UNSPECIFIED BACK PAIN LATERALITY, UNSPECIFIED CHRONICITY: ICD-10-CM

## 2022-07-25 RX ORDER — LIDOCAINE 50 MG/G
PATCH TOPICAL
Qty: 30 PATCH | Refills: 3 | Status: SHIPPED | OUTPATIENT
Start: 2022-07-25 | End: 2022-09-29 | Stop reason: SDUPTHER

## 2022-07-29 DIAGNOSIS — J45.20 MILD INTERMITTENT ASTHMA WITHOUT COMPLICATION: ICD-10-CM

## 2022-07-29 RX ORDER — FLUTICASONE PROPIONATE 220 UG/1
1 AEROSOL, METERED RESPIRATORY (INHALATION) 2 TIMES DAILY
Qty: 12 EACH | Refills: 1 | Status: SHIPPED | OUTPATIENT
Start: 2022-07-29

## 2022-07-29 RX ORDER — ALBUTEROL SULFATE 90 UG/1
AEROSOL, METERED RESPIRATORY (INHALATION)
Qty: 8.5 EACH | Refills: 1 | Status: SHIPPED | OUTPATIENT
Start: 2022-07-29

## 2022-07-29 NOTE — TELEPHONE ENCOUNTER
Requested Prescriptions     Pending Prescriptions Disp Refills    fluticasone propionate (Flovent HFA) 220 mcg/actuation inhaler 12 Each 1     Sig: Take 1 Puff by inhalation two (2) times a day.     albuterol (ProAir HFA) 90 mcg/actuation inhaler 8.5 Each 1         [facility]/pharmacy #[id number]- [place] [address]  Phone: [phone] Fax: [phone]       4/28/2022 is LAST OFFICE VISIT     Future Appointments   Date Time Provider Trent Howe   10/20/2022  8:30 AM Romario Orr NP [facility]

## 2022-09-28 ENCOUNTER — DOCUMENTATION ONLY (OUTPATIENT)
Dept: INTERNAL MEDICINE CLINIC | Age: 57
End: 2022-09-28

## 2022-09-28 NOTE — PROGRESS NOTES
Chief Complaint   Patient presents with    Prior Auth     Lidocaine 5% patches        Status: PA Response - Favorable

## 2022-09-29 DIAGNOSIS — M54.50 LOW BACK PAIN WITHOUT SCIATICA, UNSPECIFIED BACK PAIN LATERALITY, UNSPECIFIED CHRONICITY: ICD-10-CM

## 2022-09-29 DIAGNOSIS — G47.00 INSOMNIA, UNSPECIFIED TYPE: ICD-10-CM

## 2022-09-29 RX ORDER — ZOLPIDEM TARTRATE 5 MG/1
5 TABLET ORAL
Qty: 30 TABLET | Refills: 1 | Status: SHIPPED | OUTPATIENT
Start: 2022-09-29 | End: 2022-10-20 | Stop reason: SDUPTHER

## 2022-09-29 RX ORDER — TIZANIDINE HYDROCHLORIDE 6 MG/1
CAPSULE, GELATIN COATED ORAL
Qty: 60 CAPSULE | Refills: 1 | Status: SHIPPED | OUTPATIENT
Start: 2022-09-29 | End: 2022-10-07 | Stop reason: SDUPTHER

## 2022-09-29 RX ORDER — AMITRIPTYLINE HYDROCHLORIDE 25 MG/1
25 TABLET, FILM COATED ORAL
Qty: 30 TABLET | Refills: 1 | Status: SHIPPED | OUTPATIENT
Start: 2022-09-29

## 2022-09-29 RX ORDER — ZOLPIDEM TARTRATE 5 MG/1
5 TABLET ORAL
Qty: 30 TABLET | Refills: 1 | Status: SHIPPED | OUTPATIENT
Start: 2022-09-29 | End: 2022-09-29 | Stop reason: SDUPTHER

## 2022-09-29 RX ORDER — LIDOCAINE 50 MG/G
PATCH TOPICAL
Qty: 30 PATCH | Refills: 3 | Status: SHIPPED | OUTPATIENT
Start: 2022-09-29

## 2022-10-07 DIAGNOSIS — M54.50 LOW BACK PAIN WITHOUT SCIATICA, UNSPECIFIED BACK PAIN LATERALITY, UNSPECIFIED CHRONICITY: ICD-10-CM

## 2022-10-07 RX ORDER — TIZANIDINE HYDROCHLORIDE 4 MG/1
CAPSULE, GELATIN COATED ORAL
Qty: 30 CAPSULE | Refills: 0 | Status: SHIPPED | OUTPATIENT
Start: 2022-10-07 | End: 2022-10-20 | Stop reason: SDUPTHER

## 2022-10-18 ENCOUNTER — DOCUMENTATION ONLY (OUTPATIENT)
Dept: INTERNAL MEDICINE CLINIC | Age: 57
End: 2022-10-18

## 2022-10-18 ENCOUNTER — TELEPHONE (OUTPATIENT)
Dept: INTERNAL MEDICINE CLINIC | Age: 57
End: 2022-10-18

## 2022-10-18 NOTE — TELEPHONE ENCOUNTER
Prior Authorization needed for tiZANidine (Zanaflex) 4 mg tab.      [facility]: Emi Jarrett, [place]

## 2022-10-18 NOTE — TELEPHONE ENCOUNTER
Spoke with Pt. Informed her on Rx denial and filled out a new request for tablets instead of capsules. Pt voiced understanding and appreciation.

## 2022-10-20 ENCOUNTER — TELEPHONE (OUTPATIENT)
Dept: INTERNAL MEDICINE CLINIC | Age: 57
End: 2022-10-20

## 2022-10-20 ENCOUNTER — OFFICE VISIT (OUTPATIENT)
Dept: INTERNAL MEDICINE CLINIC | Age: 57
End: 2022-10-20
Payer: MEDICAID

## 2022-10-20 VITALS
WEIGHT: 161.6 LBS | DIASTOLIC BLOOD PRESSURE: 88 MMHG | OXYGEN SATURATION: 97 % | TEMPERATURE: 98.9 F | HEART RATE: 96 BPM | BODY MASS INDEX: 31.73 KG/M2 | SYSTOLIC BLOOD PRESSURE: 138 MMHG | HEIGHT: 60 IN | RESPIRATION RATE: 12 BRPM

## 2022-10-20 DIAGNOSIS — E78.2 MIXED HYPERLIPIDEMIA: ICD-10-CM

## 2022-10-20 DIAGNOSIS — Z00.00 WELL ADULT EXAM: Primary | ICD-10-CM

## 2022-10-20 DIAGNOSIS — M54.50 LOW BACK PAIN WITHOUT SCIATICA, UNSPECIFIED BACK PAIN LATERALITY, UNSPECIFIED CHRONICITY: ICD-10-CM

## 2022-10-20 DIAGNOSIS — I10 BENIGN ESSENTIAL HTN: ICD-10-CM

## 2022-10-20 DIAGNOSIS — G47.00 INSOMNIA, UNSPECIFIED TYPE: ICD-10-CM

## 2022-10-20 PROCEDURE — 90688 IIV4 VACCINE SPLT 0.5 ML IM: CPT | Performed by: INTERNAL MEDICINE

## 2022-10-20 PROCEDURE — 99396 PREV VISIT EST AGE 40-64: CPT | Performed by: INTERNAL MEDICINE

## 2022-10-20 RX ORDER — ZOLPIDEM TARTRATE 5 MG/1
5 TABLET ORAL
Qty: 30 TABLET | Refills: 1 | Status: SHIPPED | OUTPATIENT
Start: 2022-10-20

## 2022-10-20 RX ORDER — TIZANIDINE HYDROCHLORIDE 4 MG/1
CAPSULE, GELATIN COATED ORAL
Qty: 30 CAPSULE | Refills: 0 | Status: SHIPPED | OUTPATIENT
Start: 2022-10-20 | End: 2022-10-31

## 2022-10-20 NOTE — PROGRESS NOTES
HISTORY OF PRESENT ILLNESS  Kristian Zaman is a 59 y.o. female. Patient was seen for a follow up. Has been back and forth to [facility]. Has lost several family members recently. Is wanting to come back and settle in [place]. Has gel inserted in [place] for her knees. This has helped tremendously. No falls. Has ongoing spasm and tightness to the right shoulder/neck. The Zanaflex does help this. Had lab work done. Will get this sent. Had her mammogram and colon screen. Visit Vitals  /88   Pulse 96   Temp 98.9 °F (37.2 °C)   Resp 12   Ht 5' (1.524 m)   Wt 161 lb 9.6 oz (73.3 kg)   SpO2 97%   BMI 31.56 kg/m²     Past Medical History:   Diagnosis Date    Asthma     SOB    Chronic pain     joint pain    Knee meniscus pain 9/7/2017    Mild intermittent asthma without complication 8/6/7651    Sinus congestion     Vitamin D deficiency      Past Surgical History:   Procedure Laterality Date    HX GI      recloser cloystomy     Family History   Problem Relation Age of Onset    Liver Disease Mother     No Known Problems Father     Lung Cancer Sister     Lung Cancer Brother      Outpatient Encounter Medications as of 10/20/2022   Medication Sig Dispense Refill    tiZANidine (ZANAFLEX) 4 mg capsule TAKE 1 CAPSULE BY MOUTH TWICE A DAY AS NEEDED 30 Capsule 0    zolpidem (AMBIEN) 5 mg tablet Take 1 Tablet by mouth nightly as needed for Sleep. Max Daily Amount: 5 mg. 30 Tablet 1    amitriptyline (ELAVIL) 25 mg tablet Take 1 Tablet by mouth nightly. 30 Tablet 1    lidocaine (LIDODERM) 5 % Apply patch to the affected area for 12 hours a day and remove for 12 hours a day. 30 Patch 3    fluticasone propionate (Flovent HFA) 220 mcg/actuation inhaler Take 1 Puff by inhalation two (2) times a day.  12 Each 1    albuterol (ProAir HFA) 90 mcg/actuation inhaler INHALE 2 PUFFS BY MOUTH EVERY 4 HOURS AS NEEDED FOR WHEEZE 8.5 Each 1    [DISCONTINUED] tiZANidine (ZANAFLEX) 4 mg capsule TAKE 1 CAPSULE BY MOUTH TWICE A DAY AS NEEDED 30 Capsule 0    [DISCONTINUED] zolpidem (AMBIEN) 5 mg tablet Take 1 Tablet by mouth nightly as needed for Sleep. Max Daily Amount: 5 mg. 30 Tablet 1    TURMERIC, BULK, by Does Not Apply route.      ergocalciferol (ERGOCALCIFEROL) 1,250 mcg (50,000 unit) capsule Take 1 Capsule by mouth every seven (7) days. 12 Capsule 0    simvastatin (ZOCOR) 5 mg tablet Take 1 Tablet by mouth nightly. 90 Tablet 1    amLODIPine (NORVASC) 10 mg tablet TAKE 1 TABLET BY MOUTH EVERY DAY 90 Tablet 1     No facility-administered encounter medications on file as of 10/20/2022. HPI    Review of Systems   Constitutional: Negative. Respiratory: Negative. Cardiovascular: Negative. Gastrointestinal: Negative. Genitourinary: Negative. Musculoskeletal:  Negative for back pain and joint pain. Right neck spasm    Neurological: Negative. Psychiatric/Behavioral:  The patient has insomnia. Physical Exam  Vitals and nursing note reviewed. Neck:      Thyroid: No thyroid mass. Cardiovascular:      Rate and Rhythm: Normal rate and regular rhythm. Pulmonary:      Effort: Pulmonary effort is normal.      Breath sounds: Normal breath sounds. Abdominal:      General: Bowel sounds are normal.      Palpations: Abdomen is soft. Musculoskeletal:      Cervical back: Muscular tenderness present. No pain with movement. Normal range of motion. Right lower leg: No edema. Left lower leg: No edema. Skin:     General: Skin is warm. Neurological:      Mental Status: She is alert and oriented to person, place, and time. Psychiatric:         Behavior: Behavior normal.       ASSESSMENT and PLAN  Diagnoses and all orders for this visit:    1. Well adult exam    2. Benign essential HTN    3. Mixed hyperlipidemia    4. Low back pain without sciatica, unspecified back pain laterality, unspecified chronicity  -     tiZANidine (ZANAFLEX) 4 mg capsule; TAKE 1 CAPSULE BY MOUTH TWICE A DAY AS NEEDED    5.  Insomnia, unspecified type  -     zolpidem (AMBIEN) 5 mg tablet; Take 1 Tablet by mouth nightly as needed for Sleep. Max Daily Amount: 5 mg.     Other orders  -     INFLUENZA, AFLURIA, (AGE 3 Y+), FLUZONE (AGE 6 MO+) IM, MDV, 0.5 ML      Follow-up and Dispositions    Return in about 6 months (around 4/20/2023), or if symptoms worsen or fail to improve.       lab results and schedule of future lab studies reviewed with patient  reviewed diet, exercise and weight control  cardiovascular risk and specific lipid/LDL goals reviewed

## 2022-10-20 NOTE — TELEPHONE ENCOUNTER
----- Message from Sea Granda sent at 10/20/2022  8:33 AM EDT -----  Subject: Message to Provider    QUESTIONS  Information for Provider? Patient is running late due to traffic, she's   about to turn on [facility].  ---------------------------------------------------------------------------  --------------  [address]  [phone]; OK to leave message on voicemail  ---------------------------------------------------------------------------  --------------  SCRIPT ANSWERS  Relationship to Patient?  Self

## 2022-10-20 NOTE — PROGRESS NOTES
Health Maintenance Due   Topic Date Due    Pneumococcal 0-64 years (1 - PCV) Never done    Shingrix Vaccine Age 50> (1 of 2) Never done    COVID-19 Vaccine (3 - Booster for Pfizer series) 09/16/2021    Flu Vaccine (1) 08/01/2022       Chief Complaint   Patient presents with    Hypertension    Follow Up Chronic Condition    Medication Evaluation     [facility]. 1. Have you been to the ER, urgent care clinic since your last visit? Hospitalized since your last visit? No    2. Have you seen or consulted any other health care providers outside of the [address] [place] [address] since your last visit? Include any pap smears or colon screening. No    3) Do you have an Advance Directive on file? no    4) Are you interested in receiving information on Advance Directives? NO      Patient is accompanied by self I have received verbal consent from Apurva Webb to discuss any/all medical information while they are present in the room.

## 2022-10-31 DIAGNOSIS — M54.50 LOW BACK PAIN WITHOUT SCIATICA, UNSPECIFIED BACK PAIN LATERALITY, UNSPECIFIED CHRONICITY: Primary | ICD-10-CM

## 2022-10-31 RX ORDER — TIZANIDINE 4 MG/1
4 TABLET ORAL
Qty: 60 TABLET | Refills: 1 | Status: SHIPPED | OUTPATIENT
Start: 2022-10-31

## 2022-11-13 DIAGNOSIS — G47.00 INSOMNIA, UNSPECIFIED TYPE: ICD-10-CM

## 2022-11-14 RX ORDER — AMITRIPTYLINE HYDROCHLORIDE 25 MG/1
25 TABLET, FILM COATED ORAL
Qty: 30 TABLET | Refills: 1 | Status: SHIPPED | OUTPATIENT
Start: 2022-11-14

## 2022-12-24 DIAGNOSIS — G47.00 INSOMNIA, UNSPECIFIED TYPE: ICD-10-CM

## 2022-12-24 DIAGNOSIS — M54.50 LOW BACK PAIN WITHOUT SCIATICA, UNSPECIFIED BACK PAIN LATERALITY, UNSPECIFIED CHRONICITY: ICD-10-CM

## 2022-12-24 RX ORDER — AMITRIPTYLINE HYDROCHLORIDE 25 MG/1
25 TABLET, FILM COATED ORAL
Qty: 30 TABLET | Refills: 1 | Status: SHIPPED | OUTPATIENT
Start: 2022-12-24

## 2022-12-24 RX ORDER — TIZANIDINE 4 MG/1
4 TABLET ORAL
Qty: 60 TABLET | Refills: 1 | Status: SHIPPED | OUTPATIENT
Start: 2022-12-24

## 2023-01-17 DIAGNOSIS — M54.50 LOW BACK PAIN WITHOUT SCIATICA, UNSPECIFIED BACK PAIN LATERALITY, UNSPECIFIED CHRONICITY: ICD-10-CM

## 2023-01-17 DIAGNOSIS — G47.00 INSOMNIA, UNSPECIFIED TYPE: ICD-10-CM

## 2023-01-17 RX ORDER — LIDOCAINE 50 MG/G
PATCH TOPICAL
Qty: 30 PATCH | Refills: 3 | Status: SHIPPED | OUTPATIENT
Start: 2023-01-17

## 2023-01-17 RX ORDER — AMITRIPTYLINE HYDROCHLORIDE 25 MG/1
25 TABLET, FILM COATED ORAL
Qty: 30 TABLET | Refills: 1 | Status: SHIPPED | OUTPATIENT
Start: 2023-01-17

## 2023-01-17 RX ORDER — TIZANIDINE 4 MG/1
4 TABLET ORAL
Qty: 60 TABLET | Refills: 1 | Status: SHIPPED | OUTPATIENT
Start: 2023-01-17

## 2023-01-24 DIAGNOSIS — G47.00 INSOMNIA, UNSPECIFIED TYPE: ICD-10-CM

## 2023-01-24 RX ORDER — ZOLPIDEM TARTRATE 5 MG/1
5 TABLET ORAL
Qty: 30 TABLET | Refills: 1 | Status: SHIPPED | OUTPATIENT
Start: 2023-01-24

## 2023-01-24 NOTE — TELEPHONE ENCOUNTER
Requested Prescriptions     Pending Prescriptions Disp Refills    zolpidem (AMBIEN) 5 mg tablet 30 Tablet 1     Sig: Take 1 Tablet by mouth nightly as needed for Sleep. Max Daily Amount: 5 mg.      10/20/2022  No upcoming  [facility] on [facility]

## 2023-02-11 DIAGNOSIS — M54.50 LOW BACK PAIN WITHOUT SCIATICA, UNSPECIFIED BACK PAIN LATERALITY, UNSPECIFIED CHRONICITY: ICD-10-CM

## 2023-02-11 DIAGNOSIS — G47.00 INSOMNIA, UNSPECIFIED TYPE: ICD-10-CM

## 2023-02-11 RX ORDER — AMITRIPTYLINE HYDROCHLORIDE 25 MG/1
25 TABLET, FILM COATED ORAL
Qty: 30 TABLET | Refills: 1 | Status: SHIPPED | OUTPATIENT
Start: 2023-02-11

## 2023-02-11 RX ORDER — TIZANIDINE 4 MG/1
4 TABLET ORAL
Qty: 60 TABLET | Refills: 1 | Status: SHIPPED | OUTPATIENT
Start: 2023-02-11

## 2023-02-14 ENCOUNTER — TELEPHONE (OUTPATIENT)
Dept: INTERNAL MEDICINE CLINIC | Age: 58
End: 2023-02-14

## 2023-02-14 NOTE — TELEPHONE ENCOUNTER
----- Message from Jose Pineda sent at 2/14/2023 10:34 AM EST -----  Subject: Message to Provider    QUESTIONS  Information for Provider? Patient called in this morning to let the office   know she will be at the appointment on the 17th, nothing has changed. ---------------------------------------------------------------------------  --------------  Jessa Rodríguez [facility]  [phone]; OK to leave message on voicemail  ---------------------------------------------------------------------------  --------------  SCRIPT ANSWERS  Relationship to Patient?  Self

## 2023-03-09 DIAGNOSIS — G47.00 INSOMNIA, UNSPECIFIED TYPE: ICD-10-CM

## 2023-03-09 DIAGNOSIS — M54.50 LOW BACK PAIN WITHOUT SCIATICA, UNSPECIFIED BACK PAIN LATERALITY, UNSPECIFIED CHRONICITY: ICD-10-CM

## 2023-03-09 RX ORDER — TIZANIDINE 4 MG/1
4 TABLET ORAL
Qty: 60 TABLET | Refills: 1 | Status: SHIPPED | OUTPATIENT
Start: 2023-03-09

## 2023-03-09 RX ORDER — AMITRIPTYLINE HYDROCHLORIDE 25 MG/1
25 TABLET, FILM COATED ORAL
Qty: 30 TABLET | Refills: 1 | Status: SHIPPED | OUTPATIENT
Start: 2023-03-09

## 2023-03-28 ENCOUNTER — OFFICE VISIT (OUTPATIENT)
Dept: INTERNAL MEDICINE CLINIC | Age: 58
End: 2023-03-28
Payer: MEDICAID

## 2023-03-28 VITALS
OXYGEN SATURATION: 98 % | TEMPERATURE: 98.7 F | HEIGHT: 60 IN | WEIGHT: 157 LBS | DIASTOLIC BLOOD PRESSURE: 88 MMHG | RESPIRATION RATE: 16 BRPM | HEART RATE: 100 BPM | SYSTOLIC BLOOD PRESSURE: 138 MMHG | BODY MASS INDEX: 30.82 KG/M2

## 2023-03-28 DIAGNOSIS — M25.561 CHRONIC PAIN OF RIGHT KNEE: ICD-10-CM

## 2023-03-28 DIAGNOSIS — G89.29 CHRONIC PAIN OF RIGHT KNEE: ICD-10-CM

## 2023-03-28 DIAGNOSIS — E55.9 VITAMIN D DEFICIENCY: ICD-10-CM

## 2023-03-28 DIAGNOSIS — I10 BENIGN ESSENTIAL HTN: Primary | ICD-10-CM

## 2023-03-28 DIAGNOSIS — M54.50 LOW BACK PAIN WITHOUT SCIATICA, UNSPECIFIED BACK PAIN LATERALITY, UNSPECIFIED CHRONICITY: ICD-10-CM

## 2023-03-28 DIAGNOSIS — R73.03 PREDIABETES: ICD-10-CM

## 2023-03-28 DIAGNOSIS — E78.2 MIXED HYPERLIPIDEMIA: ICD-10-CM

## 2023-03-28 PROCEDURE — 99214 OFFICE O/P EST MOD 30 MIN: CPT | Performed by: INTERNAL MEDICINE

## 2023-03-28 RX ORDER — SIMVASTATIN 5 MG/1
5 TABLET, FILM COATED ORAL
Qty: 90 TABLET | Refills: 1 | Status: SHIPPED | OUTPATIENT
Start: 2023-03-28

## 2023-03-28 RX ORDER — ACETAMINOPHEN AND CODEINE PHOSPHATE 300; 30 MG/1; MG/1
1 TABLET ORAL
Qty: 30 TABLET | Refills: 0 | Status: SHIPPED | OUTPATIENT
Start: 2023-03-28 | End: 2023-03-30 | Stop reason: SDUPTHER

## 2023-03-28 NOTE — PROGRESS NOTES
HISTORY OF PRESENT ILLNESS  Sonia Owens is a 59 y.o. female who presents today for general chronic care follow up. She reports that she is doing generally well but she would like to address her right knee pain which seems to be worsening. She has been following up with orthopedics and last had a cortisone injection in the knee of osteoarthritis about 1 month ago. She had last had an injection in that joint about 6 months prior. She has also had gel-injections in the knee in the past with little relief. She acknowledges that the next step in her care would be a joint replacement but she is unwilling to have this done. While the steroid injections have helped somewhat, she is requesting something stronger for pain on the days when pain is especially bad. She is already using muscle relaxers every evening, and has tried prednisone in the past with some relief. She reports pain as bad as 10/10. She is also due for routine blood work today. She has a history of HTN, HLD and prediabetes. Blood pressure is a little high today at 140/90 but she has been running around this morning preparing to travel this afternoon. Up to date on mammogram and colonoscopy, completed last year.   Visit Vitals  BP (!) 140/90   Pulse 100   Temp 98.7 °F (37.1 °C)   Resp 16   Ht 5' (1.524 m)   Wt 157 lb (71.2 kg)   SpO2 98%   BMI 30.66 kg/m²     Past Medical History:   Diagnosis Date    Asthma     SOB    Chronic pain     joint pain    Knee meniscus pain 9/7/2017    Mild intermittent asthma without complication 8/1/3378    Sinus congestion     Vitamin D deficiency      Past Surgical History:   Procedure Laterality Date    HX GI      recloser cloystomy     Family History   Problem Relation Age of Onset    Liver Disease Mother     No Known Problems Father     Lung Cancer Sister     Lung Cancer Brother      Outpatient Encounter Medications as of 3/28/2023   Medication Sig Dispense Refill    simvastatin (ZOCOR) 5 mg tablet Take 1 Tablet by mouth nightly. 90 Tablet 1    acetaminophen-codeine (TYLENOL #3) 300-30 mg per tablet Take 1 Tablet by mouth nightly as needed for Pain for up to 30 days. Max Daily Amount: 1 Tablet. 30 Tablet 0    amitriptyline (ELAVIL) 25 mg tablet TAKE 1 TABLET BY MOUTH NIGHTLY 30 Tablet 1    tiZANidine (ZANAFLEX) 4 mg tablet TAKE 1 TABLET BY MOUTH TWO (2) TIMES DAILY AS NEEDED FOR MUSCLE SPASM(S). 60 Tablet 1    zolpidem (AMBIEN) 5 mg tablet Take 1 Tablet by mouth nightly as needed for Sleep. Max Daily Amount: 5 mg. 30 Tablet 1    lidocaine (LIDODERM) 5 % APPLY 1 PATCH TO AFFECTED AREA 12 HOURS ON AND 12 HOURS OFF 30 Patch 3    predniSONE (DELTASONE) 5 mg tablet Take 1 Tablet by mouth two (2) times a day. 10 Tablet 0    fluticasone propionate (Flovent HFA) 220 mcg/actuation inhaler Take 1 Puff by inhalation two (2) times a day. 12 Each 1    albuterol (ProAir HFA) 90 mcg/actuation inhaler INHALE 2 PUFFS BY MOUTH EVERY 4 HOURS AS NEEDED FOR WHEEZE 8.5 Each 1    TURMERIC, BULK, by Does Not Apply route.      ergocalciferol (ERGOCALCIFEROL) 1,250 mcg (50,000 unit) capsule Take 1 Capsule by mouth every seven (7) days. 12 Capsule 0    amLODIPine (NORVASC) 10 mg tablet TAKE 1 TABLET BY MOUTH EVERY DAY 90 Tablet 1    [DISCONTINUED] simvastatin (ZOCOR) 5 mg tablet Take 1 Tablet by mouth nightly. 90 Tablet 1     No facility-administered encounter medications on file as of 3/28/2023. Knee Pain  Pertinent negatives include no chest pain, no abdominal pain, no headaches and no shortness of breath. Review of Systems   Constitutional:  Negative for chills, fever, malaise/fatigue and weight loss. HENT:  Negative for congestion, ear pain, sinus pain and sore throat. Eyes:  Negative for pain and discharge. Respiratory:  Negative for cough, shortness of breath and wheezing. Cardiovascular:  Negative for chest pain, palpitations and leg swelling.    Gastrointestinal:  Negative for abdominal pain, constipation, diarrhea, heartburn, nausea and vomiting. Genitourinary:  Negative for dysuria, frequency and urgency. Musculoskeletal:  Positive for back pain and joint pain. Negative for falls. Skin:  Negative for rash. Neurological:  Negative for dizziness, weakness and headaches. Psychiatric/Behavioral:  Negative for depression. The patient is not nervous/anxious. Physical Exam  Vitals and nursing note reviewed. Constitutional:       General: She is not in acute distress. Appearance: Normal appearance. She is not ill-appearing. HENT:      Head: Normocephalic and atraumatic. Cardiovascular:      Rate and Rhythm: Normal rate and regular rhythm. Pulses: Normal pulses. Heart sounds: Normal heart sounds. No murmur heard. No friction rub. No gallop. Pulmonary:      Effort: Pulmonary effort is normal. No respiratory distress. Breath sounds: Normal breath sounds. No wheezing, rhonchi or rales. Abdominal:      General: Abdomen is flat. Bowel sounds are normal. There is no distension. Palpations: Abdomen is soft. Tenderness: There is no abdominal tenderness. Musculoskeletal:         General: No signs of injury. Cervical back: Normal range of motion. Right knee: Bony tenderness and crepitus present. No swelling, deformity or erythema. Left knee: Normal.      Right lower leg: No swelling. No edema. Left lower leg: No swelling. No edema. Skin:     General: Skin is warm and dry. Neurological:      General: No focal deficit present. Mental Status: She is alert and oriented to person, place, and time. Psychiatric:         Mood and Affect: Mood normal.         Behavior: Behavior normal.     ASSESSMENT and PLAN  Will do routine blood work today and refill the patients simvastatin as requested. Will prescribe Tylenol #3 prn for breakthrough pain. Diagnoses and all orders for this visit:    1. Benign essential HTN  -     METABOLIC PANEL, COMPREHENSIVE;  Future  -     CBC WITH AUTOMATED DIFF; Future  -     TSH 3RD GENERATION; Future    2. Mixed hyperlipidemia  -     LIPID PANEL; Future  -     simvastatin (ZOCOR) 5 mg tablet; Take 1 Tablet by mouth nightly. 3. Prediabetes  -     HEMOGLOBIN A1C WITH EAG; Future    4. Vitamin D deficiency  -     VITAMIN D, 25 HYDROXY; Future    5. Low back pain without sciatica, unspecified back pain laterality, unspecified chronicity  -     acetaminophen-codeine (TYLENOL #3) 300-30 mg per tablet; Take 1 Tablet by mouth nightly as needed for Pain for up to 30 days. Max Daily Amount: 1 Tablet. 6. Chronic pain of right knee  -     acetaminophen-codeine (TYLENOL #3) 300-30 mg per tablet; Take 1 Tablet by mouth nightly as needed for Pain for up to 30 days. Max Daily Amount: 1 Tablet. Follow-up and Dispositions    Return in about 6 months (around 9/28/2023), or if symptoms worsen or fail to improve.       lab results and schedule of future lab studies reviewed with patient  reviewed medications and side effects in detail                                                                                                                Consent For Provider Observation  [address] [place] [address] (\"[facility]\") has agreed to permit a provider employed by [employer] (\"Observer\") to observe care to patients treated in its physician practices. Your physician has agreed to permit such Observer to observe his/her patient care activities. Such observation will not include any direct participation in the care provided to you. This writer has obtained verbal permission from this patient to permit Observer to observe their medical care received at [facility]. This patient agrees that they have been given the opportunity to refuse to give such consent and that they may withdraw their consent at any time, except to the extent [employer] has relied on this consent and an Observer has already observed their medical care.  This consent shall remain in effect for 1 year, unless otherwise withdrawn by this patient.

## 2023-03-28 NOTE — PROGRESS NOTES
Rm    Chief Complaint   Patient presents with    Follow Up Appointment     6 month     Knee Pain     Right         Visit Vitals  BP (!) 140/90   Pulse 100   Temp 98.7 °F (37.1 °C)   Resp 16   Ht 5' (1.524 m)   Wt 157 lb (71.2 kg)   SpO2 98%   BMI 30.66 kg/m²        1. Have you been to the ER, urgent care clinic since your last visit? Hospitalized since your last visit? No    2. Have you seen or consulted any other health care providers outside of the [address] [place] [address] since your last visit? Include any pap smears or colon screening. No     Health Maintenance Due   Topic Date Due    Pneumococcal 0-64 years (1 - PCV) Never done    DTaP/Tdap/Td series (1 - Tdap) Never done    Shingles Vaccine (1 of 2) Never done    COVID-19 Vaccine (3 - Booster for Pfizer series) 06/11/2021    A1C test (Diabetic or Prediabetic)  01/11/2023    Lipid Screen  01/11/2023        3 most recent PHQ Screens 3/28/2023   Little interest or pleasure in doing things Not at all   Feeling down, depressed, irritable, or hopeless Not at all   Total Score PHQ 2 0   Trouble falling or staying asleep, or sleeping too much -   Feeling tired or having little energy -   Poor appetite, weight loss, or overeating -   Feeling bad about yourself - or that you are a failure or have let yourself or your family down -   Trouble concentrating on things such as school, work, reading, or watching TV -   Moving or speaking so slowly that other people could have noticed; or the opposite being so fidgety that others notice -   Thoughts of being better off dead, or hurting yourself in some way -   PHQ 9 Score -        Fall Risk Assessment, last 12 mths 4/28/2022   Able to walk? Yes   Fall in past 12 months? 0   Do you feel unsteady?  0   Are you worried about falling 0       Learning Assessment 1/22/2019   PRIMARY LEARNER Patient   HIGHEST LEVEL OF EDUCATION - PRIMARY LEARNER  4 YEARS OF COLLEGE   BARRIERS PRIMARY LEARNER NONE   CO-LEARNER CAREGIVER No   PRIMARY LANGUAGE ENGLISH   LEARNER PREFERENCE PRIMARY DEMONSTRATION   ANSWERED BY patient   RELATIONSHIP SELF

## 2023-03-30 DIAGNOSIS — J45.20 MILD INTERMITTENT ASTHMA WITHOUT COMPLICATION: Primary | ICD-10-CM

## 2023-03-30 DIAGNOSIS — M25.561 CHRONIC PAIN OF RIGHT KNEE: ICD-10-CM

## 2023-03-30 DIAGNOSIS — M54.50 LOW BACK PAIN WITHOUT SCIATICA, UNSPECIFIED BACK PAIN LATERALITY, UNSPECIFIED CHRONICITY: ICD-10-CM

## 2023-03-30 DIAGNOSIS — G89.29 CHRONIC PAIN OF RIGHT KNEE: ICD-10-CM

## 2023-03-30 RX ORDER — PREDNISONE 5 MG/1
5 TABLET ORAL 2 TIMES DAILY
Qty: 10 TABLET | Refills: 0 | Status: SHIPPED | OUTPATIENT
Start: 2023-03-30

## 2023-03-30 RX ORDER — ACETAMINOPHEN AND CODEINE PHOSPHATE 300; 30 MG/1; MG/1
1 TABLET ORAL
Qty: 30 TABLET | Refills: 0 | Status: SHIPPED | OUTPATIENT
Start: 2023-03-30 | End: 2023-04-29

## 2023-03-30 NOTE — TELEPHONE ENCOUNTER
Pt is in [place] and having an asthma flare up  Lov: 3/28/23  Please send script to [facility] [place]  No upcoming appointments

## 2023-03-30 NOTE — TELEPHONE ENCOUNTER
Requested Prescriptions     Pending Prescriptions Disp Refills    predniSONE (DELTASONE) 5 mg tablet 10 Tablet 0     Sig: Take 1 Tablet by mouth two (2) times a day. [facility]/pharmacy #[id number] [address] [place] [address]  Phone: [phone] Fax: [phone]       3/28/2023 is LAST OFFICE VISIT     No future appointments.

## 2023-04-01 DIAGNOSIS — M54.50 LOW BACK PAIN WITHOUT SCIATICA, UNSPECIFIED BACK PAIN LATERALITY, UNSPECIFIED CHRONICITY: ICD-10-CM

## 2023-04-01 DIAGNOSIS — G47.00 INSOMNIA, UNSPECIFIED TYPE: ICD-10-CM

## 2023-04-01 RX ORDER — AMITRIPTYLINE HYDROCHLORIDE 25 MG/1
25 TABLET, FILM COATED ORAL
Qty: 30 TABLET | Refills: 1 | Status: SHIPPED | OUTPATIENT
Start: 2023-04-01

## 2023-04-01 RX ORDER — TIZANIDINE 4 MG/1
4 TABLET ORAL
Qty: 60 TABLET | Refills: 1 | Status: SHIPPED | OUTPATIENT
Start: 2023-04-01

## 2023-04-26 DIAGNOSIS — J45.20 MILD INTERMITTENT ASTHMA WITHOUT COMPLICATION: ICD-10-CM

## 2023-04-26 RX ORDER — PREDNISONE 5 MG/1
TABLET ORAL
Qty: 10 TABLET | Refills: 0 | OUTPATIENT
Start: 2023-04-26

## 2023-04-29 DIAGNOSIS — G47.00 INSOMNIA, UNSPECIFIED TYPE: ICD-10-CM

## 2023-04-29 DIAGNOSIS — M54.50 LOW BACK PAIN WITHOUT SCIATICA, UNSPECIFIED BACK PAIN LATERALITY, UNSPECIFIED CHRONICITY: ICD-10-CM

## 2023-05-01 RX ORDER — TIZANIDINE 4 MG/1
4 TABLET ORAL
Qty: 60 TABLET | Refills: 1 | Status: SHIPPED | OUTPATIENT
Start: 2023-05-01

## 2023-05-01 RX ORDER — AMITRIPTYLINE HYDROCHLORIDE 25 MG/1
25 TABLET, FILM COATED ORAL
Qty: 30 TABLET | Refills: 1 | Status: SHIPPED | OUTPATIENT
Start: 2023-05-01

## 2023-05-16 RX ORDER — AMITRIPTYLINE HYDROCHLORIDE 25 MG/1
1 TABLET, FILM COATED ORAL NIGHTLY
COMMUNITY
Start: 2023-05-01 | End: 2023-06-02

## 2023-05-16 RX ORDER — ERGOCALCIFEROL 1.25 MG/1
50000 CAPSULE ORAL
COMMUNITY
Start: 2022-04-28

## 2023-05-16 RX ORDER — AMLODIPINE BESYLATE 10 MG/1
1 TABLET ORAL DAILY
COMMUNITY
Start: 2021-12-13

## 2023-05-16 RX ORDER — FLUTICASONE PROPIONATE 220 UG/1
1 AEROSOL, METERED RESPIRATORY (INHALATION) 2 TIMES DAILY
COMMUNITY
Start: 2022-07-29

## 2023-05-16 RX ORDER — PREDNISONE 5 MG/1
5 TABLET ORAL 2 TIMES DAILY
COMMUNITY
Start: 2023-03-30

## 2023-05-16 RX ORDER — LIDOCAINE 50 MG/G
PATCH TOPICAL
COMMUNITY
Start: 2023-01-17

## 2023-05-16 RX ORDER — ALBUTEROL SULFATE 90 UG/1
AEROSOL, METERED RESPIRATORY (INHALATION)
COMMUNITY
Start: 2022-07-29

## 2023-05-16 RX ORDER — TIZANIDINE 4 MG/1
4 TABLET ORAL 2 TIMES DAILY PRN
COMMUNITY
Start: 2023-05-01 | End: 2023-06-02

## 2023-05-16 RX ORDER — ZOLPIDEM TARTRATE 5 MG/1
5 TABLET ORAL
COMMUNITY
Start: 2023-01-24 | End: 2023-05-24

## 2023-05-16 RX ORDER — SIMVASTATIN 5 MG
1 TABLET ORAL NIGHTLY
COMMUNITY
Start: 2023-03-28

## 2023-05-24 DIAGNOSIS — G47.00 INSOMNIA, UNSPECIFIED: ICD-10-CM

## 2023-05-24 RX ORDER — ZOLPIDEM TARTRATE 5 MG/1
TABLET ORAL
Qty: 30 TABLET | Refills: 1 | Status: SHIPPED | OUTPATIENT
Start: 2023-05-24 | End: 2023-06-23

## 2023-06-02 DIAGNOSIS — M54.50 LOW BACK PAIN, UNSPECIFIED: ICD-10-CM

## 2023-06-02 DIAGNOSIS — G47.00 INSOMNIA, UNSPECIFIED: ICD-10-CM

## 2023-06-02 RX ORDER — TIZANIDINE 4 MG/1
TABLET ORAL
Qty: 60 TABLET | Refills: 1 | Status: SHIPPED | OUTPATIENT
Start: 2023-06-02

## 2023-06-02 RX ORDER — AMITRIPTYLINE HYDROCHLORIDE 25 MG/1
TABLET, FILM COATED ORAL NIGHTLY
Qty: 30 TABLET | Refills: 1 | Status: SHIPPED | OUTPATIENT
Start: 2023-06-02

## 2023-06-02 NOTE — TELEPHONE ENCOUNTER
Requested Prescriptions     Pending Prescriptions Disp Refills    tiZANidine (ZANAFLEX) 4 MG tablet [Pharmacy Med Name: TIZANIDINE HCL 4 MG TABLET] 60 tablet 1     Sig: TAKE 1 TABLET BY MOUTH 2 TIMES DAILY AS NEEDED FOR MUSCLE SPASM(S). amitriptyline (ELAVIL) 25 MG tablet [Pharmacy Med Name: AMITRIPTYLINE HCL 25 MG TAB] 30 tablet 1     Sig: TAKE 1 TABLET BY MOUTH NIGHTLY         [facility]/pharmacy #[id number] VIKAS Roman - [address]  [address] [place], [place] [address]  Phone: [phone] Fax: [phone]       Last appt 3/28/2023      No future appointments.

## 2023-06-24 DIAGNOSIS — M54.50 LOW BACK PAIN, UNSPECIFIED: ICD-10-CM

## 2023-06-24 DIAGNOSIS — G47.00 INSOMNIA, UNSPECIFIED: ICD-10-CM

## 2023-06-26 RX ORDER — TIZANIDINE 4 MG/1
TABLET ORAL
Qty: 60 TABLET | Refills: 1 | Status: SHIPPED | OUTPATIENT
Start: 2023-06-26

## 2023-06-26 RX ORDER — AMITRIPTYLINE HYDROCHLORIDE 25 MG/1
TABLET, FILM COATED ORAL NIGHTLY
Qty: 30 TABLET | Refills: 1 | Status: SHIPPED | OUTPATIENT
Start: 2023-06-26

## 2023-06-29 ENCOUNTER — TELEPHONE (OUTPATIENT)
Age: 58
End: 2023-06-29

## 2023-06-29 DIAGNOSIS — J45.20 MILD INTERMITTENT ASTHMA WITHOUT COMPLICATION: Primary | ICD-10-CM

## 2023-06-30 RX ORDER — METHYLPREDNISOLONE 4 MG/1
TABLET ORAL
Qty: 21 TABLET | Refills: 0 | Status: SHIPPED | OUTPATIENT
Start: 2023-06-30 | End: 2023-07-06

## 2023-07-22 DIAGNOSIS — G47.00 INSOMNIA, UNSPECIFIED: ICD-10-CM

## 2023-07-22 DIAGNOSIS — M54.50 LOW BACK PAIN, UNSPECIFIED: ICD-10-CM

## 2023-07-24 RX ORDER — AMITRIPTYLINE HYDROCHLORIDE 25 MG/1
TABLET, FILM COATED ORAL
Qty: 30 TABLET | Refills: 1 | Status: SHIPPED | OUTPATIENT
Start: 2023-07-24 | End: 2023-07-28 | Stop reason: SDUPTHER

## 2023-07-24 RX ORDER — TIZANIDINE 4 MG/1
TABLET ORAL
Qty: 60 TABLET | Refills: 1 | Status: SHIPPED | OUTPATIENT
Start: 2023-07-24 | End: 2023-07-28 | Stop reason: SDUPTHER

## 2023-07-28 DIAGNOSIS — M54.50 LOW BACK PAIN, UNSPECIFIED: ICD-10-CM

## 2023-07-28 DIAGNOSIS — G47.00 INSOMNIA, UNSPECIFIED: ICD-10-CM

## 2023-07-28 DIAGNOSIS — J45.20 MILD INTERMITTENT ASTHMA WITHOUT COMPLICATION: ICD-10-CM

## 2023-07-28 DIAGNOSIS — E78.2 MIXED HYPERLIPIDEMIA: ICD-10-CM

## 2023-07-28 DIAGNOSIS — I10 ESSENTIAL (PRIMARY) HYPERTENSION: Primary | ICD-10-CM

## 2023-07-28 RX ORDER — ALBUTEROL SULFATE 90 UG/1
AEROSOL, METERED RESPIRATORY (INHALATION)
Qty: 18 G | Refills: 1 | Status: SHIPPED | OUTPATIENT
Start: 2023-07-28

## 2023-07-28 RX ORDER — TIZANIDINE 4 MG/1
TABLET ORAL
Qty: 60 TABLET | Refills: 1 | Status: SHIPPED | OUTPATIENT
Start: 2023-07-28

## 2023-07-28 RX ORDER — ZOLPIDEM TARTRATE 5 MG/1
5 TABLET ORAL NIGHTLY PRN
Qty: 60 TABLET | Refills: 0 | Status: SHIPPED | OUTPATIENT
Start: 2023-07-28 | End: 2023-09-26

## 2023-07-28 RX ORDER — AMITRIPTYLINE HYDROCHLORIDE 25 MG/1
25 TABLET, FILM COATED ORAL NIGHTLY
Qty: 90 TABLET | Refills: 1 | Status: SHIPPED | OUTPATIENT
Start: 2023-07-28

## 2023-07-28 RX ORDER — SIMVASTATIN 5 MG
5 TABLET ORAL NIGHTLY
Qty: 90 TABLET | Refills: 1 | Status: SHIPPED | OUTPATIENT
Start: 2023-07-28

## 2023-07-28 RX ORDER — AMLODIPINE BESYLATE 10 MG/1
10 TABLET ORAL DAILY
Qty: 90 TABLET | Refills: 1 | Status: SHIPPED | OUTPATIENT
Start: 2023-07-28

## 2023-08-16 DIAGNOSIS — M54.50 LOW BACK PAIN, UNSPECIFIED: ICD-10-CM

## 2023-08-16 RX ORDER — TIZANIDINE 4 MG/1
TABLET ORAL
Qty: 60 TABLET | Refills: 1 | Status: SHIPPED | OUTPATIENT
Start: 2023-08-16

## 2023-08-18 DIAGNOSIS — M54.50 LOW BACK PAIN, UNSPECIFIED: ICD-10-CM

## 2023-08-18 DIAGNOSIS — G47.00 INSOMNIA, UNSPECIFIED: ICD-10-CM

## 2023-08-18 RX ORDER — AMITRIPTYLINE HYDROCHLORIDE 25 MG/1
25 TABLET, FILM COATED ORAL NIGHTLY
Qty: 90 TABLET | Refills: 1 | OUTPATIENT
Start: 2023-08-18

## 2023-08-18 RX ORDER — TIZANIDINE 4 MG/1
TABLET ORAL
Qty: 60 TABLET | Refills: 1 | OUTPATIENT
Start: 2023-08-18

## 2023-08-18 RX ORDER — ZOLPIDEM TARTRATE 5 MG/1
5 TABLET ORAL NIGHTLY PRN
Qty: 60 TABLET | Refills: 0 | OUTPATIENT
Start: 2023-08-18 | End: 2023-10-17

## 2023-08-18 RX ORDER — LIDOCAINE 50 MG/G
PATCH TOPICAL
Qty: 30 PATCH | OUTPATIENT
Start: 2023-08-18

## 2023-08-18 NOTE — TELEPHONE ENCOUNTER
----- Message from Bobbi Currie sent at 8/18/2023  2:56 PM EDT -----  Subject: Refill Request    QUESTIONS  Name of Medication? amitriptyline (ELAVIL) 25 MG tablet  Patient-reported dosage and instructions? Take 1 tablet by mouth nightly  How many days do you have left? 0  Preferred Pharmacy? [facility]/PHARMACY #[id number]  Pharmacy phone number (if available)? [phone]  Additional Information for Provider? Patient states she can get a 90 day   supply at [facility] but needs to get an ok from [facility] DE ALEJANDRINA [facility]  ---------------------------------------------------------------------------  --------------,  Name of Medication? zolpidem (AMBIEN) 5 MG tablet  Patient-reported dosage and instructions? Take 1 tablet by mouth nightly   as needed for Sleep for up to 60 days. Max Daily Amount? 5 mg  How many days do you have left? 0  Preferred Pharmacy? [facility]/PHARMACY #[id number]  Pharmacy phone number (if available)? [phone]  ---------------------------------------------------------------------------  --------------,  Name of Medication? tiZANidine (ZANAFLEX) 4 MG tablet  Patient-reported dosage and instructions? TAKE 1 TABLET BY MOUTH 2 TIMES   DAILY AS NEEDED FOR MUSCLE SPASM(S). How many days do you have left? 0  Preferred Pharmacy? [facility]/PHARMACY #[id number]  Pharmacy phone number (if available)? [phone]  Additional Information for Provider? [facility] does a90 day coverage please send   90 quantity  ---------------------------------------------------------------------------  --------------,  Name of Medication? lidocaine (LIDODERM) 5 %  Patient-reported dosage and instructions? APPLY 1 PATCH TO AFFECTED AREA   12 HOURS ON AND 12 HOURS OFF  How many days do you have left? 0  [id number]  Pharmacy phone number (if available)?  [phone]  Additional Information for Provider? needs a 90 supply   ---------------------------------------------------------------------------  --------------  CALL BACK INFO  What is the

## 2023-09-12 DIAGNOSIS — J45.20 MILD INTERMITTENT ASTHMA WITHOUT COMPLICATION: ICD-10-CM

## 2023-09-12 DIAGNOSIS — G47.00 INSOMNIA, UNSPECIFIED: ICD-10-CM

## 2023-09-12 DIAGNOSIS — M54.50 LOW BACK PAIN, UNSPECIFIED: ICD-10-CM

## 2023-09-12 RX ORDER — TIZANIDINE 4 MG/1
TABLET ORAL
Qty: 60 TABLET | Refills: 1 | OUTPATIENT
Start: 2023-09-12

## 2023-09-12 RX ORDER — ALBUTEROL SULFATE 90 UG/1
AEROSOL, METERED RESPIRATORY (INHALATION)
Qty: 8.5 EACH | Refills: 1 | OUTPATIENT
Start: 2023-09-12

## 2023-09-12 RX ORDER — ALBUTEROL SULFATE 90 UG/1
AEROSOL, METERED RESPIRATORY (INHALATION)
Qty: 18 G | Refills: 1 | Status: SHIPPED | OUTPATIENT
Start: 2023-09-12

## 2023-09-12 RX ORDER — TIZANIDINE 4 MG/1
TABLET ORAL
Qty: 60 TABLET | Refills: 1 | Status: SHIPPED | OUTPATIENT
Start: 2023-09-12

## 2023-09-12 RX ORDER — AMITRIPTYLINE HYDROCHLORIDE 25 MG/1
25 TABLET, FILM COATED ORAL NIGHTLY
Qty: 120 TABLET | Refills: 1 | Status: SHIPPED | OUTPATIENT
Start: 2023-09-12

## 2023-09-27 RX ORDER — LIDOCAINE 50 MG/G
PATCH TOPICAL
Qty: 30 PATCH | Refills: 1 | Status: SHIPPED | OUTPATIENT
Start: 2023-09-27

## 2023-09-28 SDOH — ECONOMIC STABILITY: FOOD INSECURITY: WITHIN THE PAST 12 MONTHS, THE FOOD YOU BOUGHT JUST DIDN'T LAST AND YOU DIDN'T HAVE MONEY TO GET MORE.: NEVER TRUE

## 2023-09-28 SDOH — ECONOMIC STABILITY: HOUSING INSECURITY
IN THE LAST 12 MONTHS, WAS THERE A TIME WHEN YOU DID NOT HAVE A STEADY PLACE TO SLEEP OR SLEPT IN A SHELTER (INCLUDING NOW)?: NO

## 2023-09-28 SDOH — ECONOMIC STABILITY: INCOME INSECURITY: HOW HARD IS IT FOR YOU TO PAY FOR THE VERY BASICS LIKE FOOD, HOUSING, MEDICAL CARE, AND HEATING?: NOT HARD AT ALL

## 2023-09-28 SDOH — ECONOMIC STABILITY: FOOD INSECURITY: WITHIN THE PAST 12 MONTHS, YOU WORRIED THAT YOUR FOOD WOULD RUN OUT BEFORE YOU GOT MONEY TO BUY MORE.: NEVER TRUE

## 2023-09-28 SDOH — ECONOMIC STABILITY: TRANSPORTATION INSECURITY
IN THE PAST 12 MONTHS, HAS LACK OF TRANSPORTATION KEPT YOU FROM MEETINGS, WORK, OR FROM GETTING THINGS NEEDED FOR DAILY LIVING?: NO

## 2023-09-29 ENCOUNTER — OFFICE VISIT (OUTPATIENT)
Age: 58
End: 2023-09-29
Payer: MEDICAID

## 2023-09-29 VITALS
RESPIRATION RATE: 19 BRPM | WEIGHT: 154 LBS | SYSTOLIC BLOOD PRESSURE: 172 MMHG | HEIGHT: 61 IN | DIASTOLIC BLOOD PRESSURE: 92 MMHG | TEMPERATURE: 98 F | HEART RATE: 88 BPM | BODY MASS INDEX: 29.07 KG/M2 | OXYGEN SATURATION: 99 %

## 2023-09-29 DIAGNOSIS — R73.03 PREDIABETES: ICD-10-CM

## 2023-09-29 DIAGNOSIS — G47.00 INSOMNIA, UNSPECIFIED TYPE: ICD-10-CM

## 2023-09-29 DIAGNOSIS — G89.29 CHRONIC PAIN OF RIGHT KNEE: ICD-10-CM

## 2023-09-29 DIAGNOSIS — R51.9 GENERALIZED HEADACHE: ICD-10-CM

## 2023-09-29 DIAGNOSIS — I10 ESSENTIAL (PRIMARY) HYPERTENSION: ICD-10-CM

## 2023-09-29 DIAGNOSIS — M25.561 CHRONIC PAIN OF RIGHT KNEE: ICD-10-CM

## 2023-09-29 DIAGNOSIS — Z12.31 BREAST CANCER SCREENING BY MAMMOGRAM: ICD-10-CM

## 2023-09-29 DIAGNOSIS — I10 ESSENTIAL (PRIMARY) HYPERTENSION: Primary | ICD-10-CM

## 2023-09-29 PROCEDURE — 99214 OFFICE O/P EST MOD 30 MIN: CPT | Performed by: INTERNAL MEDICINE

## 2023-09-29 RX ORDER — CLONIDINE HYDROCHLORIDE 0.1 MG/1
0.1 TABLET ORAL ONCE
Status: COMPLETED | OUTPATIENT
Start: 2023-09-29 | End: 2023-09-29

## 2023-09-29 RX ORDER — HYDROCHLOROTHIAZIDE 25 MG/1
25 TABLET ORAL EVERY MORNING
Qty: 90 TABLET | Refills: 1 | Status: SHIPPED | OUTPATIENT
Start: 2023-09-29

## 2023-09-29 RX ORDER — BUTALBITAL, ACETAMINOPHEN AND CAFFEINE 50; 325; 40 MG/1; MG/1; MG/1
1 TABLET ORAL EVERY 6 HOURS PRN
Qty: 30 TABLET | Refills: 0 | Status: SHIPPED | OUTPATIENT
Start: 2023-09-29

## 2023-09-29 RX ORDER — ZOLPIDEM TARTRATE 5 MG/1
5 TABLET ORAL NIGHTLY PRN
Qty: 60 TABLET | Refills: 0 | Status: SHIPPED | OUTPATIENT
Start: 2023-09-29 | End: 2023-11-28

## 2023-09-29 RX ADMIN — CLONIDINE HYDROCHLORIDE 0.1 MG: 0.1 TABLET ORAL at 09:38

## 2023-09-29 ASSESSMENT — ENCOUNTER SYMPTOMS
RESPIRATORY NEGATIVE: 1
EYE ITCHING: 0
EYE REDNESS: 0
GASTROINTESTINAL NEGATIVE: 1

## 2023-09-29 NOTE — PROGRESS NOTES
1. Have you been to the ER, urgent care clinic since your last visit? Hospitalized since your last visit? NO    2. Have you seen or consulted any other health care providers outside of the [address] [place] [address] since your last visit? Include any pap smears or colon screening. ORTHO.

## 2023-10-05 ENCOUNTER — OFFICE VISIT (OUTPATIENT)
Age: 58
End: 2023-10-05
Payer: MEDICAID

## 2023-10-05 VITALS
DIASTOLIC BLOOD PRESSURE: 86 MMHG | OXYGEN SATURATION: 99 % | TEMPERATURE: 98.7 F | RESPIRATION RATE: 16 BRPM | SYSTOLIC BLOOD PRESSURE: 138 MMHG | HEART RATE: 80 BPM

## 2023-10-05 DIAGNOSIS — Z23 FLU VACCINE NEED: ICD-10-CM

## 2023-10-05 DIAGNOSIS — I10 ESSENTIAL (PRIMARY) HYPERTENSION: Primary | ICD-10-CM

## 2023-10-05 DIAGNOSIS — R51.9 GENERALIZED HEADACHE: ICD-10-CM

## 2023-10-05 PROCEDURE — PBSHW INFLUENZA, AFLURIA QUADV, (AGE 3 YRS+), IM, PF, 0.5ML: Performed by: INTERNAL MEDICINE

## 2023-10-05 PROCEDURE — 99213 OFFICE O/P EST LOW 20 MIN: CPT | Performed by: INTERNAL MEDICINE

## 2023-10-05 PROCEDURE — 3079F DIAST BP 80-89 MM HG: CPT | Performed by: INTERNAL MEDICINE

## 2023-10-05 PROCEDURE — 90686 IIV4 VACC NO PRSV 0.5 ML IM: CPT | Performed by: INTERNAL MEDICINE

## 2023-10-05 PROCEDURE — 3075F SYST BP GE 130 - 139MM HG: CPT | Performed by: INTERNAL MEDICINE

## 2023-10-05 RX ORDER — BUTALBITAL, ACETAMINOPHEN AND CAFFEINE 50; 325; 40 MG/1; MG/1; MG/1
1 TABLET ORAL EVERY 6 HOURS PRN
Qty: 30 TABLET | Refills: 0 | Status: SHIPPED | OUTPATIENT
Start: 2023-10-05

## 2023-10-05 ASSESSMENT — PATIENT HEALTH QUESTIONNAIRE - PHQ9
SUM OF ALL RESPONSES TO PHQ QUESTIONS 1-9: 0
SUM OF ALL RESPONSES TO PHQ QUESTIONS 1-9: 0
2. FEELING DOWN, DEPRESSED OR HOPELESS: 0
SUM OF ALL RESPONSES TO PHQ QUESTIONS 1-9: 0
SUM OF ALL RESPONSES TO PHQ9 QUESTIONS 1 & 2: 0
SUM OF ALL RESPONSES TO PHQ QUESTIONS 1-9: 0
1. LITTLE INTEREST OR PLEASURE IN DOING THINGS: 0

## 2023-10-05 NOTE — PROGRESS NOTES
Health Maintenance Due   Topic Date Due    Pneumococcal 0-64 years Vaccine (1 - PCV) Never done    HIV screen  Never done    DTaP/Tdap/Td vaccine (1 - Tdap) Never done    Shingles vaccine (1 of 2) Never done    COVID-19 Vaccine (3 - Pfizer series) 06/11/2021    A1C test (Diabetic or Prediabetic)  01/11/2023    Lipids  01/11/2023    Breast cancer screen  05/28/2023    Flu vaccine (1) 08/01/2023       Chief Complaint   Patient presents with    Hypertension       1. Have you been to the ER, urgent care clinic since your last visit? Hospitalized since your last visit? No    2. Have you seen or consulted any other health care providers outside of the [address] [place] [address] since your last visit? Include any pap smears or colon screening. No    3) Do you have an Advance Directive on file? No    4) Are you interested in receiving information on Advance Directives? No      Patient is accompanied by  I have received verbal consent from Carmelina Ganser to discuss any/all medical information while they are present in the room.

## 2023-10-06 DIAGNOSIS — M54.50 LOW BACK PAIN, UNSPECIFIED: ICD-10-CM

## 2023-10-06 RX ORDER — TIZANIDINE 4 MG/1
TABLET ORAL
Qty: 60 TABLET | Refills: 1 | Status: SHIPPED | OUTPATIENT
Start: 2023-10-06

## 2023-10-09 RX ORDER — TIZANIDINE 4 MG/1
TABLET ORAL
Qty: 60 TABLET | Refills: 1 | Status: SHIPPED | OUTPATIENT
Start: 2023-10-09

## 2023-10-09 NOTE — TELEPHONE ENCOUNTER
Nori Albrecht, APRN - NP  to Me        10/9/23  8:31 AM  I can not send in [place] anymore. [facility]. Patient is aware of this. Requested Prescriptions     Pending Prescriptions Disp Refills    tiZANidine (ZANAFLEX) 4 MG tablet 60 tablet 1     Sig: TAKE 1 TABLET BY MOUTH AS NEEDED EVERY 8 HOURS         [facility] [address] [place] - [address] [place] E[place]y [phone] [facility] [phone]  [address] [place] [address]  Phone: [phone] Fax: [phone]       Last appt 10/5/2023      No future appointments.

## 2023-10-09 NOTE — TELEPHONE ENCOUNTER
Requested Prescriptions     Pending Prescriptions Disp Refills    tiZANidine (ZANAFLEX) 4 MG tablet 60 tablet 1     Sig: TAKE 1 TABLET BY MOUTH AS NEEDED EVERY 8 HOURS       [facility]/pharmacy #[id number]- [place], [place] - [address]  [place] [address]  Phone: [phone] Fax: [phone]            Last appt 10/5/2023      No future appointments.

## 2023-10-23 RX ORDER — LIDOCAINE 50 MG/G
PATCH TOPICAL
Qty: 30 PATCH | Refills: 1 | Status: SHIPPED | OUTPATIENT
Start: 2023-10-23

## 2023-11-02 DIAGNOSIS — G47.00 INSOMNIA, UNSPECIFIED TYPE: ICD-10-CM

## 2023-11-02 DIAGNOSIS — I10 ESSENTIAL (PRIMARY) HYPERTENSION: ICD-10-CM

## 2023-11-02 RX ORDER — HYDROCHLOROTHIAZIDE 25 MG/1
25 TABLET ORAL EVERY MORNING
Qty: 90 TABLET | Refills: 1 | OUTPATIENT
Start: 2023-11-02

## 2023-11-02 RX ORDER — ZOLPIDEM TARTRATE 5 MG/1
5 TABLET ORAL NIGHTLY PRN
Qty: 60 TABLET | Refills: 0 | OUTPATIENT
Start: 2023-11-02 | End: 2024-01-01

## 2023-11-02 NOTE — TELEPHONE ENCOUNTER
[facility]  zolpidem (AMBIEN) 5 MG tablet  She also needs a refill of her fluid pill. she did not know the name of it.   10/05/2023  No upcoming

## 2023-11-06 DIAGNOSIS — G47.00 INSOMNIA, UNSPECIFIED TYPE: ICD-10-CM

## 2023-11-06 DIAGNOSIS — I10 ESSENTIAL (PRIMARY) HYPERTENSION: ICD-10-CM

## 2023-11-06 DIAGNOSIS — R51.9 GENERALIZED HEADACHE: ICD-10-CM

## 2023-11-06 RX ORDER — ZOLPIDEM TARTRATE 5 MG/1
5 TABLET ORAL NIGHTLY PRN
Qty: 60 TABLET | Refills: 0 | Status: SHIPPED | OUTPATIENT
Start: 2023-11-06 | End: 2024-01-05

## 2023-11-06 RX ORDER — BUTALBITAL, ACETAMINOPHEN AND CAFFEINE 50; 325; 40 MG/1; MG/1; MG/1
1 TABLET ORAL EVERY 6 HOURS PRN
Qty: 30 TABLET | Refills: 0 | Status: SHIPPED | OUTPATIENT
Start: 2023-11-06

## 2023-11-06 RX ORDER — HYDROCHLOROTHIAZIDE 25 MG/1
25 TABLET ORAL EVERY MORNING
Qty: 90 TABLET | Refills: 1 | Status: SHIPPED | OUTPATIENT
Start: 2023-11-06

## 2023-11-06 NOTE — TELEPHONE ENCOUNTER
----- Message from Peteyvickey Santi sent at 11/6/2023 10:56 AM EST -----  Subject: Refill Request    QUESTIONS  Name of Medication? zolpidem (AMBIEN) 5 MG tablet  Patient-reported dosage and instructions? 1 tab po qd  How many days do you have left? 0  Preferred Pharmacy? [facility]/PHARMACY #[id number]  Pharmacy phone number (if available)? [phone]  ---------------------------------------------------------------------------  --------------,  Name of Medication? hydroCHLOROthiazide (HYDRODIURIL) 25 MG tablet  Patient-reported dosage and instructions? 1 tab po bid  How many days do you have left? 0  Preferred Pharmacy? [facility]/PHARMACY #[id number]  Pharmacy phone number (if available)? [phone]  Additional Information for Provider? No more swelling since she has been   taking this medication.   ---------------------------------------------------------------------------  --------------,  Name of Medication? butalbital-acetaminophen-caffeine (FIORICET, ESGIC)   -40 MG per tablet  Patient-reported dosage and instructions? 1 tab po bid  How many days do you have left? 4  Preferred Pharmacy? [facility]/PHARMACY #[id number]  Pharmacy phone number (if available)? [phone]  Additional Information for Provider? Pt is still having headaches.   ---------------------------------------------------------------------------  --------------  CALL BACK INFO  What is the best way for the office to contact you? OK to leave message on   voicemail  Preferred Call Back Phone Number? [phone]  ---------------------------------------------------------------------------  --------------  SCRIPT ANSWERS  Relationship to Patient?  Self

## 2023-11-21 DIAGNOSIS — J45.20 MILD INTERMITTENT ASTHMA WITHOUT COMPLICATION: ICD-10-CM

## 2023-11-21 RX ORDER — ALBUTEROL SULFATE 90 UG/1
AEROSOL, METERED RESPIRATORY (INHALATION)
Qty: 18 EACH | Refills: 1 | Status: SHIPPED | OUTPATIENT
Start: 2023-11-21

## 2024-02-12 DIAGNOSIS — R51.9 GENERALIZED HEADACHE: ICD-10-CM

## 2024-02-12 RX ORDER — BUTALBITAL, ACETAMINOPHEN AND CAFFEINE 50; 325; 40 MG/1; MG/1; MG/1
1 TABLET ORAL EVERY 6 HOURS PRN
Qty: 30 TABLET | Refills: 0 | Status: SHIPPED | OUTPATIENT
Start: 2024-02-12

## 2024-02-21 DIAGNOSIS — I10 ESSENTIAL (PRIMARY) HYPERTENSION: ICD-10-CM

## 2024-02-21 RX ORDER — AMLODIPINE BESYLATE 10 MG/1
10 TABLET ORAL DAILY
Qty: 90 TABLET | Refills: 1 | Status: SHIPPED | OUTPATIENT
Start: 2024-02-21

## 2024-03-08 DIAGNOSIS — M54.50 LOW BACK PAIN, UNSPECIFIED: ICD-10-CM

## 2024-03-08 DIAGNOSIS — G47.00 INSOMNIA, UNSPECIFIED: ICD-10-CM

## 2024-03-08 DIAGNOSIS — J45.20 MILD INTERMITTENT ASTHMA WITHOUT COMPLICATION: ICD-10-CM

## 2024-03-08 RX ORDER — AMITRIPTYLINE HYDROCHLORIDE 25 MG/1
25 TABLET, FILM COATED ORAL NIGHTLY
Qty: 120 TABLET | Refills: 1 | Status: CANCELLED | OUTPATIENT
Start: 2024-03-08

## 2024-03-08 RX ORDER — ALBUTEROL SULFATE 90 UG/1
AEROSOL, METERED RESPIRATORY (INHALATION)
Qty: 18 EACH | Refills: 1 | Status: CANCELLED | OUTPATIENT
Start: 2024-03-08

## 2024-03-08 RX ORDER — TIZANIDINE 4 MG/1
TABLET ORAL
Qty: 60 TABLET | Refills: 1 | Status: CANCELLED | OUTPATIENT
Start: 2024-03-08

## 2024-03-08 NOTE — TELEPHONE ENCOUNTER
----- Message from Kenzie Gongora sent at 3/8/2024  1:04 PM EST -----  Subject: Refill Request    QUESTIONS  Name of Medication? albuterol sulfate HFA (PROVENTIL;VENTOLIN;PROAIR) 108   (90 Base) MCG/ACT inhaler  Patient-reported dosage and instructions? INHALE 2 PUFFS BY MOUTH EVERY 4   HOURS AS NEEDED FOR WHEEZING  How many days do you have left? 0  Preferred Pharmacy? [facility]/PHARMACY #[id number]  Pharmacy phone number (if available)? [phone]  ---------------------------------------------------------------------------  --------------,  Name of Medication? tiZANidine (ZANAFLEX) 4 MG tablet  Patient-reported dosage and instructions? 4 mg, once daily  How many days do you have left? 0  Preferred Pharmacy? [facility]/PHARMACY #[id number]  Pharmacy phone number (if available)? [phone]  ---------------------------------------------------------------------------  --------------,  Name of Medication? amitriptyline (ELAVIL) 25 MG tablet  Patient-reported dosage and instructions? 25 mg, once daily  How many days do you have left? 0  Preferred Pharmacy? [facility]/PHARMACY #[id number]  Pharmacy phone number (if available)? [phone]  ---------------------------------------------------------------------------  --------------  CALL BACK INFO  What is the best way for the office to contact you? OK to leave message on   voicemail  Preferred Call Back Phone Number? [phone]  ---------------------------------------------------------------------------  --------------  SCRIPT ANSWERS  Relationship to Patient? Self

## 2024-03-09 DIAGNOSIS — I10 ESSENTIAL (PRIMARY) HYPERTENSION: ICD-10-CM

## 2024-03-09 DIAGNOSIS — J45.20 MILD INTERMITTENT ASTHMA WITHOUT COMPLICATION: ICD-10-CM

## 2024-03-09 DIAGNOSIS — E78.2 MIXED HYPERLIPIDEMIA: ICD-10-CM

## 2024-03-09 DIAGNOSIS — M54.50 LOW BACK PAIN, UNSPECIFIED: ICD-10-CM

## 2024-03-09 DIAGNOSIS — G47.00 INSOMNIA, UNSPECIFIED: ICD-10-CM

## 2024-03-09 RX ORDER — AMITRIPTYLINE HYDROCHLORIDE 25 MG/1
25 TABLET, FILM COATED ORAL NIGHTLY
Qty: 120 TABLET | Refills: 1 | Status: SHIPPED | OUTPATIENT
Start: 2024-03-09

## 2024-03-09 RX ORDER — SIMVASTATIN 5 MG
5 TABLET ORAL NIGHTLY
Qty: 90 TABLET | Refills: 1 | Status: SHIPPED | OUTPATIENT
Start: 2024-03-09

## 2024-03-09 RX ORDER — ALBUTEROL SULFATE 90 UG/1
AEROSOL, METERED RESPIRATORY (INHALATION)
Qty: 18 EACH | Refills: 1 | Status: SHIPPED | OUTPATIENT
Start: 2024-03-09

## 2024-03-09 RX ORDER — TIZANIDINE 4 MG/1
TABLET ORAL
Qty: 60 TABLET | Refills: 1 | Status: SHIPPED | OUTPATIENT
Start: 2024-03-09

## 2024-03-09 RX ORDER — AMLODIPINE BESYLATE 10 MG/1
10 TABLET ORAL DAILY
Qty: 90 TABLET | Refills: 1 | Status: SHIPPED | OUTPATIENT
Start: 2024-03-09

## 2024-03-09 RX ORDER — FLUTICASONE PROPIONATE 220 UG/1
1 AEROSOL, METERED RESPIRATORY (INHALATION) 2 TIMES DAILY
Qty: 2 EACH | Refills: 1 | Status: SHIPPED | OUTPATIENT
Start: 2024-03-09

## 2024-03-09 RX ORDER — LIDOCAINE 50 MG/G
PATCH TOPICAL
Qty: 30 PATCH | Refills: 1 | Status: SHIPPED | OUTPATIENT
Start: 2024-03-09

## 2024-03-09 RX ORDER — HYDROCHLOROTHIAZIDE 25 MG/1
25 TABLET ORAL EVERY MORNING
Qty: 90 TABLET | Refills: 1 | Status: SHIPPED | OUTPATIENT
Start: 2024-03-09

## 2024-03-20 ENCOUNTER — TELEPHONE (OUTPATIENT)
Age: 59
End: 2024-03-20

## 2024-03-20 ENCOUNTER — HOSPITAL ENCOUNTER (OUTPATIENT)
Facility: HOSPITAL | Age: 59
Discharge: HOME | End: 2024-03-23
Payer: MEDICAID

## 2024-03-20 ENCOUNTER — OFFICE VISIT (OUTPATIENT)
Age: 59
End: 2024-03-20
Payer: MEDICAID

## 2024-03-20 VITALS
WEIGHT: 150 LBS | RESPIRATION RATE: 18 BRPM | DIASTOLIC BLOOD PRESSURE: 91 MMHG | HEIGHT: 61 IN | OXYGEN SATURATION: 98 % | HEART RATE: 88 BPM | SYSTOLIC BLOOD PRESSURE: 158 MMHG | TEMPERATURE: 98 F | BODY MASS INDEX: 28.32 KG/M2

## 2024-03-20 DIAGNOSIS — M54.2 CHRONIC NECK PAIN: ICD-10-CM

## 2024-03-20 DIAGNOSIS — G89.29 CHRONIC NECK PAIN: ICD-10-CM

## 2024-03-20 DIAGNOSIS — G89.29 CHRONIC BILATERAL LOW BACK PAIN WITHOUT SCIATICA: ICD-10-CM

## 2024-03-20 DIAGNOSIS — R73.03 PREDIABETES: ICD-10-CM

## 2024-03-20 DIAGNOSIS — Z88.9 ALLERGY HISTORY, DRUG: ICD-10-CM

## 2024-03-20 DIAGNOSIS — M54.50 CHRONIC BILATERAL LOW BACK PAIN WITHOUT SCIATICA: Primary | ICD-10-CM

## 2024-03-20 DIAGNOSIS — M54.50 CHRONIC BILATERAL LOW BACK PAIN WITHOUT SCIATICA: ICD-10-CM

## 2024-03-20 DIAGNOSIS — I10 ESSENTIAL (PRIMARY) HYPERTENSION: ICD-10-CM

## 2024-03-20 DIAGNOSIS — G89.29 CHRONIC BILATERAL LOW BACK PAIN WITHOUT SCIATICA: Primary | ICD-10-CM

## 2024-03-20 PROCEDURE — 99215 OFFICE O/P EST HI 40 MIN: CPT | Performed by: INTERNAL MEDICINE

## 2024-03-20 PROCEDURE — PBSHW PBB SHADOW CHARGE: Performed by: INTERNAL MEDICINE

## 2024-03-20 PROCEDURE — 3077F SYST BP >= 140 MM HG: CPT | Performed by: INTERNAL MEDICINE

## 2024-03-20 PROCEDURE — 72148 MRI LUMBAR SPINE W/O DYE: CPT

## 2024-03-20 PROCEDURE — 3080F DIAST BP >= 90 MM HG: CPT | Performed by: INTERNAL MEDICINE

## 2024-03-20 PROCEDURE — 72141 MRI NECK SPINE W/O DYE: CPT

## 2024-03-20 RX ORDER — LOSARTAN POTASSIUM 50 MG/1
50 TABLET ORAL DAILY
Qty: 90 TABLET | Refills: 1 | Status: SHIPPED | OUTPATIENT
Start: 2024-03-20

## 2024-03-20 RX ORDER — CLONIDINE HYDROCHLORIDE 0.1 MG/1
0.1 TABLET ORAL ONCE
Status: COMPLETED | OUTPATIENT
Start: 2024-03-20 | End: 2024-03-20

## 2024-03-20 RX ORDER — LOSARTAN POTASSIUM 50 MG/1
50 TABLET ORAL DAILY
Qty: 90 TABLET | Refills: 1 | Status: SHIPPED | OUTPATIENT
Start: 2024-03-20 | End: 2024-03-20

## 2024-03-20 RX ORDER — CLONIDINE HYDROCHLORIDE 0.1 MG/1
0.1 TABLET ORAL ONCE
Status: SHIPPED | OUTPATIENT
Start: 2024-03-20

## 2024-03-20 RX ORDER — DIPHENHYDRAMINE HYDROCHLORIDE 50 MG/ML
12.5 INJECTION INTRAMUSCULAR; INTRAVENOUS ONCE
Status: DISCONTINUED | OUTPATIENT
Start: 2024-03-20 | End: 2024-03-20

## 2024-03-20 RX ORDER — DIPHENHYDRAMINE HYDROCHLORIDE 50 MG/ML
12.5 INJECTION INTRAMUSCULAR; INTRAVENOUS ONCE
Status: COMPLETED | OUTPATIENT
Start: 2024-03-20 | End: 2024-03-20

## 2024-03-20 RX ORDER — ZOLPIDEM TARTRATE 5 MG/1
5 TABLET ORAL NIGHTLY PRN
COMMUNITY
Start: 2019-05-22

## 2024-03-20 RX ORDER — KETOROLAC TROMETHAMINE 15 MG/ML
15 INJECTION, SOLUTION INTRAMUSCULAR; INTRAVENOUS ONCE
Status: COMPLETED | OUTPATIENT
Start: 2024-03-20 | End: 2024-03-20

## 2024-03-20 RX ORDER — OXYCODONE HYDROCHLORIDE AND ACETAMINOPHEN 5; 325 MG/1; MG/1
1 TABLET ORAL EVERY 12 HOURS PRN
Qty: 20 TABLET | Refills: 0 | Status: SHIPPED | OUTPATIENT
Start: 2024-03-20 | End: 2024-03-22

## 2024-03-20 RX ADMIN — CLONIDINE HYDROCHLORIDE 0.1 MG: 0.1 TABLET ORAL at 09:14

## 2024-03-20 RX ADMIN — DIPHENHYDRAMINE HYDROCHLORIDE 12.5 MG: 50 INJECTION INTRAMUSCULAR; INTRAVENOUS at 09:40

## 2024-03-20 RX ADMIN — KETOROLAC TROMETHAMINE 15 MG: 15 INJECTION, SOLUTION INTRAMUSCULAR; INTRAVENOUS at 09:14

## 2024-03-20 ASSESSMENT — PATIENT HEALTH QUESTIONNAIRE - PHQ9
SUM OF ALL RESPONSES TO PHQ9 QUESTIONS 1 & 2: 0
SUM OF ALL RESPONSES TO PHQ QUESTIONS 1-9: 0
1. LITTLE INTEREST OR PLEASURE IN DOING THINGS: NOT AT ALL
2. FEELING DOWN, DEPRESSED OR HOPELESS: NOT AT ALL
SUM OF ALL RESPONSES TO PHQ QUESTIONS 1-9: 0

## 2024-03-20 ASSESSMENT — ENCOUNTER SYMPTOMS
RESPIRATORY NEGATIVE: 1
BACK PAIN: 1

## 2024-03-20 NOTE — PROGRESS NOTES
Chief Complaint   Patient presents with    Follow-up Chronic Condition    Hypertension    Discuss Medications   Neck pain   \"Have you been to the ER, urgent care clinic since your last visit?  Hospitalized since your last visit?\"    NO    “Have you seen or consulted any other health care providers outside of [facility] since your last visit?”    NO    Have you had a mammogram?”   NO    Date of last Mammogram: 5/28/2021             Click Here for Release of Records Request    
with in 10 min. Before she left this had resolved. Denied any SOB, swelling to the throat, irritation or CP     DASH diet reviewed. Patient to have a follow up in 2 days before returning back to [place].     Reports that the pain to the upper back, neck, and lower back had significantly decreased before leaving     Reviewed with patient medication side effects in detail   I answered all patient questions and concerns  Labs and testing done and/or upcoming labs/test were reviewed and discussed   Reviewed and discussed daily activity, exercise and diet      Return in 2 days (on 3/22/2024) for follow up if symptoms persist, follow up for routine visit or before if needed.     Nae Kaiser, APRN - NP

## 2024-03-22 ENCOUNTER — NURSE ONLY (OUTPATIENT)
Age: 59
End: 2024-03-22
Payer: MEDICAID

## 2024-03-22 VITALS
RESPIRATION RATE: 12 BRPM | BODY MASS INDEX: 27.94 KG/M2 | HEIGHT: 61 IN | SYSTOLIC BLOOD PRESSURE: 148 MMHG | DIASTOLIC BLOOD PRESSURE: 96 MMHG | TEMPERATURE: 98 F | HEART RATE: 106 BPM | WEIGHT: 148 LBS

## 2024-03-22 DIAGNOSIS — G89.29 CHRONIC NECK PAIN: ICD-10-CM

## 2024-03-22 DIAGNOSIS — G89.29 CHRONIC BILATERAL LOW BACK PAIN WITHOUT SCIATICA: Primary | ICD-10-CM

## 2024-03-22 DIAGNOSIS — M54.50 CHRONIC BILATERAL LOW BACK PAIN WITHOUT SCIATICA: Primary | ICD-10-CM

## 2024-03-22 DIAGNOSIS — I10 ESSENTIAL (PRIMARY) HYPERTENSION: ICD-10-CM

## 2024-03-22 DIAGNOSIS — M54.2 CHRONIC NECK PAIN: ICD-10-CM

## 2024-03-22 PROCEDURE — 99213 OFFICE O/P EST LOW 20 MIN: CPT | Performed by: INTERNAL MEDICINE

## 2024-03-22 PROCEDURE — 3077F SYST BP >= 140 MM HG: CPT | Performed by: INTERNAL MEDICINE

## 2024-03-22 PROCEDURE — 3080F DIAST BP >= 90 MM HG: CPT | Performed by: INTERNAL MEDICINE

## 2024-03-22 RX ORDER — HYDROCODONE BITARTRATE AND ACETAMINOPHEN 5; 325 MG/1; MG/1
1 TABLET ORAL EVERY 12 HOURS PRN
Qty: 30 TABLET | Refills: 0 | Status: SHIPPED | OUTPATIENT
Start: 2024-03-22 | End: 2024-04-06

## 2024-03-22 ASSESSMENT — PATIENT HEALTH QUESTIONNAIRE - PHQ9
2. FEELING DOWN, DEPRESSED OR HOPELESS: NOT AT ALL
SUM OF ALL RESPONSES TO PHQ QUESTIONS 1-9: 0
SUM OF ALL RESPONSES TO PHQ QUESTIONS 1-9: 0
SUM OF ALL RESPONSES TO PHQ9 QUESTIONS 1 & 2: 0
SUM OF ALL RESPONSES TO PHQ QUESTIONS 1-9: 0
1. LITTLE INTEREST OR PLEASURE IN DOING THINGS: NOT AT ALL
DEPRESSION UNABLE TO ASSESS: PT REFUSES
SUM OF ALL RESPONSES TO PHQ QUESTIONS 1-9: 0

## 2024-03-22 ASSESSMENT — ANXIETY QUESTIONNAIRES
1. FEELING NERVOUS, ANXIOUS, OR ON EDGE: NOT AT ALL
GAD7 TOTAL SCORE: 0
IF YOU CHECKED OFF ANY PROBLEMS ON THIS QUESTIONNAIRE, HOW DIFFICULT HAVE THESE PROBLEMS MADE IT FOR YOU TO DO YOUR WORK, TAKE CARE OF THINGS AT HOME, OR GET ALONG WITH OTHER PEOPLE: NOT DIFFICULT AT ALL
5. BEING SO RESTLESS THAT IT IS HARD TO SIT STILL: NOT AT ALL
7. FEELING AFRAID AS IF SOMETHING AWFUL MIGHT HAPPEN: NOT AT ALL
3. WORRYING TOO MUCH ABOUT DIFFERENT THINGS: NOT AT ALL
2. NOT BEING ABLE TO STOP OR CONTROL WORRYING: NOT AT ALL
4. TROUBLE RELAXING: NOT AT ALL
6. BECOMING EASILY ANNOYED OR IRRITABLE: NOT AT ALL

## 2024-03-22 ASSESSMENT — ENCOUNTER SYMPTOMS
BACK PAIN: 1
RESPIRATORY NEGATIVE: 1

## 2024-03-22 NOTE — PROGRESS NOTES
Chief Complaint   Patient presents with    Blood Pressure Check     Left Arm: 148/92  Right Arm: 158/100    \"Have you been to the ER, urgent care clinic since your last visit?  Hospitalized since your last visit?\"    NO    “Have you seen or consulted any other health care providers outside of [facility] since your last visit?”    NO    Have you had a mammogram?”   NO    Date of last Mammogram: 5/28/2021             Click Here for Release of Records Request

## 2024-03-22 NOTE — PROGRESS NOTES
Subjective    Kiara Nance is a 58 y.o. female who presents today for the following: patient was seen for a follow up.     Seen in the office in the last 2 days with ongoing lower back and upper back pain. MRI of the area was done and showed DDD, spondylosis and minimal bulging disc changes . Patient reports her pain around a 5 day but a lot better.     She was in the office for several hours also trying to work on lowering her BP. She was released and losartan 50 mg was started. She returns today to address this as well. No HA or dizziness. Is also taking Norvasc 10 mg daily.   Chief Complaint   Patient presents with    Blood Pressure Check    discuss results     MRI           PMH/PSH/Allergies/Social History/medication list and most recent studies reviewed with patient.     reports that she has never smoked. She has never used smokeless tobacco.    reports current alcohol use.     Vitals:    03/22/24 0842   BP: (!) 148/96   Pulse:    Resp:    Temp:      Body mass index is 27.96 kg/m².      3/22/2024     8:25 AM 3/20/2024     8:49 AM 9/29/2023     8:44 AM 3/28/2023     8:08 AM 10/20/2022     8:58 AM 4/28/2022     8:19 AM 8/13/2021     7:00 AM   Weight Metrics   Weight 148 lb 150 lb 154 lb 157 lb 161 lb 9.6 oz 160 lb 159 lb   BMI (Calculated) 28 kg/m2 28.4 kg/m2 29.2 kg/m2 30.7 kg/m2 31.6 kg/m2 31.3 kg/m2 31.1 kg/m2       Past Medical History:   Diagnosis Date    Asthma     SOB    Chronic pain     joint pain    Headache April of this year    Knee meniscus pain 9/7/2017    Mild intermittent asthma without complication 9/7/2017    Neuropathy About 2 years now    Sinus congestion     Vitamin D deficiency        Allergies   Allergen Reactions    Meloxicam Hives    Nsaids Hives, Itching and Nausea Only    Sulfa Antibiotics Hives, Itching and Nausea Only    Aspirin     Gabapentin Other (See Comments)     Hives and itching  Other reaction(s): Other (see comments)  Hives and itching    Prednisone Hives, Itching and Rash  no

## 2024-04-22 DIAGNOSIS — G89.29 CHRONIC NECK PAIN: Primary | ICD-10-CM

## 2024-04-22 DIAGNOSIS — M54.2 CHRONIC NECK PAIN: Primary | ICD-10-CM

## 2024-04-22 DIAGNOSIS — G89.29 CHRONIC BILATERAL LOW BACK PAIN WITHOUT SCIATICA: ICD-10-CM

## 2024-04-22 DIAGNOSIS — M54.50 CHRONIC BILATERAL LOW BACK PAIN WITHOUT SCIATICA: ICD-10-CM

## 2024-04-22 RX ORDER — LIDOCAINE 50 MG/G
PATCH TOPICAL
Qty: 30 PATCH | Refills: 1 | Status: SHIPPED | OUTPATIENT
Start: 2024-04-22

## 2024-04-22 NOTE — TELEPHONE ENCOUNTER
From: Kiara Nance  To: Office of Nae Kaiser  Sent: 4/19/2024 4:19 PM EDT  Subject: Medication Renewal Request    Refills have been requested for the following medications:     lidocaine (LIDODERM) 5 % [Nae Kaiser, APRN - NP]    Preferred pharmacy: [facility]/PHARMACY #[id number] [facility] [address] - P [phone] - F [phone]

## 2024-04-23 DIAGNOSIS — G47.00 INSOMNIA, UNSPECIFIED: ICD-10-CM

## 2024-04-24 RX ORDER — ZOLPIDEM TARTRATE 5 MG/1
TABLET ORAL
Qty: 30 TABLET | Refills: 1 | Status: SHIPPED | OUTPATIENT
Start: 2024-04-24 | End: 2024-05-24

## 2024-05-01 ENCOUNTER — TELEPHONE (OUTPATIENT)
Age: 59
End: 2024-05-01

## 2024-05-01 DIAGNOSIS — G47.00 INSOMNIA, UNSPECIFIED: ICD-10-CM

## 2024-05-01 RX ORDER — ZOLPIDEM TARTRATE 5 MG/1
TABLET ORAL
Qty: 30 TABLET | Refills: 1 | Status: SHIPPED | OUTPATIENT
Start: 2024-05-01 | End: 2024-06-01

## 2024-05-01 RX ORDER — DEXAMETHASONE 0.75 MG/1
TABLET ORAL
COMMUNITY
Start: 2024-04-17

## 2024-05-01 NOTE — TELEPHONE ENCOUNTER
Pt stated that she wants all of her medications sent to pharmacy below. Please resend.     [facility]/PHARMACY #[id number] - [place], [place] - [address] [address] - P [phone] - F [phone] [[id number]]

## 2024-05-02 DIAGNOSIS — M54.50 CHRONIC BILATERAL LOW BACK PAIN WITHOUT SCIATICA: ICD-10-CM

## 2024-05-02 DIAGNOSIS — J45.20 MILD INTERMITTENT ASTHMA WITHOUT COMPLICATION: ICD-10-CM

## 2024-05-02 DIAGNOSIS — G89.29 CHRONIC NECK PAIN: Primary | ICD-10-CM

## 2024-05-02 DIAGNOSIS — M54.50 LOW BACK PAIN, UNSPECIFIED: ICD-10-CM

## 2024-05-02 DIAGNOSIS — M54.2 CHRONIC NECK PAIN: ICD-10-CM

## 2024-05-02 DIAGNOSIS — G47.00 INSOMNIA, UNSPECIFIED: ICD-10-CM

## 2024-05-02 DIAGNOSIS — G89.29 CHRONIC NECK PAIN: ICD-10-CM

## 2024-05-02 DIAGNOSIS — R51.9 GENERALIZED HEADACHE: ICD-10-CM

## 2024-05-02 DIAGNOSIS — M54.2 CHRONIC NECK PAIN: Primary | ICD-10-CM

## 2024-05-02 DIAGNOSIS — I10 ESSENTIAL (PRIMARY) HYPERTENSION: ICD-10-CM

## 2024-05-02 DIAGNOSIS — G89.29 CHRONIC BILATERAL LOW BACK PAIN WITHOUT SCIATICA: ICD-10-CM

## 2024-05-02 DIAGNOSIS — E78.2 MIXED HYPERLIPIDEMIA: ICD-10-CM

## 2024-05-02 RX ORDER — AMLODIPINE BESYLATE 10 MG/1
10 TABLET ORAL DAILY
Qty: 90 TABLET | Refills: 1 | Status: SHIPPED | OUTPATIENT
Start: 2024-05-02

## 2024-05-02 RX ORDER — FLUTICASONE PROPIONATE 220 UG/1
1 AEROSOL, METERED RESPIRATORY (INHALATION) 2 TIMES DAILY
Qty: 2 EACH | Refills: 1 | Status: SHIPPED | OUTPATIENT
Start: 2024-05-02

## 2024-05-02 RX ORDER — ALBUTEROL SULFATE 90 UG/1
AEROSOL, METERED RESPIRATORY (INHALATION)
Qty: 18 EACH | Refills: 1 | Status: SHIPPED | OUTPATIENT
Start: 2024-05-02

## 2024-05-02 RX ORDER — LOSARTAN POTASSIUM 50 MG/1
50 TABLET ORAL DAILY
Qty: 90 TABLET | Refills: 1 | Status: SHIPPED | OUTPATIENT
Start: 2024-05-02

## 2024-05-02 RX ORDER — HYDROCODONE BITARTRATE AND ACETAMINOPHEN 5; 325 MG/1; MG/1
1 TABLET ORAL EVERY 12 HOURS PRN
Qty: 10 TABLET | Refills: 0 | Status: SHIPPED | OUTPATIENT
Start: 2024-05-02 | End: 2024-05-11

## 2024-05-02 RX ORDER — ZOLPIDEM TARTRATE 5 MG/1
TABLET ORAL
Qty: 30 TABLET | Refills: 1 | OUTPATIENT
Start: 2024-05-02 | End: 2024-06-02

## 2024-05-02 RX ORDER — AMITRIPTYLINE HYDROCHLORIDE 25 MG/1
25 TABLET, FILM COATED ORAL NIGHTLY
Qty: 120 TABLET | Refills: 1 | Status: SHIPPED | OUTPATIENT
Start: 2024-05-02

## 2024-05-02 RX ORDER — LIDOCAINE 50 MG/G
PATCH TOPICAL
Qty: 30 PATCH | Refills: 1 | Status: SHIPPED | OUTPATIENT
Start: 2024-05-02

## 2024-05-02 RX ORDER — HYDROCHLOROTHIAZIDE 25 MG/1
25 TABLET ORAL EVERY MORNING
Qty: 90 TABLET | Refills: 1 | Status: SHIPPED | OUTPATIENT
Start: 2024-05-02

## 2024-05-02 RX ORDER — SIMVASTATIN 5 MG
5 TABLET ORAL NIGHTLY
Qty: 90 TABLET | Refills: 1 | Status: SHIPPED | OUTPATIENT
Start: 2024-05-02

## 2024-05-02 RX ORDER — BUTALBITAL, ACETAMINOPHEN AND CAFFEINE 50; 325; 40 MG/1; MG/1; MG/1
1 TABLET ORAL EVERY 6 HOURS PRN
Qty: 30 TABLET | Refills: 0 | OUTPATIENT
Start: 2024-05-02

## 2024-05-02 RX ORDER — TIZANIDINE 4 MG/1
TABLET ORAL
Qty: 60 TABLET | Refills: 1 | OUTPATIENT
Start: 2024-05-02

## 2024-05-02 NOTE — TELEPHONE ENCOUNTER
Last appt 3/20/2024      Next Apt:     No future appointments.      [id number] - [place], [place] - [address] - P [phone] - F [phone] [address] [address] [address]  Phone: [phone] Fax: [phone]

## 2024-05-20 ENCOUNTER — TELEPHONE (OUTPATIENT)
Age: 59
End: 2024-05-20

## 2024-05-20 DIAGNOSIS — M54.50 LOW BACK PAIN, UNSPECIFIED: ICD-10-CM

## 2024-05-20 RX ORDER — TIZANIDINE 4 MG/1
TABLET ORAL
Qty: 60 TABLET | Refills: 0 | Status: SHIPPED | OUTPATIENT
Start: 2024-05-20 | End: 2024-05-25 | Stop reason: SDUPTHER

## 2024-05-21 RX ORDER — NALOXONE HYDROCHLORIDE 4 MG/.1ML
1 SPRAY NASAL PRN
Qty: 1 EACH | Refills: 0 | Status: SHIPPED | OUTPATIENT
Start: 2024-05-21

## 2024-05-21 NOTE — TELEPHONE ENCOUNTER
Received response to prior authorization and they denied the request due to the patient not being prescribed Naloxone.     Provider advised. Per provider send in nasal Naloxone.     This has bee sent in.

## 2024-05-24 ENCOUNTER — TELEPHONE (OUTPATIENT)
Age: 59
End: 2024-05-24

## 2024-05-24 NOTE — TELEPHONE ENCOUNTER
[facility] and [facility] gave her an injection in her neck. Ortho is going to follow up with her on Tavon 5/28/24. Pt also [facility] to make [facility] [address]

## 2024-05-25 DIAGNOSIS — I10 ESSENTIAL (PRIMARY) HYPERTENSION: ICD-10-CM

## 2024-05-25 DIAGNOSIS — M54.50 LOW BACK PAIN, UNSPECIFIED: ICD-10-CM

## 2024-05-25 DIAGNOSIS — E78.2 MIXED HYPERLIPIDEMIA: ICD-10-CM

## 2024-05-25 DIAGNOSIS — G47.00 INSOMNIA, UNSPECIFIED: ICD-10-CM

## 2024-05-25 RX ORDER — HYDROCODONE BITARTRATE AND ACETAMINOPHEN 5; 325 MG/1; MG/1
1 TABLET ORAL EVERY 12 HOURS PRN
Qty: 10 TABLET | Refills: 0 | Status: SHIPPED | OUTPATIENT
Start: 2024-05-25 | End: 2024-05-30

## 2024-05-25 RX ORDER — LOSARTAN POTASSIUM 50 MG/1
50 TABLET ORAL DAILY
Qty: 90 TABLET | Refills: 1 | Status: SHIPPED | OUTPATIENT
Start: 2024-05-25

## 2024-05-25 RX ORDER — TIZANIDINE 4 MG/1
TABLET ORAL
Qty: 60 TABLET | Refills: 0 | Status: SHIPPED | OUTPATIENT
Start: 2024-05-25

## 2024-05-25 RX ORDER — ZOLPIDEM TARTRATE 5 MG/1
TABLET ORAL
Qty: 30 TABLET | Refills: 1 | Status: SHIPPED | OUTPATIENT
Start: 2024-05-25 | End: 2024-06-25

## 2024-05-25 RX ORDER — AMITRIPTYLINE HYDROCHLORIDE 25 MG/1
25 TABLET, FILM COATED ORAL NIGHTLY
Qty: 120 TABLET | Refills: 1 | Status: SHIPPED | OUTPATIENT
Start: 2024-05-25

## 2024-05-25 RX ORDER — AMLODIPINE BESYLATE 10 MG/1
10 TABLET ORAL DAILY
Qty: 90 TABLET | Refills: 1 | Status: SHIPPED | OUTPATIENT
Start: 2024-05-25

## 2024-05-25 RX ORDER — SIMVASTATIN 5 MG
5 TABLET ORAL NIGHTLY
Qty: 90 TABLET | Refills: 1 | Status: SHIPPED | OUTPATIENT
Start: 2024-05-25

## 2024-05-25 RX ORDER — HYDROCHLOROTHIAZIDE 25 MG/1
25 TABLET ORAL EVERY MORNING
Qty: 90 TABLET | Refills: 1 | Status: SHIPPED | OUTPATIENT
Start: 2024-05-25

## 2024-05-30 DIAGNOSIS — R51.9 GENERALIZED HEADACHE: Primary | ICD-10-CM

## 2024-05-30 RX ORDER — BUTALBITAL, ACETAMINOPHEN AND CAFFEINE 50; 325; 40 MG/1; MG/1; MG/1
1 TABLET ORAL EVERY 6 HOURS PRN
Qty: 30 TABLET | Refills: 1 | Status: SHIPPED | OUTPATIENT
Start: 2024-05-30

## 2024-06-10 DIAGNOSIS — M54.50 CHRONIC BILATERAL LOW BACK PAIN WITHOUT SCIATICA: ICD-10-CM

## 2024-06-10 DIAGNOSIS — M54.2 CHRONIC NECK PAIN: ICD-10-CM

## 2024-06-10 DIAGNOSIS — G89.29 CHRONIC NECK PAIN: ICD-10-CM

## 2024-06-10 DIAGNOSIS — G89.29 CHRONIC BILATERAL LOW BACK PAIN WITHOUT SCIATICA: ICD-10-CM

## 2024-06-10 DIAGNOSIS — J45.20 MILD INTERMITTENT ASTHMA WITHOUT COMPLICATION: ICD-10-CM

## 2024-06-10 RX ORDER — LIDOCAINE 50 MG/G
PATCH TOPICAL
Qty: 30 PATCH | Refills: 1 | OUTPATIENT
Start: 2024-06-10

## 2024-06-10 RX ORDER — ALBUTEROL SULFATE 90 UG/1
AEROSOL, METERED RESPIRATORY (INHALATION)
Qty: 18 EACH | Refills: 1 | Status: SHIPPED | OUTPATIENT
Start: 2024-06-10

## 2024-06-18 DIAGNOSIS — M54.50 LOW BACK PAIN, UNSPECIFIED: ICD-10-CM

## 2024-06-18 DIAGNOSIS — G47.00 INSOMNIA, UNSPECIFIED: ICD-10-CM

## 2024-06-18 RX ORDER — AMITRIPTYLINE HYDROCHLORIDE 25 MG/1
25 TABLET, FILM COATED ORAL NIGHTLY
Qty: 120 TABLET | Refills: 1 | Status: CANCELLED | OUTPATIENT
Start: 2024-06-18

## 2024-06-18 RX ORDER — TIZANIDINE 4 MG/1
TABLET ORAL
Qty: 60 TABLET | Refills: 0 | Status: CANCELLED | OUTPATIENT
Start: 2024-06-18

## 2024-06-18 RX ORDER — ZOLPIDEM TARTRATE 5 MG/1
TABLET ORAL
Qty: 30 TABLET | Refills: 1 | Status: CANCELLED | OUTPATIENT
Start: 2024-06-18 | End: 2024-07-19

## 2024-06-18 NOTE — TELEPHONE ENCOUNTER
From: Kiara Nance  To: Office of aNe Kaiser  Sent: 6/18/2024 3:01 PM EDT  Subject: Medication Renewal Request    Refills have been requested for the following medications:     amitriptyline (ELAVIL) 25 MG tablet [DESHAUN Sorto - NP]     zolpidem (AMBIEN) 5 MG tablet [DESHAUN Sorto - NP]     tiZANidine (ZANAFLEX) 4 MG tablet [DESHAUN Sorto - NP]    Preferred pharmacy: [facility]/PHARMACY #[id number] [place] [address] [address] [place] [phone] - F [phone]

## 2024-07-08 DIAGNOSIS — G47.00 INSOMNIA, UNSPECIFIED: ICD-10-CM

## 2024-07-08 DIAGNOSIS — M54.50 LOW BACK PAIN, UNSPECIFIED: ICD-10-CM

## 2024-07-08 RX ORDER — TIZANIDINE 4 MG/1
TABLET ORAL
Qty: 60 TABLET | Refills: 0 | Status: SHIPPED | OUTPATIENT
Start: 2024-07-08

## 2024-07-08 RX ORDER — AMITRIPTYLINE HYDROCHLORIDE 25 MG/1
25 TABLET, FILM COATED ORAL NIGHTLY
Qty: 120 TABLET | Refills: 1 | Status: SHIPPED | OUTPATIENT
Start: 2024-07-08

## 2024-07-08 NOTE — TELEPHONE ENCOUNTER
From: Kiara Nance  To: Office of Nae Kaiser  Sent: 7/8/2024 10:39 AM EDT  Subject: Medication Renewal Request    Refills have been requested for the following medications:     amitriptyline (ELAVIL) 25 MG tablet [DESHAUN Sorto - NP]     tiZANidine (ZANAFLEX) 4 MG tablet [DESHAUN Sorto NP]    Preferred pharmacy: [facility]/PHARMACY #[id number] [facility] [address] [place] [phone] - F [phone]

## 2024-07-17 ENCOUNTER — PATIENT MESSAGE (OUTPATIENT)
Age: 59
End: 2024-07-17

## 2024-07-17 DIAGNOSIS — G47.00 INSOMNIA, UNSPECIFIED TYPE: Primary | ICD-10-CM

## 2024-07-18 RX ORDER — ZOLPIDEM TARTRATE 5 MG/1
TABLET ORAL
COMMUNITY
Start: 2024-07-17 | End: 2024-07-18 | Stop reason: SDUPTHER

## 2024-07-18 RX ORDER — ZOLPIDEM TARTRATE 5 MG/1
TABLET ORAL
Qty: 14 TABLET | Refills: 0 | Status: SHIPPED | OUTPATIENT
Start: 2024-07-18 | End: 2024-08-01

## 2024-07-25 ENCOUNTER — OFFICE VISIT (OUTPATIENT)
Age: 59
End: 2024-07-25
Payer: MEDICAID

## 2024-07-25 VITALS
HEART RATE: 71 BPM | TEMPERATURE: 98 F | OXYGEN SATURATION: 98 % | DIASTOLIC BLOOD PRESSURE: 88 MMHG | HEIGHT: 61 IN | RESPIRATION RATE: 18 BRPM | SYSTOLIC BLOOD PRESSURE: 128 MMHG | BODY MASS INDEX: 27.98 KG/M2 | WEIGHT: 148.2 LBS

## 2024-07-25 DIAGNOSIS — I10 ESSENTIAL (PRIMARY) HYPERTENSION: ICD-10-CM

## 2024-07-25 DIAGNOSIS — M54.2 CHRONIC NECK PAIN: ICD-10-CM

## 2024-07-25 DIAGNOSIS — G89.29 CHRONIC NECK PAIN: ICD-10-CM

## 2024-07-25 DIAGNOSIS — M54.50 CHRONIC BILATERAL LOW BACK PAIN WITHOUT SCIATICA: ICD-10-CM

## 2024-07-25 DIAGNOSIS — G47.00 INSOMNIA, UNSPECIFIED TYPE: Primary | ICD-10-CM

## 2024-07-25 DIAGNOSIS — G89.29 CHRONIC BILATERAL LOW BACK PAIN WITHOUT SCIATICA: ICD-10-CM

## 2024-07-25 PROCEDURE — 3079F DIAST BP 80-89 MM HG: CPT | Performed by: INTERNAL MEDICINE

## 2024-07-25 PROCEDURE — 3074F SYST BP LT 130 MM HG: CPT | Performed by: INTERNAL MEDICINE

## 2024-07-25 PROCEDURE — 99214 OFFICE O/P EST MOD 30 MIN: CPT | Performed by: INTERNAL MEDICINE

## 2024-07-25 RX ORDER — ZOLPIDEM TARTRATE 5 MG/1
TABLET ORAL
Qty: 30 TABLET | Refills: 1 | Status: SHIPPED | OUTPATIENT
Start: 2024-07-25 | End: 2024-08-08

## 2024-07-25 ASSESSMENT — ENCOUNTER SYMPTOMS
BACK PAIN: 1
RESPIRATORY NEGATIVE: 1

## 2024-07-25 NOTE — PROGRESS NOTES
Subjective    Kiara Nance is a 59 y.o. female who presents today for the following:  Chief Complaint   Patient presents with    Hypertension       History of Present Illness  The patient presents for evaluation of multiple medical concerns.    She reports satisfactory control of her blood pressure, which is managed with losartan 50 mg, amlodipine 10 mg, and hydrochlorothiazide 25 mg.    She received a gel injection in her knee at [place] today, which she found beneficial. She reports no current swelling. However, she noticed red spots on her legs three weeks ago, which she was told might be an allergic reaction from a cortisone injection. She denies any itching, burning, chest pain, or shortness of breath. She has a follow-up appointment with her orthopedic doctor in 3 months. She denies any recent falls and does not use any canes or assistive devices. She experiences lower back pain when bending her back and has a history of osteoporosis and scoliosis. She is unable to stand for extended periods at work. Her shoulder and knee pain have improved since the nerve block, but the swelling persists. She experiences tingling and numbness in her left hand, which she describes as feeling like pins and needles. She dropped something on her left hand a few days ago and writes with her right hand. She has been experiencing depression recently, which has increased her stress. She lives with her son and grandchildren, and takes time off work for her discomfort. She had diarrhea and vomiting on Monday, which she initially thought was a stomach virus. Her lower back pain is triggered by positional changes. Her pain intensifies with twisting, stooping, crouching, and squatting.    She occasionally experiences floaters in her eyes. She has not had an eye exam.    She is requesting a refill of her Ambien prescription.    She is up to date on her colonoscopy. She has not received any recent vaccinations. Her headaches have

## 2024-07-25 NOTE — PROGRESS NOTES
Chief Complaint   Patient presents with    Hypertension     \"Have you been to the ER, urgent care clinic since your last visit?  Hospitalized since your last visit?\"    NO    “Have you seen or consulted any other health care providers outside of [facility] since your last visit?”    NO    Have you had a mammogram?”   NO    Date of last Mammogram: 5/28/2021      “Have you had a pap smear?”    NO    Date of last Cervical Cancer screen (HPV or PAP): 5/28/2021             Click Here for Release of Records Request

## 2024-07-31 ENCOUNTER — PATIENT MESSAGE (OUTPATIENT)
Age: 59
End: 2024-07-31

## 2024-08-22 ENCOUNTER — PATIENT MESSAGE (OUTPATIENT)
Age: 59
End: 2024-08-22

## 2024-08-22 DIAGNOSIS — M54.50 LOW BACK PAIN, UNSPECIFIED: ICD-10-CM

## 2024-08-23 RX ORDER — TIZANIDINE 4 MG/1
TABLET ORAL
Qty: 60 TABLET | Refills: 0 | Status: SHIPPED | OUTPATIENT
Start: 2024-08-23

## 2024-09-03 ENCOUNTER — TELEPHONE (OUTPATIENT)
Age: 59
End: 2024-09-03

## 2024-09-03 NOTE — TELEPHONE ENCOUNTER
Called 10:12 AM  To schedule her a virtual appointment. She will call back cause she was busy. When she calls back lets schedule her a virtual appointment with Brenda gonzalez or Dr Hurley as Nae's schedule is full for the rest of the week.        RETURNED CALL 02:14 PM  She still has headaches every morning. She's not getting better and thinks medications are not working.

## 2024-09-03 NOTE — TELEPHONE ENCOUNTER
Patient called in..    States she's thinking her body has become used to the medication prescribed for headaches/migraines because for the last week she's be waking up with a headache.    Would like a call back to discuss     [phone]

## 2024-09-06 DIAGNOSIS — R51.9 GENERALIZED HEADACHE: ICD-10-CM

## 2024-09-06 RX ORDER — BUTALBITAL, ACETAMINOPHEN AND CAFFEINE 50; 325; 40 MG/1; MG/1; MG/1
1 TABLET ORAL EVERY 6 HOURS PRN
Qty: 30 TABLET | Refills: 1 | Status: SHIPPED | OUTPATIENT
Start: 2024-09-06

## 2024-09-10 ENCOUNTER — OFFICE VISIT (OUTPATIENT)
Age: 59
End: 2024-09-10
Payer: MEDICAID

## 2024-09-10 VITALS
DIASTOLIC BLOOD PRESSURE: 80 MMHG | RESPIRATION RATE: 18 BRPM | WEIGHT: 141.1 LBS | HEART RATE: 124 BPM | HEIGHT: 61 IN | TEMPERATURE: 98 F | BODY MASS INDEX: 26.64 KG/M2 | OXYGEN SATURATION: 98 % | SYSTOLIC BLOOD PRESSURE: 158 MMHG

## 2024-09-10 DIAGNOSIS — I10 ESSENTIAL (PRIMARY) HYPERTENSION: Primary | ICD-10-CM

## 2024-09-10 DIAGNOSIS — G47.00 INSOMNIA, UNSPECIFIED TYPE: ICD-10-CM

## 2024-09-10 DIAGNOSIS — I67.1 SUPRACLINOID CAROTID ARTERY ANEURYSM, SMALL: ICD-10-CM

## 2024-09-10 DIAGNOSIS — G44.011 INTRACTABLE EPISODIC CLUSTER HEADACHE: ICD-10-CM

## 2024-09-10 PROCEDURE — 99214 OFFICE O/P EST MOD 30 MIN: CPT | Performed by: INTERNAL MEDICINE

## 2024-09-10 PROCEDURE — 3079F DIAST BP 80-89 MM HG: CPT | Performed by: INTERNAL MEDICINE

## 2024-09-10 PROCEDURE — 3077F SYST BP >= 140 MM HG: CPT | Performed by: INTERNAL MEDICINE

## 2024-09-10 RX ORDER — LIDOCAINE 50 MG/G
1 PATCH TOPICAL DAILY
COMMUNITY
Start: 2024-09-06

## 2024-09-10 RX ORDER — BUTALBITAL, ACETAMINOPHEN AND CAFFEINE 50; 325; 40 MG/1; MG/1; MG/1
1 TABLET ORAL EVERY 6 HOURS PRN
Qty: 30 TABLET | Refills: 1 | Status: SHIPPED | OUTPATIENT
Start: 2024-09-10

## 2024-09-10 RX ORDER — ZOLPIDEM TARTRATE 5 MG/1
5 TABLET ORAL NIGHTLY PRN
COMMUNITY
End: 2024-09-10 | Stop reason: SDUPTHER

## 2024-09-10 RX ORDER — ZOLPIDEM TARTRATE 5 MG/1
5 TABLET ORAL NIGHTLY PRN
Qty: 30 TABLET | Refills: 1 | Status: SHIPPED | OUTPATIENT
Start: 2024-09-10 | End: 2024-11-09

## 2024-09-10 RX ORDER — CLONIDINE HYDROCHLORIDE 0.1 MG/1
0.1 TABLET ORAL ONCE
Status: SHIPPED | OUTPATIENT
Start: 2024-09-10

## 2024-09-10 RX ORDER — LOSARTAN POTASSIUM 50 MG/1
100 TABLET ORAL DAILY
Qty: 120 TABLET | Refills: 1 | Status: SHIPPED | OUTPATIENT
Start: 2024-09-10

## 2024-09-10 ASSESSMENT — ENCOUNTER SYMPTOMS
BACK PAIN: 1
GASTROINTESTINAL NEGATIVE: 1
PHOTOPHOBIA: 1
RESPIRATORY NEGATIVE: 1

## 2024-09-12 DIAGNOSIS — M54.50 LOW BACK PAIN, UNSPECIFIED: ICD-10-CM

## 2024-09-23 ENCOUNTER — TELEPHONE (OUTPATIENT)
Age: 59
End: 2024-09-23

## 2024-10-04 ENCOUNTER — OFFICE VISIT (OUTPATIENT)
Age: 59
End: 2024-10-04
Payer: MEDICAID

## 2024-10-04 ENCOUNTER — TELEPHONE (OUTPATIENT)
Age: 59
End: 2024-10-04

## 2024-10-04 VITALS
SYSTOLIC BLOOD PRESSURE: 150 MMHG | TEMPERATURE: 98.2 F | BODY MASS INDEX: 26.81 KG/M2 | HEIGHT: 61 IN | HEART RATE: 90 BPM | WEIGHT: 142 LBS | OXYGEN SATURATION: 98 % | DIASTOLIC BLOOD PRESSURE: 80 MMHG

## 2024-10-04 DIAGNOSIS — I10 ESSENTIAL (PRIMARY) HYPERTENSION: ICD-10-CM

## 2024-10-04 DIAGNOSIS — R51.9 GENERALIZED HEADACHE: ICD-10-CM

## 2024-10-04 DIAGNOSIS — R35.0 URINARY FREQUENCY: Primary | ICD-10-CM

## 2024-10-04 DIAGNOSIS — R31.29 OTHER MICROSCOPIC HEMATURIA: ICD-10-CM

## 2024-10-04 LAB
BILIRUBIN, URINE, POC: NEGATIVE
BLOOD URINE, POC: ABNORMAL
GLUCOSE URINE, POC: NEGATIVE
KETONES, URINE, POC: NEGATIVE
LEUKOCYTE ESTERASE, URINE, POC: NEGATIVE
NITRITE, URINE, POC: NEGATIVE
PH, URINE, POC: 5.5 (ref 4.6–8)
PROTEIN,URINE, POC: NEGATIVE
SPECIFIC GRAVITY, URINE, POC: 1.03 (ref 1–1.03)
URINALYSIS CLARITY, POC: CLEAR
URINALYSIS COLOR, POC: ABNORMAL
UROBILINOGEN, POC: ABNORMAL

## 2024-10-04 PROCEDURE — PBSHW AMB POC URINALYSIS DIP STICK AUTO W/O MICRO: Performed by: CLINICAL NURSE SPECIALIST

## 2024-10-04 PROCEDURE — 99214 OFFICE O/P EST MOD 30 MIN: CPT | Performed by: CLINICAL NURSE SPECIALIST

## 2024-10-04 PROCEDURE — 3079F DIAST BP 80-89 MM HG: CPT | Performed by: CLINICAL NURSE SPECIALIST

## 2024-10-04 PROCEDURE — 81003 URINALYSIS AUTO W/O SCOPE: CPT | Performed by: CLINICAL NURSE SPECIALIST

## 2024-10-04 PROCEDURE — 3077F SYST BP >= 140 MM HG: CPT | Performed by: CLINICAL NURSE SPECIALIST

## 2024-10-04 RX ORDER — SUMATRIPTAN 50 MG/1
50 TABLET, FILM COATED ORAL DAILY PRN
Qty: 9 TABLET | Refills: 1 | Status: SHIPPED | OUTPATIENT
Start: 2024-10-04

## 2024-10-04 RX ORDER — PROPRANOLOL HYDROCHLORIDE 80 MG/1
80 CAPSULE, EXTENDED RELEASE ORAL DAILY
Qty: 30 CAPSULE | Refills: 1 | Status: SHIPPED | OUTPATIENT
Start: 2024-10-04 | End: 2024-12-03

## 2024-10-04 SDOH — ECONOMIC STABILITY: FOOD INSECURITY: WITHIN THE PAST 12 MONTHS, THE FOOD YOU BOUGHT JUST DIDN'T LAST AND YOU DIDN'T HAVE MONEY TO GET MORE.: NEVER TRUE

## 2024-10-04 SDOH — ECONOMIC STABILITY: FOOD INSECURITY: WITHIN THE PAST 12 MONTHS, YOU WORRIED THAT YOUR FOOD WOULD RUN OUT BEFORE YOU GOT MONEY TO BUY MORE.: NEVER TRUE

## 2024-10-04 SDOH — ECONOMIC STABILITY: INCOME INSECURITY: HOW HARD IS IT FOR YOU TO PAY FOR THE VERY BASICS LIKE FOOD, HOUSING, MEDICAL CARE, AND HEATING?: NOT HARD AT ALL

## 2024-10-04 ASSESSMENT — ENCOUNTER SYMPTOMS: SHORTNESS OF BREATH: 0

## 2024-10-04 NOTE — TELEPHONE ENCOUNTER
Patient was seen by petra today. She wants to know if her work note can be changed for her to go back to work on 10/09/2024. If so she wants it sent to her in her mychart.

## 2024-10-04 NOTE — PROGRESS NOTES
Kiara Nance (:  1965) is a 59 y.o. female,Established patient, here for evaluation of the following chief complaint(s):  Urinary Frequency         Assessment & Plan  Urinary frequency    Discussed increased water intake and cutting back on any caffeine. Will follow up pending urine culture.    Orders:    AMB POC URINALYSIS DIP STICK AUTO W/O MICRO    Essential (primary) hypertension    Not to goal, considered increasing hctz but given migraines we will add propanolol. Continue amlodipine, losartan, and hctz.Encouraged increased water intake and low sodium diet.     Orders:    propranolol (INDERAL LA) 80 MG extended release capsule; Take 1 capsule by mouth daily    Other microscopic hematuria     Orders:    Culture, Urine; Future    Generalized headache    Longstanding, uncontrolled. Likely migraines. Will trial propanolol as preventative and sumatriptan as abortive. Maintain scheduled appointment with neurology.     Orders:    propranolol (INDERAL LA) 80 MG extended release capsule; Take 1 capsule by mouth daily      Return in about 6 weeks (around 11/15/2024).       Subjective   Ms. Nance presents for a problem visit. Recently she has been experiencing urinary frequency. Has noted that at times minimal urine is coming out. Experiencing a throbbing sensation after voiding. Feels that this issue started after one of her medications was increased. She denies any blood in her urine. She also reports persistent headaches, was put on fiorcet but admits that it does not provide relief. She goes to bed with a headache and wakes up with a headache. Headache worsened by bright lights. Has an appointment scheduled with neurology for next week., requesting to remain out of work until then.         Review of Systems   Constitutional:  Positive for fatigue.   Respiratory:  Negative for shortness of breath.    Cardiovascular:  Negative for chest pain.   Neurological:  Positive for headaches.        Current

## 2024-10-04 NOTE — PROGRESS NOTES
Chief Complaint   Patient presents with    Urinary Frequency     Pt states she feels she has to use the restroom every 5 minutes but not much comes out. Pt describes a throbbing sensation after urinating.     \"Have you been to the ER, urgent care clinic since your last visit?  Hospitalized since your last visit?\"    NO    “Have you seen or consulted any other health care providers outside our system since your last visit?”    NO    Have you had a mammogram?”   NO    Date of last Mammogram: 5/28/2021      “Have you had a pap smear?”    YES - Where: [place] Nurse/CMA to request most recent records if not in the chart    Date of last Cervical Cancer screen (HPV or PAP): 5/28/2021

## 2024-10-07 RX ORDER — LIDOCAINE 50 MG/G
PATCH TOPICAL
Qty: 30 PATCH | Refills: 1 | Status: SHIPPED | OUTPATIENT
Start: 2024-10-07 | End: 2024-10-09

## 2024-10-08 ENCOUNTER — OFFICE VISIT (OUTPATIENT)
Age: 59
End: 2024-10-08
Payer: MEDICAID

## 2024-10-08 VITALS
HEIGHT: 61 IN | WEIGHT: 149 LBS | BODY MASS INDEX: 28.13 KG/M2 | RESPIRATION RATE: 16 BRPM | TEMPERATURE: 97.9 F | DIASTOLIC BLOOD PRESSURE: 62 MMHG | HEART RATE: 64 BPM | SYSTOLIC BLOOD PRESSURE: 118 MMHG | OXYGEN SATURATION: 99 %

## 2024-10-08 DIAGNOSIS — I67.1 CEREBRAL ANEURYSM: Primary | ICD-10-CM

## 2024-10-08 LAB — BACTERIA UR CULT: ABNORMAL

## 2024-10-08 PROCEDURE — 3074F SYST BP LT 130 MM HG: CPT | Performed by: PSYCHIATRY & NEUROLOGY

## 2024-10-08 PROCEDURE — 99203 OFFICE O/P NEW LOW 30 MIN: CPT | Performed by: PSYCHIATRY & NEUROLOGY

## 2024-10-08 PROCEDURE — 3078F DIAST BP <80 MM HG: CPT | Performed by: PSYCHIATRY & NEUROLOGY

## 2024-10-08 RX ORDER — NITROFURANTOIN 25; 75 MG/1; MG/1
100 CAPSULE ORAL 2 TIMES DAILY
Qty: 10 CAPSULE | Refills: 0 | Status: SHIPPED | OUTPATIENT
Start: 2024-10-08 | End: 2024-10-09

## 2024-10-08 ASSESSMENT — PATIENT HEALTH QUESTIONNAIRE - PHQ9
SUM OF ALL RESPONSES TO PHQ QUESTIONS 1-9: 1
1. LITTLE INTEREST OR PLEASURE IN DOING THINGS: NOT AT ALL
SUM OF ALL RESPONSES TO PHQ9 QUESTIONS 1 & 2: 1
SUM OF ALL RESPONSES TO PHQ QUESTIONS 1-9: 1
2. FEELING DOWN, DEPRESSED OR HOPELESS: SEVERAL DAYS

## 2024-10-08 NOTE — PROGRESS NOTES
Never true     Ran Out of Food in the Last Year: Never true   Transportation Needs: Unknown (10/4/2024)    PRAPARE - Transportation     Lack of Transportation (Medical): Not on file     Lack of Transportation (Non-Medical): No   Physical Activity: Not on file   Stress: Not on file   Social Connections: Not on file   Intimate Partner Violence: Not on file   Housing Stability: Unknown (10/4/2024)    Housing Stability Vital Sign     Unable to Pay for Housing in the Last Year: Not on file     Number of Times Moved in the Last Year: Not on file     Homeless in the Last Year: No      Social History     Substance and Sexual Activity   Drug Use Not Currently    Types: Cocaine    Comment: 09/04/24 when I I'm about to aneurysm only one time     Family History   Problem Relation Age of Onset    Liver Disease Mother     No Known Problems Father     Lung Cancer Sister     Lung Cancer Brother           REVIEW OF SYSTEMS:  Neg except for above      OBJECTIVE:  Vitals:    10/08/24 0847   BP: 118/62   Pulse: 64   Resp: 16   Temp: 97.9 °F (36.6 °C)   SpO2: 99%       Physical Exam:   Gen: NAD  CV: nml s1,s2; no bruit    Neuro:  A/Ox3,speech/lang inact  CN 2-12 intact, perrl, fundoscopic exam nml, VF full  5/5 throughout no drift; SILT, Parietal fxn intact  DTR sym  FTN, gait intact        Lab Review:  Lab Results   Component Value Date    WBC 5.8 06/01/2020    HGB 12.8 06/01/2020    HCT 40.0 06/01/2020     06/01/2020    CHOL 234 (H) 06/01/2020    TRIG 154 (H) 06/01/2020    HDL 69 06/01/2020    ALT 8 06/01/2020    AST 11 06/01/2020     06/01/2020    K 4.4 06/01/2020     06/01/2020    BUN 11 06/01/2020    CO2 21 06/01/2020    TSH 2.020 06/01/2020       Imaging: my reads  CTA: L PCOM infundibulum        IMPRESSION:  Left PCOM infundibulum    PLAN:  - normal cerebrovascular no f/u imaging needed  - f/u prn        SUBMITTED BY:  Signed By: Nathanael Schuler DO     Neurointerventional Surgery  [facility]

## 2024-10-09 ENCOUNTER — TELEMEDICINE (OUTPATIENT)
Age: 59
End: 2024-10-09
Payer: MEDICAID

## 2024-10-09 ENCOUNTER — TELEPHONE (OUTPATIENT)
Age: 59
End: 2024-10-09

## 2024-10-09 DIAGNOSIS — I67.1 SUPRACLINOID CAROTID ARTERY ANEURYSM, SMALL: ICD-10-CM

## 2024-10-09 DIAGNOSIS — G44.011 INTRACTABLE EPISODIC CLUSTER HEADACHE: ICD-10-CM

## 2024-10-09 DIAGNOSIS — I10 ESSENTIAL (PRIMARY) HYPERTENSION: Primary | ICD-10-CM

## 2024-10-09 PROCEDURE — 99214 OFFICE O/P EST MOD 30 MIN: CPT | Performed by: INTERNAL MEDICINE

## 2024-10-09 RX ORDER — LOSARTAN POTASSIUM 50 MG/1
50 TABLET ORAL DAILY
Qty: 120 TABLET | Refills: 1 | Status: SHIPPED | OUTPATIENT
Start: 2024-10-09

## 2024-10-09 RX ORDER — HYDROCHLOROTHIAZIDE 25 MG/1
50 TABLET ORAL EVERY MORNING
Qty: 120 TABLET | Refills: 1 | Status: SHIPPED | OUTPATIENT
Start: 2024-10-09

## 2024-10-09 ASSESSMENT — ENCOUNTER SYMPTOMS
RESPIRATORY NEGATIVE: 1
GASTROINTESTINAL NEGATIVE: 1

## 2024-10-09 NOTE — TELEPHONE ENCOUNTER
Called patient on note below. Pt states she is allergic to NSAIDs and Aspirin. Pt states she researched what Sumatriptan contained and read it has aspirin in it. Pt also states losartan potassium is causing these UTI's. She stated she would like advise.     ----- Message from DESHAUN Butts CNP sent at 10/8/2024  1:42 PM EDT -----  UTI +, I have sent macrobid to her pharmacy. She should take this twice daily for 5 days. Like most antibiotics it can cause some diarrhea, can take probiotics to help with this.

## 2024-10-09 NOTE — PROGRESS NOTES
Chief Complaint   Patient presents with    Discuss Medications     \"Have you been to the ER, urgent care clinic since your last visit?  Hospitalized since your last visit?\"    NO    “Have you seen or consulted any other health care providers outside our system since your last visit?”    NO    Have you had a mammogram?”   NO    Date of last Mammogram: 5/28/2021      “Have you had a pap smear?”    NO    Date of last Cervical Cancer screen (HPV or PAP): 5/28/2021              
Headaches Yes Nae Kaiser APRN - NP   zolpidem (AMBIEN) 5 MG tablet Take 1 tablet by mouth nightly as needed for Sleep for up to 60 days. Max Daily Amount: 5 mg Yes Nae Kaiser APRN - NP   amitriptyline (ELAVIL) 25 MG tablet Take 1 tablet by mouth nightly Yes Nae Kaiser APRN - NP   albuterol sulfate HFA (PROVENTIL;VENTOLIN;PROAIR) 108 (90 Base) MCG/ACT inhaler TAKE 2 PUFFS BY MOUTH EVERY 4 HOURS AS NEEDED FOR WHEEZE Yes Nae Kaiser APRN - NP   simvastatin (ZOCOR) 5 MG tablet Take 1 tablet by mouth nightly Yes Nae Kaiser APRN - NP   amLODIPine (NORVASC) 10 MG tablet Take 1 tablet by mouth daily Yes Nae Kaiser APRN - NP   fluticasone (FLOVENT HFA) 220 MCG/ACT inhaler Inhale 1 puff into the lungs 2 times daily Yes Nae Kaiser APRN - NP       Social History     Tobacco Use    Smoking status: Never    Smokeless tobacco: Never   Vaping Use    Vaping status: Never Used   Substance Use Topics    Alcohol use: Yes     Alcohol/week: 1.0 standard drink of alcohol     Types: 1 Cans of beer per week    Drug use: Not Currently     Types: Cocaine     Comment: 09/04/24 when I I'm about to aneurysm only one time        Allergies   Allergen Reactions    Meloxicam Hives    Nsaids Hives, Itching and Nausea Only    Sulfa Antibiotics Hives, Itching and Nausea Only    Aspirin     Gabapentin Other (See Comments)     Hives and itching  Other reaction(s): Other (see comments)  Hives and itching   ,   Past Medical History:   Diagnosis Date    Anxiety 09/04/24    Since I found out about the aneurysm    Asthma     SOB    Chronic back pain     Chronic pain     joint pain    Depression 09/04/24    Headache     Hypertension     Knee meniscus pain 9/7/2017    Memory disorder     Just be forgetting things sometimes    Mild intermittent asthma without complication 9/7/2017    Neck pain 09/04/24    Neuropathy About 2 years now    Sinus congestion     Vitamin D deficiency    ,   Past Surgical History:

## 2024-10-11 ENCOUNTER — PATIENT MESSAGE (OUTPATIENT)
Age: 59
End: 2024-10-11

## 2024-10-11 DIAGNOSIS — R51.9 CHRONIC NONINTRACTABLE HEADACHE, UNSPECIFIED HEADACHE TYPE: ICD-10-CM

## 2024-10-11 DIAGNOSIS — R51.9 CHRONIC HEAD PAIN: Primary | ICD-10-CM

## 2024-10-11 DIAGNOSIS — G89.29 CHRONIC HEAD PAIN: Primary | ICD-10-CM

## 2024-10-11 DIAGNOSIS — G89.29 CHRONIC NONINTRACTABLE HEADACHE, UNSPECIFIED HEADACHE TYPE: ICD-10-CM

## 2024-10-11 RX ORDER — PROPRANOLOL HCL 20 MG
20 TABLET ORAL 3 TIMES DAILY
Qty: 270 TABLET | Refills: 0 | Status: SHIPPED | OUTPATIENT
Start: 2024-10-11

## 2024-10-11 NOTE — TELEPHONE ENCOUNTER
Last appt 10/9/2024      Next Apt:     No future appointments.      [id number] [place] - [address] - P [phone] - F [phone]  [address] [address]  Phone: [phone] Fax: [phone]

## 2024-10-11 NOTE — TELEPHONE ENCOUNTER
Nae Kaiser, APRN - NP   to Me       10/11/24  3:25 PM  Send in 20 mg TID of inderal. Place referral to neurology for trice np

## 2024-10-26 DIAGNOSIS — I10 ESSENTIAL (PRIMARY) HYPERTENSION: ICD-10-CM

## 2024-10-26 DIAGNOSIS — R51.9 GENERALIZED HEADACHE: ICD-10-CM

## 2024-10-28 DIAGNOSIS — G89.29 CHRONIC NONINTRACTABLE HEADACHE, UNSPECIFIED HEADACHE TYPE: ICD-10-CM

## 2024-10-28 DIAGNOSIS — I10 ESSENTIAL (PRIMARY) HYPERTENSION: ICD-10-CM

## 2024-10-28 DIAGNOSIS — R51.9 CHRONIC NONINTRACTABLE HEADACHE, UNSPECIFIED HEADACHE TYPE: ICD-10-CM

## 2024-10-28 DIAGNOSIS — G47.00 INSOMNIA, UNSPECIFIED: ICD-10-CM

## 2024-10-28 DIAGNOSIS — E78.2 MIXED HYPERLIPIDEMIA: ICD-10-CM

## 2024-10-28 DIAGNOSIS — J45.20 MILD INTERMITTENT ASTHMA WITHOUT COMPLICATION: ICD-10-CM

## 2024-10-28 DIAGNOSIS — G47.00 INSOMNIA, UNSPECIFIED TYPE: ICD-10-CM

## 2024-10-28 DIAGNOSIS — M54.50 LOW BACK PAIN, UNSPECIFIED: ICD-10-CM

## 2024-10-28 RX ORDER — AMLODIPINE BESYLATE 10 MG/1
10 TABLET ORAL DAILY
Qty: 90 TABLET | Refills: 1 | Status: SHIPPED | OUTPATIENT
Start: 2024-10-28

## 2024-10-28 RX ORDER — BUTALBITAL, ACETAMINOPHEN AND CAFFEINE 50; 325; 40 MG/1; MG/1; MG/1
1 TABLET ORAL EVERY 6 HOURS PRN
Qty: 30 TABLET | Refills: 1 | Status: SHIPPED | OUTPATIENT
Start: 2024-10-28

## 2024-10-28 RX ORDER — SIMVASTATIN 5 MG
5 TABLET ORAL NIGHTLY
Qty: 90 TABLET | Refills: 1 | Status: SHIPPED | OUTPATIENT
Start: 2024-10-28

## 2024-10-28 RX ORDER — PROPRANOLOL HCL 20 MG
20 TABLET ORAL 3 TIMES DAILY
Qty: 270 TABLET | Refills: 1 | Status: SHIPPED | OUTPATIENT
Start: 2024-10-28

## 2024-10-28 RX ORDER — PROPRANOLOL HYDROCHLORIDE 80 MG/1
CAPSULE, EXTENDED RELEASE ORAL DAILY
Qty: 90 CAPSULE | Refills: 1 | OUTPATIENT
Start: 2024-10-28

## 2024-10-28 RX ORDER — HYDROCHLOROTHIAZIDE 25 MG/1
50 TABLET ORAL EVERY MORNING
Qty: 180 TABLET | Refills: 1 | Status: SHIPPED | OUTPATIENT
Start: 2024-10-28

## 2024-10-28 RX ORDER — LOSARTAN POTASSIUM 50 MG/1
50 TABLET ORAL DAILY
Qty: 90 TABLET | Refills: 1 | Status: SHIPPED | OUTPATIENT
Start: 2024-10-28

## 2024-10-28 RX ORDER — ALBUTEROL SULFATE 90 UG/1
INHALANT RESPIRATORY (INHALATION)
Qty: 18 EACH | Refills: 1 | Status: SHIPPED | OUTPATIENT
Start: 2024-10-28

## 2024-10-28 RX ORDER — FLUTICASONE PROPIONATE 220 UG/1
1 AEROSOL, METERED RESPIRATORY (INHALATION) 2 TIMES DAILY
Qty: 2 EACH | Refills: 1 | Status: SHIPPED | OUTPATIENT
Start: 2024-10-28

## 2024-10-28 RX ORDER — ZOLPIDEM TARTRATE 5 MG/1
5 TABLET ORAL NIGHTLY PRN
Qty: 30 TABLET | Refills: 0 | Status: SHIPPED | OUTPATIENT
Start: 2024-10-28 | End: 2024-12-27

## 2024-11-05 ENCOUNTER — PATIENT MESSAGE (OUTPATIENT)
Age: 59
End: 2024-11-05

## 2024-11-05 RX ORDER — AMOXICILLIN 500 MG/1
500 CAPSULE ORAL 2 TIMES DAILY
Qty: 14 CAPSULE | Refills: 0 | Status: SHIPPED | OUTPATIENT
Start: 2024-11-05 | End: 2024-11-12

## 2024-11-14 DIAGNOSIS — G47.00 INSOMNIA, UNSPECIFIED: ICD-10-CM

## 2024-11-14 RX ORDER — BUTALBITAL, ACETAMINOPHEN AND CAFFEINE 50; 325; 40 MG/1; MG/1; MG/1
1 TABLET ORAL EVERY 6 HOURS PRN
Qty: 30 TABLET | Refills: 1 | OUTPATIENT
Start: 2024-11-14

## 2024-11-19 DIAGNOSIS — K08.89 PAIN, DENTAL: Primary | ICD-10-CM

## 2024-11-19 DIAGNOSIS — M54.50 CHRONIC BILATERAL LOW BACK PAIN WITHOUT SCIATICA: ICD-10-CM

## 2024-11-19 DIAGNOSIS — G89.29 CHRONIC BILATERAL LOW BACK PAIN WITHOUT SCIATICA: ICD-10-CM

## 2024-11-19 DIAGNOSIS — K08.89 PAIN, DENTAL: ICD-10-CM

## 2024-11-19 RX ORDER — AMOXICILLIN 500 MG/1
500 CAPSULE ORAL 2 TIMES DAILY
Qty: 20 CAPSULE | Refills: 0 | Status: SHIPPED | OUTPATIENT
Start: 2024-11-19 | End: 2024-11-19 | Stop reason: SDUPTHER

## 2024-11-19 RX ORDER — ACETAMINOPHEN AND CODEINE PHOSPHATE 300; 30 MG/1; MG/1
1 TABLET ORAL EVERY 8 HOURS PRN
Qty: 12 TABLET | Refills: 0 | Status: SHIPPED | OUTPATIENT
Start: 2024-11-19 | End: 2024-11-19 | Stop reason: SDUPTHER

## 2024-11-19 NOTE — TELEPHONE ENCOUNTER
acetaminophen-codeine (TYLENOL #3) 300-30 MG per tablet     amoxicillin (AMOXIL) 500 MG capsule     Please resend due to State License Issue.

## 2024-11-20 RX ORDER — ACETAMINOPHEN AND CODEINE PHOSPHATE 300; 30 MG/1; MG/1
1 TABLET ORAL EVERY 8 HOURS PRN
Qty: 12 TABLET | Refills: 0 | Status: SHIPPED | OUTPATIENT
Start: 2024-11-20 | End: 2024-11-24

## 2024-11-20 RX ORDER — AMOXICILLIN 500 MG/1
500 CAPSULE ORAL 2 TIMES DAILY
Qty: 20 CAPSULE | Refills: 0 | Status: SHIPPED | OUTPATIENT
Start: 2024-11-20 | End: 2024-11-30

## 2024-11-27 ENCOUNTER — OFFICE VISIT (OUTPATIENT)
Age: 59
End: 2024-11-27
Payer: MEDICAID

## 2024-11-27 ENCOUNTER — TELEPHONE (OUTPATIENT)
Age: 59
End: 2024-11-27

## 2024-11-27 VITALS
HEART RATE: 86 BPM | HEIGHT: 60 IN | OXYGEN SATURATION: 97 % | DIASTOLIC BLOOD PRESSURE: 98 MMHG | RESPIRATION RATE: 14 BRPM | WEIGHT: 145 LBS | BODY MASS INDEX: 28.47 KG/M2 | SYSTOLIC BLOOD PRESSURE: 160 MMHG

## 2024-11-27 DIAGNOSIS — M54.50 LOW BACK PAIN, UNSPECIFIED: ICD-10-CM

## 2024-11-27 DIAGNOSIS — R51.9 NEW ONSET OF HEADACHES AFTER AGE 50: ICD-10-CM

## 2024-11-27 DIAGNOSIS — G43.709 CHRONIC MIGRAINE WITHOUT AURA WITHOUT STATUS MIGRAINOSUS, NOT INTRACTABLE: Primary | ICD-10-CM

## 2024-11-27 DIAGNOSIS — R20.2 PARESTHESIA OF RIGHT ARM AND LEG: ICD-10-CM

## 2024-11-27 DIAGNOSIS — I67.1 CEREBRAL ANEURYSM: ICD-10-CM

## 2024-11-27 PROCEDURE — 99215 OFFICE O/P EST HI 40 MIN: CPT

## 2024-11-27 PROCEDURE — 3080F DIAST BP >= 90 MM HG: CPT

## 2024-11-27 PROCEDURE — 3077F SYST BP >= 140 MM HG: CPT

## 2024-11-27 RX ORDER — AMITRIPTYLINE HYDROCHLORIDE 50 MG/1
50 TABLET ORAL NIGHTLY
Qty: 90 TABLET | Refills: 0 | Status: SHIPPED | OUTPATIENT
Start: 2024-11-27

## 2024-11-27 RX ORDER — PREGABALIN 50 MG/1
50 CAPSULE ORAL NIGHTLY PRN
Qty: 90 CAPSULE | Refills: 0 | Status: SHIPPED | OUTPATIENT
Start: 2024-11-27 | End: 2025-02-25

## 2024-11-27 RX ORDER — PROPRANOLOL HYDROCHLORIDE 80 MG/1
80 CAPSULE, EXTENDED RELEASE ORAL DAILY
Qty: 30 CAPSULE | Refills: 2 | Status: SHIPPED | OUTPATIENT
Start: 2024-11-27

## 2024-11-27 NOTE — TELEPHONE ENCOUNTER
Patient requesting a note for work to return on Mon 12/2/24. Requesting that it be sent via [employer].

## 2024-11-27 NOTE — PROGRESS NOTES
NEUROLOGY  NEW PATIENT EVALUATION/CONSULTATION         PATIENT ID:   Kiara Nance  1965  59 y.o. female  [id number]     REASON FOR CONSULTATION: Frequent headaches       Previous records (physician notes, laboratory reports, and radiology reports) and imaging studies were reviewed and summarized. My recommendations will be communicated back to the patient's physician(s) via electronic medical record and/or by US mail.     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 PCP has started Propranolol 20mg TID for BP and headaches. Propranolol initially very helpful to improve headache pain, lost it's effect over time. She had CTA done 9/4/2024 for headache and neck pain which showed left ICA supraclinoid segment 2 mm outpouching consistent with an aneurysm or infundibulum. No other evidence of aneurysm, negative family hx for SAH. Additionally noted extracranial and intracranial mild atherosclerotic changes without significant stenosis or LVO. She has seen Dr Schuler, [facility], for this, who reviewed her imaging. No surgical intervention or repeat imaging necessary per [facility] notes.     She also c/o numbness in her right foot and right hand. States she is taking Amitriptyline 25mg qhs for numbness in the hand which has not been helpful. Noticed numbness 2 years ago. No identified cause of neuropathy- negative pmhx for T2DM, hypothyroidism, B vitamin deficiency, malignancy/chemotherapy.    Location: b/l temporal and b/l occipital   Character: Pressure, sometimes sharp  Intensity: Mild when not

## 2024-11-29 ENCOUNTER — PATIENT MESSAGE (OUTPATIENT)
Age: 59
End: 2024-11-29

## 2024-11-29 NOTE — TELEPHONE ENCOUNTER
- CTA head and neck - no finding of infarct. No stenosis or dissection  - MRI brain this admission shows small focus of diffusion restriction concerning for late acute to early subacute ischemia.   -Neurology following, noted being treated now as a stroke given MRI findings.    Overall, the etiology of tremors is unclear of twitching on 11/23 and tremors on 11/24. Patient does not have any new medications.  Being continued on Keppra as per neurology for time being.  Carboxyhemoglobin levels. (Though improved from prior) otherwise was on RA on arrival with appropriate sats.   Call placed to patient and referred to ortho for further evaluation of  continued knee pain

## 2024-11-30 LAB
TSH SERPL DL<=0.005 MIU/L-ACNC: 0.91 UIU/ML (ref 0.45–4.5)
VIT B12 SERPL-MCNC: 333 PG/ML (ref 232–1245)

## 2024-12-03 LAB
ALBUMIN SERPL ELPH-MCNC: 3.9 G/DL (ref 2.9–4.4)
ALBUMIN/GLOB SERPL: 1.1 {RATIO} (ref 0.7–1.7)
ALPHA1 GLOB SERPL ELPH-MCNC: 0.3 G/DL (ref 0–0.4)
ALPHA2 GLOB SERPL ELPH-MCNC: 0.9 G/DL (ref 0.4–1)
B-GLOBULIN SERPL ELPH-MCNC: 1.5 G/DL (ref 0.7–1.3)
GAMMA GLOB SERPL ELPH-MCNC: 1 G/DL (ref 0.4–1.8)
GLOBULIN SER-MCNC: 3.6 G/DL (ref 2.2–3.9)
IGA SERPL-MCNC: 523 MG/DL (ref 87–352)
IGG SERPL-MCNC: 1061 MG/DL (ref 586–1602)
IGM SERPL-MCNC: 154 MG/DL (ref 26–217)
INTERPRETATION SERPL IEP-IMP: ABNORMAL
KAPPA LC FREE SER-MCNC: 21.8 MG/L (ref 3.3–19.4)
KAPPA LC FREE/LAMBDA FREE SER: 1.93 {RATIO} (ref 0.26–1.65)
LABORATORY COMMENT REPORT: ABNORMAL
LAMBDA LC FREE SERPL-MCNC: 11.3 MG/L (ref 5.7–26.3)
M PROTEIN SERPL ELPH-MCNC: ABNORMAL G/DL
PROT SERPL-MCNC: 7.5 G/DL (ref 6–8.5)

## 2024-12-04 ENCOUNTER — TELEPHONE (OUTPATIENT)
Age: 59
End: 2024-12-04

## 2024-12-04 DIAGNOSIS — R76.8 OTHER SPECIFIED ABNORMAL IMMUNOLOGICAL FINDINGS IN SERUM: Primary | ICD-10-CM

## 2024-12-04 LAB
EST. AVERAGE GLUCOSE BLD GHB EST-MCNC: 154 MG/DL
HBA1C MFR BLD: 7 %HB

## 2024-12-04 NOTE — TELEPHONE ENCOUNTER
----- Message from DESHAUN Boyd NP sent at 12/4/2024  1:03 PM EST -----  Call patient and make her aware.     She will need to work on a DM diet.     Start Jardiance 10 mg daily to help with blood sugar control

## 2024-12-05 ENCOUNTER — TELEPHONE (OUTPATIENT)
Age: 59
End: 2024-12-05

## 2024-12-05 NOTE — TELEPHONE ENCOUNTER
Called patient. No answer. Left a VM stating the provider reviewed her labs stating, \"Hi Ms Nance,   It was nice to meet you in the clinic. Your labs are back and your hemoglobin A1C is in diabetes range. I will copy your primary care provider on this message, but please follow up with your primary care provider to discuss this, as it can cause neuropathy, which may be why you have numbness on the right side. Please schedule the MRI to rule out intracranial pathology contributing to symptoms. Lastly, your gammopathy panel has abnormal immunofixation pattern. We will refer you to Hematology to discern if this requires further monitoring/surveillance. Please let me know if you have questions or concerns about your results.   Thanks,   Lin .\"    Left a VM of the provider's name and phone number to call and schedule. Faxed over records as well.     Since she did not see the [employer] message of the vitamin b12 and TSH. Stating both labs are normal

## 2024-12-06 DIAGNOSIS — M54.50 LOW BACK PAIN, UNSPECIFIED: ICD-10-CM

## 2024-12-06 DIAGNOSIS — G47.00 INSOMNIA, UNSPECIFIED TYPE: ICD-10-CM

## 2024-12-07 RX ORDER — ZOLPIDEM TARTRATE 5 MG/1
5 TABLET ORAL NIGHTLY PRN
Qty: 30 TABLET | Refills: 0 | Status: SHIPPED | OUTPATIENT
Start: 2024-12-07 | End: 2025-02-05

## 2024-12-07 RX ORDER — BUTALBITAL, ACETAMINOPHEN AND CAFFEINE 50; 325; 40 MG/1; MG/1; MG/1
1 TABLET ORAL EVERY 6 HOURS PRN
Qty: 30 TABLET | Refills: 1 | OUTPATIENT
Start: 2024-12-07

## 2024-12-08 DIAGNOSIS — I10 ESSENTIAL (PRIMARY) HYPERTENSION: ICD-10-CM

## 2024-12-09 RX ORDER — LOSARTAN POTASSIUM 50 MG/1
100 TABLET ORAL DAILY
Qty: 180 TABLET | Refills: 1 | Status: SHIPPED | OUTPATIENT
Start: 2024-12-09

## 2024-12-12 ENCOUNTER — TELEPHONE (OUTPATIENT)
Age: 59
End: 2024-12-12

## 2024-12-22 NOTE — TELEPHONE ENCOUNTER
Tried to call patient to verify pharmacy as the one requested we are unable to send RX to. She did not answer, I left a vm for her to call back .   4

## 2024-12-31 ENCOUNTER — TELEPHONE (OUTPATIENT)
Age: 59
End: 2024-12-31

## 2025-01-06 DIAGNOSIS — M54.50 LOW BACK PAIN, UNSPECIFIED: ICD-10-CM

## 2025-01-06 NOTE — TELEPHONE ENCOUNTER
Last appt 10/9/2024      Next Apt:     Future Appointments   Date Time Provider Department Center   2/27/2025 11:00 AM Lin Cardenas, APRN - NP [facility]

## 2025-01-09 DIAGNOSIS — J45.20 MILD INTERMITTENT ASTHMA WITHOUT COMPLICATION: Primary | ICD-10-CM

## 2025-01-09 RX ORDER — PREDNISONE 10 MG/1
10 TABLET ORAL 2 TIMES DAILY
Qty: 10 TABLET | Refills: 0 | Status: SHIPPED | OUTPATIENT
Start: 2025-01-09 | End: 2025-01-14

## 2025-01-10 DIAGNOSIS — G47.00 INSOMNIA, UNSPECIFIED TYPE: ICD-10-CM

## 2025-01-10 RX ORDER — ZOLPIDEM TARTRATE 5 MG/1
5 TABLET ORAL NIGHTLY PRN
Qty: 30 TABLET | Refills: 0 | Status: SHIPPED | OUTPATIENT
Start: 2025-01-10 | End: 2025-03-11

## 2025-01-29 NOTE — TELEPHONE ENCOUNTER
Re: Nurtec 16 acute    Created case in [facility] Key# [id number], submitted and awaiting outcome.     01/29/25: Approval rcmarya, effective 01/28/25-07/25/25, Scanned to chart. Sent update to nurse and fax to pharmacy.

## 2025-02-04 DIAGNOSIS — G47.00 INSOMNIA, UNSPECIFIED TYPE: ICD-10-CM

## 2025-02-04 DIAGNOSIS — G43.709 CHRONIC MIGRAINE WITHOUT AURA WITHOUT STATUS MIGRAINOSUS, NOT INTRACTABLE: ICD-10-CM

## 2025-02-04 DIAGNOSIS — M54.50 LOW BACK PAIN, UNSPECIFIED: ICD-10-CM

## 2025-02-04 RX ORDER — ZOLPIDEM TARTRATE 5 MG/1
5 TABLET ORAL NIGHTLY PRN
Qty: 30 TABLET | Refills: 0 | Status: SHIPPED | OUTPATIENT
Start: 2025-02-04 | End: 2025-04-05

## 2025-02-07 ENCOUNTER — PATIENT MESSAGE (OUTPATIENT)
Age: 60
End: 2025-02-07

## 2025-02-07 DIAGNOSIS — G43.709 CHRONIC MIGRAINE WITHOUT AURA WITHOUT STATUS MIGRAINOSUS, NOT INTRACTABLE: ICD-10-CM

## 2025-02-07 RX ORDER — AMITRIPTYLINE HYDROCHLORIDE 50 MG/1
50 TABLET ORAL NIGHTLY
Qty: 90 TABLET | Refills: 0 | Status: SHIPPED | OUTPATIENT
Start: 2025-02-07

## 2025-02-07 RX ORDER — LIDOCAINE 50 MG/G
PATCH TOPICAL
COMMUNITY
Start: 2024-12-09 | End: 2025-02-07 | Stop reason: SDUPTHER

## 2025-02-07 RX ORDER — LIDOCAINE 50 MG/G
1 PATCH TOPICAL DAILY
Qty: 30 PATCH | Refills: 0 | Status: SHIPPED | OUTPATIENT
Start: 2025-02-07 | End: 2025-03-09

## 2025-02-12 RX ORDER — AMITRIPTYLINE HYDROCHLORIDE 50 MG/1
50 TABLET ORAL NIGHTLY
Qty: 90 TABLET | Refills: 0 | OUTPATIENT
Start: 2025-02-12

## 2025-02-13 ENCOUNTER — PATIENT MESSAGE (OUTPATIENT)
Age: 60
End: 2025-02-13

## 2025-02-26 NOTE — PROGRESS NOTES
Neurology Clinic Follow up Note    Patient ID:   Kiara Nance  1965  59 y.o. female  [id number]      Chief Complaint   Patient presents with    Follow-up     Migraine       Last Appointment With Me:    11/27/24  \" 59 year old female with history of mild asthma, HTN, chronic neck/back pain, anxiety and depression, small 2mm left ICA supraclinoid cerebral aneurysm, who presents as a new patient today to discuss headaches. Her exam today reveals decreased pinprick/temperature/light touch on the right side, RUE distal to wrist, RLE distal to knee, with impaired proprioception in both feet, otherwise nonfocal. She had her first headache about 3 years ago, never had migraine/frequent headaches as a young adult. Suspect migraine headaches d/t associated photophonophobia and duration of symptoms, but will obtain MRI brain W WO to discern if intracranial pathology is contributing to symptoms. Migraines became worse with move from [place] to [place]. She locates migraines bilateral temporal/bilateral occipital, severe in intensity, occurring daily. Propranolol initially effective to decrease migraine pain, effect leveled off over time. Her BP is high today, pt attributes high reading to being in pain from the current headache. Tolerating Propranolol 20mg TID and Amitriptyline 25mg qhs. We will increase Propranolol to LA 80mg qd. We will increase Amitriptyline to 50mg qhs. Discussed both medications are used in migraine prevention. Potential side effects were discussed. Will start Nurtec prn for migraine rescue as Fioricet is not effective. She has current right sided numbness, ongoing x 2 years, without clear cause. Would like to avoid triptans in this patient. Will obtain labs to discern if systemic cause for numbness (B12, TSH, HbA1C, gammopathy panel). Will also obtain EMG/NCS in the right upper and right lower extremity. She may use Lyrica prn for symptoms of neuropathic pain. Potential side effects were discussed. Discussed

## 2025-02-27 ENCOUNTER — OFFICE VISIT (OUTPATIENT)
Age: 60
End: 2025-02-27
Payer: MEDICAID

## 2025-02-27 VITALS
HEIGHT: 60 IN | RESPIRATION RATE: 16 BRPM | DIASTOLIC BLOOD PRESSURE: 72 MMHG | WEIGHT: 146 LBS | SYSTOLIC BLOOD PRESSURE: 124 MMHG | OXYGEN SATURATION: 96 % | BODY MASS INDEX: 28.66 KG/M2 | HEART RATE: 80 BPM

## 2025-02-27 DIAGNOSIS — G43.709 CHRONIC MIGRAINE WITHOUT AURA WITHOUT STATUS MIGRAINOSUS, NOT INTRACTABLE: Primary | ICD-10-CM

## 2025-02-27 DIAGNOSIS — I67.1 CEREBRAL ANEURYSM: ICD-10-CM

## 2025-02-27 DIAGNOSIS — R20.2 PARESTHESIA OF RIGHT ARM AND LEG: ICD-10-CM

## 2025-02-27 PROCEDURE — 3078F DIAST BP <80 MM HG: CPT

## 2025-02-27 PROCEDURE — 99214 OFFICE O/P EST MOD 30 MIN: CPT

## 2025-02-27 PROCEDURE — 3074F SYST BP LT 130 MM HG: CPT

## 2025-02-27 ASSESSMENT — PATIENT HEALTH QUESTIONNAIRE - PHQ9
SUM OF ALL RESPONSES TO PHQ QUESTIONS 1-9: 0
SUM OF ALL RESPONSES TO PHQ9 QUESTIONS 1 & 2: 0
2. FEELING DOWN, DEPRESSED OR HOPELESS: NOT AT ALL
SUM OF ALL RESPONSES TO PHQ QUESTIONS 1-9: 0
1. LITTLE INTEREST OR PLEASURE IN DOING THINGS: NOT AT ALL

## 2025-02-28 DIAGNOSIS — M54.50 LOW BACK PAIN, UNSPECIFIED: ICD-10-CM

## 2025-02-28 DIAGNOSIS — G47.00 INSOMNIA, UNSPECIFIED TYPE: ICD-10-CM

## 2025-03-02 DIAGNOSIS — M54.50 LOW BACK PAIN, UNSPECIFIED: ICD-10-CM

## 2025-03-02 RX ORDER — ZOLPIDEM TARTRATE 5 MG/1
5 TABLET ORAL NIGHTLY PRN
Qty: 30 TABLET | Refills: 0 | Status: SHIPPED | OUTPATIENT
Start: 2025-03-02 | End: 2025-05-01

## 2025-03-10 ENCOUNTER — TELEPHONE (OUTPATIENT)
Age: 60
End: 2025-03-10

## 2025-03-10 ENCOUNTER — PROCEDURE VISIT (OUTPATIENT)
Age: 60
End: 2025-03-10
Payer: MEDICAID

## 2025-03-10 DIAGNOSIS — G56.23 ULNAR NEUROPATHY OF BOTH UPPER EXTREMITIES: ICD-10-CM

## 2025-03-10 DIAGNOSIS — G56.03 BILATERAL CARPAL TUNNEL SYNDROME: Primary | ICD-10-CM

## 2025-03-10 PROCEDURE — 95913 NRV CNDJ TEST 13/> STUDIES: CPT | Performed by: PSYCHIATRY & NEUROLOGY

## 2025-03-10 PROCEDURE — 95886 MUSC TEST DONE W/N TEST COMP: CPT | Performed by: PSYCHIATRY & NEUROLOGY

## 2025-03-10 NOTE — PROGRESS NOTES
normal limits.      Needle evaluation of the left flexor digitorum profundus muscle showed slightly increased polyphasic potentials.  All remaining muscles (as indicated in the following table) showed no evidence of electrical instability.      Impression:  Mild, bilateral median nerve entrapment at the wrist i.e. carpal tunnel syndrome.  Mild ulnar neuropathy at the left elbow.  Minimal chronic neurogenic changes seen in the ulnar innervated flexor digitorum profundus without any acute denervation  Moderate slowing seen in the right ulnar nerve across the elbow with mild sensory response slowing indicating ulnar neuropathy at the elbow.  However no acute or chronic denervation seen in ulnar innervated muscles.  Patient appears to be clinically asymptomatic at this time   No evidence of a cervical radiculopathy on either side      ___________________________  Avila Pimentel MD        Nerve Conduction Studies  Anti Sensory Summary Table     Stim Site NR Peak (ms) Norm Peak (ms) P-T Amp (µV) Norm P-T Amp Site1 Site2 Delta-P (ms) Dist (cm) Edgar (m/s) Norm Edgar (m/s)   Left Median Anti Sensory (2nd Digit)  32 °C   Wrist    *4.4 <3.6 22.0 >10 Wrist 2nd Digit 4.4 14.0 *32 >39   Right Median Anti Sensory (2nd Digit)  32.5 °C   Wrist    *4.3 <3.6 25.7 >10 Wrist 2nd Digit 4.3 14.0 *33 >39   Left Radial Anti Sensory (Base 1st Digit)  32 °C   Wrist    1.9 <3.1 51.9  Wrist Base 1st Digit 1.9 0.0     Right Radial Anti Sensory (Base 1st Digit)  32.5 °C   Wrist    1.8 <3.1 54.8  Wrist Base 1st Digit 1.8 0.0     Right Sup Fibular Anti Sensory (Ant Lat Mall)  31 °C   14 cm    2.1 <4.4 7.9 >5.0 14 cm Ant Lat Mall 2.1 14.0 67 >32   Right Sural Anti Sensory (Lat Mall)  31 °C   Calf    2.8 <4.0 9.2 >5.0 Calf Lat Mall 2.8 14.0 50 >35   Left Ulnar Anti Sensory (5th Digit)  32 °C   Wrist    3.4 <3.7 15.8 >15.0 Wrist 5th Digit 3.4 14.0 41 >38   Right Ulnar Anti Sensory (5th Digit)  32.5 °C   Wrist    *4.4 <3.7 *13.5 >15.0 Wrist 5th Digit

## 2025-03-16 DIAGNOSIS — G43.709 CHRONIC MIGRAINE WITHOUT AURA WITHOUT STATUS MIGRAINOSUS, NOT INTRACTABLE: ICD-10-CM

## 2025-03-17 RX ORDER — PROPRANOLOL HYDROCHLORIDE 80 MG/1
80 CAPSULE, EXTENDED RELEASE ORAL DAILY
Qty: 30 CAPSULE | Refills: 1 | Status: SHIPPED | OUTPATIENT
Start: 2025-03-17

## 2025-03-17 NOTE — TELEPHONE ENCOUNTER
LOV: 02/27/25  Last refill: 11/27/24 with 2 refill   Next visit: pending, will put a note to make follow up for future fills (should be in May)

## 2025-03-27 ENCOUNTER — TELEMEDICINE (OUTPATIENT)
Age: 60
End: 2025-03-27
Payer: MEDICAID

## 2025-03-27 DIAGNOSIS — J30.1 SEASONAL ALLERGIC RHINITIS DUE TO POLLEN: ICD-10-CM

## 2025-03-27 DIAGNOSIS — J06.9 URTI (ACUTE UPPER RESPIRATORY INFECTION): Primary | ICD-10-CM

## 2025-03-27 PROCEDURE — 99213 OFFICE O/P EST LOW 20 MIN: CPT | Performed by: INTERNAL MEDICINE

## 2025-03-27 RX ORDER — DOXYCYCLINE HYCLATE 100 MG
100 TABLET ORAL 2 TIMES DAILY
Qty: 14 TABLET | Refills: 0 | Status: SHIPPED | OUTPATIENT
Start: 2025-03-27 | End: 2025-04-03

## 2025-03-27 RX ORDER — PROPRANOLOL HCL 20 MG
20 TABLET ORAL 3 TIMES DAILY
COMMUNITY
Start: 2025-02-20 | End: 2025-03-27

## 2025-03-27 RX ORDER — FLUTICASONE PROPIONATE 50 MCG
1 SPRAY, SUSPENSION (ML) NASAL DAILY
Qty: 32 G | Refills: 1 | Status: SHIPPED | OUTPATIENT
Start: 2025-03-27

## 2025-03-27 SDOH — ECONOMIC STABILITY: FOOD INSECURITY: WITHIN THE PAST 12 MONTHS, THE FOOD YOU BOUGHT JUST DIDN'T LAST AND YOU DIDN'T HAVE MONEY TO GET MORE.: NEVER TRUE

## 2025-03-27 SDOH — ECONOMIC STABILITY: FOOD INSECURITY: WITHIN THE PAST 12 MONTHS, YOU WORRIED THAT YOUR FOOD WOULD RUN OUT BEFORE YOU GOT MONEY TO BUY MORE.: NEVER TRUE

## 2025-03-27 ASSESSMENT — ENCOUNTER SYMPTOMS
EYES NEGATIVE: 1
COUGH: 1
GASTROINTESTINAL NEGATIVE: 1

## 2025-03-27 NOTE — PROGRESS NOTES
Chief Complaint   Patient presents with    Cough    Cold Symptoms     \"Have you been to the ER, urgent care clinic since your last visit?  Hospitalized since your last visit?\"    NO    “Have you seen or consulted any other health care providers outside our system since your last visit?”    NO    Have you had a mammogram?”   NO    Date of last Mammogram: 5/28/2021      “Have you had a pap smear?”    NO    Date of last Cervical Cancer screen (HPV or PAP): 5/28/2021       “Have you had a colorectal cancer screening such as a colonoscopy/FIT/Cologuard?    NO    No colonoscopy on file  Date of last Cologuard: 1/19/2022  No FIT/FOBT on file   No flexible sigmoidoscopy on file           ovarian Ca, ascites

## 2025-03-27 NOTE — PROGRESS NOTES
Kiara Nance, was evaluated through a synchronous (real-time) audio-video encounter. The patient (or guardian if applicable) is aware that this is a billable service, which includes applicable co-pays. This Virtual Visit was conducted with patient's (and/or legal guardian's) consent. Patient identification was verified, and a caregiver was present when appropriate.   The patient was located at Home: [address]  [address]  [facility] [address]  Provider was located at Facility (Appt Dept): [address] [facility] [facility] [facility]  [address]  [place] [address]  Confirm you are appropriately licensed, registered, or certified to deliver care in the state where the patient is located as indicated above. If you are not or unsure, please re-schedule the visit: Yes, I confirm.     Kiara Nance (:  1965) is a Established patient, presenting virtually for evaluation of the following:      Below is the assessment and plan developed based on review of pertinent history, physical exam, labs, studies, and medications.     Assessment & Plan  URTI (acute upper respiratory infection)       Orders:    doxycycline hyclate (VIBRA-TABS) 100 MG tablet; Take 1 tablet by mouth 2 times daily for 7 days    Seasonal allergic rhinitis due to pollen       Orders:    fluticasone (FLONASE) 50 MCG/ACT nasal spray; 1 spray by Each Nostril route daily       Assessment & Plan  1. Cough: Acute.  - Symptoms include intermittent fever, clear mucus production, and throat irritation.  - Treatment includes doxycycline twice daily for one week.  - Flonase at night for postnasal drip.  - Claritin if symptoms persist.  - Recommendations for rest and hydration.    Follow-up  - Prescriptions sent to [facility] at [facility].       No follow-ups on file.       Subjective   History of Present Illness  The patient presents via virtual visit for evaluation of a cough.    Cough  - The cough is predominantly from the throat.  - It intensifies at

## 2025-03-31 DIAGNOSIS — G43.709 CHRONIC MIGRAINE WITHOUT AURA WITHOUT STATUS MIGRAINOSUS, NOT INTRACTABLE: ICD-10-CM

## 2025-04-01 RX ORDER — PROPRANOLOL HYDROCHLORIDE 80 MG/1
80 CAPSULE, EXTENDED RELEASE ORAL DAILY
Qty: 90 CAPSULE | Refills: 1 | OUTPATIENT
Start: 2025-04-01

## 2025-04-05 DIAGNOSIS — G47.00 INSOMNIA, UNSPECIFIED TYPE: ICD-10-CM

## 2025-04-06 ENCOUNTER — PATIENT MESSAGE (OUTPATIENT)
Age: 60
End: 2025-04-06

## 2025-04-06 DIAGNOSIS — G43.709 CHRONIC MIGRAINE WITHOUT AURA WITHOUT STATUS MIGRAINOSUS, NOT INTRACTABLE: ICD-10-CM

## 2025-04-06 DIAGNOSIS — G47.00 INSOMNIA, UNSPECIFIED TYPE: ICD-10-CM

## 2025-04-06 DIAGNOSIS — M54.50 LOW BACK PAIN, UNSPECIFIED: ICD-10-CM

## 2025-04-07 ENCOUNTER — PATIENT MESSAGE (OUTPATIENT)
Age: 60
End: 2025-04-07

## 2025-04-07 RX ORDER — ZOLPIDEM TARTRATE 5 MG/1
5 TABLET ORAL NIGHTLY PRN
Qty: 30 TABLET | Refills: 0 | Status: SHIPPED | OUTPATIENT
Start: 2025-04-07 | End: 2025-06-06

## 2025-04-07 RX ORDER — ZOLPIDEM TARTRATE 5 MG/1
5 TABLET ORAL NIGHTLY PRN
Qty: 30 TABLET | Refills: 0 | OUTPATIENT
Start: 2025-04-07

## 2025-04-07 NOTE — TELEPHONE ENCOUNTER
Last appt 3/27/2025      Next Apt:     Future Appointments   Date Time Provider Department Center   4/29/2025  9:30 AM Nae Kaiser APRN - NP [facility]         [id number] - [place], [place] - [address] [facility] - P [phone] - F [phone]  [address] [facility] [address]  Phone: [phone] Fax: [phone]

## 2025-04-10 ENCOUNTER — PATIENT MESSAGE (OUTPATIENT)
Age: 60
End: 2025-04-10

## 2025-04-10 ENCOUNTER — OFFICE VISIT (OUTPATIENT)
Age: 60
End: 2025-04-10
Payer: MEDICAID

## 2025-04-10 VITALS
DIASTOLIC BLOOD PRESSURE: 60 MMHG | SYSTOLIC BLOOD PRESSURE: 110 MMHG | BODY MASS INDEX: 28.36 KG/M2 | HEIGHT: 61 IN | WEIGHT: 150.2 LBS | RESPIRATION RATE: 19 BRPM | OXYGEN SATURATION: 97 % | HEART RATE: 78 BPM | TEMPERATURE: 98.1 F

## 2025-04-10 DIAGNOSIS — I10 ESSENTIAL (PRIMARY) HYPERTENSION: Primary | ICD-10-CM

## 2025-04-10 DIAGNOSIS — M25.561 CHRONIC PAIN OF RIGHT KNEE: ICD-10-CM

## 2025-04-10 DIAGNOSIS — G43.709 CHRONIC MIGRAINE WITHOUT AURA WITHOUT STATUS MIGRAINOSUS, NOT INTRACTABLE: ICD-10-CM

## 2025-04-10 DIAGNOSIS — M54.2 CHRONIC NECK PAIN: Primary | ICD-10-CM

## 2025-04-10 DIAGNOSIS — E78.2 MIXED HYPERLIPIDEMIA: ICD-10-CM

## 2025-04-10 DIAGNOSIS — R73.03 PREDIABETES: ICD-10-CM

## 2025-04-10 DIAGNOSIS — J45.20 MILD INTERMITTENT ASTHMA WITHOUT COMPLICATION: ICD-10-CM

## 2025-04-10 DIAGNOSIS — G89.29 CHRONIC PAIN OF RIGHT KNEE: ICD-10-CM

## 2025-04-10 DIAGNOSIS — G89.29 CHRONIC NECK PAIN: Primary | ICD-10-CM

## 2025-04-10 DIAGNOSIS — I10 ESSENTIAL (PRIMARY) HYPERTENSION: ICD-10-CM

## 2025-04-10 PROCEDURE — 3078F DIAST BP <80 MM HG: CPT | Performed by: INTERNAL MEDICINE

## 2025-04-10 PROCEDURE — 99214 OFFICE O/P EST MOD 30 MIN: CPT | Performed by: INTERNAL MEDICINE

## 2025-04-10 PROCEDURE — 3074F SYST BP LT 130 MM HG: CPT | Performed by: INTERNAL MEDICINE

## 2025-04-10 RX ORDER — AMLODIPINE BESYLATE 10 MG/1
10 TABLET ORAL DAILY
Qty: 90 TABLET | Refills: 1 | Status: SHIPPED | OUTPATIENT
Start: 2025-04-10

## 2025-04-10 RX ORDER — ALBUTEROL SULFATE 90 UG/1
INHALANT RESPIRATORY (INHALATION)
Qty: 18 EACH | Refills: 1 | Status: SHIPPED | OUTPATIENT
Start: 2025-04-10

## 2025-04-10 ASSESSMENT — ENCOUNTER SYMPTOMS
COUGH: 1
GASTROINTESTINAL NEGATIVE: 1

## 2025-04-14 RX ORDER — LIDOCAINE 50 MG/G
1 PATCH TOPICAL DAILY
Qty: 10 PATCH | Refills: 0 | Status: SHIPPED | OUTPATIENT
Start: 2025-04-14 | End: 2025-04-24

## 2025-04-14 NOTE — TELEPHONE ENCOUNTER
Last appt 4/10/2025      Next Apt:     No future appointments.      [id number] [place] - [address] - P [phone] - F [phone]  [address] [facility] [address]  Phone: [phone] Fax: [phone]

## 2025-04-18 DIAGNOSIS — G89.29 CHRONIC PAIN OF RIGHT KNEE: Primary | ICD-10-CM

## 2025-04-18 DIAGNOSIS — M25.561 CHRONIC PAIN OF RIGHT KNEE: Primary | ICD-10-CM

## 2025-04-18 RX ORDER — METHYLPREDNISOLONE 4 MG/1
TABLET ORAL
Qty: 1 KIT | Refills: 0 | Status: SHIPPED | OUTPATIENT
Start: 2025-04-18 | End: 2025-04-24

## 2025-04-23 ENCOUNTER — PATIENT MESSAGE (OUTPATIENT)
Age: 60
End: 2025-04-23

## 2025-04-23 DIAGNOSIS — Z12.31 BREAST CANCER SCREENING BY MAMMOGRAM: Primary | ICD-10-CM

## 2025-04-23 DIAGNOSIS — Z01.419 WELL WOMAN EXAM WITH ROUTINE GYNECOLOGICAL EXAM: ICD-10-CM

## 2025-04-25 DIAGNOSIS — M54.50 LOW BACK PAIN, UNSPECIFIED: ICD-10-CM

## 2025-04-25 DIAGNOSIS — G43.709 CHRONIC MIGRAINE WITHOUT AURA WITHOUT STATUS MIGRAINOSUS, NOT INTRACTABLE: ICD-10-CM

## 2025-04-25 DIAGNOSIS — G47.00 INSOMNIA, UNSPECIFIED TYPE: ICD-10-CM

## 2025-04-25 RX ORDER — ZOLPIDEM TARTRATE 5 MG/1
5 TABLET ORAL NIGHTLY PRN
Qty: 30 TABLET | Refills: 0 | Status: CANCELLED | OUTPATIENT
Start: 2025-04-25 | End: 2025-06-24

## 2025-04-28 RX ORDER — AMITRIPTYLINE HYDROCHLORIDE 50 MG/1
50 TABLET ORAL NIGHTLY
Qty: 90 TABLET | Refills: 0 | OUTPATIENT
Start: 2025-04-28

## 2025-04-28 RX ORDER — PROPRANOLOL HYDROCHLORIDE 80 MG/1
80 CAPSULE, EXTENDED RELEASE ORAL DAILY
Qty: 30 CAPSULE | Refills: 1 | OUTPATIENT
Start: 2025-04-28

## 2025-05-01 DIAGNOSIS — G47.00 INSOMNIA, UNSPECIFIED TYPE: ICD-10-CM

## 2025-05-01 DIAGNOSIS — M54.50 LOW BACK PAIN, UNSPECIFIED: ICD-10-CM

## 2025-05-01 RX ORDER — ZOLPIDEM TARTRATE 5 MG/1
5 TABLET ORAL NIGHTLY PRN
Qty: 30 TABLET | Refills: 0 | Status: SHIPPED | OUTPATIENT
Start: 2025-05-01 | End: 2025-06-30

## 2025-05-02 ENCOUNTER — TELEPHONE (OUTPATIENT)
Age: 60
End: 2025-05-02

## 2025-05-02 NOTE — TELEPHONE ENCOUNTER
[id number] - [place], [place] - [address] - P [phone] - F [phone]     zolpidem (AMBIEN) 5 MG tablet       04/10/2025  03/16/2026

## 2025-05-12 ENCOUNTER — HOSPITAL ENCOUNTER (OUTPATIENT)
Facility: HOSPITAL | Age: 60
Discharge: HOME | End: 2025-05-15
Payer: MEDICAID

## 2025-05-12 VITALS — WEIGHT: 150 LBS | HEIGHT: 61 IN | BODY MASS INDEX: 28.32 KG/M2

## 2025-05-12 DIAGNOSIS — Z12.31 BREAST CANCER SCREENING BY MAMMOGRAM: ICD-10-CM

## 2025-05-12 PROCEDURE — 77067 SCR MAMMO BI INCL CAD: CPT

## 2025-05-28 DIAGNOSIS — G43.709 CHRONIC MIGRAINE WITHOUT AURA WITHOUT STATUS MIGRAINOSUS, NOT INTRACTABLE: ICD-10-CM

## 2025-05-28 RX ORDER — PROPRANOLOL HYDROCHLORIDE 80 MG/1
80 CAPSULE, EXTENDED RELEASE ORAL DAILY
Qty: 30 CAPSULE | Refills: 0 | Status: SHIPPED | OUTPATIENT
Start: 2025-05-28

## 2025-06-04 DIAGNOSIS — G47.00 INSOMNIA, UNSPECIFIED TYPE: ICD-10-CM

## 2025-06-04 RX ORDER — ZOLPIDEM TARTRATE 5 MG/1
5 TABLET ORAL NIGHTLY PRN
Qty: 30 TABLET | Refills: 0 | Status: SHIPPED | OUTPATIENT
Start: 2025-06-04 | End: 2025-06-05 | Stop reason: SDUPTHER

## 2025-06-04 RX ORDER — LIDOCAINE 50 MG/G
1 PATCH TOPICAL DAILY
Qty: 10 PATCH | Refills: 0 | Status: SHIPPED | OUTPATIENT
Start: 2025-06-04 | End: 2025-06-05 | Stop reason: SDUPTHER

## 2025-06-04 NOTE — TELEPHONE ENCOUNTER
Medication(s):  zolpidem (AMBIEN) 5 MG tablet     lidocaine (LIDODERM) 5     Last OV: 4/10/2025  Next OV:      Pharmacy: [id number] - [place], [place] - [id number] [place] [address] - P [phone] - F [phone] [[id number]]

## 2025-06-05 DIAGNOSIS — G47.00 INSOMNIA, UNSPECIFIED TYPE: ICD-10-CM

## 2025-06-05 RX ORDER — LIDOCAINE 50 MG/G
1 PATCH TOPICAL DAILY
Qty: 10 PATCH | Refills: 0 | Status: SHIPPED | OUTPATIENT
Start: 2025-06-05 | End: 2025-06-15

## 2025-06-05 RX ORDER — ZOLPIDEM TARTRATE 5 MG/1
5 TABLET ORAL NIGHTLY PRN
Qty: 30 TABLET | Refills: 0 | Status: SHIPPED | OUTPATIENT
Start: 2025-06-05 | End: 2025-08-04

## 2025-06-25 ENCOUNTER — HOSPITAL ENCOUNTER (INPATIENT)
Facility: HOSPITAL | Age: 60
LOS: 20 days | Discharge: HOME | DRG: 230 | End: 2025-07-15
Attending: STUDENT IN AN ORGANIZED HEALTH CARE EDUCATION/TRAINING PROGRAM | Admitting: INTERNAL MEDICINE
Payer: MEDICAID

## 2025-06-25 ENCOUNTER — APPOINTMENT (OUTPATIENT)
Facility: HOSPITAL | Age: 60
DRG: 230 | End: 2025-06-25
Payer: MEDICAID

## 2025-06-25 DIAGNOSIS — Z98.890 STATUS POST SURGERY: ICD-10-CM

## 2025-06-25 DIAGNOSIS — I10 ESSENTIAL (PRIMARY) HYPERTENSION: ICD-10-CM

## 2025-06-25 DIAGNOSIS — K56.609 SBO (SMALL BOWEL OBSTRUCTION) (HCC): ICD-10-CM

## 2025-06-25 DIAGNOSIS — G43.709 CHRONIC MIGRAINE WITHOUT AURA WITHOUT STATUS MIGRAINOSUS, NOT INTRACTABLE: ICD-10-CM

## 2025-06-25 DIAGNOSIS — M54.50 LOW BACK PAIN, UNSPECIFIED: ICD-10-CM

## 2025-06-25 DIAGNOSIS — D75.829 HEPARIN INDUCED THROMBOCYTOPENIA: ICD-10-CM

## 2025-06-25 DIAGNOSIS — I74.10 AORTIC THROMBUS (HCC): Primary | ICD-10-CM

## 2025-06-25 DIAGNOSIS — R78.81 BACTEREMIA: ICD-10-CM

## 2025-06-25 DIAGNOSIS — B34.0 ADENOVIRUS INFECTION: ICD-10-CM

## 2025-06-25 DIAGNOSIS — I71.21 ANEURYSM OF ASCENDING AORTA WITHOUT RUPTURE: ICD-10-CM

## 2025-06-25 LAB
ALBUMIN SERPL-MCNC: 4.2 G/DL (ref 3.4–5)
ALBUMIN/GLOB SERPL: 1 (ref 0.8–1.7)
ALP SERPL-CCNC: 116 U/L (ref 45–117)
ALT SERPL-CCNC: 16 U/L (ref 10–35)
ANION GAP SERPL CALC-SCNC: 19 MMOL/L (ref 3–18)
AST SERPL-CCNC: 17 U/L (ref 10–38)
BASOPHILS # BLD: 0.04 K/UL (ref 0–0.1)
BASOPHILS NFR BLD: 0.5 % (ref 0–2)
BILIRUB SERPL-MCNC: 0.7 MG/DL (ref 0.2–1)
BUN SERPL-MCNC: 16 MG/DL (ref 6–23)
BUN/CREAT SERPL: 13 (ref 12–20)
CALCIUM SERPL-MCNC: 10.3 MG/DL (ref 8.5–10.1)
CHLORIDE SERPL-SCNC: 95 MMOL/L (ref 98–107)
CO2 SERPL-SCNC: 23 MMOL/L (ref 21–32)
CREAT SERPL-MCNC: 1.31 MG/DL (ref 0.6–1.3)
DIFFERENTIAL METHOD BLD: NORMAL
EOSINOPHIL # BLD: 0.09 K/UL (ref 0–0.4)
EOSINOPHIL NFR BLD: 1.1 % (ref 0–5)
ERYTHROCYTE [DISTWIDTH] IN BLOOD BY AUTOMATED COUNT: 13 % (ref 11.6–14.5)
GLOBULIN SER CALC-MCNC: 4.1 G/DL (ref 2–4)
GLUCOSE SERPL-MCNC: 180 MG/DL (ref 74–108)
HCT VFR BLD AUTO: 42.4 % (ref 35–45)
HGB BLD-MCNC: 14.5 G/DL (ref 12–16)
IMM GRANULOCYTES # BLD AUTO: 0.02 K/UL (ref 0–0.04)
IMM GRANULOCYTES NFR BLD AUTO: 0.2 % (ref 0–0.5)
LACTATE BLD-SCNC: 1.27 MMOL/L (ref 0.4–2)
LACTATE BLD-SCNC: 14.85 MMOL/L (ref 0.4–2)
LACTATE BLD-SCNC: 9.5 MMOL/L (ref 0.4–2)
LIPASE SERPL-CCNC: 24 U/L (ref 13–75)
LYMPHOCYTES # BLD: 2.09 K/UL (ref 0.9–3.3)
LYMPHOCYTES NFR BLD: 24.9 % (ref 21–52)
MCH RBC QN AUTO: 29.1 PG (ref 24–34)
MCHC RBC AUTO-ENTMCNC: 34.2 G/DL (ref 31–37)
MCV RBC AUTO: 85.1 FL (ref 78–100)
MONOCYTES # BLD: 0.58 K/UL (ref 0.05–1.2)
MONOCYTES NFR BLD: 6.9 % (ref 3–10)
NEUTS SEG # BLD: 5.58 K/UL (ref 1.8–8)
NEUTS SEG NFR BLD: 66.4 % (ref 40–73)
NRBC # BLD: 0 K/UL (ref 0–0.01)
NRBC BLD-RTO: 0 PER 100 WBC
PLATELET # BLD AUTO: 345 K/UL (ref 135–420)
PMV BLD AUTO: 9.5 FL (ref 9.2–11.8)
POTASSIUM SERPL-SCNC: 3.7 MMOL/L (ref 3.5–5.5)
PROT SERPL-MCNC: 8.3 G/DL (ref 6.4–8.2)
RBC # BLD AUTO: 4.98 M/UL (ref 4.2–5.3)
SODIUM SERPL-SCNC: 136 MMOL/L (ref 136–145)
WBC # BLD AUTO: 8.4 K/UL (ref 4.6–13.2)

## 2025-06-25 PROCEDURE — 99285 EMERGENCY DEPT VISIT HI MDM: CPT

## 2025-06-25 PROCEDURE — 6360000002 HC RX W HCPCS: Performed by: EMERGENCY MEDICINE

## 2025-06-25 PROCEDURE — 6360000002 HC RX W HCPCS: Performed by: STUDENT IN AN ORGANIZED HEALTH CARE EDUCATION/TRAINING PROGRAM

## 2025-06-25 PROCEDURE — 83605 ASSAY OF LACTIC ACID: CPT

## 2025-06-25 PROCEDURE — 2580000003 HC RX 258: Performed by: STUDENT IN AN ORGANIZED HEALTH CARE EDUCATION/TRAINING PROGRAM

## 2025-06-25 PROCEDURE — 1100000000 HC RM PRIVATE

## 2025-06-25 PROCEDURE — 83690 ASSAY OF LIPASE: CPT

## 2025-06-25 PROCEDURE — 80053 COMPREHEN METABOLIC PANEL: CPT

## 2025-06-25 PROCEDURE — 6360000004 HC RX CONTRAST MEDICATION: Performed by: STUDENT IN AN ORGANIZED HEALTH CARE EDUCATION/TRAINING PROGRAM

## 2025-06-25 PROCEDURE — 96374 THER/PROPH/DIAG INJ IV PUSH: CPT

## 2025-06-25 PROCEDURE — 85025 COMPLETE CBC W/AUTO DIFF WBC: CPT

## 2025-06-25 PROCEDURE — 96375 TX/PRO/DX INJ NEW DRUG ADDON: CPT

## 2025-06-25 PROCEDURE — 74177 CT ABD & PELVIS W/CONTRAST: CPT

## 2025-06-25 PROCEDURE — 2580000003 HC RX 258: Performed by: EMERGENCY MEDICINE

## 2025-06-25 RX ORDER — ENOXAPARIN SODIUM 100 MG/ML
40 INJECTION SUBCUTANEOUS DAILY
Status: DISCONTINUED | OUTPATIENT
Start: 2025-06-26 | End: 2025-06-30

## 2025-06-25 RX ORDER — MORPHINE SULFATE 10 MG/ML
6 INJECTION, SOLUTION INTRAMUSCULAR; INTRAVENOUS
Status: COMPLETED | OUTPATIENT
Start: 2025-06-25 | End: 2025-06-25

## 2025-06-25 RX ORDER — ONDANSETRON 2 MG/ML
4 INJECTION INTRAMUSCULAR; INTRAVENOUS
Status: COMPLETED | OUTPATIENT
Start: 2025-06-25 | End: 2025-06-25

## 2025-06-25 RX ORDER — SODIUM CHLORIDE 9 MG/ML
INJECTION, SOLUTION INTRAVENOUS PRN
Status: DISCONTINUED | OUTPATIENT
Start: 2025-06-25 | End: 2025-07-15 | Stop reason: HOSPADM

## 2025-06-25 RX ORDER — SODIUM CHLORIDE 0.9 % (FLUSH) 0.9 %
5-40 SYRINGE (ML) INJECTION PRN
Status: DISCONTINUED | OUTPATIENT
Start: 2025-06-25 | End: 2025-07-15 | Stop reason: HOSPADM

## 2025-06-25 RX ORDER — POTASSIUM CHLORIDE 7.45 MG/ML
10 INJECTION INTRAVENOUS PRN
Status: DISCONTINUED | OUTPATIENT
Start: 2025-06-25 | End: 2025-06-26

## 2025-06-25 RX ORDER — IOPAMIDOL 612 MG/ML
100 INJECTION, SOLUTION INTRAVASCULAR
Status: COMPLETED | OUTPATIENT
Start: 2025-06-25 | End: 2025-06-25

## 2025-06-25 RX ORDER — ONDANSETRON 2 MG/ML
4 INJECTION INTRAMUSCULAR; INTRAVENOUS EVERY 6 HOURS PRN
Status: DISCONTINUED | OUTPATIENT
Start: 2025-06-25 | End: 2025-06-25 | Stop reason: SDUPTHER

## 2025-06-25 RX ORDER — ACETAMINOPHEN 325 MG/1
650 TABLET ORAL EVERY 6 HOURS PRN
Status: DISCONTINUED | OUTPATIENT
Start: 2025-06-25 | End: 2025-07-15 | Stop reason: HOSPADM

## 2025-06-25 RX ORDER — SODIUM CHLORIDE, SODIUM LACTATE, POTASSIUM CHLORIDE, AND CALCIUM CHLORIDE .6; .31; .03; .02 G/100ML; G/100ML; G/100ML; G/100ML
1000 INJECTION, SOLUTION INTRAVENOUS ONCE
Status: COMPLETED | OUTPATIENT
Start: 2025-06-25 | End: 2025-06-25

## 2025-06-25 RX ORDER — ACETAMINOPHEN 650 MG/1
650 SUPPOSITORY RECTAL EVERY 6 HOURS PRN
Status: DISCONTINUED | OUTPATIENT
Start: 2025-06-25 | End: 2025-07-15 | Stop reason: HOSPADM

## 2025-06-25 RX ORDER — ONDANSETRON 4 MG/1
4 TABLET, ORALLY DISINTEGRATING ORAL EVERY 8 HOURS PRN
Status: DISCONTINUED | OUTPATIENT
Start: 2025-06-25 | End: 2025-07-15 | Stop reason: HOSPADM

## 2025-06-25 RX ORDER — MORPHINE SULFATE 2 MG/ML
2 INJECTION, SOLUTION INTRAMUSCULAR; INTRAVENOUS EVERY 4 HOURS PRN
Refills: 0 | Status: DISCONTINUED | OUTPATIENT
Start: 2025-06-25 | End: 2025-06-26

## 2025-06-25 RX ORDER — POTASSIUM CHLORIDE 1500 MG/1
40 TABLET, EXTENDED RELEASE ORAL PRN
Status: DISCONTINUED | OUTPATIENT
Start: 2025-06-25 | End: 2025-06-26

## 2025-06-25 RX ORDER — SODIUM CHLORIDE 0.9 % (FLUSH) 0.9 %
5-40 SYRINGE (ML) INJECTION EVERY 12 HOURS SCHEDULED
Status: DISCONTINUED | OUTPATIENT
Start: 2025-06-26 | End: 2025-07-15 | Stop reason: HOSPADM

## 2025-06-25 RX ORDER — ONDANSETRON 2 MG/ML
4 INJECTION INTRAMUSCULAR; INTRAVENOUS EVERY 6 HOURS PRN
Status: DISCONTINUED | OUTPATIENT
Start: 2025-06-25 | End: 2025-07-15 | Stop reason: HOSPADM

## 2025-06-25 RX ORDER — SODIUM CHLORIDE AND POTASSIUM CHLORIDE 150; 900 MG/100ML; MG/100ML
INJECTION, SOLUTION INTRAVENOUS CONTINUOUS
Status: DISCONTINUED | OUTPATIENT
Start: 2025-06-26 | End: 2025-06-28

## 2025-06-25 RX ORDER — MAGNESIUM SULFATE IN WATER 40 MG/ML
2000 INJECTION, SOLUTION INTRAVENOUS PRN
Status: DISCONTINUED | OUTPATIENT
Start: 2025-06-25 | End: 2025-06-30

## 2025-06-25 RX ORDER — POLYETHYLENE GLYCOL 3350 17 G/17G
17 POWDER, FOR SOLUTION ORAL DAILY PRN
Status: DISCONTINUED | OUTPATIENT
Start: 2025-06-25 | End: 2025-07-15 | Stop reason: HOSPADM

## 2025-06-25 RX ADMIN — IOPAMIDOL 100 ML: 612 INJECTION, SOLUTION INTRAVENOUS at 22:44

## 2025-06-25 RX ADMIN — SODIUM CHLORIDE, SODIUM LACTATE, POTASSIUM CHLORIDE, AND CALCIUM CHLORIDE 1000 ML: .6; .31; .03; .02 INJECTION, SOLUTION INTRAVENOUS at 23:09

## 2025-06-25 RX ADMIN — MORPHINE SULFATE 6 MG: 10 INJECTION INTRAVENOUS at 22:09

## 2025-06-25 RX ADMIN — ONDANSETRON 4 MG: 2 INJECTION, SOLUTION INTRAMUSCULAR; INTRAVENOUS at 22:09

## 2025-06-25 RX ADMIN — PIPERACILLIN AND TAZOBACTAM 3375 MG: 3; .375 INJECTION, POWDER, LYOPHILIZED, FOR SOLUTION INTRAVENOUS at 23:50

## 2025-06-25 ASSESSMENT — PAIN SCALES - GENERAL: PAINLEVEL_OUTOF10: 10

## 2025-06-25 ASSESSMENT — PAIN - FUNCTIONAL ASSESSMENT: PAIN_FUNCTIONAL_ASSESSMENT: 0-10

## 2025-06-26 ENCOUNTER — TELEPHONE (OUTPATIENT)
Age: 60
End: 2025-06-26

## 2025-06-26 PROBLEM — J45.909 ASTHMA: Status: ACTIVE | Noted: 2025-06-26

## 2025-06-26 PROBLEM — E87.6 HYPOKALEMIA: Status: ACTIVE | Noted: 2025-06-26

## 2025-06-26 LAB
ANION GAP BLD CALC-SCNC: ABNORMAL MMOL/L (ref 10–20)
ANION GAP SERPL CALC-SCNC: 17 MMOL/L (ref 3–18)
ARTERIAL PATENCY WRIST A: POSITIVE
BASE EXCESS BLD CALC-SCNC: 2.9 MMOL/L
BASOPHILS # BLD: 0.02 K/UL (ref 0–0.1)
BASOPHILS NFR BLD: 0.3 % (ref 0–2)
BDY SITE: ABNORMAL
BUN SERPL-MCNC: 16 MG/DL (ref 6–23)
BUN/CREAT SERPL: 14 (ref 12–20)
CA-I BLD-MCNC: 1.2 MMOL/L (ref 1.15–1.33)
CALCIUM SERPL-MCNC: 9.5 MG/DL (ref 8.5–10.1)
CHLORIDE BLD-SCNC: 100 MMOL/L (ref 98–107)
CHLORIDE SERPL-SCNC: 97 MMOL/L (ref 98–107)
CO2 BLD-SCNC: 25 MMOL/L (ref 22–29)
CO2 SERPL-SCNC: 22 MMOL/L (ref 21–32)
CREAT BLD-MCNC: 1.09 MG/DL (ref 0.6–1.3)
CREAT SERPL-MCNC: 1.18 MG/DL (ref 0.6–1.3)
DIFFERENTIAL METHOD BLD: ABNORMAL
EOSINOPHIL # BLD: 0.07 K/UL (ref 0–0.4)
EOSINOPHIL NFR BLD: 0.9 % (ref 0–5)
ERYTHROCYTE [DISTWIDTH] IN BLOOD BY AUTOMATED COUNT: 12.9 % (ref 11.6–14.5)
FIO2 ON VENT: 21 %
GAS FLOW.O2 O2 DELIVERY SYS: ABNORMAL
GLUCOSE BLD-MCNC: 162 MG/DL (ref 74–99)
GLUCOSE SERPL-MCNC: 167 MG/DL (ref 74–108)
HCO3 BLD-SCNC: 26.5 MMOL/L (ref 21–28)
HCT VFR BLD AUTO: 40.2 % (ref 35–45)
HGB BLD-MCNC: 13.7 G/DL (ref 12–16)
IMM GRANULOCYTES # BLD AUTO: 0.02 K/UL (ref 0–0.04)
IMM GRANULOCYTES NFR BLD AUTO: 0.3 % (ref 0–0.5)
LACTATE BLD-SCNC: 0.81 MMOL/L (ref 0.4–2)
LACTATE SERPL-SCNC: 0.7 MMOL/L (ref 0.4–2)
LYMPHOCYTES # BLD: 2.27 K/UL (ref 0.9–3.3)
LYMPHOCYTES NFR BLD: 29.1 % (ref 21–52)
MAGNESIUM SERPL-MCNC: 1.9 MG/DL (ref 1.6–2.6)
MCH RBC QN AUTO: 28.8 PG (ref 24–34)
MCHC RBC AUTO-ENTMCNC: 34.1 G/DL (ref 31–37)
MCV RBC AUTO: 84.6 FL (ref 78–100)
MONOCYTES # BLD: 0.49 K/UL (ref 0.05–1.2)
MONOCYTES NFR BLD: 6.3 % (ref 3–10)
NEUTS SEG # BLD: 4.94 K/UL (ref 1.8–8)
NEUTS SEG NFR BLD: 63.1 % (ref 40–73)
NRBC # BLD: 0 K/UL (ref 0–0.01)
NRBC BLD-RTO: 0 PER 100 WBC
PCO2 BLD: 36.6 MMHG (ref 35–48)
PH BLD: 7.47 (ref 7.35–7.45)
PLATELET # BLD AUTO: 330 K/UL (ref 135–420)
PMV BLD AUTO: 9 FL (ref 9.2–11.8)
PO2 BLD: 96 MMHG (ref 83–108)
POTASSIUM BLD-SCNC: 3.2 MMOL/L (ref 3.5–5.1)
POTASSIUM SERPL-SCNC: 3.4 MMOL/L (ref 3.5–5.5)
RBC # BLD AUTO: 4.75 M/UL (ref 4.2–5.3)
SAO2 % BLD: 98 % (ref 94–98)
SERVICE CMNT-IMP: ABNORMAL
SODIUM BLD-SCNC: 132 MMOL/L (ref 136–145)
SODIUM SERPL-SCNC: 136 MMOL/L (ref 136–145)
SPECIMEN SITE: ABNORMAL
WBC # BLD AUTO: 7.8 K/UL (ref 4.6–13.2)

## 2025-06-26 PROCEDURE — 2500000003 HC RX 250 WO HCPCS: Performed by: HOSPITALIST

## 2025-06-26 PROCEDURE — 83735 ASSAY OF MAGNESIUM: CPT

## 2025-06-26 PROCEDURE — 2500000003 HC RX 250 WO HCPCS: Performed by: INTERNAL MEDICINE

## 2025-06-26 PROCEDURE — 82803 BLOOD GASES ANY COMBINATION: CPT

## 2025-06-26 PROCEDURE — 85025 COMPLETE CBC W/AUTO DIFF WBC: CPT

## 2025-06-26 PROCEDURE — 83605 ASSAY OF LACTIC ACID: CPT

## 2025-06-26 PROCEDURE — 82330 ASSAY OF CALCIUM: CPT

## 2025-06-26 PROCEDURE — 84132 ASSAY OF SERUM POTASSIUM: CPT

## 2025-06-26 PROCEDURE — 84295 ASSAY OF SERUM SODIUM: CPT

## 2025-06-26 PROCEDURE — 82947 ASSAY GLUCOSE BLOOD QUANT: CPT

## 2025-06-26 PROCEDURE — 2580000003 HC RX 258: Performed by: INTERNAL MEDICINE

## 2025-06-26 PROCEDURE — 6360000002 HC RX W HCPCS: Performed by: HOSPITALIST

## 2025-06-26 PROCEDURE — 1100000000 HC RM PRIVATE

## 2025-06-26 PROCEDURE — 80048 BASIC METABOLIC PNL TOTAL CA: CPT

## 2025-06-26 PROCEDURE — 6360000002 HC RX W HCPCS: Performed by: INTERNAL MEDICINE

## 2025-06-26 PROCEDURE — 36415 COLL VENOUS BLD VENIPUNCTURE: CPT

## 2025-06-26 PROCEDURE — 85014 HEMATOCRIT: CPT

## 2025-06-26 RX ORDER — HYDROMORPHONE HYDROCHLORIDE 1 MG/ML
0.5 INJECTION, SOLUTION INTRAMUSCULAR; INTRAVENOUS; SUBCUTANEOUS EVERY 4 HOURS PRN
Status: DISCONTINUED | OUTPATIENT
Start: 2025-06-26 | End: 2025-06-26

## 2025-06-26 RX ORDER — MAGNESIUM SULFATE HEPTAHYDRATE 40 MG/ML
2000 INJECTION, SOLUTION INTRAVENOUS ONCE
Status: COMPLETED | OUTPATIENT
Start: 2025-06-26 | End: 2025-06-26

## 2025-06-26 RX ORDER — HYDROMORPHONE HYDROCHLORIDE 1 MG/ML
1 INJECTION, SOLUTION INTRAMUSCULAR; INTRAVENOUS; SUBCUTANEOUS EVERY 4 HOURS PRN
Status: DISCONTINUED | OUTPATIENT
Start: 2025-06-26 | End: 2025-06-29

## 2025-06-26 RX ORDER — HYDROMORPHONE HYDROCHLORIDE 1 MG/ML
0.5 INJECTION, SOLUTION INTRAMUSCULAR; INTRAVENOUS; SUBCUTANEOUS ONCE
Status: COMPLETED | OUTPATIENT
Start: 2025-06-26 | End: 2025-06-26

## 2025-06-26 RX ORDER — POTASSIUM CHLORIDE 7.45 MG/ML
10 INJECTION INTRAVENOUS
Status: COMPLETED | OUTPATIENT
Start: 2025-06-26 | End: 2025-06-26

## 2025-06-26 RX ORDER — HYDROMORPHONE HYDROCHLORIDE 1 MG/ML
0.5 INJECTION, SOLUTION INTRAMUSCULAR; INTRAVENOUS; SUBCUTANEOUS EVERY 4 HOURS PRN
Status: DISCONTINUED | OUTPATIENT
Start: 2025-06-26 | End: 2025-07-03

## 2025-06-26 RX ORDER — POTASSIUM CHLORIDE 7.45 MG/ML
10 INJECTION INTRAVENOUS
Status: DISCONTINUED | OUTPATIENT
Start: 2025-06-26 | End: 2025-06-26

## 2025-06-26 RX ORDER — HYDRALAZINE HYDROCHLORIDE 20 MG/ML
10 INJECTION INTRAMUSCULAR; INTRAVENOUS EVERY 6 HOURS PRN
Status: DISCONTINUED | OUTPATIENT
Start: 2025-06-26 | End: 2025-07-04

## 2025-06-26 RX ADMIN — HYDROMORPHONE HYDROCHLORIDE 0.5 MG: 1 INJECTION, SOLUTION INTRAMUSCULAR; INTRAVENOUS; SUBCUTANEOUS at 06:11

## 2025-06-26 RX ADMIN — HYDROMORPHONE HYDROCHLORIDE 1 MG: 1 INJECTION, SOLUTION INTRAMUSCULAR; INTRAVENOUS; SUBCUTANEOUS at 10:11

## 2025-06-26 RX ADMIN — SODIUM CHLORIDE, PRESERVATIVE FREE 10 ML: 5 INJECTION INTRAVENOUS at 09:59

## 2025-06-26 RX ADMIN — POTASSIUM CHLORIDE 10 MEQ: 7.46 INJECTION, SOLUTION INTRAVENOUS at 12:10

## 2025-06-26 RX ADMIN — POTASSIUM CHLORIDE AND SODIUM CHLORIDE: 900; 150 INJECTION, SOLUTION INTRAVENOUS at 01:29

## 2025-06-26 RX ADMIN — MORPHINE SULFATE 2 MG: 2 INJECTION, SOLUTION INTRAMUSCULAR; INTRAVENOUS at 01:27

## 2025-06-26 RX ADMIN — POTASSIUM CHLORIDE AND SODIUM CHLORIDE: 900; 150 INJECTION, SOLUTION INTRAVENOUS at 20:44

## 2025-06-26 RX ADMIN — HYDROMORPHONE HYDROCHLORIDE 1 MG: 1 INJECTION, SOLUTION INTRAMUSCULAR; INTRAVENOUS; SUBCUTANEOUS at 22:25

## 2025-06-26 RX ADMIN — SODIUM CHLORIDE: 900 INJECTION, SOLUTION INTRAVENOUS at 09:57

## 2025-06-26 RX ADMIN — HYDROMORPHONE HYDROCHLORIDE 1 MG: 1 INJECTION, SOLUTION INTRAMUSCULAR; INTRAVENOUS; SUBCUTANEOUS at 18:28

## 2025-06-26 RX ADMIN — POTASSIUM CHLORIDE 10 MEQ: 7.46 INJECTION, SOLUTION INTRAVENOUS at 14:17

## 2025-06-26 RX ADMIN — HYDROMORPHONE HYDROCHLORIDE 1 MG: 1 INJECTION, SOLUTION INTRAMUSCULAR; INTRAVENOUS; SUBCUTANEOUS at 14:14

## 2025-06-26 RX ADMIN — SODIUM CHLORIDE, PRESERVATIVE FREE 10 ML: 5 INJECTION INTRAVENOUS at 20:46

## 2025-06-26 RX ADMIN — ENOXAPARIN SODIUM 40 MG: 100 INJECTION SUBCUTANEOUS at 09:52

## 2025-06-26 RX ADMIN — POTASSIUM CHLORIDE 10 MEQ: 7.46 INJECTION, SOLUTION INTRAVENOUS at 13:18

## 2025-06-26 RX ADMIN — POTASSIUM CHLORIDE 10 MEQ: 7.46 INJECTION, SOLUTION INTRAVENOUS at 15:39

## 2025-06-26 RX ADMIN — HYDROMORPHONE HYDROCHLORIDE 0.5 MG: 1 INJECTION, SOLUTION INTRAMUSCULAR; INTRAVENOUS; SUBCUTANEOUS at 03:12

## 2025-06-26 RX ADMIN — MAGNESIUM SULFATE HEPTAHYDRATE 2000 MG: 40 INJECTION, SOLUTION INTRAVENOUS at 09:58

## 2025-06-26 ASSESSMENT — PAIN SCALES - GENERAL
PAINLEVEL_OUTOF10: 7
PAINLEVEL_OUTOF10: 10
PAINLEVEL_OUTOF10: 10
PAINLEVEL_OUTOF10: 9
PAINLEVEL_OUTOF10: 10
PAINLEVEL_OUTOF10: 9
PAINLEVEL_OUTOF10: 10
PAINLEVEL_OUTOF10: 0
PAINLEVEL_OUTOF10: 9

## 2025-06-26 ASSESSMENT — PAIN - FUNCTIONAL ASSESSMENT
PAIN_FUNCTIONAL_ASSESSMENT: ACTIVITIES ARE NOT PREVENTED

## 2025-06-26 ASSESSMENT — PAIN DESCRIPTION - ONSET
ONSET: ON-GOING

## 2025-06-26 ASSESSMENT — PAIN DESCRIPTION - DESCRIPTORS
DESCRIPTORS: STABBING
DESCRIPTORS: PRESSURE
DESCRIPTORS: STABBING
DESCRIPTORS: ACHING

## 2025-06-26 ASSESSMENT — PAIN DESCRIPTION - LOCATION
LOCATION: ABDOMEN

## 2025-06-26 ASSESSMENT — PAIN DESCRIPTION - FREQUENCY
FREQUENCY: CONTINUOUS

## 2025-06-26 ASSESSMENT — PAIN DESCRIPTION - PAIN TYPE
TYPE: ACUTE PAIN
TYPE: ACUTE PAIN

## 2025-06-26 ASSESSMENT — PAIN DESCRIPTION - ORIENTATION
ORIENTATION: RIGHT;UPPER
ORIENTATION: UPPER
ORIENTATION: MID
ORIENTATION: MID

## 2025-06-26 NOTE — ED PROVIDER NOTES
[facility]  EMERGENCY DEPARTMENT ENCOUNTER    Time/Date: 11:08 PM EDT on 6/25/25  Patient Name: Kiara Nance  MRN: [id number]  YOB: 1965  Provider: CARMINA Guallpa MD am the primary clinician of record.    History of Presenting Illness     Patient was signed out to me by the previous physician, Dr. Bolton Please refer to his dictation for more complete history. In summary the patient presents with abdominal pain for 3 days and vomiting once today. History of at least 2 remote abdominal surgeries.  It sounds like she had a bowel distraction and surgery resulted in a colostomy which was later reversed.  Labs are okay but CT imaging is ordered for suspected bowel obstruction.    The patient is awaiting: CT imaging, POC lactate  Expected plan of care: Admission    Physical Exam     Vitals:    06/25/25 2211 06/25/25 2215 06/25/25 2326 06/25/25 2356   BP: (!) 146/84      Pulse:  79 78 84   Resp:       Temp:       SpO2: 100%  97% 97%   Weight:       Height:           Physical Exam  Constitutional: Well nourished, well developed, appears stated age. Alert and oriented.  HEENT: Neck supple without meningismus. PERRLA, no conjunctival injection. EOM intact  Cardiovascular: RRR, Warm, well-perfused extremities  Respiratory: CTAB, Unlabored respiratory effort  Abdominal: Distended and diffuse tenderness palpation.  Musculoskeletal: Full range of motion all extremities. No deformities. No peripheral edema.  Skin: Warm, dry. No rashes  Vascular: Pulses equal in all 4 extremities.  Neuro: CNs II-XII grossly intact. Sensation and motor function of extremities grossly intact.  Psych: Appropriate mood and affect.     Diagnostic Study Results     LABS:  Recent Results (from the past 120 hours)   CBC with Auto Differential    Collection Time: 06/25/25  9:21 PM   Result Value Ref Range    WBC 8.4 4.6 - 13.2 K/uL    RBC 4.98 4.20 - 5.30 M/uL    Hemoglobin 14.5 12.0 - 16.0 g/dL

## 2025-06-26 NOTE — PROGRESS NOTES
Hospitalist Progress Note      Patient: Kiara Nance MRN: [id number]  CSN: [id number]    YOB: 1965  Age: 60 y.o.  Sex: female    DOA: 6/25/2025 LOS:  LOS: 1 day      PCP: Nae Kaiser, APRN - NP       Summary:    60 years old presented with abdominal pain, distention, constipation, and vomiting , she was admitted for small bowel obstruction.    Subjective still have abnormal pain not passing gas, had a similar episode in 1995, received a surgery, no nausea no vomiting    RN still having abdominal pain    Assessment/Plan:    Principal Problem:    SBO (small bowel obstruction) ([facility])  Active Problems:    Benign essential HTN    Hypokalemia    Asthma  Resolved Problems:    * No resolved hospital problems. *       Small bowel obstruction  Continue IV hydration, pain count  So far no nausea no vomit  Hold NG tube for now  General Surgery has been consulted  Balance electrolytes replace magnesium and potassium  Encourage patient increase physical activity  Dr. Encinas will see patient  Continue n.p.o. with ice chips    Hypokalemia  Potassium replacement    Hypertension  Hydralazine as needed    Asthma   Stable breathing treatment as needed    Anxiety  Po meds hold for now             Discharge to:   [] Home  []SNF/Rehab  [x]  Others  in  2 Days, []  stable  for DC  DVT Prophylaxis:   [x]Lovenox  []Hep SQ  []SCDs  []Coumadin, Eliquis, Xarelto, Pradaxa   []On Heparin gtt. [] No indication [] Refused  Case discussed with:   [x]Patient  []Family  [x]Nursing  [x]Case Management [x] Consultants        Review of systems:  10 systems reviewed, all negative other than what is mentioned above.        Objective:  /67   Pulse 76   Temp 97.6 °F (36.4 °C) (Oral)   Resp 16   Ht 1.524 m (5')   Wt 72.6 kg (160 lb)   SpO2 97%   BMI 31.25 kg/m²   Last three shifts:  No intake/output data recorded.  General: Alert, NAD, AOX3   CVS: Regular rate and rhythm, no m/c/r, S1/S2     Lungs: Clear to  auscultation bilaterally, no wheezes, rhonchi, or rales   Abdomen: Soft,  + tender, + distention, No Bowel sounds    Extremities: No c/c/e     Neuro:grossly non-focal neurologic exam, follows commands        Intake/Output  No intake/output data recorded.       Pertinent Labs:    No results found for the last 90 days.    Results       ** No results found for the last 336 hours. **          Labs: Results:       Chemistry Recent Labs     06/25/25 2121 06/26/25  0006 06/26/25 0227   GLUCOSE 180*  --  167*     --  136   K 3.7  --  3.4*   CL 95*  --  97*   CO2 23  --  22   BUN 16  --  16   CREATININE 1.31* 1.09 1.18   CALCIUM 10.3*  --  9.5   BILITOT 0.7  --   --    AST 17  --   --    ALT 16  --   --    ALKPHOS 116  --   --    GLOB 4.1*  --   --    LABGLOM 46*  --  52*      CBC w/Diff Recent Labs     06/25/25 2121 06/26/25 0227   WBC 8.4 7.8   RBC 4.98 4.75   HGB 14.5 13.7   HCT 42.4 40.2    330   LYMPHOPCT 24.9 29.1      Cardiac Enzymes No results for input(s): \"CKTOTAL\", \"CKMB\", \"CKMBINDEX\", \"TROPONINI\" in the last 72 hours.    Invalid input(s): \"LENARD\"   Coagulation No results for input(s): \"PROTIME\", \"INR\", \"APTT\" in the last 72 hours.    Lipid Panel Lab Results   Component Value Date/Time    CHOL 234 06/01/2020 08:26 AM    TRIG 154 06/01/2020 08:26 AM    HDL 69 06/01/2020 08:26 AM    VLDL 31 06/01/2020 08:26 AM      BNP No results for input(s): \"BNP\" in the last 72 hours.   Liver Enzymes Recent Labs     06/25/25 2121   ALT 16   AST 17   ALKPHOS 116   BILITOT 0.7      Thyroid Studies Lab Results   Component Value Date/Time    TSH 0.908 11/29/2024 09:27 AM              Pertinent Radiology/Procedures:  See electronic medical records for all procedures/Xrays and details which were not copied into this note but were reviewed prior to creation of Plan    CT ABDOMEN PELVIS W IV CONTRAST Additional Contrast? None  Result Date: 6/25/2025  EXAM: CT ABDOMEN PELVIS W IV CONTRAST INDICATION: diffuse abdominal pain

## 2025-06-26 NOTE — ED NOTES
ED TO INPATIENT SBAR HANDOFF    Patient Name: Kiara Nance   Preferred Name: Kiara  : 1965  60 y.o.   Family/Caregiver Present: no   Code Status Order: Full Code  PO Status: NPO:yes  Telemetry Order: Yes  C-SSRS: Risk of Suicide: No Risk  Sitter no   Restraints:     Sepsis Risk Score Sepsis V2 Risk Score: 12.9    Situation  Chief Complaint   Patient presents with    Abdominal Pain    Constipation    Vomiting     Brief Description of Patient's Condition:   Mental Status: a+ox4  Arrived from: home  Imaging:   CT ABDOMEN PELVIS W IV CONTRAST Additional Contrast? None   Final Result   Closed-loop small bowel obstruction obstruction with markedly   dilated small bowel in the right upper quadrant, likely secondary to   postoperative adhesions.      The findings were called to Dr. Guallpa on 2025 at 23:60 by myself.  [phone]      Electronically signed by Marcos Bennett        Abnormal labs:   Abnormal Labs Reviewed   COMPREHENSIVE METABOLIC PANEL - Abnormal; Notable for the following components:       Result Value    Chloride 95 (*)     Anion Gap 19 (*)     Glucose 180 (*)     Creatinine 1.31 (*)     Est, Glom Filt Rate 46 (*)     Calcium 10.3 (*)     Total Protein 8.3 (*)     Globulin 4.1 (*)     All other components within normal limits   POCT LACTIC ACID (LACTATE) - Abnormal; Notable for the following components:    POC Lactic Acid 14.85 (*)     All other components within normal limits   POCT LACTIC ACID (LACTATE) - Abnormal; Notable for the following components:    POC Lactic Acid 9.50 (*)     All other components within normal limits   POCT BLOOD GAS & ELECTROLYTES - Abnormal; Notable for the following components:    POC pH 7.47 (*)     POC Sodium 132 (*)     POC Potassium 3.2 (*)     POC Glucose 162 (*)     eGFR, POC 57 (*)     All other components within normal limits       Background  Allergies:   Allergies   Allergen Reactions    Meloxicam Hives    Nsaids Hives, Itching and Nausea Only    Sulfa  in 50 mL IVPB premix (has no administration in time range)   enoxaparin (LOVENOX) injection 40 mg (has no administration in time range)   ondansetron (ZOFRAN-ODT) disintegrating tablet 4 mg (has no administration in time range)     Or   ondansetron (ZOFRAN) injection 4 mg (has no administration in time range)   polyethylene glycol (GLYCOLAX) packet 17 g (has no administration in time range)   acetaminophen (TYLENOL) tablet 650 mg (has no administration in time range)     Or   acetaminophen (TYLENOL) suppository 650 mg (has no administration in time range)   morphine (PF) injection 2 mg (has no administration in time range)   0.9% NaCl with KCl 20 mEq infusion (has no administration in time range)   morphine (PF) injection 6 mg (6 mg IntraVENous Given 6/25/25 2209)   ondansetron (ZOFRAN) injection 4 mg (4 mg IntraVENous Given 6/25/25 2209)   iopamidol (ISOVUE-300) 61 % injection 100 mL (100 mLs IntraVENous Given 6/25/25 2244)   lactated ringers bolus 1,000 mL (0 mLs IntraVENous Stopped 6/25/25 2352)   piperacillin-tazobactam (ZOSYN) 3,375 mg in sodium chloride 0.9 % 50 mL extended IVPB (addEASE) (0 mg IntraVENous Stopped 6/26/25 0029)     Last documented pain medication administration: 6mg morphine 22:09  Pertinent or High Risk Medications/Drips: no   If Yes, please provide details: na  Blood Product Administration: no  If Yes, please provide details: na  Process Protocols/Bundles: na    Recommendation  Incomplete STAT orders: KCL 20mEq with 0.9% NaCl 100ml/hr  Overdue Medications: listed above  Patient Belongings:  na  Additional Comments: na  If any further questions, please call Sending RN at [phone]      Admitting Unit Notification  Name of person notified and time: tbd      Electronically signed by: Electronically signed by Renata Mobley RN on 6/26/2025 at 12:29 AM

## 2025-06-26 NOTE — ED PROVIDER NOTES
EMERGENCY DEPARTMENT HISTORY AND PHYSICAL EXAM    2:03 PM      Date: 6/25/2025  Patient Name: Kiara Nance    History of Presenting Illness     Chief Complaint   Patient presents with    Abdominal Pain    Constipation    Vomiting       History From: Patient    Kiara Nance is a 60 y.o. female   HPI  60-year-old female with past medical history of diabetes, multiple intra-abdominal surgeries status post colostomy reversal ( 1995)  who presents with abdominal pain, nausea, vomiting.  Patient said the symptoms been going on for the past 2 to 3 days.  Denies any changes in meds, sick contacts, any other changes.  She states her abdomen is distended, severe in nature, throbbing, intermittent.  No aggravating or alleviating factors.  When assessing ROS he denies any fevers, chest pain, shortness of breath, change in urination, or any other changes.    Nursing Notes were all reviewed and agreed with or any disagreements were addressed in the HPI.    PCP: Nae Kaiser, APRN - NP    Current Facility-Administered Medications   Medication Dose Route Frequency Provider Last Rate Last Admin    potassium chloride 20 mEq/50 mL IVPB (Central Line)  20 mEq IntraVENous PRN Melina Long MD        Or    potassium chloride 10 mEq/100 mL IVPB (Peripheral Line)  10 mEq IntraVENous PRN Melina Long MD        magnesium sulfate 2000 mg in 50 mL IVPB premix  2,000 mg IntraVENous PRN Melina Long MD        sodium phosphate 15 mmol in sodium chloride 0.9 % 250 mL IVPB  15 mmol IntraVENous PRN Melina Long MD   Stopped at 07/01/25 1337    fat emulsion (INTRALIPID/NUTRILIPID) 20 % infusion 50 mL  50 mL IntraVENous Daily Melina Long MD        vancomycin (VANCOCIN) 750 mg in sodium chloride 0.9 % 250 mL (addEASE) IVPB  750 mg IntraVENous Q24H Elias Jade PA-C        [START ON 7/2/2025] Vancomycin Random Level with am labs 7/2/2025   Other Once Elias Jade PA-C        piperacillin-tazobactam (ZOSYN) 3,375 mg in sodium chloride 0.9 % 50 mL extended IVPB    Electronically signed by Huyen Salazar      IR PICC WO SQ PORT/PUMP > 5 YEARS   Final Result   Technically successful ultrasound and fluoroscopic guided placement of a left   arm 5 [place], double lumen PICC.  The catheter is ready for immediate use.      Electronically signed by KIRAN ZARATE      CT ABDOMEN PELVIS W IV CONTRAST Additional Contrast? Oral   Final Result   1. New, small right pleural effusion and right basilar patchy airspace   consolidation. New mild left basilar atelectasis.   2. Persistent, markedly dilated loop of bowel in the right abdomen, as described   above      Electronically signed by Sarmad Roman MD      XR CHEST 1 VIEW   Final Result   Nasogastric tube tip extends to the stomach. Unchanged dilated bowel loops right   lower quadrant, see recent CT.         Electronically signed by Scooter Keller      XR ABDOMEN (KUB) (SINGLE AP VIEW)   Final Result   NG tube in place. Query sigmoid volvulus. Consider CT or full   abdominal series.         Electronically signed by Huyen Salazar      CT ABDOMEN PELVIS W IV CONTRAST Additional Contrast? None   Final Result   Closed-loop small bowel obstruction obstruction with markedly   dilated small bowel in the right upper quadrant, likely secondary to   postoperative adhesions.      The findings were called to Dr. Guallpa on 6/25/2025 at 23:60 by myself.  [phone]      Electronically signed by Marcos Bennett            Medical Decision Making   I am the first provider for this patient.    I reviewed the vital signs, available nursing notes, past medical history, past surgical history, family history and social history.    Vital Signs-Reviewed the patient's vital signs.      EKG: none       ED Course: Progress Notes, Reevaluation, and Consults:    Provider Notes (Medical Decision Making):     MDM  60-year-old female who presents abdominal pain.  High suspicion for small bowel obstruction versus small bowel obstruction.  Will consider volvulus.  Will consider enteritis.

## 2025-06-26 NOTE — PROGRESS NOTES
Signed         met patient at bedside for an initial visit and was in a lot of pain and crying.   She is Pentecostal and has great mary in God.      Patient told me she is in hospital because she has an obstruction in her bowels.   We prayed together and she thanked  for the visit and prayer.       provided presence and support for patient.     Chaplains will provide follow-up care for patient and family as needed.    Spiritual Health History and Assessment/Progress Note  [facility]    Spiritual/Emotional Needs, Emotional distress,  ,      Name: Kiara Nance MRN: [id number]    Age: 60 y.o.     Sex: female   Language: English   Congregational: Pent[employer]al   SBO (small bowel obstruction) ([facility])     Date: 6/26/2025            Total Time Calculated: 20 min              Spiritual Assessment began in [address] SURGICAL/ONCOLOGY        Referral/Consult From: Rounding   Encounter Overview/Reason: Spiritual/Emotional Needs  Service Provided For: Patient    Mary, Belief, Meaning:   Patient identifies as spiritual and is connected with a mary tradition or spiritual practice  Family/Friends No family/friends present      Importance and Influence:  Patient has spiritual/personal beliefs that influence decisions regarding their health  Family/Friends No family/friends present    Community:  Patient is connected with a spiritual community  Family/Friends No family/friends present    Assessment and Plan of Care:     Patient Interventions include: Facilitated expression of thoughts and feelings  Family/Friends Interventions include: No family/friends present    Patient Plan of Care: Spiritual Care available upon further referral  Family/Friends Plan of Care: No family/friends present    Electronically signed by Chaplain Birgit on 6/26/2025 at 4:49 PM

## 2025-06-26 NOTE — CONSULTS
[address]  [address]                              CONSULTATION      PATIENT NAME: BOONE GOMES              : 1965  MED REC NO: [id number]                       ROOM: 330  ACCOUNT NO: [id number]                       ADMIT DATE: 2025  PROVIDER: Nicolas Encinas MD    DATE OF SERVICE:  2025    ATTENDING PHYSICIAN:  JAMES MORTON    REASON FOR CONSULT:  Small bowel obstruction.    HISTORY OF PRESENT ILLNESS:  The patient is a 60-year-old black female with history of 2 exploratory laparotomies, denies any previous history of bowel obstruction, currently admitted through the [facility] Emergency Room to the hospitalist service with a 2-day history of diffuse abdominal pain, distention, several episodes of nausea, vomiting, change in caliber of her bowel movements, decreased flatus, and radiologic findings consistent with a small bowel obstruction.  The patient denies any previous similar episodes.  She does state that she feels better in terms of abdominal discomfort today.  She has not had any further episodes of nausea or vomiting since arrival.  She had a small bowel movement yesterday, but has not passed any flatus today.  She denies any chest pain, shortness of breath, or other symptoms at this time.    PAST MEDICAL HISTORY:  Hypertension, prediabetic, anxiety, asthma.  The patient does not believe she has ever had a colonoscopy.    PAST SURGICAL HISTORY:  Exploratory laparotomy with removal of part of colon and colostomy for what the patient describes as \"I was not able to poop\" that was in her early 90s.  In the mid 90s, she had another laparotomy with reversal of that colostomy.    CURRENT MEDICATIONS:  Please see med rec sheet.  She is not on any baseline anticoagulation or antiplatelet medications.  She is currently on prophylactic dose Lovenox.    ALLERGIES:  TO NSAID, MELOXICAM, SULFA ANTIBIOTICS, ASPIRIN,

## 2025-06-26 NOTE — ED TRIAGE NOTES
Pt presents to ED with cc of abd pain and constipation X 2 days. Reports episode of emesis today. States she took stool softeners at home.

## 2025-06-26 NOTE — H&P (VIEW-ONLY)
[address]  [address]                              CONSULTATION      PATIENT NAME: BOONE GOMES              : 1965  MED REC NO: [id number]                       ROOM: 330  ACCOUNT NO: [id number]                       ADMIT DATE: 2025  PROVIDER: Nicolas Encinas MD    DATE OF SERVICE:  2025    ATTENDING PHYSICIAN:  JAMES MORTNO    REASON FOR CONSULT:  Small bowel obstruction.    HISTORY OF PRESENT ILLNESS:  The patient is a 60-year-old black female with history of 2 exploratory laparotomies, denies any previous history of bowel obstruction, currently admitted through the [facility] Emergency Room to the hospitalist service with a 2-day history of diffuse abdominal pain, distention, several episodes of nausea, vomiting, change in caliber of her bowel movements, decreased flatus, and radiologic findings consistent with a small bowel obstruction.  The patient denies any previous similar episodes.  She does state that she feels better in terms of abdominal discomfort today.  She has not had any further episodes of nausea or vomiting since arrival.  She had a small bowel movement yesterday, but has not passed any flatus today.  She denies any chest pain, shortness of breath, or other symptoms at this time.    PAST MEDICAL HISTORY:  Hypertension, prediabetic, anxiety, asthma.  The patient does not believe she has ever had a colonoscopy.    PAST SURGICAL HISTORY:  Exploratory laparotomy with removal of part of colon and colostomy for what the patient describes as \"I was not able to poop\" that was in her early 90s.  In the mid 90s, she had another laparotomy with reversal of that colostomy.    CURRENT MEDICATIONS:  Please see med rec sheet.  She is not on any baseline anticoagulation or antiplatelet medications.  She is currently on prophylactic dose Lovenox.    ALLERGIES:  TO NSAID, MELOXICAM, SULFA ANTIBIOTICS, ASPIRIN,

## 2025-06-26 NOTE — H&P
HISTORY & PHYSICAL      Patient: Kiara Nance MRN: [id number]  CSN: [id number]    YOB: 1965  Age: 60 y.o.  Sex: female    DOA: 6/25/2025 LOS:  LOS: 0 days        DOA: 6/25/2025        Assessment/Plan     60 years old presented with abdominal pain, distention, constipation, and vomiting    -Small bowel obstruction, suspect secondary to adhesions  -Nausea and vomiting secondary to the above  -Mild KE, with a creatinine of 1.31, suspect prerenal  -Hypertension  -Anxiety/depression  -Asthma  -Other medical problems as below    The patient is admitted to [facility] telemetry  Keep the patient n.p.o.  IV fluid hydration  Symptomatic management, including antiemetics and pain meds  Further management per general surgery, consulted by ED  NG tube, if she start vomiting  Clinical monitoring, and trending lactic acid  Hold nonessential oral meds    Lovenox for DVT prophylaxis    Admission plan was discussed    Patient Active Problem List   Diagnosis    Prediabetes    Left hand paresthesia    Knee meniscus pain    Scoliosis    Benign essential HTN    Adverse effect of gabapentin, sequela    Mild intermittent asthma without complication    Chronic neck pain    SBO (small bowel obstruction) (HCC)       History of Presenting Illness:  60 years old who is admitted for small bowel obstruction, suspected secondary to adhesions.  She been to the ED with 2 days history of having abdominal pain, constipation and vomiting.  No fever, chills, or hematemesis.  She gives history of having bowel resection surgery in the 1990s and colostomy at [facility] that she think was for bowel obstruction however unable to provide definite details.  She had CT scan of the abdomen and pelvis as below which confirmed small bowel obstruction, suspect secondary to adhesions. ED discussed findings with general surgery, who recommended admission to the hospitalist service, does not see indication for urgent surgery,

## 2025-06-26 NOTE — CARE COORDINATION
06/26/25 0841   Service Assessment   Patient Orientation Alert and Oriented   Cognition Alert   History Provided By Patient   Primary Caregiver Self   Accompanied By/Relationship N/A   Support Systems Family Members   PCP Verified by CM Yes   Last Visit to PCP Within last 6 months   Prior Functional Level Independent in ADLs/IADLs   Current Functional Level Independent in ADLs/IADLs   Can patient return to prior living arrangement Yes   Ability to make needs known: Good   Family able to assist with home care needs: Yes   Would you like for me to discuss the discharge plan with any other family members/significant others, and if so, who? Yes   Financial Resources Medicaid   Social/Functional History   Lives With Family   Type of Home Apartment   Home Equipment None   Prior Level of Assist for ADLs Independent   Prior Level of Assist for Homemaking Independent   Ambulation Assistance Independent   Prior Level of Assist for Transfers Independent   Discharge Planning   Type of Residence Apartment   Living Arrangements Family Members   Current Services Prior To Admission None   Potential Assistance Needed N/A   DME Ordered? No   Potential Assistance Purchasing Medications No   Type of Home Care Services None   Patient expects to be discharged to: Apartment   Services At/After Discharge   Transition of Care Consult (CM Consult) Discharge Planning   Services At/After Discharge None   Mode of Transport at Discharge Other (see comment)  (Family)   Confirm Follow Up Transport Self   Condition of Participation: Discharge Planning   The Plan for Transition of Care is related to the following treatment goals: Home when stable   The Patient and/or Patient Representative was provided with a Choice of Provider? Patient   The Patient and/Or Patient Representative agree with the Discharge Plan? Yes   Freedom of Choice list was provided with basic dialogue that supports the patient's individualized plan of care/goals, treatment

## 2025-06-26 NOTE — TELEPHONE ENCOUNTER
The patient called she won't make her appointment today wanted to let LEBRON Cardenas know that she is in the hospital and will be getting surgery she has obstructed bowels.

## 2025-06-26 NOTE — PROGRESS NOTES
Pt examined.  Labs, CT reviewed.  SBO, clinically sl improved.  No urgent surgery needed at this time.  NPO/IVF.  NGT if fails to improve or if recurrent n/v.  Follow exam, labs. Correct e'lyte abnl.  OOB.  Following.    Consult dictated #[id number]  RP

## 2025-06-27 ENCOUNTER — APPOINTMENT (OUTPATIENT)
Facility: HOSPITAL | Age: 60
DRG: 230 | End: 2025-06-27
Payer: MEDICAID

## 2025-06-27 PROCEDURE — 2500000003 HC RX 250 WO HCPCS: Performed by: HOSPITALIST

## 2025-06-27 PROCEDURE — 2500000003 HC RX 250 WO HCPCS: Performed by: INTERNAL MEDICINE

## 2025-06-27 PROCEDURE — 6360000002 HC RX W HCPCS: Performed by: HOSPITALIST

## 2025-06-27 PROCEDURE — 6360000002 HC RX W HCPCS: Performed by: INTERNAL MEDICINE

## 2025-06-27 PROCEDURE — 2580000003 HC RX 258: Performed by: INTERNAL MEDICINE

## 2025-06-27 PROCEDURE — 1100000000 HC RM PRIVATE

## 2025-06-27 PROCEDURE — 74018 RADEX ABDOMEN 1 VIEW: CPT

## 2025-06-27 RX ORDER — POTASSIUM CHLORIDE 7.45 MG/ML
10 INJECTION INTRAVENOUS
Status: COMPLETED | OUTPATIENT
Start: 2025-06-27 | End: 2025-06-27

## 2025-06-27 RX ORDER — HYDROMORPHONE HYDROCHLORIDE 1 MG/ML
1 INJECTION, SOLUTION INTRAMUSCULAR; INTRAVENOUS; SUBCUTANEOUS ONCE
Status: COMPLETED | OUTPATIENT
Start: 2025-06-27 | End: 2025-06-27

## 2025-06-27 RX ORDER — MAGNESIUM SULFATE HEPTAHYDRATE 40 MG/ML
2000 INJECTION, SOLUTION INTRAVENOUS ONCE
Status: COMPLETED | OUTPATIENT
Start: 2025-06-27 | End: 2025-06-27

## 2025-06-27 RX ORDER — 0.9 % SODIUM CHLORIDE 0.9 %
500 INTRAVENOUS SOLUTION INTRAVENOUS ONCE
Status: COMPLETED | OUTPATIENT
Start: 2025-06-27 | End: 2025-06-27

## 2025-06-27 RX ORDER — POTASSIUM CHLORIDE 7.45 MG/ML
10 INJECTION INTRAVENOUS
Status: DISCONTINUED | OUTPATIENT
Start: 2025-06-27 | End: 2025-06-27

## 2025-06-27 RX ADMIN — SODIUM CHLORIDE, PRESERVATIVE FREE 10 ML: 5 INJECTION INTRAVENOUS at 21:44

## 2025-06-27 RX ADMIN — HYDROMORPHONE HYDROCHLORIDE 1 MG: 1 INJECTION, SOLUTION INTRAMUSCULAR; INTRAVENOUS; SUBCUTANEOUS at 02:37

## 2025-06-27 RX ADMIN — POTASSIUM CHLORIDE 10 MEQ: 7.46 INJECTION, SOLUTION INTRAVENOUS at 09:23

## 2025-06-27 RX ADMIN — POTASSIUM CHLORIDE 10 MEQ: 7.46 INJECTION, SOLUTION INTRAVENOUS at 11:44

## 2025-06-27 RX ADMIN — MAGNESIUM SULFATE HEPTAHYDRATE 2000 MG: 40 INJECTION, SOLUTION INTRAVENOUS at 14:57

## 2025-06-27 RX ADMIN — POTASSIUM CHLORIDE 10 MEQ: 7.46 INJECTION, SOLUTION INTRAVENOUS at 10:42

## 2025-06-27 RX ADMIN — ENOXAPARIN SODIUM 40 MG: 100 INJECTION SUBCUTANEOUS at 10:49

## 2025-06-27 RX ADMIN — POTASSIUM CHLORIDE AND SODIUM CHLORIDE: 900; 150 INJECTION, SOLUTION INTRAVENOUS at 17:53

## 2025-06-27 RX ADMIN — HYDROMORPHONE HYDROCHLORIDE 1 MG: 1 INJECTION, SOLUTION INTRAMUSCULAR; INTRAVENOUS; SUBCUTANEOUS at 19:33

## 2025-06-27 RX ADMIN — HYDROMORPHONE HYDROCHLORIDE 1 MG: 1 INJECTION, SOLUTION INTRAMUSCULAR; INTRAVENOUS; SUBCUTANEOUS at 17:27

## 2025-06-27 RX ADMIN — POTASSIUM CHLORIDE 10 MEQ: 7.46 INJECTION, SOLUTION INTRAVENOUS at 12:52

## 2025-06-27 RX ADMIN — SODIUM CHLORIDE 500 ML: 0.9 INJECTION, SOLUTION INTRAVENOUS at 19:38

## 2025-06-27 RX ADMIN — HYDROMORPHONE HYDROCHLORIDE 1 MG: 1 INJECTION, SOLUTION INTRAMUSCULAR; INTRAVENOUS; SUBCUTANEOUS at 08:14

## 2025-06-27 RX ADMIN — SODIUM CHLORIDE: 900 INJECTION, SOLUTION INTRAVENOUS at 09:20

## 2025-06-27 ASSESSMENT — PAIN SCALES - GENERAL
PAINLEVEL_OUTOF10: 4
PAINLEVEL_OUTOF10: 3
PAINLEVEL_OUTOF10: 10
PAINLEVEL_OUTOF10: 10
PAINLEVEL_OUTOF10: 9
PAINLEVEL_OUTOF10: 8
PAINLEVEL_OUTOF10: 7

## 2025-06-27 ASSESSMENT — PAIN DESCRIPTION - LOCATION
LOCATION: ABDOMEN

## 2025-06-27 ASSESSMENT — PAIN SCALES - WONG BAKER
WONGBAKER_NUMERICALRESPONSE: HURTS A LITTLE BIT
WONGBAKER_NUMERICALRESPONSE: HURTS A LITTLE BIT

## 2025-06-27 ASSESSMENT — PAIN DESCRIPTION - ORIENTATION
ORIENTATION: RIGHT;LEFT;ANTERIOR
ORIENTATION: UPPER;MID
ORIENTATION: LEFT;RIGHT;UPPER
ORIENTATION: RIGHT;UPPER

## 2025-06-27 ASSESSMENT — PAIN DESCRIPTION - FREQUENCY: FREQUENCY: INTERMITTENT

## 2025-06-27 ASSESSMENT — PAIN DESCRIPTION - DESCRIPTORS
DESCRIPTORS: SHARP
DESCRIPTORS: ACHING;TENDER;CRAMPING
DESCRIPTORS: CRAMPING;ACHING
DESCRIPTORS: ACHING;SHARP

## 2025-06-27 NOTE — CARE COORDINATION
Patient discussed in IDR. Patient ambulating but still NPO with abdominal pain and has not passed gas. Discharge plan continues home with family support when stable for discharge. CM will continue to follow.

## 2025-06-27 NOTE — PROGRESS NOTES
Bedside and Verbal shift change report given to LESLIE Shaffer (oncoming nurse) by LESLIE Guerrier (offgoing nurse). Report included the following information Nurse Handoff Report, Adult Overview, Intake/Output, MAR, and Recent Results.

## 2025-06-27 NOTE — PROGRESS NOTES
Nurse tried to reinsert displaced NGT. PT could not tolerate reinsertion and asked nurse to take out remaining tubing from left nostril. Nurse informed night shift nurse.

## 2025-06-27 NOTE — PROGRESS NOTES
Hospitalist Progress Note      Patient: Kiara Nance MRN: [id number]  CSN: [id number]    YOB: 1965  Age: 60 y.o.  Sex: female    DOA: 6/25/2025 LOS:  LOS: 2 days      PCP: Nae Kaiser, APRN - NP       Summary:  60 years old presented with abdominal pain, distention, constipation, and vomiting , she was admitted for small bowel obstruction.    Subjective still have no bowel movement still have pain in the bed in no nausea bowel, but burp     RN still having abdominal pain     Assessment/Plan:    Principal Problem:    SBO (small bowel obstruction) ([facility])  Active Problems:    Benign essential HTN    Hypokalemia    Asthma  Resolved Problems:    * No resolved hospital problems. *       Small bowel obstruction  Stable, not improving   Continue IV hydration, pain count  So far no nausea no vomit  Hold NG tube for now  General Surgery has been consulted  Balance electrolytes replace magnesium and potassium  Encourage patient increase physical activity  Dr. Encinas will see patient  Continue n.p.o. with ice chips  Balance the electrolytes     Hypokalemia  Potassium replacement  Will give mg 1 g to keep >2    Hypertension  Hydralazine as needed    Asthma   Stable breathing treatment as needed    Anxiety  Po meds hold for now             Discharge to:   [] Home  []SNF/Rehab  [x]  Others  in  2 Days, []  stable  for DC  DVT Prophylaxis:   [x]Lovenox  []Hep SQ  []SCDs  []Coumadin, Eliquis, Xarelto, Pradaxa   []On Heparin gtt. [] No indication [] Refused  Case discussed with:   [x]Patient  []Family  [x]Nursing  [x]Case Management [] Consultants        Review of systems:  10 systems reviewed, all negative other than what is mentioned above.        Objective:  /60   Pulse 79   Temp 98.8 °F (37.1 °C) (Oral)   Resp 16   Ht 1.524 m (5')   Wt 72.6 kg (160 lb)   SpO2 95%   BMI 31.25 kg/m²   Last three shifts:  No intake/output data recorded.  General: Alert, NAD, AOX3   CVS: Regular rate and

## 2025-06-27 NOTE — PROGRESS NOTES
Department of General Surgery - Adult  Surgical Service General  Attending Progress Note      SUBJECTIVE:  Feeling about the same, no n/v, no change in RUQ discomfort, no flatus    OBJECTIVE      Physical    VITALS:  /60   Pulse 79   Temp 98.8 °F (37.1 °C) (Oral)   Resp 16   Ht 1.524 m (5')   Wt 72.6 kg (160 lb)   SpO2 95%   BMI 31.25 kg/m²   INTAKE/OUTPUT:  No intake or output data in the 24 hours ending 06/27/25 1411  ABDOMEN:  Distended,  min BS present (sl improved), mild RUQ tender, no peritoneal signs  Data  CBC:   Lab Results   Component Value Date/Time    WBC 7.8 06/26/2025 02:27 AM    RBC 4.75 06/26/2025 02:27 AM    HGB 13.7 06/26/2025 02:27 AM    HCT 40.2 06/26/2025 02:27 AM    MCV 84.6 06/26/2025 02:27 AM    MCH 28.8 06/26/2025 02:27 AM    MCHC 34.1 06/26/2025 02:27 AM    RDW 12.9 06/26/2025 02:27 AM     06/26/2025 02:27 AM    MPV 9.0 06/26/2025 02:27 AM     CBC with Differential:    Lab Results   Component Value Date/Time    WBC 7.8 06/26/2025 02:27 AM    RBC 4.75 06/26/2025 02:27 AM    HGB 13.7 06/26/2025 02:27 AM    HCT 40.2 06/26/2025 02:27 AM     06/26/2025 02:27 AM    MCV 84.6 06/26/2025 02:27 AM    MCH 28.8 06/26/2025 02:27 AM    MCHC 34.1 06/26/2025 02:27 AM    RDW 12.9 06/26/2025 02:27 AM    NRBC 0.0 06/26/2025 02:27 AM    NRBC 0.00 06/26/2025 02:27 AM    LYMPHOPCT 29.1 06/26/2025 02:27 AM    MONOPCT 6.3 06/26/2025 02:27 AM    EOSPCT 0.9 06/26/2025 02:27 AM    BASOPCT 0.3 06/26/2025 02:27 AM    MONOSABS 0.49 06/26/2025 02:27 AM    LYMPHSABS 2.27 06/26/2025 02:27 AM    EOSABS 0.07 06/26/2025 02:27 AM    BASOSABS 0.02 06/26/2025 02:27 AM    DIFFTYPE AUTOMATED 06/26/2025 02:27 AM     CMP:    Lab Results   Component Value Date/Time     06/26/2025 02:27 AM    K 3.4 06/26/2025 02:27 AM    CL 97 06/26/2025 02:27 AM    CO2 22 06/26/2025 02:27 AM    BUN 16 06/26/2025 02:27 AM    CREATININE 1.18 06/26/2025 02:27 AM    CREATININE 1.09 06/26/2025 12:06 AM    GFRAA 78  06/01/2020 08:26 AM    AGRATIO 1.4 06/01/2020 08:26 AM    LABGLOM 52 06/26/2025 02:27 AM    GLUCOSE 167 06/26/2025 02:27 AM    CALCIUM 9.5 06/26/2025 02:27 AM    BILITOT 0.7 06/25/2025 09:21 PM    ALKPHOS 116 06/25/2025 09:21 PM    ALKPHOS 97 06/01/2020 08:26 AM    AST 17 06/25/2025 09:21 PM    ALT 16 06/25/2025 09:21 PM     BMP:    Lab Results   Component Value Date/Time     06/26/2025 02:27 AM    K 3.4 06/26/2025 02:27 AM    CL 97 06/26/2025 02:27 AM    CO2 22 06/26/2025 02:27 AM    BUN 16 06/26/2025 02:27 AM    CREATININE 1.18 06/26/2025 02:27 AM    CREATININE 1.09 06/26/2025 12:06 AM    CALCIUM 9.5 06/26/2025 02:27 AM    GFRAA 78 06/01/2020 08:26 AM    LABGLOM 52 06/26/2025 02:27 AM    GLUCOSE 167 06/26/2025 02:27 AM       Current Inpatient Medications    Current Facility-Administered Medications: magnesium sulfate 2000 mg in water 50 mL IVPB, 2,000 mg, IntraVENous, Once  HYDROmorphone HCl PF (DILAUDID) injection 1 mg, 1 mg, IntraVENous, Q4H PRN  HYDROmorphone HCl PF (DILAUDID) injection 0.5 mg, 0.5 mg, IntraVENous, Q4H PRN  hydrALAZINE (APRESOLINE) injection 10 mg, 10 mg, IntraVENous, Q6H PRN  sodium chloride flush 0.9 % injection 5-40 mL, 5-40 mL, IntraVENous, 2 times per day  sodium chloride flush 0.9 % injection 5-40 mL, 5-40 mL, IntraVENous, PRN  0.9 % sodium chloride infusion, , IntraVENous, PRN  magnesium sulfate 2000 mg in 50 mL IVPB premix, 2,000 mg, IntraVENous, PRN  enoxaparin (LOVENOX) injection 40 mg, 40 mg, SubCUTAneous, Daily  ondansetron (ZOFRAN-ODT) disintegrating tablet 4 mg, 4 mg, Oral, Q8H PRN **OR** ondansetron (ZOFRAN) injection 4 mg, 4 mg, IntraVENous, Q6H PRN  polyethylene glycol (GLYCOLAX) packet 17 g, 17 g, Oral, Daily PRN  acetaminophen (TYLENOL) tablet 650 mg, 650 mg, Oral, Q6H PRN **OR** acetaminophen (TYLENOL) suppository 650 mg, 650 mg, Rectal, Q6H PRN  0.9% NaCl with KCl 20 mEq infusion, , IntraVENous, Continuous    ASSESSMENT AND PLAN    SBO -- clinically unchanged.  Even

## 2025-06-28 ENCOUNTER — APPOINTMENT (OUTPATIENT)
Facility: HOSPITAL | Age: 60
DRG: 230 | End: 2025-06-28
Payer: MEDICAID

## 2025-06-28 LAB
ANION GAP SERPL CALC-SCNC: 14 MMOL/L (ref 3–18)
BUN SERPL-MCNC: 12 MG/DL (ref 6–23)
BUN/CREAT SERPL: 14 (ref 12–20)
CALCIUM SERPL-MCNC: 8.8 MG/DL (ref 8.5–10.1)
CHLORIDE SERPL-SCNC: 105 MMOL/L (ref 98–107)
CO2 SERPL-SCNC: 18 MMOL/L (ref 21–32)
CREAT SERPL-MCNC: 0.89 MG/DL (ref 0.6–1.3)
GLUCOSE SERPL-MCNC: 77 MG/DL (ref 74–108)
MAGNESIUM SERPL-MCNC: 2.2 MG/DL (ref 1.6–2.6)
POTASSIUM SERPL-SCNC: 5 MMOL/L (ref 3.5–5.5)
SODIUM SERPL-SCNC: 136 MMOL/L (ref 136–145)

## 2025-06-28 PROCEDURE — 2500000003 HC RX 250 WO HCPCS: Performed by: INTERNAL MEDICINE

## 2025-06-28 PROCEDURE — 6360000002 HC RX W HCPCS: Performed by: INTERNAL MEDICINE

## 2025-06-28 PROCEDURE — 83735 ASSAY OF MAGNESIUM: CPT

## 2025-06-28 PROCEDURE — 6360000004 HC RX CONTRAST MEDICATION: Performed by: SURGERY

## 2025-06-28 PROCEDURE — 74177 CT ABD & PELVIS W/CONTRAST: CPT

## 2025-06-28 PROCEDURE — 1100000000 HC RM PRIVATE

## 2025-06-28 PROCEDURE — 71045 X-RAY EXAM CHEST 1 VIEW: CPT

## 2025-06-28 PROCEDURE — 80048 BASIC METABOLIC PNL TOTAL CA: CPT

## 2025-06-28 PROCEDURE — 36415 COLL VENOUS BLD VENIPUNCTURE: CPT

## 2025-06-28 PROCEDURE — 2580000003 HC RX 258: Performed by: HOSPITALIST

## 2025-06-28 RX ORDER — IOPAMIDOL 612 MG/ML
100 INJECTION, SOLUTION INTRAVASCULAR
Status: COMPLETED | OUTPATIENT
Start: 2025-06-28 | End: 2025-06-28

## 2025-06-28 RX ORDER — NALOXONE HYDROCHLORIDE 0.4 MG/ML
0.4 INJECTION, SOLUTION INTRAMUSCULAR; INTRAVENOUS; SUBCUTANEOUS PRN
Status: DISCONTINUED | OUTPATIENT
Start: 2025-06-28 | End: 2025-07-15 | Stop reason: HOSPADM

## 2025-06-28 RX ORDER — DEXTROSE MONOHYDRATE AND SODIUM CHLORIDE 5; .45 G/100ML; G/100ML
INJECTION, SOLUTION INTRAVENOUS CONTINUOUS
Status: DISCONTINUED | OUTPATIENT
Start: 2025-06-28 | End: 2025-06-30

## 2025-06-28 RX ADMIN — IOPAMIDOL 100 ML: 612 INJECTION, SOLUTION INTRAVENOUS at 18:15

## 2025-06-28 RX ADMIN — HYDROMORPHONE HYDROCHLORIDE 1 MG: 1 INJECTION, SOLUTION INTRAMUSCULAR; INTRAVENOUS; SUBCUTANEOUS at 01:41

## 2025-06-28 RX ADMIN — DEXTROSE AND SODIUM CHLORIDE: 5; .45 INJECTION, SOLUTION INTRAVENOUS at 14:58

## 2025-06-28 RX ADMIN — POTASSIUM CHLORIDE AND SODIUM CHLORIDE: 900; 150 INJECTION, SOLUTION INTRAVENOUS at 07:05

## 2025-06-28 RX ADMIN — ENOXAPARIN SODIUM 40 MG: 100 INJECTION SUBCUTANEOUS at 09:57

## 2025-06-28 RX ADMIN — HYDROMORPHONE HYDROCHLORIDE 1 MG: 1 INJECTION, SOLUTION INTRAMUSCULAR; INTRAVENOUS; SUBCUTANEOUS at 11:10

## 2025-06-28 RX ADMIN — HYDROMORPHONE HYDROCHLORIDE 1 MG: 1 INJECTION, SOLUTION INTRAMUSCULAR; INTRAVENOUS; SUBCUTANEOUS at 20:58

## 2025-06-28 RX ADMIN — HYDROMORPHONE HYDROCHLORIDE 1 MG: 1 INJECTION, SOLUTION INTRAMUSCULAR; INTRAVENOUS; SUBCUTANEOUS at 07:06

## 2025-06-28 RX ADMIN — SODIUM CHLORIDE, PRESERVATIVE FREE 10 ML: 5 INJECTION INTRAVENOUS at 09:58

## 2025-06-28 RX ADMIN — SODIUM CHLORIDE, PRESERVATIVE FREE 10 ML: 5 INJECTION INTRAVENOUS at 21:01

## 2025-06-28 RX ADMIN — HYDROMORPHONE HYDROCHLORIDE 0.5 MG: 1 INJECTION, SOLUTION INTRAMUSCULAR; INTRAVENOUS; SUBCUTANEOUS at 16:01

## 2025-06-28 ASSESSMENT — PAIN DESCRIPTION - LOCATION
LOCATION: ABDOMEN

## 2025-06-28 ASSESSMENT — PAIN SCALES - GENERAL
PAINLEVEL_OUTOF10: 7
PAINLEVEL_OUTOF10: 10
PAINLEVEL_OUTOF10: 10
PAINLEVEL_OUTOF10: 9
PAINLEVEL_OUTOF10: 6

## 2025-06-28 ASSESSMENT — PAIN DESCRIPTION - DESCRIPTORS
DESCRIPTORS: ACHING
DESCRIPTORS: ACHING

## 2025-06-28 ASSESSMENT — PAIN DESCRIPTION - ORIENTATION
ORIENTATION: RIGHT;UPPER
ORIENTATION: RIGHT;UPPER

## 2025-06-28 NOTE — PROGRESS NOTES
Patient is hospital day 3 with a small bowel obstruction.  The patient states that this began rather suddenly a day or 2 prior to her presentation.  She states that she has never had any episodes of obstruction since her surgery that was performed in 1995.  It is unclear why she had surgery, but she states that she had an ostomy for a period of time and a subsequent ostomy takedown.  Her abdominal scars are consistent with a prior left abdominal ostomy.  She states that she is continuing to have abdominal pain, and has not had flatus in a couple of days.  On cursory examination she is distended, and the patient states that this is very unusual for her.  She has some high blood pressure and some anxiety as well as a history of asthma.    Physical examination    98.6 °F (37 °C) 62 -- 129/82 96 %   She has been afebrile throughout her hospital course.  She did have some tachycardia and hypertension yesterday evening, but since that time her heart rate is in the 60s and 70s.     Latest Reference Range & Units 06/26/25 02:27   WBC 4.6 - 13.2 K/uL 7.8   RBC 4.20 - 5.30 M/uL 4.75   Hemoglobin Quant 12.0 - 16.0 g/dL 13.7   Hematocrit 35.0 - 45.0 % 40.2   MCV 78.0 - 100.0 FL 84.6   MCH 24.0 - 34.0 PG 28.8   MCHC 31.0 - 37.0 g/dL 34.1   MPV 9.2 - 11.8 FL 9.0 (L)   RDW 11.6 - 14.5 % 12.9   Platelet Count 135 - 420 K/uL 330   (L): Data is abnormally low     Latest Reference Range & Units 06/28/25 09:45   Sodium 136 - 145 mmol/L 136   Potassium 3.5 - 5.5 mmol/L 5.0   Chloride 98 - 107 mmol/L 105   CARBON DIOXIDE 21 - 32 mmol/L 18 (L)   BUN,BUNPL 6 - 23 MG/DL 12   Creatinine 0.60 - 1.30 MG/DL 0.89   Bun/Cre 12 - 20   14   Anion Gap 3 - 18 mmol/L 14   Est, Glom Filt Rate >60 ml/min/1.73m2 73   Magnesium 1.6 - 2.6 mg/dL 2.2   Glucose 74 - 108 mg/dL 77   Calcium 8.5 - 10.1 MG/DL 8.8   (L): Data is abnormally low    Nasogastric tube in place.  Her abdominal exam demonstrates diffuse distention with tympany.  No masses and no

## 2025-06-28 NOTE — PROGRESS NOTES
Bedside and Verbal shift change report given to Luann RN (oncoming nurse) by Hellen RN (offgoing nurse). Report included the following information Nurse Handoff Report, Adult Overview, Intake/Output, MAR, Recent Results, and Med Rec Status.

## 2025-06-28 NOTE — PROGRESS NOTES
Hospitalist Progress Note      Patient: Kiara Nance MRN: [id number]  CSN: [id number]    YOB: 1965  Age: 60 y.o.  Sex: female    DOA: 6/25/2025 LOS:  LOS: 3 days      PCP: Nae Kaiser, APRN - NP       Summary:  60 years old presented with abdominal pain, distention, constipation, and vomiting , she was admitted for small bowel obstruction.    Subjective still in pain and just received pain meds, no bm ,no gas. Not burping   RN still having abdominal pain     NG tube falling , need to be reinserted, ct scan with oral contrast ordered per surgeon     Assessment/Plan:    Principal Problem:    SBO (small bowel obstruction) ([facility])  Active Problems:    Benign essential HTN    Hypokalemia    Asthma  Resolved Problems:    * No resolved hospital problems. *       Small bowel obstruction  Stable, not improving   Continue IV hydration, pain count  So far no nausea no vomit  Hold NG tube for now  General Surgery has been consulted  Balance electrolytes replace magnesium and potassium  Encourage patient increase physical activity  Continue n.p.o. with ice chips  Balance the electrolytes -K and mg replacement   Change fluid because K  5       Hypokalemia-resolved   Potassium replacement    Hypertension  Hydralazine as needed    Asthma   Stable breathing treatment as needed    Anxiety  Po meds hold for now             Discharge to:   [] Home  []SNF/Rehab  [x]  Others  in  2 Days, []  stable  for DC  DVT Prophylaxis:   [x]Lovenox  []Hep SQ  []SCDs  []Coumadin, Eliquis, Xarelto, Pradaxa   []On Heparin gtt. [] No indication [] Refused  Case discussed with:   [x]Patient  []Family  [x]Nursing  [x]Case Management [] Consultants        Review of systems:  10 systems reviewed, all negative other than what is mentioned above.        Objective:  /82   Pulse 67   Temp 98.6 °F (37 °C) (Oral)   Resp 16   Ht 1.524 m (5')   Wt 72.6 kg (160 lb)   SpO2 96%   BMI 31.25 kg/m²   Last three shifts:  06/26

## 2025-06-28 NOTE — PLAN OF CARE
Problem: Pain  Goal: Verbalizes/displays adequate comfort level or baseline comfort level  6/28/2025 0121 by Audra Rodriguez RN  Outcome: Progressing  6/27/2025 1421 by Edmund Castellon RN  Outcome: Progressing     Problem: ABCDS Injury Assessment  Goal: Absence of physical injury  6/28/2025 0121 by Audra Rodriguez RN  Outcome: Progressing  6/27/2025 1421 by Edmund Castellon RN  Outcome: Progressing

## 2025-06-28 NOTE — PROGRESS NOTES
Bedside and Verbal shift change report given to LESLIE Macedo (oncoming nurse) by Audra Rodriguez RN   (offgoing nurse). Report included the following information Nurse Handoff Report, Index, Adult Overview, Intake/Output, MAR, and Recent Results.

## 2025-06-28 NOTE — PROGRESS NOTES
Bedside shift change report given to Audra RN (oncoming nurse) by Edmund RN (offgoing nurse). Report included the following information Nurse Handoff Report, Index, Adult Overview, Intake/Output, MAR, Recent Results, and Med Rec Status.

## 2025-06-28 NOTE — PROGRESS NOTES
0224  NG tube inserted. Patient tolerated well. Tube size 16 Fr inserted via left nare without difficulty. Tube advanced to 55 cm. Placement verified by X-ray. Tube secured with a radha. No signs of respiratory distress.

## 2025-06-29 ENCOUNTER — ANESTHESIA (OUTPATIENT)
Facility: HOSPITAL | Age: 60
End: 2025-06-29
Payer: MEDICAID

## 2025-06-29 ENCOUNTER — ANESTHESIA EVENT (OUTPATIENT)
Facility: HOSPITAL | Age: 60
End: 2025-06-29
Payer: MEDICAID

## 2025-06-29 DIAGNOSIS — G43.709 CHRONIC MIGRAINE WITHOUT AURA WITHOUT STATUS MIGRAINOSUS, NOT INTRACTABLE: ICD-10-CM

## 2025-06-29 LAB
ANION GAP SERPL CALC-SCNC: 15 MMOL/L (ref 3–18)
BASOPHILS # BLD: 0.02 K/UL (ref 0–0.1)
BASOPHILS NFR BLD: 0.4 % (ref 0–2)
BUN SERPL-MCNC: 10 MG/DL (ref 6–23)
BUN/CREAT SERPL: 11 (ref 12–20)
CALCIUM SERPL-MCNC: 9.3 MG/DL (ref 8.5–10.1)
CHLORIDE SERPL-SCNC: 100 MMOL/L (ref 98–107)
CO2 SERPL-SCNC: 19 MMOL/L (ref 21–32)
CREAT SERPL-MCNC: 0.89 MG/DL (ref 0.6–1.3)
DIFFERENTIAL METHOD BLD: ABNORMAL
EOSINOPHIL # BLD: 0.06 K/UL (ref 0–0.4)
EOSINOPHIL NFR BLD: 1.1 % (ref 0–5)
ERYTHROCYTE [DISTWIDTH] IN BLOOD BY AUTOMATED COUNT: 12.9 % (ref 11.6–14.5)
GLUCOSE SERPL-MCNC: 179 MG/DL (ref 74–108)
HCT VFR BLD AUTO: 40.3 % (ref 35–45)
HGB BLD-MCNC: 13.4 G/DL (ref 12–16)
IMM GRANULOCYTES # BLD AUTO: 0.02 K/UL (ref 0–0.04)
IMM GRANULOCYTES NFR BLD AUTO: 0.4 % (ref 0–0.5)
LYMPHOCYTES # BLD: 0.93 K/UL (ref 0.9–3.3)
LYMPHOCYTES NFR BLD: 16.8 % (ref 21–52)
MAGNESIUM SERPL-MCNC: 2 MG/DL (ref 1.6–2.6)
MCH RBC QN AUTO: 28.9 PG (ref 24–34)
MCHC RBC AUTO-ENTMCNC: 33.3 G/DL (ref 31–37)
MCV RBC AUTO: 86.9 FL (ref 78–100)
MONOCYTES # BLD: 0.44 K/UL (ref 0.05–1.2)
MONOCYTES NFR BLD: 7.9 % (ref 3–10)
NEUTS SEG # BLD: 4.08 K/UL (ref 1.8–8)
NEUTS SEG NFR BLD: 73.4 % (ref 40–73)
NRBC # BLD: 0 K/UL (ref 0–0.01)
NRBC BLD-RTO: 0 PER 100 WBC
PLATELET # BLD AUTO: 337 K/UL (ref 135–420)
PMV BLD AUTO: 9.2 FL (ref 9.2–11.8)
POTASSIUM SERPL-SCNC: 4.7 MMOL/L (ref 3.5–5.5)
RBC # BLD AUTO: 4.64 M/UL (ref 4.2–5.3)
SODIUM SERPL-SCNC: 134 MMOL/L (ref 136–145)
WBC # BLD AUTO: 5.6 K/UL (ref 4.6–13.2)

## 2025-06-29 PROCEDURE — 2709999900 HC NON-CHARGEABLE SUPPLY: Performed by: SURGERY

## 2025-06-29 PROCEDURE — 2500000003 HC RX 250 WO HCPCS

## 2025-06-29 PROCEDURE — 7100000001 HC PACU RECOVERY - ADDTL 15 MIN: Performed by: SURGERY

## 2025-06-29 PROCEDURE — 6360000002 HC RX W HCPCS: Performed by: INTERNAL MEDICINE

## 2025-06-29 PROCEDURE — 6360000002 HC RX W HCPCS: Performed by: ANESTHESIOLOGY

## 2025-06-29 PROCEDURE — 2580000003 HC RX 258: Performed by: HOSPITALIST

## 2025-06-29 PROCEDURE — 6360000002 HC RX W HCPCS: Performed by: SURGERY

## 2025-06-29 PROCEDURE — 2580000003 HC RX 258: Performed by: SURGERY

## 2025-06-29 PROCEDURE — 83735 ASSAY OF MAGNESIUM: CPT

## 2025-06-29 PROCEDURE — 2580000003 HC RX 258: Performed by: ANESTHESIOLOGY

## 2025-06-29 PROCEDURE — 7100000000 HC PACU RECOVERY - FIRST 15 MIN: Performed by: SURGERY

## 2025-06-29 PROCEDURE — 85025 COMPLETE CBC W/AUTO DIFF WBC: CPT

## 2025-06-29 PROCEDURE — 3700000000 HC ANESTHESIA ATTENDED CARE: Performed by: SURGERY

## 2025-06-29 PROCEDURE — 2500000003 HC RX 250 WO HCPCS: Performed by: SURGERY

## 2025-06-29 PROCEDURE — 3600000002 HC SURGERY LEVEL 2 BASE: Performed by: SURGERY

## 2025-06-29 PROCEDURE — 2500000003 HC RX 250 WO HCPCS: Performed by: INTERNAL MEDICINE

## 2025-06-29 PROCEDURE — 1100000000 HC RM PRIVATE

## 2025-06-29 PROCEDURE — 2580000003 HC RX 258

## 2025-06-29 PROCEDURE — 6360000002 HC RX W HCPCS: Performed by: HOSPITALIST

## 2025-06-29 PROCEDURE — 3700000001 HC ADD 15 MINUTES (ANESTHESIA): Performed by: SURGERY

## 2025-06-29 PROCEDURE — 3600000012 HC SURGERY LEVEL 2 ADDTL 15MIN: Performed by: SURGERY

## 2025-06-29 PROCEDURE — 36415 COLL VENOUS BLD VENIPUNCTURE: CPT

## 2025-06-29 PROCEDURE — 2500000003 HC RX 250 WO HCPCS: Performed by: HOSPITALIST

## 2025-06-29 PROCEDURE — 0DB80ZZ EXCISION OF SMALL INTESTINE, OPEN APPROACH: ICD-10-PCS | Performed by: SURGERY

## 2025-06-29 PROCEDURE — 6360000002 HC RX W HCPCS

## 2025-06-29 PROCEDURE — 0DN80ZZ RELEASE SMALL INTESTINE, OPEN APPROACH: ICD-10-PCS | Performed by: SURGERY

## 2025-06-29 PROCEDURE — 88307 TISSUE EXAM BY PATHOLOGIST: CPT

## 2025-06-29 PROCEDURE — 80048 BASIC METABOLIC PNL TOTAL CA: CPT

## 2025-06-29 RX ORDER — MORPHINE SULFATE 2 MG/ML
2 INJECTION, SOLUTION INTRAMUSCULAR; INTRAVENOUS
Status: DISCONTINUED | OUTPATIENT
Start: 2025-06-29 | End: 2025-06-30

## 2025-06-29 RX ORDER — ROCURONIUM BROMIDE 10 MG/ML
INJECTION, SOLUTION INTRAVENOUS
Status: DISCONTINUED | OUTPATIENT
Start: 2025-06-29 | End: 2025-06-29 | Stop reason: SDUPTHER

## 2025-06-29 RX ORDER — HYDROMORPHONE HYDROCHLORIDE 1 MG/ML
1 INJECTION, SOLUTION INTRAMUSCULAR; INTRAVENOUS; SUBCUTANEOUS
Status: DISCONTINUED | OUTPATIENT
Start: 2025-06-29 | End: 2025-07-03

## 2025-06-29 RX ORDER — PROPOFOL 10 MG/ML
INJECTION, EMULSION INTRAVENOUS
Status: DISCONTINUED | OUTPATIENT
Start: 2025-06-29 | End: 2025-06-29 | Stop reason: SDUPTHER

## 2025-06-29 RX ORDER — HYDROMORPHONE HYDROCHLORIDE 1 MG/ML
0.5 INJECTION, SOLUTION INTRAMUSCULAR; INTRAVENOUS; SUBCUTANEOUS EVERY 5 MIN PRN
Status: DISCONTINUED | OUTPATIENT
Start: 2025-06-29 | End: 2025-06-29 | Stop reason: HOSPADM

## 2025-06-29 RX ORDER — DROPERIDOL 2.5 MG/ML
0.62 INJECTION, SOLUTION INTRAMUSCULAR; INTRAVENOUS
Status: DISCONTINUED | OUTPATIENT
Start: 2025-06-29 | End: 2025-06-29 | Stop reason: HOSPADM

## 2025-06-29 RX ORDER — FENTANYL CITRATE 50 UG/ML
25 INJECTION, SOLUTION INTRAMUSCULAR; INTRAVENOUS EVERY 5 MIN PRN
Status: DISCONTINUED | OUTPATIENT
Start: 2025-06-29 | End: 2025-06-29 | Stop reason: HOSPADM

## 2025-06-29 RX ORDER — SODIUM CHLORIDE 0.9 % (FLUSH) 0.9 %
5-40 SYRINGE (ML) INJECTION EVERY 12 HOURS SCHEDULED
Status: DISCONTINUED | OUTPATIENT
Start: 2025-06-29 | End: 2025-06-29 | Stop reason: HOSPADM

## 2025-06-29 RX ORDER — OXYCODONE HYDROCHLORIDE 5 MG/1
5 TABLET ORAL
Status: DISCONTINUED | OUTPATIENT
Start: 2025-06-29 | End: 2025-06-29 | Stop reason: HOSPADM

## 2025-06-29 RX ORDER — MIDAZOLAM HYDROCHLORIDE 1 MG/ML
INJECTION, SOLUTION INTRAMUSCULAR; INTRAVENOUS
Status: DISCONTINUED | OUTPATIENT
Start: 2025-06-29 | End: 2025-06-29 | Stop reason: SDUPTHER

## 2025-06-29 RX ORDER — DIPHENHYDRAMINE HYDROCHLORIDE 50 MG/ML
12.5 INJECTION, SOLUTION INTRAMUSCULAR; INTRAVENOUS
Status: DISCONTINUED | OUTPATIENT
Start: 2025-06-29 | End: 2025-06-29 | Stop reason: HOSPADM

## 2025-06-29 RX ORDER — DEXMEDETOMIDINE HYDROCHLORIDE 100 UG/ML
INJECTION, SOLUTION INTRAVENOUS
Status: DISCONTINUED | OUTPATIENT
Start: 2025-06-29 | End: 2025-06-29 | Stop reason: SDUPTHER

## 2025-06-29 RX ORDER — SODIUM CHLORIDE, SODIUM LACTATE, POTASSIUM CHLORIDE, CALCIUM CHLORIDE 600; 310; 30; 20 MG/100ML; MG/100ML; MG/100ML; MG/100ML
INJECTION, SOLUTION INTRAVENOUS CONTINUOUS
Status: DISCONTINUED | OUTPATIENT
Start: 2025-06-29 | End: 2025-06-29 | Stop reason: HOSPADM

## 2025-06-29 RX ORDER — GLYCOPYRROLATE 0.2 MG/ML
INJECTION INTRAMUSCULAR; INTRAVENOUS
Status: DISCONTINUED | OUTPATIENT
Start: 2025-06-29 | End: 2025-06-29 | Stop reason: SDUPTHER

## 2025-06-29 RX ORDER — SUCCINYLCHOLINE/SOD CL,ISO/PF 100 MG/5ML
SYRINGE (ML) INTRAVENOUS
Status: DISCONTINUED | OUTPATIENT
Start: 2025-06-29 | End: 2025-06-29 | Stop reason: SDUPTHER

## 2025-06-29 RX ORDER — LABETALOL HYDROCHLORIDE 5 MG/ML
10 INJECTION, SOLUTION INTRAVENOUS
Status: DISCONTINUED | OUTPATIENT
Start: 2025-06-29 | End: 2025-06-29 | Stop reason: HOSPADM

## 2025-06-29 RX ORDER — SODIUM CHLORIDE 0.9 % (FLUSH) 0.9 %
5-40 SYRINGE (ML) INJECTION PRN
Status: DISCONTINUED | OUTPATIENT
Start: 2025-06-29 | End: 2025-06-29 | Stop reason: HOSPADM

## 2025-06-29 RX ORDER — DEXAMETHASONE SODIUM PHOSPHATE 4 MG/ML
INJECTION, SOLUTION INTRA-ARTICULAR; INTRALESIONAL; INTRAMUSCULAR; INTRAVENOUS; SOFT TISSUE
Status: DISCONTINUED | OUTPATIENT
Start: 2025-06-29 | End: 2025-06-29 | Stop reason: SDUPTHER

## 2025-06-29 RX ORDER — ONDANSETRON 2 MG/ML
INJECTION INTRAMUSCULAR; INTRAVENOUS
Status: DISCONTINUED | OUTPATIENT
Start: 2025-06-29 | End: 2025-06-29 | Stop reason: SDUPTHER

## 2025-06-29 RX ORDER — FENTANYL CITRATE 50 UG/ML
INJECTION, SOLUTION INTRAMUSCULAR; INTRAVENOUS
Status: DISCONTINUED | OUTPATIENT
Start: 2025-06-29 | End: 2025-06-29 | Stop reason: SDUPTHER

## 2025-06-29 RX ORDER — MAGNESIUM HYDROXIDE 1200 MG/15ML
LIQUID ORAL CONTINUOUS PRN
Status: COMPLETED | OUTPATIENT
Start: 2025-06-29 | End: 2025-06-29

## 2025-06-29 RX ORDER — MORPHINE SULFATE 4 MG/ML
2 INJECTION, SOLUTION INTRAMUSCULAR; INTRAVENOUS EVERY 4 HOURS PRN
Refills: 0 | Status: DISCONTINUED | OUTPATIENT
Start: 2025-06-29 | End: 2025-06-29

## 2025-06-29 RX ORDER — LIDOCAINE HYDROCHLORIDE 20 MG/ML
INJECTION, SOLUTION EPIDURAL; INFILTRATION; INTRACAUDAL; PERINEURAL
Status: DISCONTINUED | OUTPATIENT
Start: 2025-06-29 | End: 2025-06-29 | Stop reason: SDUPTHER

## 2025-06-29 RX ORDER — ONDANSETRON 2 MG/ML
4 INJECTION INTRAMUSCULAR; INTRAVENOUS
Status: DISCONTINUED | OUTPATIENT
Start: 2025-06-29 | End: 2025-06-29 | Stop reason: HOSPADM

## 2025-06-29 RX ORDER — IPRATROPIUM BROMIDE AND ALBUTEROL SULFATE 2.5; .5 MG/3ML; MG/3ML
1 SOLUTION RESPIRATORY (INHALATION)
Status: DISCONTINUED | OUTPATIENT
Start: 2025-06-29 | End: 2025-06-29 | Stop reason: HOSPADM

## 2025-06-29 RX ORDER — SODIUM CHLORIDE 9 MG/ML
INJECTION, SOLUTION INTRAVENOUS PRN
Status: DISCONTINUED | OUTPATIENT
Start: 2025-06-29 | End: 2025-06-29 | Stop reason: HOSPADM

## 2025-06-29 RX ORDER — EPHEDRINE SULFATE 5 MG/ML
INJECTION INTRAVENOUS
Status: DISCONTINUED | OUTPATIENT
Start: 2025-06-29 | End: 2025-06-29 | Stop reason: SDUPTHER

## 2025-06-29 RX ORDER — MEPERIDINE HYDROCHLORIDE 50 MG/ML
12.5 INJECTION INTRAMUSCULAR; INTRAVENOUS; SUBCUTANEOUS AS NEEDED
Status: DISCONTINUED | OUTPATIENT
Start: 2025-06-29 | End: 2025-06-29 | Stop reason: HOSPADM

## 2025-06-29 RX ORDER — SODIUM CHLORIDE, SODIUM LACTATE, POTASSIUM CHLORIDE, CALCIUM CHLORIDE 600; 310; 30; 20 MG/100ML; MG/100ML; MG/100ML; MG/100ML
INJECTION, SOLUTION INTRAVENOUS
Status: DISCONTINUED | OUTPATIENT
Start: 2025-06-29 | End: 2025-06-29 | Stop reason: SDUPTHER

## 2025-06-29 RX ADMIN — Medication 100 MG: at 11:49

## 2025-06-29 RX ADMIN — MIDAZOLAM 2 MG: 1 INJECTION INTRAMUSCULAR; INTRAVENOUS at 11:43

## 2025-06-29 RX ADMIN — DEXMEDETOMIDINE HYDROCHLORIDE 6 MCG: 100 INJECTION, SOLUTION INTRAVENOUS at 12:21

## 2025-06-29 RX ADMIN — PROPOFOL 200 MG: 10 INJECTION, EMULSION INTRAVENOUS at 11:49

## 2025-06-29 RX ADMIN — PIPERACILLIN AND TAZOBACTAM 4500 MG: 4; .5 INJECTION, POWDER, FOR SOLUTION INTRAVENOUS; PARENTERAL at 06:41

## 2025-06-29 RX ADMIN — ROCURONIUM BROMIDE 10 MG: 10 INJECTION, SOLUTION INTRAVENOUS at 13:15

## 2025-06-29 RX ADMIN — SODIUM CHLORIDE, SODIUM LACTATE, POTASSIUM CHLORIDE, AND CALCIUM CHLORIDE: 600; 310; 30; 20 INJECTION, SOLUTION INTRAVENOUS at 13:00

## 2025-06-29 RX ADMIN — DEXTROSE AND SODIUM CHLORIDE: 5; .45 INJECTION, SOLUTION INTRAVENOUS at 06:48

## 2025-06-29 RX ADMIN — MORPHINE SULFATE 2 MG: 4 INJECTION, SOLUTION INTRAMUSCULAR; INTRAVENOUS at 16:27

## 2025-06-29 RX ADMIN — SODIUM CHLORIDE, PRESERVATIVE FREE 40 MG: 5 INJECTION INTRAVENOUS at 08:01

## 2025-06-29 RX ADMIN — EPHEDRINE SULFATE 10 MG: 5 INJECTION INTRAVENOUS at 12:53

## 2025-06-29 RX ADMIN — LIDOCAINE HYDROCHLORIDE 60 MG: 20 INJECTION, SOLUTION EPIDURAL; INFILTRATION; INTRACAUDAL; PERINEURAL at 11:49

## 2025-06-29 RX ADMIN — ROCURONIUM BROMIDE 30 MG: 10 INJECTION, SOLUTION INTRAVENOUS at 12:00

## 2025-06-29 RX ADMIN — GLYCOPYRROLATE 0.2 MG: 0.2 INJECTION INTRAMUSCULAR; INTRAVENOUS at 11:49

## 2025-06-29 RX ADMIN — ONDANSETRON HYDROCHLORIDE 4 MG: 2 INJECTION INTRAMUSCULAR; INTRAVENOUS at 00:59

## 2025-06-29 RX ADMIN — Medication 20 MG: at 13:00

## 2025-06-29 RX ADMIN — SODIUM CHLORIDE, SODIUM LACTATE, POTASSIUM CHLORIDE, AND CALCIUM CHLORIDE: .6; .31; .03; .02 INJECTION, SOLUTION INTRAVENOUS at 15:20

## 2025-06-29 RX ADMIN — SODIUM CHLORIDE, SODIUM LACTATE, POTASSIUM CHLORIDE, AND CALCIUM CHLORIDE: 600; 310; 30; 20 INJECTION, SOLUTION INTRAVENOUS at 11:39

## 2025-06-29 RX ADMIN — ROCURONIUM BROMIDE 5 MG: 10 INJECTION, SOLUTION INTRAVENOUS at 11:49

## 2025-06-29 RX ADMIN — FENTANYL CITRATE 50 MCG: 50 INJECTION INTRAMUSCULAR; INTRAVENOUS at 12:06

## 2025-06-29 RX ADMIN — EPHEDRINE SULFATE 5 MG: 5 INJECTION INTRAVENOUS at 12:36

## 2025-06-29 RX ADMIN — EPHEDRINE SULFATE 10 MG: 5 INJECTION INTRAVENOUS at 13:21

## 2025-06-29 RX ADMIN — HYDROMORPHONE HYDROCHLORIDE 1 MG: 1 INJECTION, SOLUTION INTRAMUSCULAR; INTRAVENOUS; SUBCUTANEOUS at 00:50

## 2025-06-29 RX ADMIN — HYDROMORPHONE HYDROCHLORIDE 0.5 MG: 1 INJECTION, SOLUTION INTRAMUSCULAR; INTRAVENOUS; SUBCUTANEOUS at 14:10

## 2025-06-29 RX ADMIN — FENTANYL CITRATE 50 MCG: 50 INJECTION INTRAMUSCULAR; INTRAVENOUS at 11:49

## 2025-06-29 RX ADMIN — HYDROMORPHONE HYDROCHLORIDE 1 MG: 1 INJECTION, SOLUTION INTRAMUSCULAR; INTRAVENOUS; SUBCUTANEOUS at 21:37

## 2025-06-29 RX ADMIN — SUGAMMADEX 200 MG: 100 INJECTION, SOLUTION INTRAVENOUS at 14:05

## 2025-06-29 RX ADMIN — MORPHINE SULFATE 2 MG: 2 INJECTION, SOLUTION INTRAMUSCULAR; INTRAVENOUS at 20:19

## 2025-06-29 RX ADMIN — HYDROMORPHONE HYDROCHLORIDE 0.5 MG: 1 INJECTION, SOLUTION INTRAMUSCULAR; INTRAVENOUS; SUBCUTANEOUS at 07:57

## 2025-06-29 RX ADMIN — ROCURONIUM BROMIDE 35 MG: 10 INJECTION, SOLUTION INTRAVENOUS at 11:55

## 2025-06-29 RX ADMIN — HYDRALAZINE HYDROCHLORIDE 10 MG: 20 INJECTION INTRAMUSCULAR; INTRAVENOUS at 17:48

## 2025-06-29 RX ADMIN — DEXAMETHASONE SODIUM PHOSPHATE 4 MG: 4 INJECTION, SOLUTION INTRAMUSCULAR; INTRAVENOUS at 11:57

## 2025-06-29 RX ADMIN — SODIUM CHLORIDE, PRESERVATIVE FREE 10 ML: 5 INJECTION INTRAVENOUS at 08:05

## 2025-06-29 RX ADMIN — Medication 30 MG: at 12:03

## 2025-06-29 RX ADMIN — LABETALOL HYDROCHLORIDE 10 MG: 5 INJECTION INTRAVENOUS at 14:57

## 2025-06-29 RX ADMIN — ROCURONIUM BROMIDE 20 MG: 10 INJECTION, SOLUTION INTRAVENOUS at 12:30

## 2025-06-29 RX ADMIN — SODIUM CHLORIDE, PRESERVATIVE FREE 10 ML: 5 INJECTION INTRAVENOUS at 19:47

## 2025-06-29 RX ADMIN — FENTANYL CITRATE 25 MCG: 50 INJECTION INTRAMUSCULAR; INTRAVENOUS at 15:31

## 2025-06-29 RX ADMIN — HYDROMORPHONE HYDROCHLORIDE 1 MG: 1 INJECTION, SOLUTION INTRAMUSCULAR; INTRAVENOUS; SUBCUTANEOUS at 18:00

## 2025-06-29 RX ADMIN — ONDANSETRON HYDROCHLORIDE 4 MG: 2 INJECTION INTRAMUSCULAR; INTRAVENOUS at 10:54

## 2025-06-29 RX ADMIN — HYDROMORPHONE HYDROCHLORIDE 1 MG: 1 INJECTION, SOLUTION INTRAMUSCULAR; INTRAVENOUS; SUBCUTANEOUS at 05:03

## 2025-06-29 RX ADMIN — DEXMEDETOMIDINE HYDROCHLORIDE 6 MCG: 100 INJECTION, SOLUTION INTRAVENOUS at 12:17

## 2025-06-29 RX ADMIN — ONDANSETRON 4 MG: 2 INJECTION, SOLUTION INTRAMUSCULAR; INTRAVENOUS at 13:50

## 2025-06-29 ASSESSMENT — PAIN DESCRIPTION - ORIENTATION
ORIENTATION: MID
ORIENTATION: MID
ORIENTATION: RIGHT;OUTER
ORIENTATION: MID
ORIENTATION: MID

## 2025-06-29 ASSESSMENT — PAIN SCALES - GENERAL
PAINLEVEL_OUTOF10: 9
PAINLEVEL_OUTOF10: 9
PAINLEVEL_OUTOF10: 10
PAINLEVEL_OUTOF10: 4
PAINLEVEL_OUTOF10: 0
PAINLEVEL_OUTOF10: 8
PAINLEVEL_OUTOF10: 9
PAINLEVEL_OUTOF10: 9
PAINLEVEL_OUTOF10: 2
PAINLEVEL_OUTOF10: 10
PAINLEVEL_OUTOF10: 10

## 2025-06-29 ASSESSMENT — PAIN DESCRIPTION - LOCATION
LOCATION: ABDOMEN
LOCATION: ABDOMEN;INCISION
LOCATION: ABDOMEN;INCISION
LOCATION: ABDOMEN
LOCATION: ABDOMEN;INCISION
LOCATION: VAGINA

## 2025-06-29 ASSESSMENT — PAIN DESCRIPTION - DESCRIPTORS
DESCRIPTORS: ACHING
DESCRIPTORS: PRESSURE
DESCRIPTORS: ACHING

## 2025-06-29 NOTE — PROGRESS NOTES
TRANSFER - OUT REPORT:    Verbal report given to Octavio NELSON on Kiara Nance  being transferred to OR for routine progression of patient care       Report consisted of patient's Situation, Background, Assessment and   Recommendations(SBAR).     Information from the following report(s) Nurse Handoff Report, Adult Overview, Intake/Output, MAR, Recent Results, and Med Rec Status was reviewed with the receiving nurse.           Lines:   Peripheral IV 06/28/25 Right;Anterior Forearm (Active)        Opportunity for questions and clarification was provided.      Patient transported with:  Tech

## 2025-06-29 NOTE — INTERVAL H&P NOTE
Update History & Physical    The patient's History and Physical of earlier this week was reviewed with the patient and I examined the patient. There was no change, the patient continues to have abdominal pain and distention and a CT demonstrating small bowel obstruction.  My recommendation is to proceed with surgical exploration, lysis of adhesions, and possible small bowel resection and ostomy. The surgical site was confirmed by the patient and me.     Plan: The risks, benefits, expected outcome, and alternative to the recommended procedure have been discussed with the patient. Patient understands and wants to proceed with the procedure.     Electronically signed by Carlos Palencia MD on 6/29/2025 at 10:12 AM

## 2025-06-29 NOTE — PROGRESS NOTES
Patient is hospital day 4 with small bowel obstruction.  She states her abdominal pain is worse today.  I repeated the CAT scan yesterday with oral contrast.  She denies any flatus or bowel movements since the administration of the oral contrast.  The CT demonstrates:  Bowel: There is a persistent markedly dilated loop of bowel in the right  abdomen, likely small bowel and likely representing a closed loop obstruction.  This loop of bowel contains gas and fluid and is unopacified with oral contrast.  Remaining small bowel loops are nondilated. There is no pneumatosis. There is no  mesenteric vein gas.      Appendix: The appendix is normal.     Urinary bladder: Urinary bladder is partially filled and grossly normal.     Miscellaneous: There is trace perihepatic and pelvic free intraperitoneal fluid.  There is no free intraperitoneal gas. There is no focal fluid collection to  suggest abscess. There are multiple uterine masses consistent with fibroids.     IMPRESSION:  1. New, small right pleural effusion and right basilar patchy airspace  consolidation. New mild left basilar atelectasis.  2. Persistent, markedly dilated loop of bowel in the right abdomen, as described  Above    Her physical examination shows continued distention and she is more tender today.  Assessment small bowel obstruction, failing to resolve with nonoperative management  At this point my recommendation is to take her to surgery for exploration.  I counseled her on the conduction of the operation and she is in agreement with the plan.

## 2025-06-29 NOTE — PLAN OF CARE
Problem: Pain  Goal: Verbalizes/displays adequate comfort level or baseline comfort level  Outcome: Progressing  Flowsheets (Taken 6/29/2025 0523)  Verbalizes/displays adequate comfort level or baseline comfort level:   Encourage patient to monitor pain and request assistance   Assess pain using appropriate pain scale   Administer analgesics based on type and severity of pain and evaluate response     Problem: ABCDS Injury Assessment  Goal: Absence of physical injury  Outcome: Progressing  Flowsheets (Taken 6/29/2025 0523)  Absence of Physical Injury: Implement safety measures based on patient assessment     Problem: Gastrointestinal - Adult  Goal: Maintains or returns to baseline bowel function  Outcome: Progressing  Flowsheets (Taken 6/29/2025 0523)  Maintains or returns to baseline bowel function:   Assess bowel function   Encourage mobilization and activity   Administer ordered medications as needed     Problem: Metabolic/Fluid and Electrolytes - Adult  Goal: Electrolytes maintained within normal limits  Outcome: Progressing  Flowsheets (Taken 6/29/2025 0523)  Electrolytes maintained within normal limits:   Monitor labs and assess patient for signs and symptoms of electrolyte imbalances   Administer electrolyte replacement as ordered   Monitor response to electrolyte replacements, including repeat lab results as appropriate

## 2025-06-29 NOTE — PROGRESS NOTES
Hospitalist Progress Note      Patient: Kiara Nance MRN: [id number]  CSN: [id number]    YOB: 1965  Age: 60 y.o.  Sex: female    DOA: 6/25/2025 LOS:  LOS: 4 days      PCP: Nae Kaiser, APRN - NP       Summary:  60 years old presented with abdominal pain, distention, constipation, and vomiting , she was admitted for small bowel obstruction.    Subjective still in pain , no bm , no burp /passing gas   RN still having abdominal pain , will have surgery today     NG tube falling , need to be reinserted, ct scan with oral contrast ordered per surgeon     Assessment/Plan:    Principal Problem:    SBO (small bowel obstruction) ([facility])  Active Problems:    Benign essential HTN    Hypokalemia    Asthma  Resolved Problems:    * No resolved hospital problems. *       Small bowel obstruction  not improving on medical treatment, will have surgical involvement  Continue IV hydration, pain control  Incentive spirometry        Hypokalemia-resolved   Potassium replacement    Hypertension  Hydralazine as needed    Asthma   Stable breathing treatment as needed    Anxiety  Po meds hold for now             Discharge to:   [] Home  []SNF/Rehab  [x]  Others  in  2 Days, []  stable  for DC  DVT Prophylaxis:   [x]Lovenox  []Hep SQ  []SCDs  []Coumadin, Eliquis, Xarelto, Pradaxa   []On Heparin gtt. [] No indication [] Refused  Case discussed with:   [x]Patient  []Family  [x]Nursing  [x]Case Management [] Consultants        Review of systems:  10 systems reviewed, all negative other than what is mentioned above.        Objective:  BP (!) 151/89 Comment: notifed the nurse  Pulse 71   Temp 97.3 °F (36.3 °C) (Oral)   Resp 16   Ht 1.524 m (5')   Wt 72.6 kg (160 lb)   SpO2 100%   BMI 31.25 kg/m²   Last three shifts:  No intake/output data recorded.  General: Alert, NAD, AOX3 , in distress  CVS: Regular rate and rhythm, no m/c/r, S1/S2     Lungs: Clear to auscultation bilaterally, no wheezes, rhonchi, or rales

## 2025-06-29 NOTE — PROGRESS NOTES
TRANSFER - IN REPORT:    Verbal report received from Lou NELSON on Kiara Nance  being received from PACU for routine progression of patient care      Report consisted of patient's Situation, Background, Assessment and   Recommendations(SBAR).     Information from the following report(s) Nurse Handoff Report, Adult Overview, Intake/Output, MAR, Recent Results, and Med Rec Status was reviewed with the receiving nurse.    Opportunity for questions and clarification was provided.      Assessment completed upon patient's arrival to unit and care assumed.

## 2025-06-29 NOTE — PROGRESS NOTES
Bedside and Verbal shift change report given to Nancy RN (oncoming nurse) by Hellen RN (offgoing nurse). Report included the following information Nurse Handoff Report, Adult Overview, Intake/Output, MAR, Recent Results, and Med Rec Status.

## 2025-06-29 NOTE — PERIOP NOTE
TRANSFER - IN REPORT:    Verbal report received from OR RN on Kiara Nance  being received from OR for routine progression of patient care      Report consisted of patient's Situation, Background, Assessment and   Recommendations(SBAR).     Information from the following report(s) Adult Overview, Surgery Report, Intake/Output, and MAR was reviewed with the receiving nurse.    Opportunity for questions and clarification was provided.      Assessment completed upon patient's arrival to unit and care assumed.

## 2025-06-29 NOTE — PERIOP NOTE
Reoriented to place and time resp easy and even, bp elevated Dr. Quintero was made aware and labetalol was given see mar.  Repositioned on to bad also instructed on use of incentive spirometer.

## 2025-06-29 NOTE — OP NOTE
Operative Note      Patient: Kiara Nance  YOB: 1965  MRN: [id number]    Date of Procedure: 6/29/2025    Pre-Op Diagnosis Codes:      * Small bowel obstruction (HCC) [K56.609]    Post-Op Diagnosis: Same       Procedure(s):  LAPAROTOMY EXPLORATORY, LYSIS OF ADHESIONS,  SMALL BOWEL RESECTION    Surgeon(s):  Carlos Palencia MD    Assistant:   Surgical Assistant: Sai Rosas    Anesthesia: General    Estimated Blood Loss (mL): less than 50     Complications: None    Specimens:   ID Type Source Tests Collected by Time Destination   A : BOWEL OBSTUCTION Tissue Small Intestine SURGICAL PATHOLOGY Carlos Palencia MD 6/29/2025 1346        Implants:  * No implants in log *      Drains:   NG/OG/NJ/NE Tube Nasogastric 16 fr Left nostril (Active)       Urinary Catheter 06/29/25 Merrill (Active)       [REMOVED] NG/OG/NJ/NE Tube Nasogastric 16 fr Right nostril (Removed)   Securement device Tape 06/27/25 1900   Status Suction-low continuous 06/27/25 1900   Placement Verified X-Ray (Initial) 06/27/25 1900   Drainage Appearance Brown 06/27/25 1900   Output (mL) 100 ml 06/27/25 1900       [REMOVED] NG/OG/NJ/NE Tube Nasogastric 16 fr Left nostril (Removed)   Surrounding Skin Clean, dry & intact 06/28/25 0307   Securement device Adhesive based hurst 06/28/25 0307   Status Suction-low intermittent 06/28/25 0307   Placement Verified X-Ray (Initial) 06/28/25 0307   NG/OG/NJ/NE External Measurement (cm) 55 cm 06/28/25 0307   Drainage Appearance Brown 06/28/25 0307       [REMOVED] NG/OG/NJ/NE Tube Orogastric Center mouth (Removed)       Findings:  Infection Present At Time Of Surgery (PATOS) (choose all levels that have infection present):  No infection present  Other Findings: Small bowel obstruction proximal to existing ileo-colic anastamosis    Detailed Description of Procedure:   After informed consent was obtained the patient was taken to the operating suite where general anesthesia was administered by the department of  was thoroughly irrigated.  Hemostasis was ensured.  The fascia was closed with #1 looped PDS suture, and the skin was closed with staples.  The patient was then awakened and extubated and transported to the recovery room in stable condition.  There were no complications.    Electronically signed by Carlos Palencia MD on 6/29/2025 at 2:24 PM

## 2025-06-29 NOTE — PERIOP NOTE
Moaning on and off states I am cold, extra blanket placed and ender hugger on.  Encouraged to relax and focus on breathing temp 98.8 temporal.  Abdomen rounded surgical dressing clean dry and intact.

## 2025-06-29 NOTE — PROGRESS NOTES
Patient received pain morphine 2mg at 1627. Pain not Improved pain remains at a 10/10 with patient restless in bed.  1800 Dr. Palencia at bedside, patient restless and complaining pain 10/10. Verbal orders in change frequency of pain med morphine 2 mg to every 2 hours as needed and increase dilaudid 1mg frequency to every 2 hours as needed.

## 2025-06-29 NOTE — PERIOP NOTE
Patient complains of pressure \"down below-vagina\" was medicated per MDA guidelines.  Merrill catheter is secured to thigh and draining clear yellow urine.  Tried to reassure patient and reposition for comfort, vial signs with in limits.

## 2025-06-29 NOTE — PERIOP NOTE
TRANSFER - OUT REPORT:    Verbal report given to LESLIE Ingram on Kiara Nance  being transferred to [address] [place] [address]  for routine post-op       Report consisted of patient's Situation, Background, Assessment and   Recommendations(SBAR).     Information from the following report(s) Nurse Handoff Report was reviewed with the receiving nurse.           Lines:   Peripheral IV 06/28/25 Right;Anterior Forearm (Active)   Site Assessment Clean, dry & intact 06/29/25 1527   Line Status Infusing 06/29/25 1527   Phlebitis Assessment No symptoms 06/29/25 1527   Infiltration Assessment 0 06/29/25 1527   Dressing Status Clean, dry & intact 06/29/25 1527   Dressing Type Transparent 06/29/25 1527        Opportunity for questions and clarification was provided.      Patient transported with:  Registered Nurse

## 2025-06-30 ENCOUNTER — APPOINTMENT (OUTPATIENT)
Facility: HOSPITAL | Age: 60
DRG: 230 | End: 2025-06-30
Payer: MEDICAID

## 2025-06-30 PROBLEM — N17.9 ACUTE RENAL FAILURE: Status: ACTIVE | Noted: 2025-06-30

## 2025-06-30 LAB
ALBUMIN SERPL-MCNC: 2 G/DL (ref 3.4–5)
ALBUMIN SERPL-MCNC: 2.2 G/DL (ref 3.4–5)
ALBUMIN/GLOB SERPL: 0.7 (ref 0.8–1.7)
ALBUMIN/GLOB SERPL: 0.8 (ref 0.8–1.7)
ALP SERPL-CCNC: 46 U/L (ref 45–117)
ALP SERPL-CCNC: 48 U/L (ref 45–117)
ALT SERPL-CCNC: 27 U/L (ref 10–35)
ALT SERPL-CCNC: 70 U/L (ref 10–35)
ANION GAP SERPL CALC-SCNC: 13 MMOL/L (ref 3–18)
ANION GAP SERPL CALC-SCNC: 15 MMOL/L (ref 3–18)
ANION GAP SERPL CALC-SCNC: 18 MMOL/L (ref 3–18)
ARTERIAL PATENCY WRIST A: POSITIVE
AST SERPL-CCNC: 37 U/L (ref 10–38)
AST SERPL-CCNC: 93 U/L (ref 10–38)
BASE DEFICIT BLD-SCNC: 2.3 MMOL/L
BASOPHILS # BLD: 0 K/UL (ref 0–0.1)
BASOPHILS # BLD: 0 K/UL (ref 0–0.1)
BASOPHILS NFR BLD: 0 % (ref 0–2)
BASOPHILS NFR BLD: 0 % (ref 0–2)
BDY SITE: ABNORMAL
BILIRUB DIRECT SERPL-MCNC: 0.2 MG/DL (ref 0–0.2)
BILIRUB SERPL-MCNC: 0.3 MG/DL (ref 0.2–1)
BILIRUB SERPL-MCNC: 0.3 MG/DL (ref 0.2–1)
BUN SERPL-MCNC: 18 MG/DL (ref 6–23)
BUN SERPL-MCNC: 25 MG/DL (ref 6–23)
BUN SERPL-MCNC: 26 MG/DL (ref 6–23)
BUN/CREAT SERPL: 10 (ref 12–20)
BUN/CREAT SERPL: 13 (ref 12–20)
BUN/CREAT SERPL: 14 (ref 12–20)
CALCIUM SERPL-MCNC: 7.9 MG/DL (ref 8.5–10.1)
CALCIUM SERPL-MCNC: 8 MG/DL (ref 8.5–10.1)
CALCIUM SERPL-MCNC: 8.3 MG/DL (ref 8.5–10.1)
CHLORIDE SERPL-SCNC: 100 MMOL/L (ref 98–107)
CHLORIDE SERPL-SCNC: 101 MMOL/L (ref 98–107)
CHLORIDE SERPL-SCNC: 101 MMOL/L (ref 98–107)
CO2 SERPL-SCNC: 16 MMOL/L (ref 21–32)
CO2 SERPL-SCNC: 20 MMOL/L (ref 21–32)
CO2 SERPL-SCNC: 22 MMOL/L (ref 21–32)
CREAT SERPL-MCNC: 1.77 MG/DL (ref 0.6–1.3)
CREAT SERPL-MCNC: 1.84 MG/DL (ref 0.6–1.3)
CREAT SERPL-MCNC: 1.89 MG/DL (ref 0.6–1.3)
DIFFERENTIAL METHOD BLD: ABNORMAL
DIFFERENTIAL METHOD BLD: ABNORMAL
EOSINOPHIL # BLD: 0 K/UL (ref 0–0.4)
EOSINOPHIL # BLD: 0 K/UL (ref 0–0.4)
EOSINOPHIL NFR BLD: 0 % (ref 0–5)
EOSINOPHIL NFR BLD: 0 % (ref 0–5)
ERYTHROCYTE [DISTWIDTH] IN BLOOD BY AUTOMATED COUNT: 12.5 % (ref 11.6–14.5)
ERYTHROCYTE [DISTWIDTH] IN BLOOD BY AUTOMATED COUNT: 12.6 % (ref 11.6–14.5)
GAS FLOW.O2 O2 DELIVERY SYS: ABNORMAL
GLOBULIN SER CALC-MCNC: 2.8 G/DL (ref 2–4)
GLOBULIN SER CALC-MCNC: 2.8 G/DL (ref 2–4)
GLUCOSE BLD STRIP.AUTO-MCNC: 185 MG/DL (ref 70–110)
GLUCOSE BLD STRIP.AUTO-MCNC: 197 MG/DL (ref 70–110)
GLUCOSE SERPL-MCNC: 226 MG/DL (ref 74–108)
GLUCOSE SERPL-MCNC: 237 MG/DL (ref 74–108)
GLUCOSE SERPL-MCNC: 241 MG/DL (ref 74–108)
HCO3 BLD-SCNC: 20.7 MMOL/L (ref 21–28)
HCT VFR BLD AUTO: 33.7 % (ref 35–45)
HCT VFR BLD AUTO: 40.7 % (ref 35–45)
HGB BLD-MCNC: 11.7 G/DL (ref 12–16)
HGB BLD-MCNC: 14 G/DL (ref 12–16)
IMM GRANULOCYTES # BLD AUTO: 0 K/UL
IMM GRANULOCYTES # BLD AUTO: 0 K/UL
IMM GRANULOCYTES NFR BLD AUTO: 0 %
IMM GRANULOCYTES NFR BLD AUTO: 0 %
LACTATE SERPL-SCNC: 3.5 MMOL/L (ref 0.4–2)
LYMPHOCYTES # BLD: 1.17 K/UL (ref 0.9–3.6)
LYMPHOCYTES # BLD: 1.19 K/UL (ref 0.9–3.6)
LYMPHOCYTES NFR BLD: 23 % (ref 21–52)
LYMPHOCYTES NFR BLD: 27 % (ref 21–52)
MAGNESIUM SERPL-MCNC: 1.4 MG/DL (ref 1.6–2.6)
MAGNESIUM SERPL-MCNC: 1.9 MG/DL (ref 1.6–2.6)
MCH RBC QN AUTO: 28.5 PG (ref 24–34)
MCH RBC QN AUTO: 28.7 PG (ref 24–34)
MCHC RBC AUTO-ENTMCNC: 34.4 G/DL (ref 31–37)
MCHC RBC AUTO-ENTMCNC: 34.7 G/DL (ref 31–37)
MCV RBC AUTO: 82.2 FL (ref 78–100)
MCV RBC AUTO: 83.6 FL (ref 78–100)
METAMYELOCYTES NFR BLD MANUAL: 2 %
MONOCYTES # BLD: 0.18 K/UL (ref 0.05–1.2)
MONOCYTES # BLD: 0.2 K/UL (ref 0.05–1.2)
MONOCYTES NFR BLD: 4 % (ref 3–10)
MONOCYTES NFR BLD: 4 % (ref 3–10)
NEUTS BAND NFR BLD MANUAL: 29 % (ref 0–5)
NEUTS SEG # BLD: 2.95 K/UL (ref 1.8–8)
NEUTS SEG # BLD: 3.73 K/UL (ref 1.8–8)
NEUTS SEG NFR BLD: 38 % (ref 40–73)
NEUTS SEG NFR BLD: 73 % (ref 40–73)
NRBC # BLD: 0 K/UL (ref 0–0.01)
NRBC # BLD: 0 K/UL (ref 0–0.01)
NRBC BLD-RTO: 0 PER 100 WBC
NRBC BLD-RTO: 0 PER 100 WBC
O2/TOTAL GAS SETTING VFR VENT: 2 %
PCO2 BLD: 29.6 MMHG (ref 35–48)
PH BLD: 7.45 (ref 7.35–7.45)
PHOSPHATE SERPL-MCNC: 4.4 MG/DL (ref 2.5–4.9)
PLATELET # BLD AUTO: 257 K/UL (ref 135–420)
PLATELET # BLD AUTO: 333 K/UL (ref 135–420)
PLATELET COMMENT: ABNORMAL
PLATELET COMMENT: ABNORMAL
PMV BLD AUTO: 10.1 FL (ref 9.2–11.8)
PMV BLD AUTO: 9.9 FL (ref 9.2–11.8)
PO2 BLD: 47 MMHG (ref 83–108)
POTASSIUM SERPL-SCNC: 4.7 MMOL/L (ref 3.5–5.5)
POTASSIUM SERPL-SCNC: 4.8 MMOL/L (ref 3.5–5.5)
POTASSIUM SERPL-SCNC: 5.2 MMOL/L (ref 3.5–5.5)
PROCALCITONIN SERPL-MCNC: >100 NG/ML
PROT SERPL-MCNC: 4.8 G/DL (ref 6.4–8.2)
PROT SERPL-MCNC: 5 G/DL (ref 6.4–8.2)
RBC # BLD AUTO: 4.1 M/UL (ref 4.2–5.3)
RBC # BLD AUTO: 4.87 M/UL (ref 4.2–5.3)
RBC MORPH BLD: ABNORMAL
RBC MORPH BLD: ABNORMAL
SAO2 % BLD: 85.4 % (ref 92–97)
SERVICE CMNT-IMP: ABNORMAL
SODIUM SERPL-SCNC: 135 MMOL/L (ref 136–145)
SPECIMEN TYPE: ABNORMAL
TRIGL SERPL-MCNC: 59 MG/DL (ref 0–150)
WBC # BLD AUTO: 4.4 K/UL (ref 4.6–13.2)
WBC # BLD AUTO: 5.1 K/UL (ref 4.6–13.2)

## 2025-06-30 PROCEDURE — 6360000002 HC RX W HCPCS

## 2025-06-30 PROCEDURE — 2500000003 HC RX 250 WO HCPCS: Performed by: INTERNAL MEDICINE

## 2025-06-30 PROCEDURE — 36415 COLL VENOUS BLD VENIPUNCTURE: CPT

## 2025-06-30 PROCEDURE — 6360000002 HC RX W HCPCS: Performed by: SURGERY

## 2025-06-30 PROCEDURE — 83605 ASSAY OF LACTIC ACID: CPT

## 2025-06-30 PROCEDURE — 1100000000 HC RM PRIVATE

## 2025-06-30 PROCEDURE — 2500000003 HC RX 250 WO HCPCS: Performed by: STUDENT IN AN ORGANIZED HEALTH CARE EDUCATION/TRAINING PROGRAM

## 2025-06-30 PROCEDURE — 2580000003 HC RX 258: Performed by: PHYSICIAN ASSISTANT

## 2025-06-30 PROCEDURE — 80076 HEPATIC FUNCTION PANEL: CPT

## 2025-06-30 PROCEDURE — 83735 ASSAY OF MAGNESIUM: CPT

## 2025-06-30 PROCEDURE — 2580000003 HC RX 258: Performed by: HOSPITALIST

## 2025-06-30 PROCEDURE — 0202U NFCT DS 22 TRGT SARS-COV-2: CPT

## 2025-06-30 PROCEDURE — 6370000000 HC RX 637 (ALT 250 FOR IP): Performed by: HOSPITALIST

## 2025-06-30 PROCEDURE — 87899 AGENT NOS ASSAY W/OPTIC: CPT

## 2025-06-30 PROCEDURE — 2500000003 HC RX 250 WO HCPCS: Performed by: HOSPITALIST

## 2025-06-30 PROCEDURE — 2580000003 HC RX 258: Performed by: STUDENT IN AN ORGANIZED HEALTH CARE EDUCATION/TRAINING PROGRAM

## 2025-06-30 PROCEDURE — 80048 BASIC METABOLIC PNL TOTAL CA: CPT

## 2025-06-30 PROCEDURE — 36600 WITHDRAWAL OF ARTERIAL BLOOD: CPT

## 2025-06-30 PROCEDURE — 70450 CT HEAD/BRAIN W/O DYE: CPT

## 2025-06-30 PROCEDURE — 6360000002 HC RX W HCPCS: Performed by: INTERNAL MEDICINE

## 2025-06-30 PROCEDURE — 6360000002 HC RX W HCPCS: Performed by: HOSPITALIST

## 2025-06-30 PROCEDURE — 81001 URINALYSIS AUTO W/SCOPE: CPT

## 2025-06-30 PROCEDURE — 82803 BLOOD GASES ANY COMBINATION: CPT

## 2025-06-30 PROCEDURE — 2709999900 IR PICC WO SQ PORT/PUMP > 5 YEARS

## 2025-06-30 PROCEDURE — 85025 COMPLETE CBC W/AUTO DIFF WBC: CPT

## 2025-06-30 PROCEDURE — 80053 COMPREHEN METABOLIC PANEL: CPT

## 2025-06-30 PROCEDURE — 84478 ASSAY OF TRIGLYCERIDES: CPT

## 2025-06-30 PROCEDURE — 84100 ASSAY OF PHOSPHORUS: CPT

## 2025-06-30 PROCEDURE — 84145 PROCALCITONIN (PCT): CPT

## 2025-06-30 PROCEDURE — 71250 CT THORAX DX C-: CPT

## 2025-06-30 PROCEDURE — 82962 GLUCOSE BLOOD TEST: CPT

## 2025-06-30 PROCEDURE — 6360000002 HC RX W HCPCS: Performed by: PHYSICIAN ASSISTANT

## 2025-06-30 RX ORDER — 0.9 % SODIUM CHLORIDE 0.9 %
500 INTRAVENOUS SOLUTION INTRAVENOUS ONCE
Status: COMPLETED | OUTPATIENT
Start: 2025-06-30 | End: 2025-06-30

## 2025-06-30 RX ORDER — HEPARIN SODIUM 200 [USP'U]/100ML
1000 INJECTION, SOLUTION INTRAVENOUS ONCE
Status: COMPLETED | OUTPATIENT
Start: 2025-06-30 | End: 2025-06-30

## 2025-06-30 RX ORDER — SODIUM BICARBONATE IN D5W 150/1000ML
PLASTIC BAG, INJECTION (ML) INTRAVENOUS CONTINUOUS
Status: DISCONTINUED | OUTPATIENT
Start: 2025-06-30 | End: 2025-06-30 | Stop reason: SDUPTHER

## 2025-06-30 RX ORDER — NALOXONE HYDROCHLORIDE 0.4 MG/ML
0.4 INJECTION, SOLUTION INTRAMUSCULAR; INTRAVENOUS; SUBCUTANEOUS
Status: COMPLETED | OUTPATIENT
Start: 2025-06-30 | End: 2025-06-30

## 2025-06-30 RX ORDER — SODIUM CHLORIDE, SODIUM LACTATE, POTASSIUM CHLORIDE, AND CALCIUM CHLORIDE .6; .31; .03; .02 G/100ML; G/100ML; G/100ML; G/100ML
500 INJECTION, SOLUTION INTRAVENOUS ONCE
Status: DISCONTINUED | OUTPATIENT
Start: 2025-06-30 | End: 2025-06-30

## 2025-06-30 RX ORDER — PROPRANOLOL HYDROCHLORIDE 80 MG/1
80 CAPSULE, EXTENDED RELEASE ORAL DAILY
Qty: 30 CAPSULE | Refills: 0 | Status: SHIPPED | OUTPATIENT
Start: 2025-06-30

## 2025-06-30 RX ORDER — MAGNESIUM SULFATE HEPTAHYDRATE 40 MG/ML
2000 INJECTION, SOLUTION INTRAVENOUS ONCE
Status: COMPLETED | OUTPATIENT
Start: 2025-06-30 | End: 2025-06-30

## 2025-06-30 RX ORDER — HEPARIN SODIUM (PORCINE) LOCK FLUSH IV SOLN 100 UNIT/ML 100 UNIT/ML
500 SOLUTION INTRAVENOUS EVERY 8 HOURS PRN
Status: DISCONTINUED | OUTPATIENT
Start: 2025-06-30 | End: 2025-07-02 | Stop reason: SINTOL

## 2025-06-30 RX ORDER — METOPROLOL TARTRATE 1 MG/ML
2.5 INJECTION, SOLUTION INTRAVENOUS EVERY 8 HOURS
Status: DISCONTINUED | OUTPATIENT
Start: 2025-06-30 | End: 2025-07-04

## 2025-06-30 RX ORDER — ENOXAPARIN SODIUM 100 MG/ML
30 INJECTION SUBCUTANEOUS DAILY
Status: DISCONTINUED | OUTPATIENT
Start: 2025-07-01 | End: 2025-07-02

## 2025-06-30 RX ORDER — DIAZEPAM 10 MG/2ML
2.5 INJECTION, SOLUTION INTRAMUSCULAR; INTRAVENOUS ONCE
Status: COMPLETED | OUTPATIENT
Start: 2025-06-30 | End: 2025-06-30

## 2025-06-30 RX ORDER — ENOXAPARIN SODIUM 100 MG/ML
30 INJECTION SUBCUTANEOUS DAILY
Status: DISCONTINUED | OUTPATIENT
Start: 2025-07-01 | End: 2025-06-30

## 2025-06-30 RX ORDER — DIAZEPAM 10 MG/2ML
5 INJECTION, SOLUTION INTRAMUSCULAR; INTRAVENOUS ONCE
Status: COMPLETED | OUTPATIENT
Start: 2025-06-30 | End: 2025-06-30

## 2025-06-30 RX ORDER — SODIUM CHLORIDE, SODIUM LACTATE, POTASSIUM CHLORIDE, AND CALCIUM CHLORIDE .6; .31; .03; .02 G/100ML; G/100ML; G/100ML; G/100ML
30 INJECTION, SOLUTION INTRAVENOUS ONCE
Status: COMPLETED | OUTPATIENT
Start: 2025-06-30 | End: 2025-06-30

## 2025-06-30 RX ORDER — LIDOCAINE HYDROCHLORIDE 10 MG/ML
20 INJECTION, SOLUTION INFILTRATION; PERINEURAL ONCE
Status: COMPLETED | OUTPATIENT
Start: 2025-06-30 | End: 2025-06-30

## 2025-06-30 RX ADMIN — SODIUM CHLORIDE, PRESERVATIVE FREE 10 ML: 5 INJECTION INTRAVENOUS at 21:10

## 2025-06-30 RX ADMIN — METOPROLOL TARTRATE 2.5 MG: 5 INJECTION INTRAVENOUS at 13:03

## 2025-06-30 RX ADMIN — SODIUM CHLORIDE 500 ML: 0.9 INJECTION, SOLUTION INTRAVENOUS at 13:12

## 2025-06-30 RX ADMIN — MORPHINE SULFATE 2 MG: 2 INJECTION, SOLUTION INTRAMUSCULAR; INTRAVENOUS at 00:15

## 2025-06-30 RX ADMIN — INSULIN HUMAN 2 UNITS: 100 INJECTION, SOLUTION PARENTERAL at 18:48

## 2025-06-30 RX ADMIN — SODIUM CHLORIDE, SODIUM LACTATE, POTASSIUM CHLORIDE, AND CALCIUM CHLORIDE 2178 ML: .6; .31; .03; .02 INJECTION, SOLUTION INTRAVENOUS at 21:19

## 2025-06-30 RX ADMIN — NALXONE HYDROCHLORIDE 0.4 MG: 0.4 INJECTION INTRAMUSCULAR; INTRAVENOUS; SUBCUTANEOUS at 20:28

## 2025-06-30 RX ADMIN — SODIUM CHLORIDE, PRESERVATIVE FREE 10 ML: 5 INJECTION INTRAVENOUS at 09:58

## 2025-06-30 RX ADMIN — METOPROLOL TARTRATE 2.5 MG: 5 INJECTION INTRAVENOUS at 20:35

## 2025-06-30 RX ADMIN — HYDROMORPHONE HYDROCHLORIDE 1 MG: 1 INJECTION, SOLUTION INTRAMUSCULAR; INTRAVENOUS; SUBCUTANEOUS at 10:04

## 2025-06-30 RX ADMIN — HYDROMORPHONE HYDROCHLORIDE 1 MG: 1 INJECTION, SOLUTION INTRAMUSCULAR; INTRAVENOUS; SUBCUTANEOUS at 21:10

## 2025-06-30 RX ADMIN — MAGNESIUM SULFATE HEPTAHYDRATE 2000 MG: 40 INJECTION, SOLUTION INTRAVENOUS at 11:12

## 2025-06-30 RX ADMIN — VANCOMYCIN HYDROCHLORIDE 1750 MG: 10 INJECTION, POWDER, LYOPHILIZED, FOR SOLUTION INTRAVENOUS at 23:24

## 2025-06-30 RX ADMIN — PIPERACILLIN AND TAZOBACTAM 3375 MG: 3; .375 INJECTION, POWDER, LYOPHILIZED, FOR SOLUTION INTRAVENOUS at 23:29

## 2025-06-30 RX ADMIN — SODIUM BICARBONATE: 84 INJECTION, SOLUTION INTRAVENOUS at 17:03

## 2025-06-30 RX ADMIN — DIAZEPAM 5 MG: 5 INJECTION INTRAMUSCULAR; INTRAVENOUS at 20:43

## 2025-06-30 RX ADMIN — MORPHINE SULFATE 2 MG: 2 INJECTION, SOLUTION INTRAMUSCULAR; INTRAVENOUS at 06:16

## 2025-06-30 RX ADMIN — DIAZEPAM 2.5 MG: 5 INJECTION, SOLUTION INTRAMUSCULAR; INTRAVENOUS at 04:56

## 2025-06-30 RX ADMIN — HYDROMORPHONE HYDROCHLORIDE 1 MG: 1 INJECTION, SOLUTION INTRAMUSCULAR; INTRAVENOUS; SUBCUTANEOUS at 13:34

## 2025-06-30 RX ADMIN — ENOXAPARIN SODIUM 40 MG: 100 INJECTION SUBCUTANEOUS at 09:40

## 2025-06-30 RX ADMIN — HYDROMORPHONE HYDROCHLORIDE 1 MG: 1 INJECTION, SOLUTION INTRAMUSCULAR; INTRAVENOUS; SUBCUTANEOUS at 18:17

## 2025-06-30 RX ADMIN — SODIUM CHLORIDE, PRESERVATIVE FREE 40 MG: 5 INJECTION INTRAVENOUS at 09:39

## 2025-06-30 RX ADMIN — Medication 500 UNITS: at 16:31

## 2025-06-30 RX ADMIN — ASCORBIC ACID, VITAMIN A PALMITATE, CHOLECALCIFEROL, THIAMINE HYDROCHLORIDE, RIBOFLAVIN-5 PHOSPHATE SODIUM, PYRIDOXINE HYDROCHLORIDE, NIACINAMIDE, DEXPANTHENOL, ALPHA-TOCOPHEROL ACETATE, VITAMIN K1, FOLIC ACID, BIOTIN, CYANOCOBALAMIN: 200; 3300; 200; 6; 3.6; 6; 40; 15; 10; 150; 600; 60; 5 INJECTION, SOLUTION INTRAVENOUS at 18:24

## 2025-06-30 RX ADMIN — LIDOCAINE HYDROCHLORIDE 3 ML: 10 INJECTION, SOLUTION INFILTRATION; PERINEURAL at 16:32

## 2025-06-30 RX ADMIN — HYDROMORPHONE HYDROCHLORIDE 1 MG: 1 INJECTION, SOLUTION INTRAMUSCULAR; INTRAVENOUS; SUBCUTANEOUS at 01:16

## 2025-06-30 RX ADMIN — HYDROMORPHONE HYDROCHLORIDE 1 MG: 1 INJECTION, SOLUTION INTRAMUSCULAR; INTRAVENOUS; SUBCUTANEOUS at 04:30

## 2025-06-30 RX ADMIN — MORPHINE SULFATE 2 MG: 2 INJECTION, SOLUTION INTRAMUSCULAR; INTRAVENOUS at 03:00

## 2025-06-30 RX ADMIN — HEPARIN SODIUM 1000 UNITS: 200 INJECTION, SOLUTION INTRAVENOUS at 16:32

## 2025-06-30 ASSESSMENT — PAIN DESCRIPTION - LOCATION
LOCATION: ABDOMEN

## 2025-06-30 ASSESSMENT — PAIN SCALES - GENERAL
PAINLEVEL_OUTOF10: 10
PAINLEVEL_OUTOF10: 8
PAINLEVEL_OUTOF10: 10
PAINLEVEL_OUTOF10: 7
PAINLEVEL_OUTOF10: 10
PAINLEVEL_OUTOF10: 10

## 2025-06-30 ASSESSMENT — PAIN DESCRIPTION - DESCRIPTORS
DESCRIPTORS: SHARP
DESCRIPTORS: SHARP

## 2025-06-30 ASSESSMENT — PAIN DESCRIPTION - ORIENTATION
ORIENTATION: MID
ORIENTATION: MID

## 2025-06-30 NOTE — PLAN OF CARE
Problem: Pain  Goal: Verbalizes/displays adequate comfort level or baseline comfort level  Outcome: Progressing     Problem: ABCDS Injury Assessment  Goal: Absence of physical injury  Outcome: Progressing     Problem: Gastrointestinal - Adult  Goal: Maintains or returns to baseline bowel function  Outcome: Progressing  Flowsheets (Taken 6/30/2025 0957)  Maintains or returns to baseline bowel function: Assess bowel function     Problem: Metabolic/Fluid and Electrolytes - Adult  Goal: Electrolytes maintained within normal limits  Outcome: Progressing  Flowsheets (Taken 6/30/2025 0957)  Electrolytes maintained within normal limits:   Monitor labs and assess patient for signs and symptoms of electrolyte imbalances   Administer electrolyte replacement as ordered     Problem: Safety - Adult  Goal: Free from fall injury  Outcome: Progressing     Problem: Skin/Tissue Integrity  Goal: Skin integrity remains intact  Description: 1.  Monitor for areas of redness and/or skin breakdown  2.  Assess vascular access sites hourly  3.  Every 4-6 hours minimum:  Change oxygen saturation probe site  4.  Every 4-6 hours:  If on nasal continuous positive airway pressure, respiratory therapy assess nares and determine need for appliance change or resting period  Outcome: Progressing  Flowsheets (Taken 6/30/2025 0957)  Skin Integrity Remains Intact:   Monitor for areas of redness and/or skin breakdown   Assess vascular access sites hourly

## 2025-06-30 NOTE — PROGRESS NOTES
Hospitalist Progress Note      Patient: Kiara Nance MRN: [id number]  CSN: [id number]    YOB: 1965  Age: 60 y.o.  Sex: female    DOA: 6/25/2025 LOS:  LOS: 5 days      PCP: Nae Kaiser, APRN - NP       Summary:  60 years old presented with abdominal pain, distention, constipation, and vomiting , she was admitted for small bowel obstruction.    Subjective still in pain ,     Rn HR up and some confused   She answered questions     She received Valium 2.5 mg in the morning, recommended to try to avoid,  Continue Dilaudid, hold morphine    Assessment/Plan:    Principal Problem:    SBO (small bowel obstruction) ([facility])  Active Problems:    Benign essential HTN    Hypokalemia    Asthma    Acute renal failure  Resolved Problems:    * No resolved hospital problems. *       Small bowel obstruction  not improving on medical treatment,  LAPAROTOMY EXPLORATORY, LYSIS OF ADHESIONS, SMALL BOWEL RESECTION (Abdomen)  on 6/29   Continue IV hydration, pain control  Incentive spirometry  Continue npo per surgeon and recommend tpn       Acute renal failure  Nephrology has been consulted  Avoid IV contrast  Renal dose medicine  Avoid NSAIDs    Hypomagnesemia   Mg replacement   Will recheck     Hypokalemia-resolved   Potassium replacement    Hypertension  Hydralazine as needed    Asthma   Stable breathing treatment as needed    Anxiety  Po meds hold for now                 Discharge to:   [] Home  []SNF/Rehab  [x]  Others  in  5 Days, []  stable  for DC  DVT Prophylaxis:   [x]Lovenox  []Hep SQ  []SCDs  []Coumadin, Eliquis, Xarelto, Pradaxa   []On Heparin gtt. [] No indication [] Refused  Case discussed with:   [x]Patient  []Family  [x]Nursing  [x]Case Management [] Consultants        Review of systems:  10 systems reviewed, all negative other than what is mentioned above.        Objective:  /72   Pulse (!) 121 Comment: 20 minutes post Metoprolol  Temp 98.2 °F (36.8 °C) (Oral)   Resp 22   Ht 1.524 m

## 2025-06-30 NOTE — PROGRESS NOTES
Pharmacist Review and Automatic Dose Adjustment of Prophylactic Enoxaparin    *Review reason for admission/hospital problem list*    The reviewing pharmacist has made an adjustment to the ordered enoxaparin dose or converted to UFH per the approved [facility] protocol and table as identified below.      Kiara Nance is a 60 y.o. female.     Recent Labs     06/28/25  0945 06/29/25  0510 06/30/25  0515   CREATININE 0.89 0.89 1.77*       Estimated Creatinine Clearance: 29 mL/min (A) (based on SCr of 1.77 mg/dL (H)).    Recent Labs     06/29/25  0510 06/30/25  0515   HGB 13.4 14.0   HCT 40.3 40.7    333     No results for input(s): \"INR\" in the last 72 hours.    Height:   Ht Readings from Last 1 Encounters:   06/30/25 1.524 m (5')     Weight:  Wt Readings from Last 1 Encounters:   06/25/25 72.6 kg (160 lb)             Plan: Based upon the patient's weight and renal function, the ordered enoxaparin dose of Lovenox 40 mg daily has been changed/converted to Lovenox 30 mg daily.    Thank you,  Stanton Palacios, [facility]  6/30/2025, 1:20 PM

## 2025-06-30 NOTE — PROGRESS NOTES
Consult for TPN Dosing per Pharmacy by Dr. Long  Consult provided for this 60 y.o. female, for indication of Nutrition  Day of Therapy 1    Dosing weight: 52.3 kg (MAW)    Labs:  BMP Lab Results   Component Value Date/Time     06/30/2025 05:15 AM    K 5.2 06/30/2025 05:15 AM     06/30/2025 05:15 AM    CO2 16 06/30/2025 05:15 AM    BUN 18 06/30/2025 05:15 AM    CREATININE 1.77 06/30/2025 05:15 AM    GLUCOSE 241 06/30/2025 05:15 AM    CALCIUM 8.3 06/30/2025 05:15 AM    LABGLOM 32 06/30/2025 05:15 AM      CMP Lab Results   Component Value Date     (L) 06/30/2025    K 5.2 06/30/2025     06/30/2025    CO2 16 (L) 06/30/2025    BUN 18 06/30/2025    CREATININE 1.77 (H) 06/30/2025    GLUCOSE 241 (H) 06/30/2025    CALCIUM 8.3 (L) 06/30/2025    BILITOT 0.3 06/30/2025    ALKPHOS 48 06/30/2025    AST 37 06/30/2025    ALT 27 06/30/2025    LABGLOM 32 (L) 06/30/2025    GFRAA 78 06/01/2020    AGRATIO 1.4 06/01/2020    GLOB 2.8 06/30/2025      Ca/ Phos Lab Results   Component Value Date/Time    PHOS 4.4 06/30/2025 05:15 AM       Mag    Lab Results   Component Value Date/Time    MG 1.4 06/30/2025 05:15 AM        Kcal requirements:  25-35 kcal/kg (1308 kcal - 1831 kcal)    Macronutrients - provided by premixed Clinemix 4.25%AA/5%Dextrose WITH NO ELECTROLYTES in 1000 ml bag (750 mL/24 hr):  Protein                                                32 g/day = 128 kcal  Dextrose                                             38 g/day = 130 kcal                                                                                 258 kcal total     TPN  to be initiated at 750 mL/24 hrs and titrated to goal per patient tolerance.      MD to replete electrolytes acutely outside of TPN as needed.   MD to add/adjust/dc maintenance IV Fluid as needed for fluid requirements.     Additional recommendations/Orders:   Peripheral concentration until PICC achieved  No electrolytes d/t increased Scr, pharmacy to monitor and MD to replete  electrolytes outside TPN  Corrective insulin coverage with Regular Insulin q6h while on TPN.    BMP, Mag, Phos ordered daily  Triglycerides, Prealbumin, CMP ordered upon initiation and weekly  Current IVF infusion of D5+1/2NS @ 75 mL/hr     Pharmacy to follow daily and will make changes based on labs/clinical status.      Stanton Palacios, [facility]  [phone]

## 2025-06-30 NOTE — PROGRESS NOTES
Pt arrived from PACU with a pain of 10/10. Provider paged and came to the bedside to assess. Provider increased pain medication frequency. Pt medicated per MAR, with no relief. Provider paged at 0440 due to the lack of pain control. Provider at bedside at 0500. One time order of diazePAM given, per provider order and MAR. Pt still with increased respirations, restlessness and no pain control. Surgeon spoken to at bedside and made aware of pt's status. No new orders at this time.

## 2025-06-30 NOTE — PROGRESS NOTES
Progress Note    Patient: Kiara Nance MRN: [id number]  CSN: [id number]    YOB: 1965  Age: 60 y.o.  Sex: female    DOA: 6/25/2025 LOS:  LOS: 5 days                    Subjective:     Patient complains of pain.  She seems somewhat confused to me.  Her vital signs are stable otherwise.  .    Objective:      Visit Vitals  /72   Pulse (!) 121   Temp 98.2 °F (36.8 °C) (Oral)   Resp 22   Ht 1.524 m (5')   Wt 72.6 kg (160 lb)   SpO2 99%   BMI 31.25 kg/m²       Physical Exam:  General appearance: awake, responds but seems confused  Lungs: clear to auscultation bilaterally  Heart: regular rate and rhythm, S1, S2 normal, no murmur, click, rub or gallop  Abdomen: soft, appropriate tenderness,distended.  Extremities: extremities normal, atraumatic, no cyanosis or edema, calfs non-tender  Skin: Skin color, texture, turgor normal. No rashes or lesions      Intake and Output:  Current Shift:  06/30 0701 - 06/30 1900  In: -   Out: 250 [Urine:100]  Last three shifts:  06/28 1901 - 06/30 0700  In: 1800 [I.V.:1800]  Out: 850 [Urine:850]    Lab/Data Reviewed:      Medications Reviewed.    Assessment/Plan     Principal Problem:    SBO (small bowel obstruction) (HCC)  Active Problems:    Benign essential HTN    Hypokalemia    Asthma    Acute renal failure  Resolved Problems:    * No resolved hospital problems. *        Patient is stable.  This is my first time seeing her however she does seem a little confused.  He does seem to be in pain.  Her pain will be addressed and she is to have a PICC line and TPN started.  The nasogastric tube will stay in.

## 2025-06-30 NOTE — PROGRESS NOTES
Received order for PICC line and called to speak to patient RN Gabi. Patient's GFR is below threshold for Peripheral PICC. Need nephrology input on peripheral vs tunneled PICC. IR will continue to follow.

## 2025-06-30 NOTE — CONSULTS
RENAL CONSULT  2025    Patient:  Kiara Nance  :  1965  Gender:  female  MRN #:  [id number]    Reason for Consult: acute renal failure     History of Present Illness:    Kiara Nance is a 60 y.o. year old female hypertension presented with abdominal pain, distention, constipation, and vomiting  Admitted for small bowel obstruction   S/p laparotomy and lysis of adhesion   Now on iv fluid .   Reports abdominal discomfort and distension . No dyspnea or vomiting. Urine output not much              Past Medical History:   Diagnosis Date    Anxiety 24    Since I found out about the aneurysm    Asthma     SOB    Chronic back pain     Chronic pain     joint pain    Depression 24    Headache     Hypertension     Knee meniscus pain 2017    Memory disorder     Just be forgetting things sometimes    Mild intermittent asthma without complication 2017    Neck pain 24    Neuropathy About 2 years now    Sinus congestion     Vitamin D deficiency      Past Surgical History:   Procedure Laterality Date    GI      recloser cloystomy     LAPAROTOMY N/A 2025    LAPAROTOMY EXPLORATORY, LYSIS OF ADHESIONS,  SMALL BOWEL RESECTION performed by Carlos Palencia MD at [facility] MA[facility]     Family History   Problem Relation Age of Onset    Liver Disease Mother     No Known Problems Father     Lung Cancer Sister     Lung Cancer Brother      Allergies   Allergen Reactions    Meloxicam Hives    Nsaids Hives, Itching and Nausea Only    Sulfa Antibiotics Hives, Itching and Nausea Only    Aspirin     Gabapentin Other (See Comments)     Hives and itching  Other reaction(s): Other (see comments)  Hives and itching     Current Facility-Administered Medications   Medication Dose Route Frequency Provider Last Rate Last Admin    metoprolol (LOPRESSOR) injection 2.5 mg  2.5 mg IntraVENous Q8H Melina Long MD   2.5 mg at 25 1303    sodium chloride 0.9 % bolus 500 mL  500 mL IntraVENous Once Melina Long .9

## 2025-06-30 NOTE — PROGRESS NOTES
Signed         met patient at bedside for an follow-up visit and she was in a lot of pain.  She is Buddhism and has great mary in God she told me the last time I visited her.      Patient has an obstruction in her bowels and has had surgery.       provided presence and support for patient.     Chaplains will provide follow-up care for patient and family as needed.      Spiritual Health History and Assessment/Progress Note  [facility]    Follow-up, Emotional distress, Follow up, Adjustment to illness, Life Adjustments,      Name: Kiara Nance MRN: [id number]    Age: 60 y.o.     Sex: female   Language: English   Temple: Buddhism   SBO (small bowel obstruction) ([facility])     Date: 6/30/2025            Total Time Calculated: 15 min              Spiritual Assessment continued in [address] SURGICAL/ONCOLOGY        Referral/Consult From: Rounding   Encounter Overview/Reason: Follow-up  Service Provided For: Patient    Mary, Belief, Meaning:   Patient identifies as spiritual and is connected with a mary tradition or spiritual practice  Family/Friends No family/friends present      Importance and Influence:  Patient has spiritual/personal beliefs that influence decisions regarding their health  Family/Friends No family/friends present    Community:  Patient is connected with a spiritual community  Family/Friends No family/friends present    Assessment and Plan of Care:     Patient Interventions include: Other: unable to say  Family/Friends Interventions include: No family/friends present    Patient Plan of Care: No spiritual needs identified for follow-up  Family/Friends Plan of Care: No family/friends present    Electronically signed by Chaplain Birgit on 6/30/2025 at 4:05 PM

## 2025-06-30 NOTE — PROGRESS NOTES
Comprehensive High Risk Nutrition Assessment Initial    Type and Reason for Visit:  Initial, NPO/clear liquid    Nutrition Recommendations/Plan:   NPO x 5  If begins TPN rec begin 1/4 goal adv to goal over next 3-4 days rec premix macro goals ~75-80g AA  Check Tg, bilirubins, and LFTs monitor hold vs cycle lipid if Tg >=400-500  Cont to check Phos, Mg, K replete as needed however K trends up 5.2   Monitor trending up Cr 1.77 and  levels while on TPN  Cont to monitor GI status and UOP  Please weigh pt to check admit wt accuracy  Cont to monitor POC/NCP, wt trends, renal fx, lytes, UOP, bowel fx, skin integrity/wound healing and adjust recs as needed     Malnutrition Assessment:  Malnutrition Status:  At risk for malnutrition (06/30/25 1110)      Nutrition Assessment:    61yo F with abd pain/distention several n/v x 2 PTA, SBO, s/p exp lap lysis of adhesions, small bowel resection and small bowel repair POD#1 per MD. alert. pain noted. NPO x ~5 days. +NGT. noted placing PICC and plan for TPN. Cr trends up 1.77, Mg L 1.4. wt trends up from wt hx if correct 66kg Sept 2024 and March 2025 and 71kg April 2025. however ?stated admit wt accuracy.  Wt Readings from Last 10 Encounters:   06/25/25 72.6 kg (160 lb)   05/12/25 68 kg (150 lb)   04/10/25 68.1 kg (150 lb 3.2 oz)   02/27/25 66.2 kg (146 lb)   11/27/24 65.8 kg (145 lb)   10/08/24 67.6 kg (149 lb)   10/04/24 64.4 kg (142 lb)   09/10/24 64 kg (141 lb 1.6 oz)   07/25/24 67.2 kg (148 lb 3.2 oz)   03/22/24 67.1 kg (148 lb)   Nutrition Related Findings:      Wound Type: Surgical Incision       Current Nutrition Intake & Therapies:    Average Meal Intake: NPO  Average Supplements Intake: NPO  Diet NPO Exceptions are: Ice Chips    Anthropometric Measures:  Height: 152.4 cm (5')  Ideal Body Weight (IBW): 100 lbs (45 kg)    Admission Body Weight: 73 kg (160 lb 15 oz)  Current Body Weight: 73 kg (160 lb 15 oz), 160.9 % IBW.    Current BMI (kg/m2): 31.4  Usual Body Weight:

## 2025-06-30 NOTE — PROGRESS NOTES
0903 IR requesting Neph consult for their approval on PICC Tunneled v. Peripheral due to GFR parameters. MD Long aware    1010 Concerns raised to MD Long about pt constantly moaning and moving around in bed despite heavy pain medication regimen. Per night RN, pt was unable to answer A/O questions and would nod \"yes\" at most. After much persisting patient was able to answer MD Long's questions and state her pain level to this RN.     1056 IR consult placed for Midline designated for PPN with telephone order from MD Long    1140 IR to place midline later this afternoon. Pt 100ml of dinesh urine from 4085-9240, MD Long notified. Fluids to continue at 75ml/hr.    1305 Metoprolol IV administered over 10 minutes per protocol,  at start. Pt currently 10/10 pain, will hold dilaudid post-30 minutes after metoprolol.    1325 Pt HR continues at 120-125 15 minutes after metoprolol.Dilaudid IV administered. MD Long aware.     1607 Pt sent down to IR for PICC placement    1711 Pt urine output 200 ml of dinesh urine since 0700. MD spears via [facility]

## 2025-06-30 NOTE — PROGRESS NOTES
Patient is postop day 1 status post exploratory laparotomy, lysis of adhesions, small bowel resection and small bowel repair.  Patient has had moderate pain throughout the evening.  On initially seeing the patient she is only moaning.  She clearly understands, and with some difficulty, I was able to get her to answer a question.  She states that she is uncomfortable, but that the pain is better.  She states that the nasogastric tube is bothering her throat, but it is not intolerable.  On physical examination her abdomen is tender, but appropriately.  The dressing looks good.  She is much less distended than preoperatively.  Her labs are reviewed her creatinine is elevated from preoperatively, which I suspect is prerenal in nature.  Assessment postop day 1 status post complex abdominal operation.  Patient continues to have some pain control options and would benefit from some additional fluid.  I would leave the Merrill catheter in place to guide resuscitation at this point.  I think the patient is significantly behind from a nutritional standpoint, and would likely benefit from parenteral nutrition as it will probably be several days before we can feed her enterally.

## 2025-06-30 NOTE — PLAN OF CARE
Problem: Pain  Goal: Verbalizes/displays adequate comfort level or baseline comfort level  Outcome: Progressing     Problem: ABCDS Injury Assessment  Goal: Absence of physical injury  Outcome: Progressing     Problem: Safety - Adult  Goal: Free from fall injury  Outcome: Progressing     Problem: Skin/Tissue Integrity  Goal: Skin integrity remains intact  Description: 1.  Monitor for areas of redness and/or skin breakdown  2.  Assess vascular access sites hourly  3.  Every 4-6 hours minimum:  Change oxygen saturation probe site  4.  Every 4-6 hours:  If on nasal continuous positive airway pressure, respiratory therapy assess nares and determine need for appliance change or resting period  Outcome: Progressing      occasional social

## 2025-06-30 NOTE — PROGRESS NOTES
INTERVENTIONAL RADIOLOGY    Patient:  Kiara Nance  :  1965  Age:  60 y.o.  MRN:  [id number]    Today's Date:  2025  Admission Date:  2025  Hospital Day:  5    Kiara Nance is a 60 y.o. female with a history of HTN, SBO s/p small bowel resection 25, malnutrition.    IR PICC ordered for TPN.    BMP:    Lab Results   Component Value Date/Time     2025 05:15 AM    K 5.2 2025 05:15 AM     2025 05:15 AM    CO2 16 2025 05:15 AM    BUN 18 2025 05:15 AM    CREATININE 1.77 2025 05:15 AM    CALCIUM 8.3 2025 05:15 AM    GFR 32 2025 05:15 AM       LABGLOM 32 2025 05:15 AM    GLUCOSE 241 2025 05:15 AM     GFR <40 per facility guidelines PICC/Midline requires nephrologist approval.    Discussed with Dr. Long, hold off on PICC/Midline placement awaiting nephrology input.   PPN can be administered via peripheral IV.     Jody Zaragoza APRN - CNP  [facility]

## 2025-07-01 PROBLEM — E87.20 METABOLIC ACIDOSIS: Status: ACTIVE | Noted: 2025-07-01

## 2025-07-01 PROBLEM — N13.30 HYDRONEPHROSIS: Status: ACTIVE | Noted: 2025-07-01

## 2025-07-01 PROBLEM — R65.10 SIRS (SYSTEMIC INFLAMMATORY RESPONSE SYNDROME) (HCC): Status: ACTIVE | Noted: 2025-07-01

## 2025-07-01 PROBLEM — I95.9 HYPOTENSION: Status: ACTIVE | Noted: 2025-07-01

## 2025-07-01 LAB
ALBUMIN SERPL-MCNC: 1.8 G/DL (ref 3.4–5)
ALBUMIN/GLOB SERPL: 0.6 (ref 0.8–1.7)
ALP SERPL-CCNC: 42 U/L (ref 45–117)
ALT SERPL-CCNC: 62 U/L (ref 10–35)
AMMONIA PLAS-SCNC: 29 UMOL/L (ref 11–60)
ANION GAP SERPL CALC-SCNC: 12 MMOL/L (ref 3–18)
APPEARANCE UR: CLEAR
AST SERPL-CCNC: 74 U/L (ref 10–38)
B PERT DNA SPEC QL NAA+PROBE: NOT DETECTED
BACTERIA URNS QL MICRO: ABNORMAL /HPF
BASOPHILS # BLD: 0 K/UL (ref 0–0.1)
BASOPHILS NFR BLD: 0 % (ref 0–2)
BILIRUB DIRECT SERPL-MCNC: 0.2 MG/DL (ref 0–0.2)
BILIRUB SERPL-MCNC: 0.3 MG/DL (ref 0.2–1)
BILIRUB UR QL: NEGATIVE
BORDETELLA PARAPERTUSSIS BY PCR: NOT DETECTED
BUN SERPL-MCNC: 24 MG/DL (ref 6–23)
BUN/CREAT SERPL: 15 (ref 12–20)
C PNEUM DNA SPEC QL NAA+PROBE: NOT DETECTED
CALCIUM SERPL-MCNC: 7.8 MG/DL (ref 8.5–10.1)
CHLORIDE SERPL-SCNC: 100 MMOL/L (ref 98–107)
CO2 SERPL-SCNC: 22 MMOL/L (ref 21–32)
COLOR UR: YELLOW
CREAT SERPL-MCNC: 1.59 MG/DL (ref 0.6–1.3)
DIFFERENTIAL METHOD BLD: ABNORMAL
EOSINOPHIL # BLD: 0 K/UL (ref 0–0.4)
EOSINOPHIL NFR BLD: 0 % (ref 0–5)
EPITH CASTS URNS QL MICRO: ABNORMAL /LPF (ref 0–5)
ERYTHROCYTE [DISTWIDTH] IN BLOOD BY AUTOMATED COUNT: 12.4 % (ref 11.6–14.5)
FLUAV SUBTYP SPEC NAA+PROBE: NOT DETECTED
FLUBV RNA SPEC QL NAA+PROBE: NOT DETECTED
GLOBULIN SER CALC-MCNC: 3.2 G/DL (ref 2–4)
GLUCOSE BLD STRIP.AUTO-MCNC: 123 MG/DL (ref 70–110)
GLUCOSE BLD STRIP.AUTO-MCNC: 150 MG/DL (ref 70–110)
GLUCOSE BLD STRIP.AUTO-MCNC: 198 MG/DL (ref 70–110)
GLUCOSE BLD STRIP.AUTO-MCNC: 202 MG/DL (ref 70–110)
GLUCOSE BLD STRIP.AUTO-MCNC: 227 MG/DL (ref 70–110)
GLUCOSE SERPL-MCNC: 183 MG/DL (ref 74–108)
GLUCOSE UR STRIP.AUTO-MCNC: NEGATIVE MG/DL
HADV DNA SPEC QL NAA+PROBE: DETECTED
HCOV 229E RNA SPEC QL NAA+PROBE: NOT DETECTED
HCOV HKU1 RNA SPEC QL NAA+PROBE: NOT DETECTED
HCOV NL63 RNA SPEC QL NAA+PROBE: NOT DETECTED
HCOV OC43 RNA SPEC QL NAA+PROBE: NOT DETECTED
HCT VFR BLD AUTO: 30.8 % (ref 35–45)
HGB BLD-MCNC: 10.7 G/DL (ref 12–16)
HGB UR QL STRIP: ABNORMAL
HMPV RNA SPEC QL NAA+PROBE: NOT DETECTED
HPIV1 RNA SPEC QL NAA+PROBE: NOT DETECTED
HPIV2 RNA SPEC QL NAA+PROBE: NOT DETECTED
HPIV3 RNA SPEC QL NAA+PROBE: NOT DETECTED
HPIV4 RNA SPEC QL NAA+PROBE: NOT DETECTED
IMM GRANULOCYTES # BLD AUTO: 0 K/UL
IMM GRANULOCYTES NFR BLD AUTO: 0 %
KETONES UR QL STRIP.AUTO: NEGATIVE MG/DL
L PNEUMO AG UR QL: NEGATIVE
LACTATE SERPL-SCNC: 2.7 MMOL/L (ref 0.4–2)
LACTATE SERPL-SCNC: 2.8 MMOL/L (ref 0.4–2)
LACTATE SERPL-SCNC: 3 MMOL/L (ref 0.4–2)
LACTATE SERPL-SCNC: 3.6 MMOL/L (ref 0.4–2)
LACTATE SERPL-SCNC: 3.9 MMOL/L (ref 0.4–2)
LDH SERPL L TO P-CCNC: 217 U/L (ref 81–231)
LEUKOCYTE ESTERASE UR QL STRIP.AUTO: NEGATIVE
LYMPHOCYTES # BLD: 1.45 K/UL (ref 0.9–3.6)
LYMPHOCYTES NFR BLD: 33 % (ref 21–52)
M PNEUMO DNA SPEC QL NAA+PROBE: NOT DETECTED
MAGNESIUM SERPL-MCNC: 1.8 MG/DL (ref 1.6–2.6)
MAGNESIUM SERPL-MCNC: 1.8 MG/DL (ref 1.6–2.6)
MCH RBC QN AUTO: 28.6 PG (ref 24–34)
MCHC RBC AUTO-ENTMCNC: 34.7 G/DL (ref 31–37)
MCV RBC AUTO: 82.4 FL (ref 78–100)
METAMYELOCYTES NFR BLD MANUAL: 3 %
MONOCYTES # BLD: 0.35 K/UL (ref 0.05–1.2)
MONOCYTES NFR BLD: 8 % (ref 3–10)
NEGATIVE CONTROL: NEGATIVE
NEUTS BAND NFR BLD MANUAL: 14 % (ref 0–5)
NEUTS SEG # BLD: 2.46 K/UL (ref 1.8–8)
NEUTS SEG NFR BLD: 42 % (ref 40–73)
NITRITE UR QL STRIP.AUTO: NEGATIVE
NRBC # BLD: 0 K/UL (ref 0–0.01)
NRBC BLD-RTO: 0 PER 100 WBC
PH UR STRIP: 5.5 (ref 5–8)
PH, URINE: 5 (ref 4.6–8)
PHOSPHATE SERPL-MCNC: 2.1 MG/DL (ref 2.5–4.9)
PHOSPHATE SERPL-MCNC: 2.5 MG/DL (ref 2.5–4.9)
PHOSPHATE SERPL-MCNC: 2.6 MG/DL (ref 2.5–4.9)
PLATELET # BLD AUTO: 219 K/UL (ref 135–420)
PLATELET COMMENT: ABNORMAL
PMV BLD AUTO: 10.1 FL (ref 9.2–11.8)
POSITIVE CONTROL: POSITIVE
POTASSIUM SERPL-SCNC: 3.6 MMOL/L (ref 3.5–5.5)
POTASSIUM SERPL-SCNC: 4.3 MMOL/L (ref 3.5–5.5)
PROT SERPL-MCNC: 5 G/DL (ref 6.4–8.2)
PROT UR STRIP-MCNC: 100 MG/DL
RBC # BLD AUTO: 3.74 M/UL (ref 4.2–5.3)
RBC #/AREA URNS HPF: ABNORMAL /HPF (ref 0–5)
RBC MORPH BLD: ABNORMAL
RSV RNA SPEC QL NAA+PROBE: NOT DETECTED
RV+EV RNA SPEC QL NAA+PROBE: NOT DETECTED
S PNEUM AG UR QL IA.RAPID: NEGATIVE
SARS-COV-2 RNA RESP QL NAA+PROBE: NOT DETECTED
SODIUM SERPL-SCNC: 135 MMOL/L (ref 136–145)
SP GR UR REFRACTOMETRY: 1.02 (ref 1–1.03)
TRIGL SERPL-MCNC: 80 MG/DL (ref 0–150)
UROBILINOGEN UR QL STRIP.AUTO: 1 EU/DL (ref 0.2–1)
WBC # BLD AUTO: 4.4 K/UL (ref 4.6–13.2)
WBC URNS QL MICRO: NEGATIVE /HPF (ref 0–5)

## 2025-07-01 PROCEDURE — 82140 ASSAY OF AMMONIA: CPT

## 2025-07-01 PROCEDURE — 6360000002 HC RX W HCPCS: Performed by: SURGERY

## 2025-07-01 PROCEDURE — P9047 ALBUMIN (HUMAN), 25%, 50ML: HCPCS | Performed by: INTERNAL MEDICINE

## 2025-07-01 PROCEDURE — 2580000003 HC RX 258: Performed by: INTERNAL MEDICINE

## 2025-07-01 PROCEDURE — 87186 SC STD MICRODIL/AGAR DIL: CPT

## 2025-07-01 PROCEDURE — 2580000003 HC RX 258: Performed by: HOSPITALIST

## 2025-07-01 PROCEDURE — 84478 ASSAY OF TRIGLYCERIDES: CPT

## 2025-07-01 PROCEDURE — 83605 ASSAY OF LACTIC ACID: CPT

## 2025-07-01 PROCEDURE — 87154 CUL TYP ID BLD PTHGN 6+ TRGT: CPT

## 2025-07-01 PROCEDURE — 2500000003 HC RX 250 WO HCPCS: Performed by: HOSPITALIST

## 2025-07-01 PROCEDURE — 80048 BASIC METABOLIC PNL TOTAL CA: CPT

## 2025-07-01 PROCEDURE — 36415 COLL VENOUS BLD VENIPUNCTURE: CPT

## 2025-07-01 PROCEDURE — 87040 BLOOD CULTURE FOR BACTERIA: CPT

## 2025-07-01 PROCEDURE — 83735 ASSAY OF MAGNESIUM: CPT

## 2025-07-01 PROCEDURE — 82962 GLUCOSE BLOOD TEST: CPT

## 2025-07-01 PROCEDURE — 83615 LACTATE (LD) (LDH) ENZYME: CPT

## 2025-07-01 PROCEDURE — 6360000002 HC RX W HCPCS: Performed by: HOSPITALIST

## 2025-07-01 PROCEDURE — 6370000000 HC RX 637 (ALT 250 FOR IP): Performed by: INTERNAL MEDICINE

## 2025-07-01 PROCEDURE — 1100000000 HC RM PRIVATE

## 2025-07-01 PROCEDURE — 87077 CULTURE AEROBIC IDENTIFY: CPT

## 2025-07-01 PROCEDURE — 2580000003 HC RX 258: Performed by: PHYSICIAN ASSISTANT

## 2025-07-01 PROCEDURE — 2500000003 HC RX 250 WO HCPCS: Performed by: INTERNAL MEDICINE

## 2025-07-01 PROCEDURE — 6370000000 HC RX 637 (ALT 250 FOR IP): Performed by: HOSPITALIST

## 2025-07-01 PROCEDURE — 2700000000 HC OXYGEN THERAPY PER DAY

## 2025-07-01 PROCEDURE — 84100 ASSAY OF PHOSPHORUS: CPT

## 2025-07-01 PROCEDURE — 51798 US URINE CAPACITY MEASURE: CPT

## 2025-07-01 PROCEDURE — 87185 SC STD ENZYME DETCJ PER NZM: CPT

## 2025-07-01 PROCEDURE — 6360000002 HC RX W HCPCS: Performed by: PHYSICIAN ASSISTANT

## 2025-07-01 PROCEDURE — 6360000002 HC RX W HCPCS: Performed by: INTERNAL MEDICINE

## 2025-07-01 PROCEDURE — 84132 ASSAY OF SERUM POTASSIUM: CPT

## 2025-07-01 PROCEDURE — 87076 CULTURE ANAEROBE IDENT EACH: CPT

## 2025-07-01 PROCEDURE — 80076 HEPATIC FUNCTION PANEL: CPT

## 2025-07-01 PROCEDURE — 85025 COMPLETE CBC W/AUTO DIFF WBC: CPT

## 2025-07-01 RX ORDER — POTASSIUM CHLORIDE 29.8 MG/ML
20 INJECTION INTRAVENOUS PRN
Status: ACTIVE | OUTPATIENT
Start: 2025-07-01 | End: 2025-07-15

## 2025-07-01 RX ORDER — ALBUTEROL SULFATE 0.83 MG/ML
2.5 SOLUTION RESPIRATORY (INHALATION) EVERY 6 HOURS PRN
Status: DISCONTINUED | OUTPATIENT
Start: 2025-07-01 | End: 2025-07-15 | Stop reason: HOSPADM

## 2025-07-01 RX ORDER — MAGNESIUM SULFATE IN WATER 40 MG/ML
2000 INJECTION, SOLUTION INTRAVENOUS PRN
Status: DISCONTINUED | OUTPATIENT
Start: 2025-07-01 | End: 2025-07-15 | Stop reason: HOSPADM

## 2025-07-01 RX ORDER — SODIUM CHLORIDE 9 MG/ML
INJECTION, SOLUTION INTRAVENOUS CONTINUOUS
Status: DISPENSED | OUTPATIENT
Start: 2025-07-01 | End: 2025-07-01

## 2025-07-01 RX ORDER — POTASSIUM CHLORIDE 7.45 MG/ML
10 INJECTION INTRAVENOUS PRN
Status: DISPENSED | OUTPATIENT
Start: 2025-07-01 | End: 2025-07-15

## 2025-07-01 RX ORDER — ALBUMIN (HUMAN) 12.5 G/50ML
25 SOLUTION INTRAVENOUS ONCE
Status: COMPLETED | OUTPATIENT
Start: 2025-07-01 | End: 2025-07-01

## 2025-07-01 RX ADMIN — HYDROMORPHONE HYDROCHLORIDE 1 MG: 1 INJECTION, SOLUTION INTRAMUSCULAR; INTRAVENOUS; SUBCUTANEOUS at 18:28

## 2025-07-01 RX ADMIN — SODIUM CHLORIDE, PRESERVATIVE FREE 10 ML: 5 INJECTION INTRAVENOUS at 20:14

## 2025-07-01 RX ADMIN — ACETAMINOPHEN 650 MG: 650 SUPPOSITORY RECTAL at 20:39

## 2025-07-01 RX ADMIN — SODIUM CHLORIDE, PRESERVATIVE FREE 10 ML: 5 INJECTION INTRAVENOUS at 08:22

## 2025-07-01 RX ADMIN — PIPERACILLIN AND TAZOBACTAM 3375 MG: 3; .375 INJECTION, POWDER, LYOPHILIZED, FOR SOLUTION INTRAVENOUS at 07:31

## 2025-07-01 RX ADMIN — INSULIN HUMAN 2 UNITS: 100 INJECTION, SOLUTION PARENTERAL at 00:45

## 2025-07-01 RX ADMIN — ACETAMINOPHEN 650 MG: 650 SUPPOSITORY RECTAL at 05:59

## 2025-07-01 RX ADMIN — PIPERACILLIN AND TAZOBACTAM 3375 MG: 3; .375 INJECTION, POWDER, LYOPHILIZED, FOR SOLUTION INTRAVENOUS at 20:49

## 2025-07-01 RX ADMIN — SODIUM BICARBONATE: 84 INJECTION, SOLUTION INTRAVENOUS at 15:08

## 2025-07-01 RX ADMIN — SODIUM BICARBONATE: 84 INJECTION, SOLUTION INTRAVENOUS at 21:46

## 2025-07-01 RX ADMIN — ENOXAPARIN SODIUM 30 MG: 100 INJECTION SUBCUTANEOUS at 08:48

## 2025-07-01 RX ADMIN — ASCORBIC ACID, VITAMIN A PALMITATE, CHOLECALCIFEROL, THIAMINE HYDROCHLORIDE, RIBOFLAVIN-5 PHOSPHATE SODIUM, PYRIDOXINE HYDROCHLORIDE, NIACINAMIDE, DEXPANTHENOL, ALPHA-TOCOPHEROL ACETATE, VITAMIN K1, FOLIC ACID, BIOTIN, CYANOCOBALAMIN: 200; 3300; 200; 6; 3.6; 6; 40; 15; 10; 150; 600; 60; 5 INJECTION, SOLUTION INTRAVENOUS at 18:21

## 2025-07-01 RX ADMIN — METOPROLOL TARTRATE 2.5 MG: 5 INJECTION INTRAVENOUS at 12:49

## 2025-07-01 RX ADMIN — METOPROLOL TARTRATE 2.5 MG: 5 INJECTION INTRAVENOUS at 20:31

## 2025-07-01 RX ADMIN — HYDROMORPHONE HYDROCHLORIDE 1 MG: 1 INJECTION, SOLUTION INTRAMUSCULAR; INTRAVENOUS; SUBCUTANEOUS at 13:09

## 2025-07-01 RX ADMIN — ALBUMIN (HUMAN) 25 G: 0.25 INJECTION, SOLUTION INTRAVENOUS at 04:55

## 2025-07-01 RX ADMIN — SODIUM CHLORIDE, PRESERVATIVE FREE 40 MG: 5 INJECTION INTRAVENOUS at 08:46

## 2025-07-01 RX ADMIN — HYDROMORPHONE HYDROCHLORIDE 1 MG: 1 INJECTION, SOLUTION INTRAMUSCULAR; INTRAVENOUS; SUBCUTANEOUS at 01:29

## 2025-07-01 RX ADMIN — VANCOMYCIN HYDROCHLORIDE 750 MG: 750 INJECTION, POWDER, LYOPHILIZED, FOR SOLUTION INTRAVENOUS at 23:39

## 2025-07-01 RX ADMIN — HYDROMORPHONE HYDROCHLORIDE 1 MG: 1 INJECTION, SOLUTION INTRAMUSCULAR; INTRAVENOUS; SUBCUTANEOUS at 22:39

## 2025-07-01 RX ADMIN — ACETAMINOPHEN 650 MG: 650 SUPPOSITORY RECTAL at 13:22

## 2025-07-01 RX ADMIN — I.V. FAT EMULSION 50 ML: 20 EMULSION INTRAVENOUS at 18:21

## 2025-07-01 RX ADMIN — SODIUM CHLORIDE: 0.9 INJECTION, SOLUTION INTRAVENOUS at 05:28

## 2025-07-01 RX ADMIN — SODIUM PHOSPHATE, MONOBASIC, MONOHYDRATE AND SODIUM PHOSPHATE, DIBASIC, ANHYDROUS 15 MMOL: 142; 276 INJECTION, SOLUTION INTRAVENOUS at 10:32

## 2025-07-01 RX ADMIN — PIPERACILLIN AND TAZOBACTAM 3375 MG: 3; .375 INJECTION, POWDER, LYOPHILIZED, FOR SOLUTION INTRAVENOUS at 13:32

## 2025-07-01 RX ADMIN — INSULIN HUMAN 2 UNITS: 100 INJECTION, SOLUTION PARENTERAL at 18:22

## 2025-07-01 RX ADMIN — HYDROMORPHONE HYDROCHLORIDE 0.5 MG: 1 INJECTION, SOLUTION INTRAMUSCULAR; INTRAVENOUS; SUBCUTANEOUS at 08:48

## 2025-07-01 RX ADMIN — SODIUM PHOSPHATE, MONOBASIC, MONOHYDRATE AND SODIUM PHOSPHATE, DIBASIC, ANHYDROUS 15 MMOL: 142; 276 INJECTION, SOLUTION INTRAVENOUS at 20:14

## 2025-07-01 ASSESSMENT — PAIN SCALES - GENERAL
PAINLEVEL_OUTOF10: 9
PAINLEVEL_OUTOF10: 0
PAINLEVEL_OUTOF10: 10
PAINLEVEL_OUTOF10: 0
PAINLEVEL_OUTOF10: 10
PAINLEVEL_OUTOF10: 0

## 2025-07-01 ASSESSMENT — PAIN DESCRIPTION - ORIENTATION
ORIENTATION: ANTERIOR
ORIENTATION: ANTERIOR

## 2025-07-01 ASSESSMENT — PAIN DESCRIPTION - FREQUENCY: FREQUENCY: CONTINUOUS

## 2025-07-01 ASSESSMENT — PAIN DESCRIPTION - LOCATION
LOCATION: ABDOMEN

## 2025-07-01 ASSESSMENT — PAIN DESCRIPTION - ONSET: ONSET: ON-GOING

## 2025-07-01 ASSESSMENT — PAIN SCALES - WONG BAKER
WONGBAKER_NUMERICALRESPONSE: NO HURT
WONGBAKER_NUMERICALRESPONSE: NO HURT
WONGBAKER_NUMERICALRESPONSE: HURTS WORST

## 2025-07-01 ASSESSMENT — PAIN DESCRIPTION - PAIN TYPE: TYPE: SURGICAL PAIN

## 2025-07-01 NOTE — PROGRESS NOTES
[facility]   Pharmacy Pharmacokinetic Monitoring Service - Vancomycin     Kiara Nance is a 60 y.o. female starting on vancomycin therapy for sepsis of unknown etiology. Pharmacy consulted by KARO Jade for monitoring and adjustment.    Target Concentration: Goal AUC/MICHAEL 400-600 mg*hr/L    Additional Antimicrobials: zosyn    Pertinent Laboratory Values:   Wt Readings from Last 1 Encounters:   06/25/25 72.6 kg (160 lb)     Temp Readings from Last 1 Encounters:   07/01/25 100.2 °F (37.9 °C) (Axillary)     Estimated Creatinine Clearance: 33 mL/min (A) (based on SCr of 1.59 mg/dL (H)).  Recent Labs     06/30/25 2026 07/01/25  0022   CREATININE 1.84* 1.59*   BUN 26* 24*   WBC 5.1 4.4*       Plan:  Dosing recommendations based on Bayesian software  Vancomycin 1750mg iv LD X 1 was started and will be followed by vancomycin iv 750mg q24h x 7 days.  Anticipated AUC of 502mg/L/hr and trough concentration of 14mg/L at steady state  Renal labs as indicated   Pharmacy will continue to monitor patient and adjust therapy as indicated    Thank you for the consult,  Salvador Cunha RPH  7/1/2025 6:11 AM

## 2025-07-01 NOTE — WOUND CARE
ICU Rounding  Wound care nurse rounded on patient for skin issues.  Patient has a Jabari score of 15.  Does patient have any pressure injury?no per primary nurse LESLIE Lopez     Skin Care & Pressure Relief Recommendations  Minimize layers of linen  Pads under patient to optimize support surface  Turn/reposition approximately every 2 hours  Use pillow wedges to maintain positioning but do not put pillow directly over wounds  Manage incontinence   Promote continence; Skin Protective lotion/cream to buttocks and sacrum daily and as needed with incontinence care  Offload heels pillows    Consult wound care if any wounds noted during admission.  Wound Care nurse will continue to follow during ICU admission.

## 2025-07-01 NOTE — PROGRESS NOTES
1910 - Assumed care at this time.     0445 - Dr. Angel notified of pt retaining. Abdominal distention. New orders placed for pt.

## 2025-07-01 NOTE — CONSULTS
Department of Urology  Attending History and Physical        CHIEF COMPLAINT:  new hydro left kidney    Reason for Admission:  pSBO    History Obtained From:  patient and chart    HISTORY OF PRESENT ILLNESS:      The patient is a 60 y.o. female with SBO had   LAPAROTOMY EXPLORATORY, LYSIS OF ADHESIONS,  SMALL BOWEL RESECTION by Dr. Talha MANUEL General surgery. Urology is consulted for new onset left mod hydro noted on CT 6/30. She had CT 6/28 normal b/l kidneys w/o hydronephrosis.   KE noted trending down from 1.8, to 1.5, baseline 0.89    CT c/a/p 6/30  KIDNEYS: Moderate left hydroureteronephrosis. No distal ureteral stone. No right  hydronephrosis.  STOMACH: Enteric tube terminating in the stomach.  SMALL BOWEL: Dilated fluid-filled small bowel loops, normal in caliber in the  pelvis upstream from the ileocolic anastomosis. No discrete transition point.  COLON: Right and transverse colectomy. Small locules of gas in the left  paracolic gutter. No drainable fluid collection.      Past Medical History:        Diagnosis Date    Anxiety 09/04/24    Since I found out about the aneurysm    Asthma     SOB    Chronic back pain     Chronic pain     joint pain    Depression 09/04/24    Headache     Hypertension     Knee meniscus pain 9/7/2017    Memory disorder     Just be forgetting things sometimes    Mild intermittent asthma without complication 9/7/2017    Neck pain 09/04/24    Neuropathy About 2 years now    Sinus congestion     Vitamin D deficiency      Past Surgical History:        Procedure Laterality Date    GI      recloser cloystomy 2005    LAPAROTOMY N/A 6/29/2025    LAPAROTOMY EXPLORATORY, LYSIS OF ADHESIONS,  SMALL BOWEL RESECTION performed by Carlos Palencia MD at [facility] MAIN OR         Medications Prior to Admission:   Medications Prior to Admission: zolpidem (AMBIEN) 5 MG tablet, Take 1 tablet by mouth nightly as needed for Sleep for up to 60 days. Max Daily Amount: 5 mg  [DISCONTINUED] propranolol (INDERAL LA)

## 2025-07-01 NOTE — CONSULTS
Consult received from Dr Long    Bacteremia-GNR  Obstructive Uropathy- post op  SBO- post X lap, SB resection, lysis- 6/29/25  Adenovirus infection    Plan:    Cont current abx  Will see patient tomorrow  Thanks    Ursula Li MD  [facility]  Office: [phone] -Option #8  Office fax:  [phone]

## 2025-07-01 NOTE — CONSULTS
[facility]  Pulmonary, Critical Care, and Sleep Medicine    Name: Kiara Nance MRN: [id number]   : 1965 Hospital: [facility]    Date: 2025  Room: [address] [place] [address] Note                                              Consult requesting physician: Dr. Long  Reason for Consult: Hypotension    IMPRESSION:         Active Hospital Problems    Diagnosis Date Noted    SBO (small bowel obstruction) ([facility]) [K56.609] 2025     Priority: High    Hypotension [I95.9] 2025     Priority: Medium    Hydronephrosis [N13.30] 2025    SIRS (systemic inflammatory response syndrome) ([facility]) [R65.10] 2025    Metabolic acidosis [E87.20] 2025    Acute renal failure [N17.9] 2025    Hypokalemia [E87.6] 2025    Asthma [J45.909] 2025    Benign essential HTN [I10] 2021        Code status: Full Code       RECOMMENDATIONS:     Respiratory: History of asthma.  No asthma exacerbation.  Start albuterol nebulized as needed.  Incentive spirometry; encouraged to use.  Appears protecting airway well.    ABG 2025 at the time of RRT and transferred to ICU: pH 7.45, pCO2 29.6, , SpO2 85.4%; on 2 LPM O2 NC.  Currently on 3 LPM O2 NC; SpO2 in high 90s.    CT chest 2025: Small bilateral pleural effusions.  Bibasilar airspace consolidation, likely atelectasis but pneumonia cannot be ruled out.    Keep SPO2 >=92%. HOB 30 degree elevation all the time. Aggressive pulmonary toileting. Aspiration precautions. Incentive spirometry.    CVS: History of HTN.  Transferred to ICU for brief hypotension; resolved with 1 L LR bolus and albumin.  SBP now elevated in 150s; on low-dose Lopressor 2.5 mg IV every 8 hours.  Monitor hemodynamics.    ID:   Respiratory viral panel 2025: Positive for adenovirus.  Blood culture 2025: 1 out of 2 blood culture positive for GNR.    SIRS with hypotension; hypotension resolved.  Lactic acidosis improving.  Continue      acetaminophen (TYLENOL) tablet 650 mg  650 mg Oral Q6H PRN David Peoples MD        Or    acetaminophen (TYLENOL) suppository 650 mg  650 mg Rectal Q6H PRN David Peoples MD   650 mg at 07/01/25 1322           Objective:   Intake/Output:     Intake/Output Summary (Last 24 hours) at 7/1/2025 1422  Last data filed at 7/1/2025 0800  Gross per 24 hour   Intake 1668.4 ml   Output 1025 ml   Net 643.4 ml       Vital Signs:    /78   Pulse (!) 121   Temp 100.4 °F (38 °C) (Oral)   Resp 22   Ht 1.524 m (5')   Wt 72.6 kg (160 lb)   SpO2 (!) 87%   BMI 31.25 kg/m²     Weight:  Wt Readings from Last 3 Encounters:   06/25/25 72.6 kg (160 lb)   05/12/25 68 kg (150 lb)   04/10/25 68.1 kg (150 lb 3.2 oz)          Physical Exam:     General/Neurology: Alert, Awake, moderate distress due to abdominal pain.  O2 NC.  Moving all 4 extremities spontaneously.  Head:   Normocephalic, without obvious abnormality, atraumatic.  Eye:   EOM intact. No scleral icterus, no pallor, no cyanosis.  Nose:   Left NGT in place.  Throat:  Lips, mucosa, and tongue normal. No oral thrush.  Neck:   Supple, symmetric. No lymphadenopathy. Trachea midline.  Lung:   Moderate air entry bilateral equal. No rales. No rhonchi. No wheezing. No stridors. No prolongded expiration. No accessory muscle use.  Heart:   Regular. S1 S2 present. NoJVD. No murmur.   Abdomen:  Soft.  Mildly distended abdomen.  Midline surgical site under dressing.  Generalized tenderness on mild palpation.  No bowel sounds heard.  Extremities:  No cyanosis. No clubbing. No edema.   Pulses: 2+ and symmetric in DP. Capillary refill: normal.       Data:       Recent Results (from the past 24 hours)   Basic Metabolic Panel    Collection Time: 06/30/25  5:36 PM   Result Value Ref Range    Sodium 135 (L) 136 - 145 mmol/L    Potassium 4.8 3.5 - 5.5 mmol/L    Chloride 101 98 - 107 mmol/L    CO2 20 (L) 21 - 32 mmol/L    Anion Gap 15 3 - 18 mmol/L    Glucose 226 (H) 74 - 108 mg/dL

## 2025-07-01 NOTE — PROGRESS NOTES
Lactic Acid  3.5   Received from Sil FALL      0004-   Critical Lactic Acid 3.9 received from Saloni in Lab

## 2025-07-01 NOTE — PROGRESS NOTES
Hospitalist Progress Note      Patient: Kiara Nance MRN: [id number]  CSN: [id number]    YOB: 1965  Age: 60 y.o.  Sex: female    DOA: 6/25/2025 LOS:  LOS: 5 days      PCP: Nae Kaiser, APRN - NP       Summary:      Kiara Nance is a 60 y.o. old female who was admitted to [facility] 6/25/2025 with severe abdominal pain with a history of 2 exploratory laparotomies.  Admitted to the hospital after presenting to the emergency room with 2-day history of diffuse abdominal pain, distention with associated nausea and vomiting.  Imaging revealed small bowel obstruction.   Patient was observed for several days with NG tube in place showing no improvement.  Was ultimately taken to the operating room on June 29.  Surgeon noted during immediate opening of the abdomen revealed \"ischemic segment of dilated small intestine immediately underneath the incision\".  Segment was removed.  There was also extensive lysis of adhesions of the small intestine.  Ultimately side-to-side stapled anastomosis was completed.   According to nursing staff, patient has been more altered throughout the course of the day.  Ultimately tonight, initially code stroke was called because patient had altered mental status.  This was quickly changed to a rapid response again primarily because her symptoms had been like this all day long but progressively worsening.  Per nursing staff, decreased urine output - dark urine.  Temperature max 99.1.  Had been moaning a lot in discomfort.  NG tube still in place on suction.  Receiving TPN for nourishment.  Patient currently unable to elaborate as she is obviously very confused when asked questions.  Not currently oriented.     Assessment/Plan:  Principal Problem:  Altered mental status?  Encephalopathic  SIRS with concerns of sepsis  Patient meeting SIRS criteria based on heart rate, respiratory rate.  Potential source of infection to include continued issues related

## 2025-07-01 NOTE — PROGRESS NOTES
Code Stroke / RRT called on this patient due to clinical presentation and DI score of 50.   EKG completed  . Labs ordered and drawn on patient by primary patient . ( See orders for lactic acid) She was sent down to a stat CT and returned to 3S. She had minimal output despite the byrne and fluids .Bladder scn >180. See VS for labs. NG tube remains in place.     Provider informed by Staff RNs of critical labs (see previous note). Patient was mostly nonverbal upon interaction with staff Rns. Byrne Bag replaced. Provider and supervisor informed of patient's labs and presentation post-rrt. DI remains above 50.     0500 Patient transferred to ICU

## 2025-07-01 NOTE — PROGRESS NOTES
BP down trending.  96/65. BP has been low 100's systolic overnight.   Remains tachycardic.  D/w RN and RN supervisor.  Will transfer to the ICU.    -Give 1 liter NS over 2 hrs + one dose albumin.    -Continue abx.  -Continue lactic monitoring until normalized.

## 2025-07-01 NOTE — PROGRESS NOTES
Consult for TPN Dosing per Pharmacy by Dr. Long    Consult provided for this 60 y.o. female, for indication of Nutrition    Day of Therapy:  2    Dosing Weight:  52.3 kg    Labs:  BMP BMP:   @[id number](na,k,cl,co2,AGAP,glu,bun,crea,GFRAA,GFRNA)@       CMP CMP:   @[id number](na,k,cl,co2,AGAP,glu,bun,crea,GFRAA,GFRNA,ca,mg,phos,alb,tbil,TP,ALB,GLOB,AGRAT,sgot,ALT,GPT)@      Ca/ Phos Lab Results   Component Value Date/Time    PHOS 2.6 07/01/2025 12:22 AM       Mag    Lab Results   Component Value Date/Time    MG 1.8 07/01/2025 12:22 AM      Tg No components found for: \"TGL\"   Albumin No results found for: \"TP\"      Kcal requirements:  25-35 kcal/kg (1308 kcal - 1831 kcal)     Macronutrients - provided by premixed Clinemix 4.25%AA/5%Dextrose WITH  ELECTROLYTES in 1000 ml bag (750 mL/24 hr):  Protein                                                32 g/day = 128 kcal  Lipids 20%                                         10  g/day = 90  kcal ( administered separately over 12 hrs )  Dextrose                                             38 g/day = 130 kcal                                                                                 348 kcal total     TPN  to be initiated at 750 mL/24 hrs and titrated to goal per patient tolerance.      MD to replete electrolytes acutely outside of TPN as needed.   MD to add/adjust/dc maintenance IV Fluid as needed for fluid/therapeutic requirements.     Additional recommendations/Orders:   Will continue with same Macronutrient Formulation due to electrolyte shift ( Phos and K drop )  Will continue at same rate of 32 ml/hr ( Patient currently on Na Bicarb drip at 150 ml/hr )  Changed TPN formulation to contain Electrolytes for today  Patient to receive Lipids 10 gm over 12 hrs today ( Triglyceride level 59 mg/dl )  Corrective insulin coverage with Regular Insulin q6h while on TPN.    BMP, Mag, Phos ordered daily  Triglycerides, Prealbumin, CMP ordered upon initiation and weekly     Pharmacy to

## 2025-07-01 NOTE — PROGRESS NOTES
RENAL CONSULT PROGRESS NOTE   2025    Patient:  Kiara Nance  :  1965  Gender:  female  MRN #:  [id number]    Reason for Consult: acute renal failure     Subjective/relevant events in 24 hour   Has NG tube, transferred in ICU for hypotension   BP now stable   Lactic acid is elevated   Labs, overnight events and consultant note reviewed.     Objective:    /78   Pulse (!) 110   Temp 98.2 °F (36.8 °C) (Oral)   Resp 23   Ht 1.524 m (5')   Wt 72.6 kg (160 lb)   SpO2 99%   BMI 31.25 kg/m²     Physical Exam:    Pt awake,  alert   Lung: clear to auscultation  Abdomen: distended   Ext: trace edema   CNS- Oriented to  place and person     Laboratory Data:    Lab Results   Component Value Date    BUN 24 (H) 2025    BUN 26 (H) 2025    BUN 25 (H) 2025     (L) 2025     (L) 2025     (L) 2025    CO2 22 2025    CO2 22 2025    CO2 20 (L) 2025    GLUCOSE 183 (H) 2025    GLUCOSE 237 (H) 2025    GLUCOSE 226 (H) 2025     Lab Results   Component Value Date    WBC 4.4 (L) 2025    HGB 10.7 (L) 2025    HCT 30.8 (L) 2025     Lab Results   Component Value Date    CALCIUM 7.8 (L) 2025     Lab Results   Component Value Date    HDL 69 2020     No results found for: \"TURBIDITY\"    Imaging Reveiwed:      EKG  Renal ultrasound  Xray   CT abdomen 25 : IMPRESSION:  1. New, small right pleural effusion and right basilar patchy airspace  consolidation. New mild left basilar atelectasis.  2. Persistent, markedly dilated loop of bowel in the right abdomen, as described  above      Assessment:  she is 60 years old with hypertension presented with abdominal pain, distention, constipation, and vomiting        She has small bowel obstruction in CT scan s/p laparotomy , lysis of adhesions and bowel resection   She has iv contrast exposure twice thsi admission on  and  which is likely cause of KE on top

## 2025-07-01 NOTE — PROGRESS NOTES
Hospitalist Progress Note      Patient: Kiara Nance MRN: [id number]  CSN: [id number]    YOB: 1965  Age: 60 y.o.  Sex: female    DOA: 6/25/2025 LOS:  LOS: 6 days      PCP: Nae Kaiser, APRN - NP       Summary:  60 years old presented with abdominal pain, distention, constipation, and vomiting , she was admitted for small bowel obstruction.    She received surgery and developed metabolic acidosis, luis and ileus possible sepsis, rrt called and transferred to icu. Nephrologist already on board, on bicarb, abx started overnight and ct head no acute issue, abx started for possible sepsis. Bp respond to fluid and albumin, ct scan ileus post surgical change and left hydro     Subjective still in pain ,   She is lying in the beds comfortable   She answered questions   Rn abdomen distended and  no bs     Assessment/Plan:    Principal Problem:    SBO (small bowel obstruction) ([facility])  Active Problems:    Benign essential HTN    Hypokalemia    Asthma    Acute renal failure    Hydronephrosis    SIRS (systemic inflammatory response syndrome) ([facility])    Metabolic acidosis  Resolved Problems:    * No resolved hospital problems. *      Sirs   On abx for empiric tx   Continue abx and will narrow abx soon     Metabolic acidosis and luis   On bicarb gtt and nephrologist on board   Cr improving, continue monitoring urine out-pt     Hydronephrosis, left side no obstruction  Urology has been consulted.         Small bowel obstruction, ileus  not improving on medical treatment,  LAPAROTOMY EXPLORATORY, LYSIS OF ADHESIONS, SMALL BOWEL RESECTION (Abdomen)  on 6/29   Repeated CT abdomen post shows post surgical changes   Continue IV hydration, pain control  Incentive spirometry  Continue npo per surgeon and recommend tpn   Continue post surgical care -wound consult       Acute renal failure-improving   Nephrology has been consulted  Avoid IV contrast  Renal dose medicine  Avoid NSAIDs  Continue monitoring urine

## 2025-07-01 NOTE — PROGRESS NOTES
Patient in ICU for hypotension.  This has improved but her lactate was still high.  At this time I am not sure exactly what the problem is.  Her white count is normal.  She does complain of pain.  At this time I would try to hydrate her see how she does in the ICU today.  May have to repeat CT scan possibly CTA at some point as well.  However her creatinine is a concern.

## 2025-07-01 NOTE — PROGRESS NOTES
Zosyn (Piperacillin/Tazobactam) Extended Infusion    Kiara Nance, a 60 y.o. yo female, has been converted to an extended infusion of Zosyn while in the intensive care unit.    A loading dose of 3.375 or 4.5 gm will be given over 30 minutes depending on indication.    Extended infusions will begin 4 hours after the initial dose if CrCl  >/= 20 ml/min or 8 hours after the initial dose if CrCl < 20 ml/min.    Extended infusions will run over 4 hours (240 minutes).    Invalid input(s): \"CREA\"  Ht Readings from Last 1 Encounters:   06/30/25 1.524 m (5')     Wt Readings from Last 1 Encounters:   06/25/25 72.6 kg (160 lb)       CrCl : Serum creatinine: 1.59 mg/dL (H) 07/01/25 0022  Estimated creatinine clearance: 33 mL/min (A)    Renal adjustment of extended infusion of Zosyn  3.375 or 4.5 gm every 8 hours for CrCl >/= 20 ml/min  3.375 or 4.5 gm every 12 hours for CrCl < 20 ml/min, intermittent HD or PD

## 2025-07-02 ENCOUNTER — APPOINTMENT (OUTPATIENT)
Facility: HOSPITAL | Age: 60
DRG: 230 | End: 2025-07-02
Payer: MEDICAID

## 2025-07-02 PROBLEM — A41.9 SEPSIS (HCC): Status: ACTIVE | Noted: 2025-07-01

## 2025-07-02 PROBLEM — R78.81 BACTEREMIA: Status: ACTIVE | Noted: 2025-07-02

## 2025-07-02 PROBLEM — B34.0 ADENOVIRUS INFECTION: Status: ACTIVE | Noted: 2025-07-02

## 2025-07-02 PROBLEM — Z98.890 STATUS POST SURGERY: Status: ACTIVE | Noted: 2025-07-02

## 2025-07-02 LAB
ACB COMPLEX DNA BLD POS QL NAA+NON-PROBE: NOT DETECTED
ACCESSION NUMBER, LLC1M: ABNORMAL
ALBUMIN SERPL-MCNC: 1.8 G/DL (ref 3.4–5)
ALBUMIN/GLOB SERPL: 0.6 (ref 0.8–1.7)
ALP SERPL-CCNC: 50 U/L (ref 45–117)
ALT SERPL-CCNC: 31 U/L (ref 10–35)
ANION GAP SERPL CALC-SCNC: 9 MMOL/L (ref 3–18)
APTT PPP: 32.2 SEC (ref 21.7–34.2)
APTT PPP: 46.9 SEC (ref 21.7–34.2)
APTT PPP: 47.4 SEC (ref 21.7–34.2)
AST SERPL-CCNC: 28 U/L (ref 10–38)
B FRAGILIS DNA BLD POS QL NAA+NON-PROBE: NOT DETECTED
BASOPHILS # BLD: 0 K/UL (ref 0–0.1)
BASOPHILS # BLD: 0 K/UL (ref 0–0.1)
BASOPHILS NFR BLD: 0 % (ref 0–2)
BASOPHILS NFR BLD: 0 % (ref 0–2)
BILIRUB DIRECT SERPL-MCNC: 0.2 MG/DL (ref 0–0.2)
BILIRUB SERPL-MCNC: 0.3 MG/DL (ref 0.2–1)
BIOFIRE TEST COMMENT: ABNORMAL
BLACTX-M ISLT/SPM QL: DETECTED
BLAIMP ISLT/SPM QL: NOT DETECTED
BLAKPC ISLT/SPM QL: NOT DETECTED
BLAOXA-48-LIKE ISLT/SPM QL: NOT DETECTED
BLAVIM ISLT/SPM QL: NOT DETECTED
BUN SERPL-MCNC: 15 MG/DL (ref 6–23)
BUN/CREAT SERPL: 14 (ref 12–20)
C ALBICANS DNA BLD POS QL NAA+NON-PROBE: NOT DETECTED
C AURIS DNA BLD POS QL NAA+NON-PROBE: NOT DETECTED
C GATTII+NEOFOR DNA BLD POS QL NAA+N-PRB: NOT DETECTED
C GLABRATA DNA BLD POS QL NAA+NON-PROBE: NOT DETECTED
C KRUSEI DNA BLD POS QL NAA+NON-PROBE: NOT DETECTED
C PARAP DNA BLD POS QL NAA+NON-PROBE: NOT DETECTED
C TROPICLS DNA BLD POS QL NAA+NON-PROBE: NOT DETECTED
CALCIUM SERPL-MCNC: 7.7 MG/DL (ref 8.5–10.1)
CHLORIDE SERPL-SCNC: 97 MMOL/L (ref 98–107)
CO2 SERPL-SCNC: 34 MMOL/L (ref 21–32)
COLISTIN RES MCR-1 ISLT/SPM QL: NOT DETECTED
CREAT SERPL-MCNC: 1.08 MG/DL (ref 0.6–1.3)
DIFFERENTIAL METHOD BLD: ABNORMAL
DIFFERENTIAL METHOD BLD: ABNORMAL
E CLOAC COMP DNA BLD POS NAA+NON-PROBE: NOT DETECTED
E COLI DNA BLD POS QL NAA+NON-PROBE: DETECTED
E FAECALIS DNA BLD POS QL NAA+NON-PROBE: NOT DETECTED
E FAECIUM DNA BLD POS QL NAA+NON-PROBE: NOT DETECTED
ENTEROBACTERALES DNA BLD POS NAA+N-PRB: DETECTED
EOSINOPHIL # BLD: 0.05 K/UL (ref 0–0.4)
EOSINOPHIL # BLD: 0.18 K/UL (ref 0–0.4)
EOSINOPHIL NFR BLD: 1 % (ref 0–5)
EOSINOPHIL NFR BLD: 4 % (ref 0–5)
ERYTHROCYTE [DISTWIDTH] IN BLOOD BY AUTOMATED COUNT: 12.4 % (ref 11.6–14.5)
ERYTHROCYTE [DISTWIDTH] IN BLOOD BY AUTOMATED COUNT: 12.6 % (ref 11.6–14.5)
ERYTHROCYTE [DISTWIDTH] IN BLOOD BY AUTOMATED COUNT: 12.7 % (ref 11.6–14.5)
GLOBULIN SER CALC-MCNC: 2.8 G/DL (ref 2–4)
GLUCOSE BLD STRIP.AUTO-MCNC: 120 MG/DL (ref 70–110)
GLUCOSE BLD STRIP.AUTO-MCNC: 126 MG/DL (ref 70–110)
GLUCOSE BLD STRIP.AUTO-MCNC: 135 MG/DL (ref 70–110)
GLUCOSE BLD STRIP.AUTO-MCNC: 185 MG/DL (ref 70–110)
GLUCOSE BLD STRIP.AUTO-MCNC: 193 MG/DL (ref 70–110)
GLUCOSE SERPL-MCNC: 199 MG/DL (ref 74–108)
GP B STREP DNA BLD POS QL NAA+NON-PROBE: NOT DETECTED
HAEM INFLU DNA BLD POS QL NAA+NON-PROBE: NOT DETECTED
HCT VFR BLD AUTO: 25.5 % (ref 35–45)
HCT VFR BLD AUTO: 26 % (ref 35–45)
HCT VFR BLD AUTO: 26.8 % (ref 35–45)
HGB BLD-MCNC: 8.9 G/DL (ref 12–16)
HGB BLD-MCNC: 9 G/DL (ref 12–16)
HGB BLD-MCNC: 9.4 G/DL (ref 12–16)
IMM GRANULOCYTES # BLD AUTO: 0 K/UL
IMM GRANULOCYTES # BLD AUTO: 0 K/UL
IMM GRANULOCYTES NFR BLD AUTO: 0 %
IMM GRANULOCYTES NFR BLD AUTO: 0 %
INR PPP: 1.3 (ref 0.9–1.2)
K OXYTOCA DNA BLD POS QL NAA+NON-PROBE: NOT DETECTED
KLEBSIELLA SP DNA BLD POS QL NAA+NON-PRB: NOT DETECTED
KLEBSIELLA SP DNA BLD POS QL NAA+NON-PRB: NOT DETECTED
L MONOCYTOG DNA BLD POS QL NAA+NON-PROBE: NOT DETECTED
LACTATE SERPL-SCNC: 1.7 MMOL/L (ref 0.4–2)
LACTATE SERPL-SCNC: 2.2 MMOL/L (ref 0.4–2)
LACTATE SERPL-SCNC: 3 MMOL/L (ref 0.4–2)
LACTATE SERPL-SCNC: 3.2 MMOL/L (ref 0.4–2)
LYMPHOCYTES # BLD: 0.97 K/UL (ref 0.9–3.6)
LYMPHOCYTES # BLD: 1.65 K/UL (ref 0.9–3.6)
LYMPHOCYTES NFR BLD: 21 % (ref 21–52)
LYMPHOCYTES NFR BLD: 35 % (ref 21–52)
MAGNESIUM SERPL-MCNC: 1.8 MG/DL (ref 1.6–2.6)
MCH RBC QN AUTO: 28.3 PG (ref 24–34)
MCH RBC QN AUTO: 28.6 PG (ref 24–34)
MCH RBC QN AUTO: 28.6 PG (ref 24–34)
MCHC RBC AUTO-ENTMCNC: 34.6 G/DL (ref 31–37)
MCHC RBC AUTO-ENTMCNC: 34.9 G/DL (ref 31–37)
MCHC RBC AUTO-ENTMCNC: 35.1 G/DL (ref 31–37)
MCV RBC AUTO: 81.5 FL (ref 78–100)
MCV RBC AUTO: 81.8 FL (ref 78–100)
MCV RBC AUTO: 82 FL (ref 78–100)
METAMYELOCYTES NFR BLD MANUAL: 1 %
METAMYELOCYTES NFR BLD MANUAL: 4 %
MONOCYTES # BLD: 0.05 K/UL (ref 0.05–1.2)
MONOCYTES # BLD: 0.09 K/UL (ref 0.05–1.2)
MONOCYTES NFR BLD: 1 % (ref 3–10)
MONOCYTES NFR BLD: 2 % (ref 3–10)
N MEN DNA BLD POS QL NAA+NON-PROBE: NOT DETECTED
NEUTS BAND NFR BLD MANUAL: 16 % (ref 0–5)
NEUTS BAND NFR BLD MANUAL: 8 % (ref 0–5)
NEUTS SEG # BLD: 2.87 K/UL (ref 1.8–8)
NEUTS SEG # BLD: 3.22 K/UL (ref 1.8–8)
NEUTS SEG NFR BLD: 53 % (ref 40–73)
NEUTS SEG NFR BLD: 54 % (ref 40–73)
NRBC # BLD: 0.03 K/UL (ref 0–0.01)
NRBC # BLD: 0.07 K/UL (ref 0–0.01)
NRBC # BLD: 0.1 K/UL (ref 0–0.01)
NRBC BLD-RTO: 0.6 PER 100 WBC
NRBC BLD-RTO: 1.5 PER 100 WBC
NRBC BLD-RTO: 2.1 PER 100 WBC
P AERUGINOSA DNA BLD POS NAA+NON-PROBE: NOT DETECTED
PHOSPHATE SERPL-MCNC: 2.1 MG/DL (ref 2.5–4.9)
PHOSPHATE SERPL-MCNC: 2.2 MG/DL (ref 2.5–4.9)
PHOSPHATE SERPL-MCNC: 2.8 MG/DL (ref 2.5–4.9)
PLATELET # BLD AUTO: 120 K/UL (ref 135–420)
PLATELET # BLD AUTO: 64 K/UL (ref 135–420)
PLATELET # BLD AUTO: 72 K/UL (ref 135–420)
PLATELET COMMENT: ABNORMAL
PMV BLD AUTO: 10.3 FL (ref 9.2–11.8)
PMV BLD AUTO: 11.3 FL (ref 9.2–11.8)
PMV BLD AUTO: 11.4 FL (ref 9.2–11.8)
POTASSIUM SERPL-SCNC: 3.2 MMOL/L (ref 3.5–5.5)
POTASSIUM SERPL-SCNC: 3.3 MMOL/L (ref 3.5–5.5)
PROT SERPL-MCNC: 4.5 G/DL (ref 6.4–8.2)
PROTEUS SP DNA BLD POS QL NAA+NON-PROBE: NOT DETECTED
PROTHROMBIN TIME: 16.4 SEC (ref 12–15.1)
RBC # BLD AUTO: 3.11 M/UL (ref 4.2–5.3)
RBC # BLD AUTO: 3.18 M/UL (ref 4.2–5.3)
RBC # BLD AUTO: 3.29 M/UL (ref 4.2–5.3)
RBC MORPH BLD: ABNORMAL
RBC MORPH BLD: ABNORMAL
RESISTANT GENE NDM BY PCR: NOT DETECTED
RESISTANT GENE TARGETS: ABNORMAL
S AUREUS DNA BLD POS QL NAA+NON-PROBE: NOT DETECTED
S AUREUS+CONS DNA BLD POS NAA+NON-PROBE: NOT DETECTED
S EPIDERMIDIS DNA BLD POS QL NAA+NON-PRB: NOT DETECTED
S LUGDUNENSIS DNA BLD POS QL NAA+NON-PRB: NOT DETECTED
S MALTOPHILIA DNA BLD POS QL NAA+NON-PRB: NOT DETECTED
S MARCESCENS DNA BLD POS NAA+NON-PROBE: NOT DETECTED
S PNEUM DNA BLD POS QL NAA+NON-PROBE: NOT DETECTED
S PYO DNA BLD POS QL NAA+NON-PROBE: NOT DETECTED
SALMONELLA DNA BLD POS QL NAA+NON-PROBE: NOT DETECTED
SODIUM SERPL-SCNC: 139 MMOL/L (ref 136–145)
STREPTOCOCCUS DNA BLD POS NAA+NON-PROBE: NOT DETECTED
TRIGL SERPL-MCNC: 180 MG/DL (ref 0–150)
VANCOMYCIN SERPL-MCNC: 12 UG/ML (ref 5–40)
WBC # BLD AUTO: 4.6 K/UL (ref 4.6–13.2)
WBC # BLD AUTO: 4.7 K/UL (ref 4.6–13.2)
WBC # BLD AUTO: 4.7 K/UL (ref 4.6–13.2)
WBC MORPH BLD: ABNORMAL
WBC MORPH BLD: ABNORMAL

## 2025-07-02 PROCEDURE — 80048 BASIC METABOLIC PNL TOTAL CA: CPT

## 2025-07-02 PROCEDURE — 6360000002 HC RX W HCPCS: Performed by: HOSPITALIST

## 2025-07-02 PROCEDURE — 85025 COMPLETE CBC W/AUTO DIFF WBC: CPT

## 2025-07-02 PROCEDURE — 85027 COMPLETE CBC AUTOMATED: CPT

## 2025-07-02 PROCEDURE — 6370000000 HC RX 637 (ALT 250 FOR IP): Performed by: HOSPITALIST

## 2025-07-02 PROCEDURE — 74174 CTA ABD&PLVS W/CONTRAST: CPT

## 2025-07-02 PROCEDURE — 76705 ECHO EXAM OF ABDOMEN: CPT

## 2025-07-02 PROCEDURE — 6360000002 HC RX W HCPCS: Performed by: INTERNAL MEDICINE

## 2025-07-02 PROCEDURE — 2700000000 HC OXYGEN THERAPY PER DAY

## 2025-07-02 PROCEDURE — 84100 ASSAY OF PHOSPHORUS: CPT

## 2025-07-02 PROCEDURE — 84478 ASSAY OF TRIGLYCERIDES: CPT

## 2025-07-02 PROCEDURE — 80202 ASSAY OF VANCOMYCIN: CPT

## 2025-07-02 PROCEDURE — 2580000003 HC RX 258: Performed by: INTERNAL MEDICINE

## 2025-07-02 PROCEDURE — 83735 ASSAY OF MAGNESIUM: CPT

## 2025-07-02 PROCEDURE — 6360000002 HC RX W HCPCS: Performed by: PHYSICIAN ASSISTANT

## 2025-07-02 PROCEDURE — 2500000003 HC RX 250 WO HCPCS: Performed by: HOSPITALIST

## 2025-07-02 PROCEDURE — 82962 GLUCOSE BLOOD TEST: CPT

## 2025-07-02 PROCEDURE — 86022 PLATELET ANTIBODIES: CPT

## 2025-07-02 PROCEDURE — 85610 PROTHROMBIN TIME: CPT

## 2025-07-02 PROCEDURE — 6360000002 HC RX W HCPCS: Performed by: SURGERY

## 2025-07-02 PROCEDURE — 80076 HEPATIC FUNCTION PANEL: CPT

## 2025-07-02 PROCEDURE — 71275 CT ANGIOGRAPHY CHEST: CPT

## 2025-07-02 PROCEDURE — 84132 ASSAY OF SERUM POTASSIUM: CPT

## 2025-07-02 PROCEDURE — 6360000004 HC RX CONTRAST MEDICATION: Performed by: HOSPITALIST

## 2025-07-02 PROCEDURE — 1100000000 HC RM PRIVATE

## 2025-07-02 PROCEDURE — 2500000003 HC RX 250 WO HCPCS: Performed by: INTERNAL MEDICINE

## 2025-07-02 PROCEDURE — 2580000003 HC RX 258: Performed by: HOSPITALIST

## 2025-07-02 PROCEDURE — 85730 THROMBOPLASTIN TIME PARTIAL: CPT

## 2025-07-02 PROCEDURE — 83605 ASSAY OF LACTIC ACID: CPT

## 2025-07-02 PROCEDURE — 2580000003 HC RX 258: Performed by: PHYSICIAN ASSISTANT

## 2025-07-02 RX ORDER — IOPAMIDOL 755 MG/ML
150 INJECTION, SOLUTION INTRAVASCULAR
Status: COMPLETED | OUTPATIENT
Start: 2025-07-02 | End: 2025-07-02

## 2025-07-02 RX ORDER — HEPARIN SODIUM 10000 [USP'U]/100ML
5-30 INJECTION, SOLUTION INTRAVENOUS CONTINUOUS
Status: DISCONTINUED | OUTPATIENT
Start: 2025-07-02 | End: 2025-07-02

## 2025-07-02 RX ORDER — HEPARIN SODIUM 1000 [USP'U]/ML
40 INJECTION, SOLUTION INTRAVENOUS; SUBCUTANEOUS PRN
Status: DISCONTINUED | OUTPATIENT
Start: 2025-07-02 | End: 2025-07-02

## 2025-07-02 RX ORDER — HEPARIN SODIUM 1000 [USP'U]/ML
80 INJECTION, SOLUTION INTRAVENOUS; SUBCUTANEOUS PRN
Status: DISCONTINUED | OUTPATIENT
Start: 2025-07-02 | End: 2025-07-02

## 2025-07-02 RX ADMIN — INSULIN HUMAN 2 UNITS: 100 INJECTION, SOLUTION PARENTERAL at 01:15

## 2025-07-02 RX ADMIN — HYDROMORPHONE HYDROCHLORIDE 1 MG: 1 INJECTION, SOLUTION INTRAMUSCULAR; INTRAVENOUS; SUBCUTANEOUS at 15:20

## 2025-07-02 RX ADMIN — MEROPENEM 1000 MG: 1 INJECTION INTRAVENOUS at 17:54

## 2025-07-02 RX ADMIN — MEROPENEM 1000 MG: 1 INJECTION INTRAVENOUS at 11:15

## 2025-07-02 RX ADMIN — SODIUM CHLORIDE, PRESERVATIVE FREE 40 MG: 5 INJECTION INTRAVENOUS at 08:21

## 2025-07-02 RX ADMIN — PIPERACILLIN AND TAZOBACTAM 3375 MG: 3; .375 INJECTION, POWDER, LYOPHILIZED, FOR SOLUTION INTRAVENOUS at 04:59

## 2025-07-02 RX ADMIN — POTASSIUM CHLORIDE 10 MEQ: 7.46 INJECTION, SOLUTION INTRAVENOUS at 09:26

## 2025-07-02 RX ADMIN — POTASSIUM CHLORIDE 10 MEQ: 7.46 INJECTION, SOLUTION INTRAVENOUS at 08:20

## 2025-07-02 RX ADMIN — SODIUM PHOSPHATE, MONOBASIC, MONOHYDRATE AND SODIUM PHOSPHATE, DIBASIC, ANHYDROUS 15 MMOL: 142; 276 INJECTION, SOLUTION INTRAVENOUS at 17:06

## 2025-07-02 RX ADMIN — HYDROMORPHONE HYDROCHLORIDE 1 MG: 1 INJECTION, SOLUTION INTRAMUSCULAR; INTRAVENOUS; SUBCUTANEOUS at 01:27

## 2025-07-02 RX ADMIN — SODIUM CHLORIDE 0.5 MCG/KG/MIN: 0.9 INJECTION, SOLUTION INTRAVENOUS at 21:05

## 2025-07-02 RX ADMIN — ENOXAPARIN SODIUM 30 MG: 100 INJECTION SUBCUTANEOUS at 08:22

## 2025-07-02 RX ADMIN — VANCOMYCIN HYDROCHLORIDE 750 MG: 750 INJECTION, POWDER, LYOPHILIZED, FOR SOLUTION INTRAVENOUS at 12:01

## 2025-07-02 RX ADMIN — HYDROMORPHONE HYDROCHLORIDE 1 MG: 1 INJECTION, SOLUTION INTRAMUSCULAR; INTRAVENOUS; SUBCUTANEOUS at 08:48

## 2025-07-02 RX ADMIN — SODIUM PHOSPHATE, MONOBASIC, MONOHYDRATE AND SODIUM PHOSPHATE, DIBASIC, ANHYDROUS 15 MMOL: 142; 276 INJECTION, SOLUTION INTRAVENOUS at 07:20

## 2025-07-02 RX ADMIN — SODIUM BICARBONATE: 84 INJECTION, SOLUTION INTRAVENOUS at 04:45

## 2025-07-02 RX ADMIN — METOPROLOL TARTRATE 2.5 MG: 5 INJECTION INTRAVENOUS at 14:22

## 2025-07-02 RX ADMIN — POTASSIUM CHLORIDE 10 MEQ: 7.46 INJECTION, SOLUTION INTRAVENOUS at 19:31

## 2025-07-02 RX ADMIN — IOPAMIDOL 150 ML: 755 INJECTION, SOLUTION INTRAVENOUS at 10:49

## 2025-07-02 RX ADMIN — POTASSIUM CHLORIDE 10 MEQ: 7.46 INJECTION, SOLUTION INTRAVENOUS at 20:35

## 2025-07-02 RX ADMIN — INSULIN HUMAN 2 UNITS: 100 INJECTION, SOLUTION PARENTERAL at 06:35

## 2025-07-02 RX ADMIN — HEPARIN SODIUM 18 UNITS/KG/HR: 10000 INJECTION, SOLUTION INTRAVENOUS at 15:20

## 2025-07-02 RX ADMIN — HYDRALAZINE HYDROCHLORIDE 10 MG: 20 INJECTION INTRAMUSCULAR; INTRAVENOUS at 11:56

## 2025-07-02 RX ADMIN — VANCOMYCIN HYDROCHLORIDE 750 MG: 750 INJECTION, POWDER, LYOPHILIZED, FOR SOLUTION INTRAVENOUS at 23:34

## 2025-07-02 RX ADMIN — SODIUM CHLORIDE, PRESERVATIVE FREE 10 ML: 5 INJECTION INTRAVENOUS at 19:35

## 2025-07-02 RX ADMIN — HYDROMORPHONE HYDROCHLORIDE 1 MG: 1 INJECTION, SOLUTION INTRAMUSCULAR; INTRAVENOUS; SUBCUTANEOUS at 20:15

## 2025-07-02 RX ADMIN — HYDROMORPHONE HYDROCHLORIDE 1 MG: 1 INJECTION, SOLUTION INTRAMUSCULAR; INTRAVENOUS; SUBCUTANEOUS at 12:05

## 2025-07-02 RX ADMIN — METOPROLOL TARTRATE 2.5 MG: 5 INJECTION INTRAVENOUS at 05:00

## 2025-07-02 RX ADMIN — POTASSIUM CHLORIDE 10 MEQ: 7.46 INJECTION, SOLUTION INTRAVENOUS at 17:09

## 2025-07-02 RX ADMIN — POTASSIUM CHLORIDE 10 MEQ: 7.46 INJECTION, SOLUTION INTRAVENOUS at 07:14

## 2025-07-02 RX ADMIN — HYDROMORPHONE HYDROCHLORIDE 1 MG: 1 INJECTION, SOLUTION INTRAMUSCULAR; INTRAVENOUS; SUBCUTANEOUS at 04:56

## 2025-07-02 RX ADMIN — I.V. FAT EMULSION 75 ML: 20 EMULSION INTRAVENOUS at 18:05

## 2025-07-02 RX ADMIN — POTASSIUM CHLORIDE 10 MEQ: 7.46 INJECTION, SOLUTION INTRAVENOUS at 18:07

## 2025-07-02 RX ADMIN — POTASSIUM CHLORIDE 10 MEQ: 7.46 INJECTION, SOLUTION INTRAVENOUS at 11:13

## 2025-07-02 RX ADMIN — ASCORBIC ACID, VITAMIN A PALMITATE, CHOLECALCIFEROL, THIAMINE HYDROCHLORIDE, RIBOFLAVIN-5 PHOSPHATE SODIUM, PYRIDOXINE HYDROCHLORIDE, NIACINAMIDE, DEXPANTHENOL, ALPHA-TOCOPHEROL ACETATE, VITAMIN K1, FOLIC ACID, BIOTIN, CYANOCOBALAMIN: 200; 3300; 200; 6; 3.6; 6; 40; 15; 10; 150; 600; 60; 5 INJECTION, SOLUTION INTRAVENOUS at 18:04

## 2025-07-02 RX ADMIN — METOPROLOL TARTRATE 2.5 MG: 5 INJECTION INTRAVENOUS at 20:22

## 2025-07-02 RX ADMIN — HYDROMORPHONE HYDROCHLORIDE 1 MG: 1 INJECTION, SOLUTION INTRAMUSCULAR; INTRAVENOUS; SUBCUTANEOUS at 23:55

## 2025-07-02 ASSESSMENT — PAIN DESCRIPTION - LOCATION
LOCATION: ABDOMEN
LOCATION: ABDOMEN;BACK
LOCATION: ABDOMEN

## 2025-07-02 ASSESSMENT — PAIN DESCRIPTION - DESCRIPTORS
DESCRIPTORS: STABBING
DESCRIPTORS: SHARP;SORE
DESCRIPTORS: ACHING
DESCRIPTORS: ACHING

## 2025-07-02 ASSESSMENT — PAIN SCALES - GENERAL
PAINLEVEL_OUTOF10: 0
PAINLEVEL_OUTOF10: 1
PAINLEVEL_OUTOF10: 8
PAINLEVEL_OUTOF10: 9
PAINLEVEL_OUTOF10: 8
PAINLEVEL_OUTOF10: 10
PAINLEVEL_OUTOF10: 9
PAINLEVEL_OUTOF10: 10
PAINLEVEL_OUTOF10: 0
PAINLEVEL_OUTOF10: 10

## 2025-07-02 ASSESSMENT — PAIN DESCRIPTION - ORIENTATION: ORIENTATION: ANTERIOR

## 2025-07-02 ASSESSMENT — PAIN SCALES - WONG BAKER
WONGBAKER_NUMERICALRESPONSE: NO HURT

## 2025-07-02 NOTE — PROGRESS NOTES
Consult for TPN Dosing per Pharmacy by Dr. Long    Consult provided for this 60 y.o. female, for indication of Nutrition    Day of Therapy:  3    Doing Weight:  52.3 kg    Labs:  BMP BMP:   @[id number](na,k,cl,co2,AGAP,glu,bun,crea,GFRAA,GFRNA)@       CMP CMP:   @[id number](na,k,cl,co2,AGAP,glu,bun,crea,GFRAA,GFRNA,ca,mg,phos,alb,tbil,TP,ALB,GLOB,AGRAT,sgot,ALT,GPT)@      Ca/ Phos Lab Results   Component Value Date/Time    PHOS 2.2 07/02/2025 06:12 AM       Mag    Lab Results   Component Value Date/Time    MG 1.8 07/02/2025 06:12 AM      Tg No components found for: \"TGL\"   Albumin No results found for: \"TP\"      Kcal requirements:  25-35 kcal/kg  ( 1716-0071 kcal/day )    Macronutrients - provided by premixed Clinemix E  4.25%AA/5%Dextrose with electrolytes in 1000 ml bag:  Protein                                      42.5 g/day = 170 kcal  Dextrose                                      50 g/day = 170 kcal  Lipids 20%                                   15 g/day = 135 kcal ( administered separately )                                                                          475 kcal total     TPN  to be titrated to goal per patient tolerance.      MD to replete electrolytes acutely outside of TPN as needed.   MD to add/adjust/dc maintenance IV Fluid as needed for fluid requirements.  (Patient currently remains on Na Bicarb drip at 150 ml/hr)     Additional recommendations/Orders:   Corrective insulin coverage with Regular Insulin q6h while on TPN per protocol.    Increase TPN rate to 42 ml/hr ( 1000 ml/24hrs )  Increased Lipids to 15 gm   Will plan to increase concentration of TPN Formulation when Electrolyte Levels normalize ( ie. K and Phos )  BMP, Mag, Phos ordered daily  Triglycerides, Prealbumin, CMP ordered upon initiation and weekly     Pharmacy to follow daily and will make changes based on labs/clinical status.      GEETHA PATTON, [facility], BCPS  [phone]

## 2025-07-02 NOTE — PROGRESS NOTES
Palliative Medicine      Thank you for the consultation to the Palliative Medicine Team. We will review the chart and meet with the patient as soon as possible.    Annia Astorga, RN, MSN

## 2025-07-02 NOTE — WOUND CARE
ICU Rounding  Wound care nurse rounded on patient for skin issues.  Patient has a Jabari score of 16.  Does patient have any pressure injury?no per primary nurse LESLIE Mast     Skin Care & Pressure Relief Recommendations  Minimize layers of linen  Pads under patient to optimize support surface  Turn/reposition approximately every 2 hours  Use pillow wedges to maintain positioning but do not put pillow directly over wounds  Manage incontinence   Promote continence; Skin Protective lotion/cream to buttocks and sacrum daily and as needed with incontinence care  Offload heels pillows    Consult wound care if any wounds noted during admission.  Wound Care nurse will continue to follow during ICU admission.

## 2025-07-02 NOTE — PROGRESS NOTES
Cta results reviewed  discussed with  Dr. Zavala-continue watch for the fluid collection and vascular -Rocky Amaral -recommend heparin gtt and she will see pt discussed with dr. Trinidad rosales to have heparin gtt .

## 2025-07-02 NOTE — PROGRESS NOTES
GNR in blood culture- E coli, has ESBL gene by [employer]    DC zosyn  Start meropenem  Will see in pm    Ursula Li MD  [facility])  Office:  -Option [phone]  Office fax:  [phone]

## 2025-07-02 NOTE — PROGRESS NOTES
Reviewed CT scan.  There is a 5 cm collection possible developing abscess.  Right now I see no evidence for a leak she does have oral contrast that was given previously.  However this is not a complete contrast with oral oral contrast.  Agree with vascular consult and I think it is okay to start her on heparin if needed.  Will follow her today and if she does not improve we will need to get a CT scan with oral contrast.

## 2025-07-02 NOTE — PROGRESS NOTES
[facility]   Pharmacy Pharmacokinetic Monitoring Service - Vancomycin    Consulting Provider:  Dr. Jade   Indication:  Sepsis of Unknown Etiology 7 day tx  Target Concentration: Goal AUC/MICHAEL 400-600 mg*hr/L  Day of Therapy: 2  Additional Antimicrobials: Zosyn    Pertinent Laboratory Values:   Wt Readings from Last 1 Encounters:   06/25/25 72.6 kg (160 lb)     Temp Readings from Last 1 Encounters:   07/02/25 98.4 °F (36.9 °C) (Oral)     Estimated Creatinine Clearance: 48 mL/min (based on SCr of 1.08 mg/dL).  Recent Labs     07/01/25  0022 07/02/25  0612   CREATININE 1.59* 1.08   BUN 24* 15   WBC 4.4* 4.6     Recent vancomycin administrations                     vancomycin (VANCOCIN) 750 mg in sodium chloride 0.9 % 250 mL (addEASE) IVPB (mg) 750 mg New Bag 07/01/25 2339    vancomycin (VANCOCIN) 1750 mg in sodium chloride 0.9 % 500 mL IVPB (mg) 1,750 mg New Bag 06/30/25 2324             Plan:  Vancomycin Random Level 12.0 mg/l at 06:12 7/2/2025  Updated  mg/l.hr  Trough 5.4 mg/l ==> Sub Therapeutic  Increased dosing to Vancomycin 750 mg IV q12hrs  Estimated  mg/l.hr  Trough 13.9 mg/l  Pharmacy will continue to monitor patient and adjust therapy as indicated    GEETHA PATTON RPH, BCPS  7/2/2025 8:48 AM

## 2025-07-02 NOTE — CONSULTS
Received consult request from hospitalist Dr. Long regarding CT abd/pelvis finding of new abdominal aortic filling defect at the level of the SMA, mural thrombus vs adherent embolus. Pt admitted for small bowel obstuction    Discussed with vascular attending Dr. Stone who independently reviewed pt's CT imaging. There is no evidence for any flow limiting stenosis within the aorta or SMA.    - No vascular intervention indicated  - Recommend management with anticoagulation only, heparin gtt for now. In the setting of SBO/poor GI absorption, if pt needs parenteral anticoagulation for discharge would recommend therapeutic Lovenox as long as creatinine stays normal until able to tolerate PO.    Please contact with any questions/concerns.    Imagin/2/25: CTA ABDOMEN PELVIS W CONTRAST    Narrative & Impression  EXAM:  CTA CHEST W WO CONTRAST, CTA ABDOMEN PELVIS W CONTRAST     INDICATION: Tachycardia, hypotension, and lactic acidosis post surgery evaluate  for PE, ischemic bowel, abscess     COMPARISON: CT chest/abdomen/pelvis 2025, CT abdomen/pelvis 2025     CONTRAST: 150 mL of Isovue-370.     ORAL CONTRAST: None     TECHNIQUE: Helical thin section chest CT following intravenous administration of  nonionic contrast was performed according to departmental PE protocol. Coronal  and sagittal reformats were performed. 3D post processing was performed. Then,  CT angiography axial images were obtained through the abdomen and pelvis.  Coronal and sagittal reconstructions were generated. Additionally, multiplanar  reformats including volume rendered images and 3D/MIPs were created. CT dose  reduction was achieved through use of a standardized protocol tailored for this  examination and automatic exposure control for dose modulation.     FINDINGS:     PULMONARY ARTERIES: This is a good quality study for the evaluation of pulmonary  embolism to the segmental arterial level. There is no pulmonary embolism to

## 2025-07-02 NOTE — PROGRESS NOTES
Hospitalist Progress Note      Patient: Kiara Nance MRN: [id number]  CSN: [id number]    YOB: 1965  Age: 60 y.o.  Sex: female    DOA: 6/25/2025 LOS:  LOS: 7 days      PCP: Nae Kaiser, APRN - NP       Summary:  60 years old presented with abdominal pain, distention, constipation, and vomiting , she was admitted for small bowel obstruction.    She received surgery and developed metabolic acidosis, luis and ileus and  sepsis.     Subjective still in pain ,   She is lying in the beds comfortable   She answered questions   Rn lactic acid still elevated on bicarb     Will hold bicarb, cr is better and will do cta chest/abdomen/pelvic to r/o abscess vs pE -has been tachy     Called  son Reynold Kothari (Child)  [phone] (Mobile) he is updated .      Assessment/Plan:    Principal Problem:    SBO (small bowel obstruction) (HCC)  Active Problems:    Benign essential HTN    Hypokalemia    Asthma    Acute renal failure    Hydronephrosis    Sepsis (HCC)    Metabolic acidosis    Hypotension    Adenovirus infection    Bacteremia    Status post surgery  Resolved Problems:    * No resolved hospital problems. *      Sepsis blood culture E. coli  ID has been consulted.  Antibiotic changed to meropenem and vancomycin   Still elevated lactic acid  Will do pan CT scan to rule out abscess, others source of infection      metabolic acidosis and luis   On bicarb drip hold per nephrologist  Cr back to normal    Hydronephrosis, left side no obstruction  Urology has been consulted.  Will repeat imaging         Small bowel obstruction, ileus  not improving on medical treatment,  LAPAROTOMY EXPLORATORY, LYSIS OF ADHESIONS, SMALL BOWEL RESECTION (Abdomen)  on 6/29 per dr. Palencia   Repeated CT abdomen post shows post surgery changes   Continue IV hydration, pain control  Incentive spirometry  Will repeat ct with contrast for better picture       Acute renal failure-resolved   Nephrology has been  Leiomyomatous uterus with multiple uterine leiomyomata. Ovaries are within normal limits. URINARY BLADDER: No mass or calculus. BONES: No destructive bone lesion. ABDOMINAL WALL: No mass or hernia. Postsurgical midline incisional change. ADDITIONAL COMMENTS: N/A     Closed-loop small bowel obstruction obstruction with markedly dilated small bowel in the right upper quadrant, likely secondary to postoperative adhesions. The findings were called to Dr. Guallpa on 6/25/2025 at 23:60 by myself.  [id number] Electronically signed by Marcos Bennett               TIME: 55 minutes were spent on the care of this patient today.  This time also includes physician non-face-to-face service time visit on the date of service, including:  Preparing to see the patient (eg, review of tests)  Obtaining and/or reviewing separately obtained history  Performing a medically necessary appropriate examination and/or evaluation  Counseling and educating the patient/family/caregiver  Ordering medications, tests, or procedures  Referring and communicating with other health care professionals as needed  Documenting clinical information in the electronic or other health record  Independently interpreting results (not reported separately) and communicating results to the patient/family/caregiver  Care coordination and discharge planning with Case Management.        Dear patient Kiara Nance, if you are reviewing this note and have a question regarding the medical terminology, please bring it with you to your next PCP visit.  Medical notes are meant to be a communication between medical professionals.  Additionally, a portion of this note were created using voice recognition function, syntax which may have escaped proofreading.    Melina Long MD

## 2025-07-02 NOTE — PROGRESS NOTES
hydronephrosis. STOMACH: Enteric tube terminating in the stomach. SMALL BOWEL: Dilated fluid-filled small bowel loops, normal in caliber in the pelvis upstream from the ileocolic anastomosis. No discrete transition point. COLON: Right and transverse colectomy. Small locules of gas in the left paracolic gutter. No drainable fluid collection. APPENDIX: Absent PERITONEUM: No ascites or pneumoperitoneum. RETROPERITONEUM: No lymphadenopathy or aortic aneurysm. REPRODUCTIVE ORGANS: Lobulated uterus consistent with fibroids. URINARY BLADDER: Contracted urinary bladder containing a Merrill catheter. BONES: No destructive bone lesion. ADDITIONAL COMMENTS: N/A     1.  Postsurgical changes from partial colectomy with ileocolic anastomosis. Dilated fluid-filled small bowel loops suggestive of ileus. 2.  No drainable fluid collection. 3.  Postoperative locules of gas in the left paracolic gutter. 4.  Moderate left hydronephrosis without evidence of obstructive stone. 5.  Enlarged fibroid uterus. 6.  Bibasilar atelectasis with small bilateral pleural effusions right greater than left. Electronically signed by YOHAN PRESCOTT    CT HEAD WO CONTRAST  Result Date: 6/30/2025  EXAM: CT CODE NEURO HEAD WO CONTRAST INDICATION: Acute mental status change COMPARISON: 9/4/2024. CONTRAST: None. TECHNIQUE: Unenhanced CT of the head was performed using 5 mm images. Brain and bone windows were generated. Coronal and sagittal reformats. CT dose reduction was achieved through use of a standardized protocol tailored for this examination and automatic exposure control for dose modulation.  FINDINGS: The ventricles and sulci are normal in size, shape and configuration. There is no significant white matter disease. There is no intracranial hemorrhage, extra-axial collection, or mass effect. The basilar cisterns are open. No CT evidence of acute infarct. The bone windows demonstrate no abnormalities. The visualized portions of the paranasal sinuses and  other respective systems problem management to primary team and other respective consultants. Any abnormalities on the tests, labs, pathology results, radiologic imaging studies, cardiovascular testing etc. that are not addressed during the hospital course, and if any pending results, should be conveyed to the patient’s PCP and respecrive consultants at the time of discharge. This is deferred to the primary team. Please call if any questions.    Further recommendations will be based on the patient's response to recommended treatment and results of the investigation ordered.      ·Please note: Voice-recognition software may have been used to generate this report, which may have resulted in some phonetic-based errors in grammar and contents. Even though attempts were made to correct all the mistakes, some may have been missed, and remained in the body of the document.    Elvis Perez MD  7/2/2025

## 2025-07-02 NOTE — PROGRESS NOTES
RENAL CONSULT PROGRESS NOTE   2025    Patient:  Kiara Nance  :  1965  Gender:  female  MRN #:  [id number]    Reason for Consult: acute renal failure     Subjective/relevant events in 24 hour   Has NG tube,   BP now stable , urine output is decent   Lactic acid is elevated   Labs, overnight events and consultant note reviewed.     Objective:    /88   Pulse (!) 116   Temp 98.4 °F (36.9 °C) (Oral)   Resp (!) 34   Ht 1.524 m (5')   Wt 72.6 kg (160 lb)   SpO2 100%   BMI 31.25 kg/m²     Physical Exam:    Pt awake,  alert   Lung: clear to auscultation  Abdomen: distended   Ext: trace edema   CNS- Oriented to  place and person     Laboratory Data:    Lab Results   Component Value Date    BUN 15 2025    BUN 24 (H) 2025    BUN 26 (H) 2025     2025     (L) 2025     (L) 2025    CO2 34 (H) 2025    CO2 22 2025    CO2 22 2025    GLUCOSE 199 (H) 2025    GLUCOSE 183 (H) 2025    GLUCOSE 237 (H) 2025     Lab Results   Component Value Date    WBC 4.6 2025    HGB 8.9 (L) 2025    HCT 25.5 (L) 2025     Lab Results   Component Value Date    CALCIUM 7.7 (L) 2025     Lab Results   Component Value Date    HDL 69 2020     No results found for: \"TURBIDITY\"    Imaging Reveiwed:      EKG  Renal ultrasound  Xray   CT abdomen 25 : IMPRESSION:  1. New, small right pleural effusion and right basilar patchy airspace  consolidation. New mild left basilar atelectasis.  2. Persistent, markedly dilated loop of bowel in the right abdomen, as described  above      Assessment:  she is 60 years old with hypertension presented with abdominal pain, distention, constipation, and vomiting        She has small bowel obstruction in CT scan s/p laparotomy , lysis of adhesions and bowel resection   She has iv contrast exposure twice thsi admission on  and  which is likely cause of KE on top of volume

## 2025-07-02 NOTE — CONSULTS
[facility]  (A Division of [facility])  Ursula Li MD   Office Tel:  [phone] Option #8                                                               Date of Admission: 6/25/2025       ID Consult for antimicrobial management GNR bacteremia requested by Dr Long   PCP: Nae Kaiser, DESHAUN - NP    C/C: Vomintin/abdominal pain/constipation-2 to 3 days on admission- 6/25    Current Antimicrobials:    Prior Antimicrobials:  Pip tazo 6/29 to date   Pip tazo X1- 6/25     Allergy to antibiotics- NA       Assessment:     Critically Ill patient with:    GNR bacteremia- E coli with ESBL gene by biofire 7/1- SS pending-- of abdominal source likley, doubt CRBSI, doubt urinary source-UA clear  SBO- persistent loop distension on imaging. History of prior surgery  S/P X-lap, lysis of adhesion, SB resection, side to side anastomosis- 6/29  Severe sepsis- KE/lactic acidosis  Bandemia 29%-->-> 8%  Abdominal fluid collection on CT with mild rim enhancement- probable abscess Vs leak?  Distended GB on CT, no acute cholecystitis on US  Procal >100-6/30    Adenovirus infection- Positive on resp viral panel  Micro:  6/30: Respiratory  viral panel for adenovirus is positive. Negative for Corona virus- COVID 19 and non COVID 19, human meta pneumovirus, rhino virus, Influenza A/B, parinflunenza 1-4, RSV negative. Bacterial PCR for bordetella, chlamydia pneumoniae and mycoplasma negative.      7/ 1 - blood cutlure X2: 2/4 E coli- ESBL by biofire, In progress        -UA no pyuria     Multiple abnormality on CT CAP 7/2  Bilateral pleural effusions : worsened compared to CT from 6/30/2025.   Atelectasis of nearly the entire right lower lobe and multiple segments of the   right middle and left lower lobes.       Abdominal aorta with 13mm filing defect  Mild left hydronephrosis and hydroureter to the level of the UVJ. No obstructing mass or stone.-- FU US recommended by Urology. No

## 2025-07-02 NOTE — PROGRESS NOTES
Call received from chemistry regarding cbc results and platelet count.  Paged out to hospitalist to report information orders to redraw cbc and orders to follow based on results.  Due to patient's platelets dropping MD to  switch heparin gtt per pharmacy recommendation.

## 2025-07-03 ENCOUNTER — APPOINTMENT (OUTPATIENT)
Facility: HOSPITAL | Age: 60
DRG: 230 | End: 2025-07-03
Payer: MEDICAID

## 2025-07-03 ENCOUNTER — APPOINTMENT (OUTPATIENT)
Facility: HOSPITAL | Age: 60
DRG: 230 | End: 2025-07-03
Attending: SURGERY
Payer: MEDICAID

## 2025-07-03 PROBLEM — I74.10 AORTIC THROMBUS (HCC): Status: ACTIVE | Noted: 2025-07-03

## 2025-07-03 LAB
ALBUMIN SERPL-MCNC: 1.6 G/DL (ref 3.4–5)
ALBUMIN/GLOB SERPL: 0.4 (ref 0.8–1.7)
ALP SERPL-CCNC: 52 U/L (ref 45–117)
ALT SERPL-CCNC: 23 U/L (ref 10–35)
ANION GAP BLD CALC-SCNC: NORMAL (ref 10–20)
ANION GAP SERPL CALC-SCNC: 8 MMOL/L (ref 3–18)
APTT PPP: 53.6 SEC (ref 21.7–34.2)
APTT PPP: 55.4 SEC (ref 21.7–34.2)
APTT PPP: 58 SEC (ref 21.7–34.2)
APTT PPP: 58.3 SEC (ref 21.7–34.2)
AST SERPL-CCNC: 22 U/L (ref 10–38)
BACTERIA SPEC CULT: ABNORMAL
BACTERIA SPEC CULT: ABNORMAL
BASOPHILS # BLD: 0 K/UL (ref 0–0.1)
BASOPHILS NFR BLD: 0 % (ref 0–2)
BILIRUB DIRECT SERPL-MCNC: 0.2 MG/DL (ref 0–0.2)
BILIRUB SERPL-MCNC: 0.4 MG/DL (ref 0.2–1)
BUN SERPL-MCNC: 12 MG/DL (ref 6–23)
BUN/CREAT SERPL: 13 (ref 12–20)
CALCIUM SERPL-MCNC: 8.4 MG/DL (ref 8.5–10.1)
CHLORIDE SERPL-SCNC: 101 MMOL/L (ref 98–107)
CO2 SERPL-SCNC: 30 MMOL/L (ref 21–32)
CREAT SERPL-MCNC: 0.87 MG/DL (ref 0.6–1.3)
DIFFERENTIAL METHOD BLD: ABNORMAL
ECHO BSA: 1.75 M2
EOSINOPHIL # BLD: 0.15 K/UL (ref 0–0.4)
EOSINOPHIL NFR BLD: 3 % (ref 0–5)
ERYTHROCYTE [DISTWIDTH] IN BLOOD BY AUTOMATED COUNT: 13.2 % (ref 11.6–14.5)
GLOBULIN SER CALC-MCNC: 3.8 G/DL (ref 2–4)
GLUCOSE BLD STRIP.AUTO-MCNC: 109 MG/DL (ref 70–110)
GLUCOSE BLD STRIP.AUTO-MCNC: 128 MG/DL (ref 70–110)
GLUCOSE BLD STRIP.AUTO-MCNC: 143 MG/DL (ref 70–110)
GLUCOSE BLD STRIP.AUTO-MCNC: 146 MG/DL (ref 70–110)
GLUCOSE SERPL-MCNC: 133 MG/DL (ref 74–108)
GRAM STN SPEC: ABNORMAL
GRAM STN SPEC: ABNORMAL
HCT VFR BLD AUTO: 25 % (ref 35–45)
HGB BLD-MCNC: 8.5 G/DL (ref 12–16)
IMM GRANULOCYTES # BLD AUTO: 0 K/UL
IMM GRANULOCYTES NFR BLD AUTO: 0 %
LYMPHOCYTES # BLD: 1.65 K/UL (ref 0.9–3.6)
LYMPHOCYTES NFR BLD: 33 % (ref 21–52)
MAGNESIUM SERPL-MCNC: 1.8 MG/DL (ref 1.6–2.6)
MCH RBC QN AUTO: 28.3 PG (ref 24–34)
MCHC RBC AUTO-ENTMCNC: 34 G/DL (ref 31–37)
MCV RBC AUTO: 83.3 FL (ref 78–100)
METAMYELOCYTES NFR BLD MANUAL: 5 %
MONOCYTES # BLD: 0 K/UL (ref 0.05–1.2)
MONOCYTES NFR BLD: 0 % (ref 3–10)
NEUTS BAND NFR BLD MANUAL: 12 % (ref 0–5)
NEUTS SEG # BLD: 2.95 K/UL (ref 1.8–8)
NEUTS SEG NFR BLD: 47 % (ref 40–73)
NRBC # BLD: 0.07 K/UL (ref 0–0.01)
NRBC BLD-RTO: 1.4 PER 100 WBC
PHOSPHATE SERPL-MCNC: 2.6 MG/DL (ref 2.5–4.9)
PHOSPHATE SERPL-MCNC: 2.6 MG/DL (ref 2.5–4.9)
PHOSPHATE SERPL-MCNC: 2.8 MG/DL (ref 2.5–4.9)
PLATELET # BLD AUTO: 62 K/UL (ref 135–420)
PLATELET COMMENT: ABNORMAL
PMV BLD AUTO: 11.4 FL (ref 9.2–11.8)
POTASSIUM BLD-SCNC: 3.5 MMOL/L (ref 3.5–5.5)
POTASSIUM SERPL-SCNC: 3.8 MMOL/L (ref 3.5–5.5)
POTASSIUM SERPL-SCNC: 3.8 MMOL/L (ref 3.5–5.5)
POTASSIUM SERPL-SCNC: 4 MMOL/L (ref 3.5–5.5)
PROT SERPL-MCNC: 5.4 G/DL (ref 6.4–8.2)
RBC # BLD AUTO: 3 M/UL (ref 4.2–5.3)
RBC MORPH BLD: ABNORMAL
SERVICE CMNT-IMP: ABNORMAL
SODIUM SERPL-SCNC: 138 MMOL/L (ref 136–145)
TRIGL SERPL-MCNC: 214 MG/DL (ref 0–150)
VAS LEFT ATA DIST PSV: 105.1 CM/S
VAS LEFT CFA DIST PSV: 177.2 CM/S
VAS LEFT PFA PROX PSV: 147.4 CM/S
VAS LEFT POP A DIST PSV: 110.4 CM/S
VAS LEFT POP A PROX PSV: 141.4 CM/S
VAS LEFT POP A PROX VEL RATIO: 1.17
VAS LEFT PTA DIST PSV: 70.7 CM/S
VAS LEFT SFA DIST PSV: 120.7 CM/S
VAS LEFT SFA DIST VEL RATIO: 0.98
VAS LEFT SFA MID PSV: 123.3 CM/S
VAS LEFT SFA MID VEL RATIO: 1.02
VAS LEFT SFA PROX PSV: 120.7 CM/S
VAS LEFT SFA PROX VEL RATIO: 0.68
WBC # BLD AUTO: 5 K/UL (ref 4.6–13.2)

## 2025-07-03 PROCEDURE — 6360000002 HC RX W HCPCS: Performed by: INTERNAL MEDICINE

## 2025-07-03 PROCEDURE — 82962 GLUCOSE BLOOD TEST: CPT

## 2025-07-03 PROCEDURE — 2700000000 HC OXYGEN THERAPY PER DAY

## 2025-07-03 PROCEDURE — 6360000002 HC RX W HCPCS: Performed by: STUDENT IN AN ORGANIZED HEALTH CARE EDUCATION/TRAINING PROGRAM

## 2025-07-03 PROCEDURE — 80048 BASIC METABOLIC PNL TOTAL CA: CPT

## 2025-07-03 PROCEDURE — 2500000003 HC RX 250 WO HCPCS: Performed by: HOSPITALIST

## 2025-07-03 PROCEDURE — 87102 FUNGUS ISOLATION CULTURE: CPT

## 2025-07-03 PROCEDURE — 80076 HEPATIC FUNCTION PANEL: CPT

## 2025-07-03 PROCEDURE — 2580000003 HC RX 258: Performed by: INTERNAL MEDICINE

## 2025-07-03 PROCEDURE — 83735 ASSAY OF MAGNESIUM: CPT

## 2025-07-03 PROCEDURE — 2500000003 HC RX 250 WO HCPCS: Performed by: INTERNAL MEDICINE

## 2025-07-03 PROCEDURE — 2709999900 IR ASP ABSCESS/HEMATOMA/BULLA/CYST

## 2025-07-03 PROCEDURE — 6360000002 HC RX W HCPCS: Performed by: HOSPITALIST

## 2025-07-03 PROCEDURE — 87040 BLOOD CULTURE FOR BACTERIA: CPT

## 2025-07-03 PROCEDURE — 6360000002 HC RX W HCPCS: Performed by: SURGERY

## 2025-07-03 PROCEDURE — 1100000000 HC RM PRIVATE

## 2025-07-03 PROCEDURE — 85730 THROMBOPLASTIN TIME PARTIAL: CPT

## 2025-07-03 PROCEDURE — 2580000003 HC RX 258: Performed by: PHYSICIAN ASSISTANT

## 2025-07-03 PROCEDURE — 0W9G3ZZ DRAINAGE OF PERITONEAL CAVITY, PERCUTANEOUS APPROACH: ICD-10-PCS | Performed by: INTERNAL MEDICINE

## 2025-07-03 PROCEDURE — 84478 ASSAY OF TRIGLYCERIDES: CPT

## 2025-07-03 PROCEDURE — 85025 COMPLETE CBC W/AUTO DIFF WBC: CPT

## 2025-07-03 PROCEDURE — 84132 ASSAY OF SERUM POTASSIUM: CPT

## 2025-07-03 PROCEDURE — 93926 LOWER EXTREMITY STUDY: CPT

## 2025-07-03 PROCEDURE — 87070 CULTURE OTHR SPECIMN AEROBIC: CPT

## 2025-07-03 PROCEDURE — 99222 1ST HOSP IP/OBS MODERATE 55: CPT

## 2025-07-03 PROCEDURE — 2580000003 HC RX 258: Performed by: HOSPITALIST

## 2025-07-03 PROCEDURE — 84100 ASSAY OF PHOSPHORUS: CPT

## 2025-07-03 PROCEDURE — 36415 COLL VENOUS BLD VENIPUNCTURE: CPT

## 2025-07-03 PROCEDURE — 6360000002 HC RX W HCPCS: Performed by: PHYSICIAN ASSISTANT

## 2025-07-03 PROCEDURE — 87075 CULTR BACTERIA EXCEPT BLOOD: CPT

## 2025-07-03 PROCEDURE — 80047 BASIC METABLC PNL IONIZED CA: CPT

## 2025-07-03 PROCEDURE — 87205 SMEAR GRAM STAIN: CPT

## 2025-07-03 RX ORDER — HYDROMORPHONE HYDROCHLORIDE 1 MG/ML
1 INJECTION, SOLUTION INTRAMUSCULAR; INTRAVENOUS; SUBCUTANEOUS EVERY 4 HOURS PRN
Status: DISCONTINUED | OUTPATIENT
Start: 2025-07-03 | End: 2025-07-10

## 2025-07-03 RX ORDER — SODIUM CHLORIDE 9 MG/ML
INJECTION, SOLUTION INTRAVENOUS ONCE
Status: DISCONTINUED | OUTPATIENT
Start: 2025-07-03 | End: 2025-07-15 | Stop reason: HOSPADM

## 2025-07-03 RX ORDER — LIDOCAINE HYDROCHLORIDE 10 MG/ML
20 INJECTION, SOLUTION INFILTRATION; PERINEURAL ONCE
Status: COMPLETED | OUTPATIENT
Start: 2025-07-03 | End: 2025-07-03

## 2025-07-03 RX ORDER — HYDROMORPHONE HYDROCHLORIDE 2 MG/ML
1.5 INJECTION, SOLUTION INTRAMUSCULAR; INTRAVENOUS; SUBCUTANEOUS
Status: DISCONTINUED | OUTPATIENT
Start: 2025-07-03 | End: 2025-07-09

## 2025-07-03 RX ADMIN — I.V. FAT EMULSION 100 ML: 20 EMULSION INTRAVENOUS at 18:46

## 2025-07-03 RX ADMIN — SODIUM CHLORIDE, PRESERVATIVE FREE 40 MG: 5 INJECTION INTRAVENOUS at 11:07

## 2025-07-03 RX ADMIN — ASCORBIC ACID, VITAMIN A PALMITATE, CHOLECALCIFEROL, THIAMINE HYDROCHLORIDE, RIBOFLAVIN-5 PHOSPHATE SODIUM, PYRIDOXINE HYDROCHLORIDE, NIACINAMIDE, DEXPANTHENOL, ALPHA-TOCOPHEROL ACETATE, VITAMIN K1, FOLIC ACID, BIOTIN, CYANOCOBALAMIN: 200; 3300; 200; 6; 3.6; 6; 40; 15; 10; 150; 600; 60; 5 INJECTION, SOLUTION INTRAVENOUS at 18:43

## 2025-07-03 RX ADMIN — METOPROLOL TARTRATE 2.5 MG: 5 INJECTION INTRAVENOUS at 20:05

## 2025-07-03 RX ADMIN — METOPROLOL TARTRATE 2.5 MG: 5 INJECTION INTRAVENOUS at 13:23

## 2025-07-03 RX ADMIN — SODIUM CHLORIDE, PRESERVATIVE FREE 10 ML: 5 INJECTION INTRAVENOUS at 20:12

## 2025-07-03 RX ADMIN — POTASSIUM CHLORIDE 10 MEQ: 7.46 INJECTION, SOLUTION INTRAVENOUS at 04:08

## 2025-07-03 RX ADMIN — HYDRALAZINE HYDROCHLORIDE 10 MG: 20 INJECTION INTRAMUSCULAR; INTRAVENOUS at 23:36

## 2025-07-03 RX ADMIN — HYDROMORPHONE HYDROCHLORIDE 1 MG: 1 INJECTION, SOLUTION INTRAMUSCULAR; INTRAVENOUS; SUBCUTANEOUS at 17:30

## 2025-07-03 RX ADMIN — POTASSIUM CHLORIDE 10 MEQ: 7.46 INJECTION, SOLUTION INTRAVENOUS at 06:15

## 2025-07-03 RX ADMIN — HYDROMORPHONE HYDROCHLORIDE 1 MG: 1 INJECTION, SOLUTION INTRAMUSCULAR; INTRAVENOUS; SUBCUTANEOUS at 10:53

## 2025-07-03 RX ADMIN — HYDROMORPHONE HYDROCHLORIDE 1 MG: 1 INJECTION, SOLUTION INTRAMUSCULAR; INTRAVENOUS; SUBCUTANEOUS at 03:20

## 2025-07-03 RX ADMIN — SODIUM PHOSPHATE, MONOBASIC, MONOHYDRATE AND SODIUM PHOSPHATE, DIBASIC, ANHYDROUS 15 MMOL: 142; 276 INJECTION, SOLUTION INTRAVENOUS at 07:04

## 2025-07-03 RX ADMIN — HYDROMORPHONE HYDROCHLORIDE 1.5 MG: 2 INJECTION, SOLUTION INTRAMUSCULAR; INTRAVENOUS; SUBCUTANEOUS at 20:06

## 2025-07-03 RX ADMIN — HYDROMORPHONE HYDROCHLORIDE 1.5 MG: 2 INJECTION, SOLUTION INTRAMUSCULAR; INTRAVENOUS; SUBCUTANEOUS at 23:36

## 2025-07-03 RX ADMIN — SODIUM PHOSPHATE, MONOBASIC, MONOHYDRATE AND SODIUM PHOSPHATE, DIBASIC, ANHYDROUS 15 MMOL: 142; 276 INJECTION, SOLUTION INTRAVENOUS at 18:13

## 2025-07-03 RX ADMIN — SODIUM CHLORIDE, PRESERVATIVE FREE 10 ML: 5 INJECTION INTRAVENOUS at 11:07

## 2025-07-03 RX ADMIN — METOPROLOL TARTRATE 2.5 MG: 5 INJECTION INTRAVENOUS at 05:09

## 2025-07-03 RX ADMIN — VANCOMYCIN HYDROCHLORIDE 1000 MG: 1 INJECTION, POWDER, LYOPHILIZED, FOR SOLUTION INTRAVENOUS at 23:17

## 2025-07-03 RX ADMIN — HYDRALAZINE HYDROCHLORIDE 10 MG: 20 INJECTION INTRAMUSCULAR; INTRAVENOUS at 17:31

## 2025-07-03 RX ADMIN — VANCOMYCIN HYDROCHLORIDE 1000 MG: 1 INJECTION, POWDER, LYOPHILIZED, FOR SOLUTION INTRAVENOUS at 11:14

## 2025-07-03 RX ADMIN — SODIUM CHLORIDE, PRESERVATIVE FREE 10 ML: 5 INJECTION INTRAVENOUS at 21:16

## 2025-07-03 RX ADMIN — MEROPENEM 1000 MG: 1 INJECTION INTRAVENOUS at 11:10

## 2025-07-03 RX ADMIN — LIDOCAINE HYDROCHLORIDE 10 ML: 10 INJECTION, SOLUTION INFILTRATION; PERINEURAL at 12:35

## 2025-07-03 RX ADMIN — MEROPENEM 1000 MG: 1 INJECTION INTRAVENOUS at 18:03

## 2025-07-03 RX ADMIN — HYDROMORPHONE HYDROCHLORIDE 1 MG: 1 INJECTION, SOLUTION INTRAMUSCULAR; INTRAVENOUS; SUBCUTANEOUS at 13:24

## 2025-07-03 RX ADMIN — POTASSIUM CHLORIDE 10 MEQ: 7.46 INJECTION, SOLUTION INTRAVENOUS at 03:07

## 2025-07-03 RX ADMIN — MEROPENEM 1000 MG: 1 INJECTION INTRAVENOUS at 01:16

## 2025-07-03 RX ADMIN — HYDROMORPHONE HYDROCHLORIDE 1 MG: 1 INJECTION, SOLUTION INTRAMUSCULAR; INTRAVENOUS; SUBCUTANEOUS at 07:27

## 2025-07-03 RX ADMIN — POTASSIUM CHLORIDE 10 MEQ: 7.46 INJECTION, SOLUTION INTRAVENOUS at 05:14

## 2025-07-03 ASSESSMENT — PAIN DESCRIPTION - PAIN TYPE
TYPE: CHRONIC PAIN
TYPE: CHRONIC PAIN
TYPE: ACUTE PAIN

## 2025-07-03 ASSESSMENT — PAIN - FUNCTIONAL ASSESSMENT
PAIN_FUNCTIONAL_ASSESSMENT: ACTIVITIES ARE NOT PREVENTED

## 2025-07-03 ASSESSMENT — PAIN DESCRIPTION - ORIENTATION
ORIENTATION: ANTERIOR;POSTERIOR
ORIENTATION: ANTERIOR
ORIENTATION: LOWER
ORIENTATION: ANTERIOR
ORIENTATION: MID
ORIENTATION: ANTERIOR

## 2025-07-03 ASSESSMENT — PAIN DESCRIPTION - LOCATION
LOCATION: BACK
LOCATION: ABDOMEN
LOCATION: BACK
LOCATION: BACK
LOCATION: ABDOMEN
LOCATION: ABDOMEN
LOCATION: CHEST;BACK
LOCATION: BACK
LOCATION: ABDOMEN

## 2025-07-03 ASSESSMENT — PAIN SCALES - WONG BAKER
WONGBAKER_NUMERICALRESPONSE: NO HURT

## 2025-07-03 ASSESSMENT — PAIN SCALES - GENERAL
PAINLEVEL_OUTOF10: 0
PAINLEVEL_OUTOF10: 10
PAINLEVEL_OUTOF10: 3
PAINLEVEL_OUTOF10: 10
PAINLEVEL_OUTOF10: 4
PAINLEVEL_OUTOF10: 0
PAINLEVEL_OUTOF10: 3
PAINLEVEL_OUTOF10: 10
PAINLEVEL_OUTOF10: 10
PAINLEVEL_OUTOF10: 4
PAINLEVEL_OUTOF10: 10
PAINLEVEL_OUTOF10: 8
PAINLEVEL_OUTOF10: 1
PAINLEVEL_OUTOF10: 8
PAINLEVEL_OUTOF10: 10

## 2025-07-03 ASSESSMENT — PAIN DESCRIPTION - FREQUENCY
FREQUENCY: CONTINUOUS
FREQUENCY: CONTINUOUS

## 2025-07-03 ASSESSMENT — PAIN DESCRIPTION - DESCRIPTORS
DESCRIPTORS: ACHING

## 2025-07-03 ASSESSMENT — PAIN DESCRIPTION - ONSET
ONSET: ON-GOING
ONSET: ON-GOING

## 2025-07-03 NOTE — PROGRESS NOTES
lower chest. LIVER: Multiple tiny hypodensities which are too small to fully characterize but statistically are likely to reflect benign cyst. Otherwise unremarkable with normal enhancement of major hepatic vascular structures. BILIARY TREE: Gallbladder is within normal limits. CBD is not dilated. SPLEEN: within normal limits. PANCREAS: No mass or ductal dilatation. ADRENALS: Unremarkable. KIDNEYS: No mass, calculus, or hydronephrosis. STOMACH: Unremarkable. SMALL BOWEL: Postsurgical changes with a markedly 9.0 cm dilated loop of small bowel in the right upper quadrant with kinked focal transition at the afferent and efferent dilated bowel. No pneumatosis with remainder of small bowel nondilated. COLON: Status post subtotal colectomy with ileocolonic anastomosis in the left abdomen. APPENDIX: Surgically absent. PERITONEUM: No ascites or pneumoperitoneum. RETROPERITONEUM: Atherosclerotic calcification without aneurysm or dissection. No enlarged adenopathy or mass. REPRODUCTIVE ORGANS: Leiomyomatous uterus with multiple uterine leiomyomata. Ovaries are within normal limits. URINARY BLADDER: No mass or calculus. BONES: No destructive bone lesion. ABDOMINAL WALL: No mass or hernia. Postsurgical midline incisional change. ADDITIONAL COMMENTS: N/A     Closed-loop small bowel obstruction obstruction with markedly dilated small bowel in the right upper quadrant, likely secondary to postoperative adhesions. The findings were called to Dr. Guallpa on 6/25/2025 at 23:60 by myself.  [id number] Electronically signed by Marcos Bennett               TIME: 55 minutes were spent on the care of this patient today.  This time also includes physician non-face-to-face service time visit on the date of service, including:  Preparing to see the patient (eg, review of tests)  Obtaining and/or reviewing separately obtained history  Performing a medically necessary appropriate examination and/or evaluation  Counseling and educating the  patient/family/caregiver  Ordering medications, tests, or procedures  Referring and communicating with other health care professionals as needed  Documenting clinical information in the electronic or other health record  Independently interpreting results (not reported separately) and communicating results to the patient/family/caregiver  Care coordination and discharge planning with Case Management.        Dear patient Kiara Nance, if you are reviewing this note and have a question regarding the medical terminology, please bring it with you to your next PCP visit.  Medical notes are meant to be a communication between medical professionals.  Additionally, a portion of this note were created using voice recognition function, syntax which may have escaped proofreading.    Melina Long MD

## 2025-07-03 NOTE — PROGRESS NOTES
Comprehensive High Risk Nutrition Assessment Follow-Up    Type and Reason for Visit:  Reassess    Nutrition Recommendations/Plan:   Reassess if becomes intubated  Cont TPN adv to macros goal as nicolas   Cont monitor ck Tg, bilirubins, LFTs monitor hold vs cycle lipid if Tg >=400-500  Cont to check Phos, Mg, K replete as needed   Monitor Cr and BG levels while on TPN both improved   Cont to monitor GI status, NGT output and UOP  Please weigh pt to check admit wt accuracy  Cont to monitor POC/NCP, wt trends, renal fx, lytes, UOP, bowel fx, skin integrity/wound healing and adjust recs as needed     Malnutrition Assessment:  Malnutrition Status:  At risk for malnutrition (06/30/25 1110)      Nutrition Assessment:    61yo F remains in ICU alert on O2 with SBO, s/p exp lap lysis of adhesions, small bowel resection small bowel repair, some distention, abd pain, vol overload, BLE edema, c/o L foot cold per MDs. vascular and surgery following. +argatroban gtt. remains w/+NGT no output noted 7/2 mild nausea noted. TPN Day 4 doesn't appear yet at macros goal. good UOP >2L.  Cr improved. no new wts since admit however ?stated admit wt accuracy.  trends up from wt hx if correct 66kg Sept 2024 and March 2025 and 71kg April 2025. no PI noted. Ca 8.4L, Tg 180.  Wt Readings from Last 10 Encounters:   06/25/25 72.6 kg (160 lb)   05/12/25 68 kg (150 lb)   04/10/25 68.1 kg (150 lb 3.2 oz)   02/27/25 66.2 kg (146 lb)   11/27/24 65.8 kg (145 lb)   10/08/24 67.6 kg (149 lb)   10/04/24 64.4 kg (142 lb)   09/10/24 64 kg (141 lb 1.6 oz)   07/25/24 67.2 kg (148 lb 3.2 oz)   03/22/24 67.1 kg (148 lb)     Nutrition Related Findings:      Wound Type: Surgical Incision       Current Nutrition Intake & Therapies:    Average Meal Intake: NPO  Average Supplements Intake: NPO  Diet NPO  PN-Adult Premix 4.25/5 - Standard Electrolytes  PN-Adult Premix 5/15 - Standard Electrolytes - Central Line  Current Parenteral Nutrition Orders: Day 4  \"[Macronutrients  Meet at least 75% of estimated needs (prevent wt loss and skin breakdown)  Type of Goal: Continue current goal  Previous Goal Met: Progressing toward Goal(s)    Nutrition Monitoring and Evaluation:   Behavioral-Environmental Outcomes: None Identified  Food/Nutrient Intake Outcomes: Progression of Nutrition, Parenteral Nutrition Intake/Tolerance, Diet Advancement/Tolerance, Food and Nutrient Intake, Vitamin/Mineral Intake  Physical Signs/Symptoms Outcomes: GI Status, Biochemical Data, Skin, Weight, Fluid Status or Edema    Discharge Planning:    Too soon to determine     Alis Guallpa MS, RD  Clinical Dietitian  E: [email or web address]  O:  [phone]  C:  [phone]

## 2025-07-03 NOTE — PROGRESS NOTES
paracolic gutter are unchanged. 8.  Distended gallbladder without other evidence of acute cholecystitis on CT. However, if there is a strong concern for cholecystitis, would obtain gallbladder ultrasound. 9.  Unchanged mild left hydronephrosis and hydroureter to the level of the UVJ. No obstructing mass or stone. 10.  Enlarged fibroid uterus. Electronically signed by Saloni Hector    CTA ABDOMEN PELVIS W CONTRAST  Result Date: 7/2/2025  EXAM:  CTA CHEST W WO CONTRAST, CTA ABDOMEN PELVIS W CONTRAST INDICATION: Tachycardia, hypotension, and lactic acidosis post surgery evaluate for PE, ischemic bowel, abscess COMPARISON: CT chest/abdomen/pelvis 6/30/2025, CT abdomen/pelvis 6/28/2025 CONTRAST: 150 mL of Isovue-370. ORAL CONTRAST: None TECHNIQUE: Helical thin section chest CT following intravenous administration of nonionic contrast was performed according to departmental PE protocol. Coronal and sagittal reformats were performed. 3D post processing was performed. Then, CT angiography axial images were obtained through the abdomen and pelvis. Coronal and sagittal reconstructions were generated. Additionally, multiplanar reformats including volume rendered images and 3D/MIPs were created. CT dose reduction was achieved through use of a standardized protocol tailored for this examination and automatic exposure control for dose modulation. FINDINGS: PULMONARY ARTERIES: This is a good quality study for the evaluation of pulmonary embolism to the segmental arterial level. There is no pulmonary embolism to this level. MEDIASTINUM: No mass or lymphadenopathy. SIS: No mass or lymphadenopathy. THORACIC AORTA: No aneurysm. HEART: Four-chamber cardiac enlargement a left PICC terminates at the superior cavoatrial junction. ESOPHAGUS: No wall thickening or dilatation. An enteric tube passes through the esophagus and terminates in the stomach. TRACHEA/BRONCHI: Patent. PLEURA: Moderate right and small left pleural effusions have  mistakes, some may have been missed, and remained in the body of the document.    Elvis Perez MD  7/3/2025

## 2025-07-03 NOTE — CONSULTS
[facility]  (A Division of [facility])  Ursula Li MD   Office Tel:  [phone] Option #8                                                               Date of Admission: 6/25/2025       ID FU for antimicrobial management GNR bacteremia requested by Dr Long   PCP: Nae Kaiser, DESHAUN - NP    C/C: Vomintin/abdominal pain/constipation-2 to 3 days on admission- 6/25    Current Antimicrobials:    Prior Antimicrobials:  Pip tazo 6/29 to date   Pip tazo X1- 6/25     Allergy to antibiotics- NA       Assessment:     Critically Ill patient with:    ESBL E.coli bacteremia-  7/1- SS pending-- of abdominal source likley, doubt CRBSI, doubt urinary source-UA clear  SBO- persistent loop distension on imaging. History of prior surgery  S/P X-lap, lysis of adhesion, SB resection, side to side anastomosis- 6/29  Severe sepsis- KE/lactic acidosis  Bandemia 29%-->-> 8%  Abdominal fluid collection on CT with mild rim enhancement- probable abscess Vs leak?  Distended GB on CT, no acute cholecystitis on US  Procal >100-6/30    Adenovirus infection- Positive on resp viral panel  Micro:  6/30: Respiratory  viral panel for adenovirus is positive. Negative for Corona virus- COVID 19 and non COVID 19, human meta pneumovirus, rhino virus, Influenza A/B, parinflunenza 1-4, RSV negative. Bacterial PCR for bordetella, chlamydia pneumoniae and mycoplasma negative.      7/ 1 - blood cutlure X2: 2/4 E coli- ESBL by biofire, In progress        -UA no pyuria     Multiple abnormality on CT CAP 7/2  Bilateral pleural effusions : worsened compared to CT from 6/30/2025.   Atelectasis of nearly the entire right lower lobe and multiple segments of the   right middle and left lower lobes.       Abdominal aorta with 13mm filing defect  Mild left hydronephrosis and hydroureter to the level of the UVJ. No obstructing mass or stone.-- FU US recommended by Urology. No intervention    Medical History:  Elvis Perez MD        metoprolol (LOPRESSOR) injection 2.5 mg  2.5 mg IntraVENous Q8H Melina Long MD   2.5 mg at 07/03/25 1323    TPN - Pharmacy to Dose   Other RX Placeholder Melina Long MD        insulin regular (HumuLIN R;NovoLIN R) injection 0-8 Units  0-8 Units SubCUTAneous Q6H Melina Long MD   2 Units at 07/02/25 0635    Vancomycin_Pharmacy to dose   Other RX Placeholder Elias Jade PA-C        pantoprazole (PROTONIX) 40 mg in sodium chloride (PF) 0.9 % 10 mL injection  40 mg IntraVENous Daily Melina Long MD   40 mg at 07/03/25 1107    HYDROmorphone HCl PF (DILAUDID) injection 1 mg  1 mg IntraVENous Q2H PRN Carlos Palencia MD   1 mg at 07/03/25 1730    naloxone (NARCAN) injection 0.4 mg  0.4 mg IntraVENous PRN Melina Long MD        HYDROmorphone HCl PF (DILAUDID) injection 0.5 mg  0.5 mg IntraVENous Q4H PRN David Peoples MD   0.5 mg at 07/01/25 0848    hydrALAZINE (APRESOLINE) injection 10 mg  10 mg IntraVENous Q6H PRN Melina Long MD   10 mg at 07/03/25 1731    sodium chloride flush 0.9 % injection 5-40 mL  5-40 mL IntraVENous 2 times per day David Peoples MD   10 mL at 07/03/25 1107    sodium chloride flush 0.9 % injection 5-40 mL  5-40 mL IntraVENous PRN Daivd Peoples MD        0.9 % sodium chloride infusion   IntraVENous PRN David Peoples MD 12 mL/hr at 06/27/25 0920 New Bag at 06/27/25 0920    ondansetron (ZOFRAN-ODT) disintegrating tablet 4 mg  4 mg Oral Q8H PRN David Peoples MD        Or    ondansetron (ZOFRAN) injection 4 mg  4 mg IntraVENous Q6H PRN David Peoples MD   4 mg at 06/29/25 1054    polyethylene glycol (GLYCOLAX) packet 17 g  17 g Oral Daily PRN David Peoples MD        acetaminophen (TYLENOL) tablet 650 mg  650 mg Oral Q6H PRN David Peoples MD        Or    acetaminophen (TYLENOL) suppository 650 mg  650 mg Rectal Q6H PRN David Peoples MD   650 mg at 07/01/25 2039        Review of Systems     Negative Unless BOLDED     See above       Objective:       BP (!) 165/77

## 2025-07-03 NOTE — PROGRESS NOTES
Vascular Surgery Brief Progress Note:    Notes, results, events reviewed.    BP (!) 144/90   Pulse (!) 111   Temp 98.1 °F (36.7 °C) (Axillary)   Resp 15   Ht 1.524 m (5')   Wt 72.6 kg (160 lb)   SpO2 99%   BMI 31.25 kg/m²     60 y.o. female POD 4 Exlap/PAOLA/SBR.  ON CTA yesterday due to elevated lactate, found to have no-occlusive aortic thrombus on the posterior wall at the level of the SMA.  CTA reviewed, the SMA is widely patent, as is celiac and all other visualized vessels. Today, lactic acid has normalized, 1.7.    Today continues to have abdominal pain and volume overload.  BLE +edema. Complains of left foot feeling \"cold\" and objectively the left foot does feel cooler than right with slightly paler appearance.  However she denies any foot or leg pain or numbness.    Patient is on argatroban gtt, heparin gtt was started yesterday but was stopped due to thrombocytopenia and transitioned to argatroban.    Abdomen distended, diffusely TTP  Pulse exam:   Fem: Bilateral palp  Pop: Bilateral palp  DP/PT: Right palp, left non-palp, left +Doppler    A/P: Non-occlusive aortic thrombus of unclear etiology at the level of SMA, difference in pulse exam.  -Will order arterial duplex left leg  -Continue argatroban gtt  -Serial neurovascular checks    CBC w/Diff   Lab Results   Component Value Date/Time    WBC 5.0 07/03/2025 03:09 AM    RBC 3.00 (L) 07/03/2025 03:09 AM    HCT 25.0 (L) 07/03/2025 03:09 AM    MCV 83.3 07/03/2025 03:09 AM    MCH 28.3 07/03/2025 03:09 AM    MCHC 34.0 07/03/2025 03:09 AM    RDW 13.2 07/03/2025 03:09 AM    PLT 62 (L) 07/03/2025 03:09 AM    MPV 11.4 07/03/2025 03:09 AM        Basic Metabolic Profile   Lab Results   Component Value Date     07/03/2025    K 3.8 07/03/2025     07/03/2025    CO2 30 07/03/2025    BUN 12 07/03/2025    CREATININE 0.87 07/03/2025    GLUCOSE 133 (H) 07/03/2025    CALCIUM 8.4 (L) 07/03/2025    MG 1.8 07/03/2025    PHOS 2.6 07/03/2025        Coagulation

## 2025-07-03 NOTE — PROCEDURES
Interventional Radiology Brief Postoperative Note    Procedure Date:  7/3/2025    Procedure:  Ultrasound Guided Abdominal Fluid Collection Aspiration    :  Jody Zaragoza NP    Attending:  Joslyn Collier MD    Preoperative Diagnosis:  Abdominal Fluid Collection    Postoperative Diagnosis:  Same    Complications:  None immediate.    Estimated Blood Loss:  < 1 mL    Procedure Findings:  Technically successful ultrasound guided aspiration of RLQ abdominal fluid collection. 54 mL of thin, serous fluid aspirated.    Specimens:  Fluid sent for analysis.    Condition:  The patient tolerated the procedure without difficulty and remained in stable condition throughout.    Primary RN updated.    Jody Zaragoza, APRN - CNP  [facility]

## 2025-07-03 NOTE — ACP (ADVANCE CARE PLANNING)
Advance Care Planning     Palliative Team Advance Care Planning (ACP) Conversation    Date of Conversation: 07/03/25    Individuals present for the conversation: Patient     ACP documents on file prior to discussion:  -None    1030  Healthcare Decision Maker:    Primary Decision Maker: Reynold Kothari - Child - [phone]     Conversation Summary:    1030 Seen today in room 109 along with Ирина Freire NP.  Lying supine with head of bed elevated. NG in. Frequent spitting.  Awake, alert. Oriented x 4 Respirations unlabored at rest.  Oxygen on at 4 lpm per NC.  Able to speak in full  sentences. Pain 10/10--discussed with clinical nurse so she can receive pain medication. Tachycardic. Hypertensive. States she is sleeping okay     Came to the ED 6/25/2025 from home per POV for two day history of abdominal pain and constipation unrelieved with stool softeners.    CT abd/pelvis: closed-loop small bowel obstruction with markedly dilated small bowel in the right upper quadrant, likely secondary to postoperative adhesions.     PMH significant for T2DM, abdominal surgeries including colostomy and reversal of colostomy for bowel obstructions, anxiety, depression, HTN, asthma (information gathered from medical records)    Admitted to telemetry unit for small bowel obstruction (IVF, symptomatic management for n/v, NG if vomiting persistent, trend lactic acid). General surgery was consulted. Went to surgery on 6/29/2025 for exploratory laparotomy with lysis of adhesions and small bowel resection. Transferred to ICU early AM 7/1/2025 for down trending BP and decreased mentation. Blood culture (+)--ID consulted. Vascular surgery consulted for imaging identification of   A 13 mm filling defect in the abdominal aorta is new. It is adherent to the posterior wall of the aorta at the level of the superior mesenteric artery. This could reflect rapidly progressive mural thrombus or large adherent embolus,  although would be an

## 2025-07-03 NOTE — WOUND CARE
ICU Rounding  Wound care nurse rounded on patient for skin issues.  Patient has a Jabari score of 13.  Does patient have any pressure injury?no per primary nurse LESLIE Strauss     Skin Care & Pressure Relief Recommendations  Minimize layers of linen  Pads under patient to optimize support surface  Turn/reposition approximately every 2 hours  Use pillow wedges to maintain positioning but do not put pillow directly over wounds  Manage incontinence   Promote continence; Skin Protective lotion/cream to buttocks and sacrum daily and as needed with incontinence care  Offload heels pillows    Consult wound care if any wounds noted during admission.  Wound Care nurse will continue to follow during ICU admission.

## 2025-07-03 NOTE — PROGRESS NOTES
RENAL CONSULT PROGRESS NOTE   7/3/2025    Patient:  Kiara Nance  :  1965  Gender:  female  MRN #:  [id number]    Reason for Consult: acute renal failure     Subjective/relevant events in 24 hour   Has NG tube,   BP stable , urine output is decent      Labs, overnight events and consultant note reviewed.     Objective:    BP (!) 144/90   Pulse (!) 111   Temp 98.1 °F (36.7 °C) (Axillary)   Resp 15   Ht 1.524 m (5')   Wt 72.6 kg (160 lb)   SpO2 99%   BMI 31.25 kg/m²     Physical Exam:    Pt awake,  alert   Lung: clear to auscultation  Abdomen: distended   Ext: trace edema   CNS- Oriented to  place and person     Laboratory Data:    Lab Results   Component Value Date    BUN 12 2025    BUN 15 2025    BUN 24 (H) 2025     2025     2025     (L) 2025    CO2 30 2025    CO2 34 (H) 2025    CO2 22 2025    GLUCOSE 133 (H) 2025    GLUCOSE 199 (H) 2025    GLUCOSE 183 (H) 2025     Lab Results   Component Value Date    WBC 5.0 2025    HGB 8.5 (L) 2025    HCT 25.0 (L) 2025     Lab Results   Component Value Date    CALCIUM 8.4 (L) 2025     Lab Results   Component Value Date    HDL 69 2020     No results found for: \"TURBIDITY\"    Imaging Reveiwed:      EKG  Renal ultrasound  Xray   CT abdomen 25 : IMPRESSION:  1. New, small right pleural effusion and right basilar patchy airspace  consolidation. New mild left basilar atelectasis.  2. Persistent, markedly dilated loop of bowel in the right abdomen, as described  above      Assessment:  she is 60 years old with hypertension presented with abdominal pain, distention, constipation, and vomiting        She has small bowel obstruction in CT scan s/p laparotomy , lysis of adhesions and bowel resection   She has iv contrast exposure twice thsi admission on  and  which is likely cause of KE on top of volume depletion     Acute renal

## 2025-07-03 NOTE — PROGRESS NOTES
Pharmacy Note     New Argatroban infusion requested by Dr. Long (telephone order)  due to decreased platelets.   Plt = 64  (7/2/25 at 16:41)      Child-Lizama score ~ 8    Initial infusion rate of argatroban = 0.5 mcg/kg/min   Initial aPTT = 46.9  (7/2/25 at 19:34)   RN to order aPTT's and monitor and adjust rate as appropriate based on protocol.     Pharmacy to follow daily.   Brittaney Keys, PharmD

## 2025-07-03 NOTE — PLAN OF CARE
Problem: Pain  Goal: Verbalizes/displays adequate comfort level or baseline comfort level  Outcome: Progressing  Flowsheets  Taken 7/3/2025 1740 by Rica Rahman RN  Verbalizes/displays adequate comfort level or baseline comfort level:   Encourage patient to monitor pain and request assistance   Assess pain using appropriate pain scale   Administer analgesics based on type and severity of pain and evaluate response   Implement non-pharmacological measures as appropriate and evaluate response   Notify Licensed Independent Practitioner if interventions unsuccessful or patient reports new pain  Taken 7/3/2025 0400 by Lisa Crespo RN  Verbalizes/displays adequate comfort level or baseline comfort level:   Encourage patient to monitor pain and request assistance   Assess pain using appropriate pain scale   Implement non-pharmacological measures as appropriate and evaluate response   Administer analgesics based on type and severity of pain and evaluate response   Consider cultural and social influences on pain and pain management   Notify Licensed Independent Practitioner if interventions unsuccessful or patient reports new pain     Problem: Gastrointestinal - Adult  Goal: Maintains or returns to baseline bowel function  Outcome: Progressing  Flowsheets (Taken 7/3/2025 0800)  Maintains or returns to baseline bowel function:   Assess bowel function   Administer IV fluids as ordered to ensure adequate hydration     Problem: Metabolic/Fluid and Electrolytes - Adult  Goal: Electrolytes maintained within normal limits  Outcome: Progressing  Flowsheets (Taken 7/3/2025 0800)  Electrolytes maintained within normal limits:   Monitor labs and assess patient for signs and symptoms of electrolyte imbalances   Administer electrolyte replacement as ordered   Monitor response to electrolyte replacements, including repeat lab results as appropriate     Problem: Cardiovascular - Adult  Goal: Maintains optimal cardiac output and

## 2025-07-03 NOTE — PROGRESS NOTES
Physician Progress Note      PATIENT:               BOONE GOMES  CSN #:                  [id number]  :                       1965  ADMIT DATE:       2025 9:04 PM  DISCH DATE:  RESPONDING  PROVIDER #:        Melina Long MD          QUERY TEXT:    Please clarify the following documentation:    The clinical indicators include:  -60 y.o., Hypokalemia, hx of bowel resection surgery in the  and   colostomy.    -\"Small bowel obstruction, suspect secondary to adhesions. Symptomatic   management, including antiemetics and pain meds. Further management per   general surgery, consulted by ED. NG tube, if she start vomiting. Admitted for   small bowel obstruction, suspected secondary to adhesions. She been to the ED   with 2 days history of having abdominal pain, constipation and vomiting. No   fever, chills, or hematemesis. She gives history of having bowel resection   surgery in the  and colostomy at [facility] in [facility] that   she think was for bowel obstruction\" (H&P Note by David Peoples MD on   2025)  -\"Small bowel obstruction- not improving on medical treatment. Pain control.   Incentive spirometry. Continue npo per surgeon and recommend tpn\" (IMPN by Melina Long MD on 2025)  -CT Abdomen pelvis: Closed-loop small bowel obstruction with markedly dilated   small bowel in the right upper quadrant, likely secondary to postoperative   adhesions. (Radiology report by Marcos Bennett on 2025)  -IV fluid hydration, NPO, General surgery consult, radiological imaging,   Laparotomy exploratory, lysis of adhesions, small bowel resection on .    Thank you,  Yeni Main, [facility], S.  Options provided:  -- SBO due to adhesion related to previous bowel resection surgery and   colostomy  -- SBO due to adhesion not related to previous bowel resection surgery and   colostomy, but is related to other incidental risk factor, Please specify   other incidental risk factor.  -- Other - I

## 2025-07-03 NOTE — PROGRESS NOTES
AFVSS  No major changes overnight.  Abdomen is slightly distended tender.  Incision is okay.  At this point I would continue to watch her treat her with IV antibiotics as you are doing.  She is not a good surgical candidate given the previous extensive surgery she has had and her platelet counts being low etc.  Her lactic acid is improved therefore I think we should watch her and possibly if needed repeat her CAT scan with oral contrast at some point.  However I will defer this right now and see how she does clinically.

## 2025-07-03 NOTE — PROGRESS NOTES
Consult for TPN Dosing per Pharmacy by Dr. Long    Consult provided for this 60 y.o. female, for indication of Nutrition    Day of Therapy:  4    Dosing Weight:  52.3 kg    Labs:  BMP BMP:   @[id number](na,k,cl,co2,AGAP,glu,bun,crea,GFRAA,GFRNA)@       CMP CMP:   @[id number](na,k,cl,co2,AGAP,glu,bun,crea,GFRAA,GFRNA,ca,mg,phos,alb,tbil,TP,ALB,GLOB,AGRAT,sgot,ALT,GPT)@      Ca/ Phos Lab Results   Component Value Date/Time    PHOS 2.6 07/03/2025 03:09 AM       Mag    Lab Results   Component Value Date/Time    MG 1.8 07/03/2025 03:09 AM      Tg No components found for: \"TGL\"   Albumin No results found for: \"TP\"      Kcal requirements:  25-35 ( 1308 - 1830 kcal/day )    Macronutrients - provided by premixed Clinemix E  5%AA/15%Dextrose with electrolytes in 1000 ml bag:  ( Formulation concentration increased today to provide more calories )    Protein                                        50 g/day = 200 kcal  Dextrose                                   150 g/day = 510 kcal  Lipids 20%                                  20 g/day = 180 kcal ( administered separately )                                                                         890 kcal total     TPN  to run at 42 ml/hr ( 1000 ml/24 hrs) and titrated to goal per patient tolerance.      MD to replete electrolytes acutely outside of TPN as needed.   MD to add/adjust/dc maintenance IV Fluid as needed for fluid requirements.  ( NaBicarb drip was DC'd per MD )     Additional recommendations/Orders:   Corrective insulin coverage with Regular Insulin q6h while on TPN per protocol.    BMP, Mag, Phos ordered daily  Triglycerides, Prealbumin, CMP ordered upon initiation and weekly  Lipids increased to 20 gms ( infused over 12 hrs )     Pharmacy to follow daily and will make changes based on labs/clinical status.      GEETHA PATTON, [facility], BCPS  [phone]

## 2025-07-03 NOTE — CONSULTS
Neuropathy About 2 years now    Sinus congestion     Vitamin D deficiency        Past Surgical History:   Procedure Laterality Date    GI      recloser cloystomy 2005    LAPAROTOMY N/A 6/29/2025    LAPAROTOMY EXPLORATORY, LYSIS OF ADHESIONS,  SMALL BOWEL RESECTION performed by Carlos Palencia MD at [facility] MAIN OR       Family History   Problem Relation Age of Onset    Liver Disease Mother     No Known Problems Father     Lung Cancer Sister     Lung Cancer Brother        Social History     Socioeconomic History    Marital status: Single     Spouse name: None    Number of children: None    Years of education: None    Highest education level: None   Tobacco Use    Smoking status: Never    Smokeless tobacco: Never   Vaping Use    Vaping status: Never Used   Substance and Sexual Activity    Alcohol use: Yes     Alcohol/week: 1.0 standard drink of alcohol     Types: 1 Cans of beer per week     Comment: weekend beers    Drug use: Not Currently     Types: Cocaine     Comment: 09/04/24 when I I'm about to aneurysm only one time    Sexual activity: Not Currently     Partners: Male     Social Drivers of Health     Financial Resource Strain: Low Risk  (10/4/2024)    Overall Financial Resource Strain (CARDIA)     Difficulty of Paying Living Expenses: Not hard at all   Food Insecurity: No Food Insecurity (6/26/2025)    Hunger Vital Sign     Worried About Running Out of Food in the Last Year: Never true     Ran Out of Food in the Last Year: Never true   Transportation Needs: No Transportation Needs (6/26/2025)    PRAPARE - Transportation     Lack of Transportation (Medical): No     Lack of Transportation (Non-Medical): No   Intimate Partner Violence: Not At Risk (9/4/2024)    Received from [facility]    Interpersonal Violence     Patient afraid of, threatened, hurt, or sexually abused by someone known to him/her: No   Housing Stability: Low Risk  (6/26/2025)    Housing Stability Vital Sign     Unable to Pay for Housing in    butalbital-acetaminophen-caffeine (FIORICET, ESGIC) -40 MG per tablet Take 1 tablet by mouth every 6 hours as needed for Headaches 10/28/24  Yes Nae Kaiser APRN - NP   propranolol (INDERAL LA) 80 MG extended release capsule Take 1 capsule by mouth daily 25   Theresa Lin MARIBELL, APRN - NP   fluticasone (FLOVENT HFA) 220 MCG/ACT inhaler Inhale 1 puff into the lungs 2 times daily  Patient not taking: Reported on 2025 10/28/24   Nae Kaiser APRN - NP       Allergies   Allergen Reactions    Meloxicam Hives    Nsaids Hives, Itching and Nausea Only    Sulfa Antibiotics Hives, Itching and Nausea Only    Aspirin     Gabapentin Other (See Comments)     Hives and itching  Other reaction(s): Other (see comments)  Hives and itching         Physical Exam:    Temp (24hrs), Av.7 °F (37.1 °C), Min:98.1 °F (36.7 °C), Max:99.8 °F (37.7 °C)  VSIP@IOBRIEF      General: Alert , Oriented, in no distress  HEENT: no pallor, anicteric sclera, oral pharynx without lesion   No cervical, supraclavicular, axillary and inguinal lymphadenopathy palpated  Heart: regular rate, and rhythm, without murmur, gallop or rubbing  Lungs:breathing comfortably on room air, clear to auscultation and percussion bilaterally  ABD: bowel sound present, soft, nondistended, nontender, no hepatosplenomegaly or mass  Extremities: warm, well perfused, no edema  MSK: no tenderness along the spine or long bones  Skin: No rash  Neuro: non-focal    Imaging:   CHEST:  1.  No pulmonary embolism.  2.  Bilateral pleural effusions have worsened compared to CT from 2025.  Atelectasis of nearly the entire right lower lobe and multiple segments of the  right middle and left lower lobes.     ABDOMEN and PELVIS:  3.  A 13 mm filling defect in the abdominal aorta is new. It is adherent to the  posterior wall of the aorta at the level of the superior mesenteric artery. This  could reflect rapidly progressive mural thrombus or large adherent

## 2025-07-03 NOTE — CONSULTS
Palliative Medicine  Patient Name: Kiara Nance  YOB: 1965  MRN: [id number]  Age: 60 y.o.  Gender: female    Date of Initial Consult: 7/2/2025  Date of Service: 7/3/2025  Time: 10:30 AM  Provider: DESHAUN Hunter CNP  Hospital Day: 9  Admit Date: 6/25/2025  Referring Provider: Melina Long MD      Reasons for Consultation:  Goals of Care    HISTORY OF PRESENT ILLNESS (HPI):   Kiara Nance is a 60 y.o. female with a past medical history of bowel resection ~1995, HTN, asthma, and anxiety/depression who was admitted on 6/25/2025 from sister's home while visiting from [place] with a diagnosis of small bowel obstruction due to adhesions. Patient underwent exploratory laparotomy on 6/29/2025 and is currently in ICU. Palliative Medicine is consulted to assist with identification of surrogate decision-maker and goals of care.     Psychosocial: Patient is not  and has one child, son Reynold. Patient lives in [place] but was in this area visiting her sister Nancy. She has received previous hospital care at [facility] and [facility].    Functional: Prior to admission patient was fully independent with ADLs and IADLs.    PALLIATIVE DIAGNOSES:    Encounter for palliative care  Goals of care  Delirium, mixed  Severe pain  Constipation  Small bowel obstruction  S/p exploratory laparotomy    ASSESSMENT AND PLAN:   Consult received 7/2 in the afternoon. Patient seen at that time lying in a darkened room, RASS +2 with frequent non-purposeful movements of all extremities, restless in bed, and picking at the air. CAM+ and unable to participate in goals of care discussion.  Patient seen 7/3 in ICU-09 by Annia Astorga RN and Ирина MEADE. Today she is alert and conversant, RASS 0. She provides assessment data including functional/medical history. She endorses severe pain, 10/10 in her back and abdomen, and says it goes down to 4/10 with 1 mg Dilaudid IV but relief does

## 2025-07-03 NOTE — PROGRESS NOTES
[facility]   Pharmacy Pharmacokinetic Monitoring Service - Vancomycin    Consulting Provider: Geetha Jade PA-C   Indication: Sepsis of Unknown Etiology - 7 day tx  Target Concentration: Goal AUC/MICHAEL 400-600 mg*hr/L  Day of Therapy: 3  Additional Antimicrobials: Meropenem    Pertinent Laboratory Values:   Wt Readings from Last 1 Encounters:   06/25/25 72.6 kg (160 lb)     Temp Readings from Last 1 Encounters:   07/03/25 98.1 °F (36.7 °C) (Axillary)     Estimated Creatinine Clearance: 60 mL/min (based on SCr of 0.87 mg/dL).  Recent Labs     07/02/25  0612 07/02/25  1458 07/02/25  1641 07/03/25  0309   CREATININE 1.08  --   --  0.87   BUN 15  --   --  12   WBC 4.6   < > 4.7 5.0    < > = values in this interval not displayed.     Recent vancomycin administrations                     vancomycin (VANCOCIN) 750 mg in sodium chloride 0.9 % 250 mL (addEASE) IVPB (mg) 750 mg New Bag 07/02/25 2334     750 mg New Bag  1201    vancomycin (VANCOCIN) 750 mg in sodium chloride 0.9 % 250 mL (addEASE) IVPB (mg) 750 mg New Bag 07/01/25 2339    vancomycin (VANCOCIN) 1750 mg in sodium chloride 0.9 % 500 mL IVPB (mg) 1,750 mg New Bag 06/30/25 2324             Plan:  Updated  mg/l.hr  Trough 9.8 mg/l ==> Sub Therapeutic  Increased Dosing to 1000 mg IV q12hrs  Estimated  mg/l.hr  Trough 13.2 mg/l  Pharmacy will continue to monitor patient and adjust therapy as indicated    GEETHA PATTON RPH, BCPS  7/3/2025 8:43 AM   [phone]

## 2025-07-04 LAB
ALBUMIN SERPL-MCNC: 1.6 G/DL (ref 3.4–5)
ALBUMIN/GLOB SERPL: 0.5 (ref 0.8–1.7)
ALP SERPL-CCNC: 62 U/L (ref 45–117)
ALT SERPL-CCNC: 19 U/L (ref 10–35)
ANION GAP SERPL CALC-SCNC: 10 MMOL/L (ref 3–18)
APTT PPP: 55.5 SEC (ref 21.7–34.2)
APTT PPP: 56.4 SEC (ref 21.7–34.2)
APTT PPP: 57.7 SEC (ref 21.7–34.2)
APTT PPP: 59.7 SEC (ref 21.7–34.2)
APTT PPP: 60 SEC (ref 21.7–34.2)
AST SERPL-CCNC: 23 U/L (ref 10–38)
BACTERIA SPEC CULT: ABNORMAL
BACTERIA SPEC CULT: ABNORMAL
BASOPHILS # BLD: 0 K/UL (ref 0–0.1)
BASOPHILS NFR BLD: 0 % (ref 0–2)
BILIRUB DIRECT SERPL-MCNC: 0.2 MG/DL (ref 0–0.2)
BILIRUB SERPL-MCNC: 0.4 MG/DL (ref 0.2–1)
BUN SERPL-MCNC: 12 MG/DL (ref 6–23)
BUN/CREAT SERPL: 18 (ref 12–20)
CALCIUM SERPL-MCNC: 8 MG/DL (ref 8.5–10.1)
CHLORIDE SERPL-SCNC: 101 MMOL/L (ref 98–107)
CO2 SERPL-SCNC: 26 MMOL/L (ref 21–32)
CREAT SERPL-MCNC: 0.66 MG/DL (ref 0.6–1.3)
D DIMER PPP FEU-MCNC: 7.3 UG/ML(FEU)
DIFFERENTIAL METHOD BLD: ABNORMAL
EOSINOPHIL # BLD: 0.09 K/UL (ref 0–0.4)
EOSINOPHIL NFR BLD: 1 % (ref 0–5)
ERYTHROCYTE [DISTWIDTH] IN BLOOD BY AUTOMATED COUNT: 13.5 % (ref 11.6–14.5)
FIBRINOGEN PPP-MCNC: 799 MG/DL (ref 210–451)
GLOBULIN SER CALC-MCNC: 3.1 G/DL (ref 2–4)
GLUCOSE BLD STRIP.AUTO-MCNC: 218 MG/DL (ref 70–110)
GLUCOSE BLD STRIP.AUTO-MCNC: 225 MG/DL (ref 70–110)
GLUCOSE BLD STRIP.AUTO-MCNC: 267 MG/DL (ref 70–110)
GLUCOSE SERPL-MCNC: 234 MG/DL (ref 74–108)
GRAM STN SPEC: ABNORMAL
HCT VFR BLD AUTO: 24.4 % (ref 35–45)
HGB BLD-MCNC: 8.5 G/DL (ref 12–16)
IMM GRANULOCYTES # BLD AUTO: 0 K/UL
IMM GRANULOCYTES NFR BLD AUTO: 0 %
INR PPP: 2.2 (ref 0.9–1.2)
LYMPHOCYTES # BLD: 1.51 K/UL (ref 0.9–3.6)
LYMPHOCYTES NFR BLD: 17 % (ref 21–52)
MAGNESIUM SERPL-MCNC: 1.6 MG/DL (ref 1.6–2.6)
MCH RBC QN AUTO: 28.2 PG (ref 24–34)
MCHC RBC AUTO-ENTMCNC: 34.8 G/DL (ref 31–37)
MCV RBC AUTO: 81.1 FL (ref 78–100)
MONOCYTES # BLD: 0.27 K/UL (ref 0.05–1.2)
MONOCYTES NFR BLD: 3 % (ref 3–10)
NEUTS BAND NFR BLD MANUAL: 21 % (ref 0–5)
NEUTS SEG # BLD: 7.03 K/UL (ref 1.8–8)
NEUTS SEG NFR BLD: 58 % (ref 40–73)
NRBC # BLD: 0.02 K/UL (ref 0–0.01)
NRBC BLD-RTO: 0.2 PER 100 WBC
PF4 HEPARIN CMPLX AB SER-ACNC: 0.09 OD (ref 0–0.4)
PHOSPHATE SERPL-MCNC: 2.7 MG/DL (ref 2.5–4.9)
PHOSPHATE SERPL-MCNC: 3 MG/DL (ref 2.5–4.9)
PLATELET # BLD AUTO: 82 K/UL (ref 135–420)
PLATELET COMMENT: ABNORMAL
PMV BLD AUTO: 11.3 FL (ref 9.2–11.8)
POTASSIUM SERPL-SCNC: 3.6 MMOL/L (ref 3.5–5.5)
PROT SERPL-MCNC: 4.7 G/DL (ref 6.4–8.2)
PROTHROMBIN TIME: 24.9 SEC (ref 12–15.1)
RBC # BLD AUTO: 3.01 M/UL (ref 4.2–5.3)
RBC MORPH BLD: ABNORMAL
SERVICE CMNT-IMP: ABNORMAL
SODIUM SERPL-SCNC: 137 MMOL/L (ref 136–145)
TRIGL SERPL-MCNC: 214 MG/DL (ref 0–150)
WBC # BLD AUTO: 8.9 K/UL (ref 4.6–13.2)

## 2025-07-04 PROCEDURE — 85730 THROMBOPLASTIN TIME PARTIAL: CPT

## 2025-07-04 PROCEDURE — 80048 BASIC METABOLIC PNL TOTAL CA: CPT

## 2025-07-04 PROCEDURE — 85025 COMPLETE CBC W/AUTO DIFF WBC: CPT

## 2025-07-04 PROCEDURE — 6360000002 HC RX W HCPCS: Performed by: PHYSICIAN ASSISTANT

## 2025-07-04 PROCEDURE — 2580000003 HC RX 258: Performed by: PHYSICIAN ASSISTANT

## 2025-07-04 PROCEDURE — 84478 ASSAY OF TRIGLYCERIDES: CPT

## 2025-07-04 PROCEDURE — 6360000002 HC RX W HCPCS: Performed by: HOSPITALIST

## 2025-07-04 PROCEDURE — 85610 PROTHROMBIN TIME: CPT

## 2025-07-04 PROCEDURE — 2580000003 HC RX 258: Performed by: INTERNAL MEDICINE

## 2025-07-04 PROCEDURE — 6370000000 HC RX 637 (ALT 250 FOR IP): Performed by: INTERNAL MEDICINE

## 2025-07-04 PROCEDURE — 6370000000 HC RX 637 (ALT 250 FOR IP): Performed by: HOSPITALIST

## 2025-07-04 PROCEDURE — 2500000003 HC RX 250 WO HCPCS: Performed by: INTERNAL MEDICINE

## 2025-07-04 PROCEDURE — 2500000003 HC RX 250 WO HCPCS: Performed by: HOSPITALIST

## 2025-07-04 PROCEDURE — 6360000002 HC RX W HCPCS: Performed by: INTERNAL MEDICINE

## 2025-07-04 PROCEDURE — 2700000000 HC OXYGEN THERAPY PER DAY

## 2025-07-04 PROCEDURE — 80076 HEPATIC FUNCTION PANEL: CPT

## 2025-07-04 PROCEDURE — 83735 ASSAY OF MAGNESIUM: CPT

## 2025-07-04 PROCEDURE — 2580000003 HC RX 258: Performed by: HOSPITALIST

## 2025-07-04 PROCEDURE — 85384 FIBRINOGEN ACTIVITY: CPT

## 2025-07-04 PROCEDURE — 85379 FIBRIN DEGRADATION QUANT: CPT

## 2025-07-04 PROCEDURE — 1100000000 HC RM PRIVATE

## 2025-07-04 PROCEDURE — 82962 GLUCOSE BLOOD TEST: CPT

## 2025-07-04 PROCEDURE — 84100 ASSAY OF PHOSPHORUS: CPT

## 2025-07-04 RX ORDER — METOPROLOL TARTRATE 1 MG/ML
5 INJECTION, SOLUTION INTRAVENOUS EVERY 6 HOURS
Status: DISCONTINUED | OUTPATIENT
Start: 2025-07-04 | End: 2025-07-07

## 2025-07-04 RX ORDER — LABETALOL HYDROCHLORIDE 5 MG/ML
20 INJECTION, SOLUTION INTRAVENOUS EVERY 4 HOURS PRN
Status: DISCONTINUED | OUTPATIENT
Start: 2025-07-04 | End: 2025-07-12 | Stop reason: SINTOL

## 2025-07-04 RX ORDER — HYDRALAZINE HYDROCHLORIDE 20 MG/ML
20 INJECTION INTRAMUSCULAR; INTRAVENOUS EVERY 6 HOURS PRN
Status: DISCONTINUED | OUTPATIENT
Start: 2025-07-04 | End: 2025-07-15 | Stop reason: HOSPADM

## 2025-07-04 RX ORDER — LIDOCAINE 4 G/G
2 PATCH TOPICAL DAILY
Status: DISCONTINUED | OUTPATIENT
Start: 2025-07-04 | End: 2025-07-15 | Stop reason: HOSPADM

## 2025-07-04 RX ADMIN — VANCOMYCIN HYDROCHLORIDE 1000 MG: 1 INJECTION, POWDER, LYOPHILIZED, FOR SOLUTION INTRAVENOUS at 11:20

## 2025-07-04 RX ADMIN — POTASSIUM PHOSPHATE, MONOBASIC AND POTASSIUM PHOSPHATE, DIBASIC 30 MMOL: 224; 236 INJECTION, SOLUTION, CONCENTRATE INTRAVENOUS at 11:20

## 2025-07-04 RX ADMIN — INSULIN HUMAN 2 UNITS: 100 INJECTION, SOLUTION PARENTERAL at 11:29

## 2025-07-04 RX ADMIN — HYDROMORPHONE HYDROCHLORIDE 1.5 MG: 2 INJECTION, SOLUTION INTRAMUSCULAR; INTRAVENOUS; SUBCUTANEOUS at 22:05

## 2025-07-04 RX ADMIN — INSULIN HUMAN 4 UNITS: 100 INJECTION, SOLUTION PARENTERAL at 05:59

## 2025-07-04 RX ADMIN — SODIUM CHLORIDE, PRESERVATIVE FREE 40 MG: 5 INJECTION INTRAVENOUS at 09:27

## 2025-07-04 RX ADMIN — HYDRALAZINE HYDROCHLORIDE 20 MG: 20 INJECTION INTRAMUSCULAR; INTRAVENOUS at 11:52

## 2025-07-04 RX ADMIN — LABETALOL HYDROCHLORIDE 20 MG: 5 INJECTION, SOLUTION INTRAVENOUS at 01:33

## 2025-07-04 RX ADMIN — I.V. FAT EMULSION 100 ML: 20 EMULSION INTRAVENOUS at 17:41

## 2025-07-04 RX ADMIN — METOPROLOL TARTRATE 2.5 MG: 5 INJECTION INTRAVENOUS at 05:59

## 2025-07-04 RX ADMIN — LABETALOL HYDROCHLORIDE 20 MG: 5 INJECTION, SOLUTION INTRAVENOUS at 13:36

## 2025-07-04 RX ADMIN — ASCORBIC ACID, VITAMIN A PALMITATE, CHOLECALCIFEROL, THIAMINE HYDROCHLORIDE, RIBOFLAVIN-5 PHOSPHATE SODIUM, PYRIDOXINE HYDROCHLORIDE, NIACINAMIDE, DEXPANTHENOL, ALPHA-TOCOPHEROL ACETATE, VITAMIN K1, FOLIC ACID, BIOTIN, CYANOCOBALAMIN: 200; 3300; 200; 6; 3.6; 6; 40; 15; 10; 150; 600; 60; 5 INJECTION, SOLUTION INTRAVENOUS at 17:42

## 2025-07-04 RX ADMIN — HYDROMORPHONE HYDROCHLORIDE 1.5 MG: 2 INJECTION, SOLUTION INTRAMUSCULAR; INTRAVENOUS; SUBCUTANEOUS at 08:52

## 2025-07-04 RX ADMIN — METOPROLOL TARTRATE 5 MG: 5 INJECTION INTRAVENOUS at 09:27

## 2025-07-04 RX ADMIN — SODIUM CHLORIDE 1.6 MCG/KG/MIN: 0.9 INJECTION, SOLUTION INTRAVENOUS at 20:45

## 2025-07-04 RX ADMIN — MEROPENEM 1000 MG: 1 INJECTION INTRAVENOUS at 00:46

## 2025-07-04 RX ADMIN — MEROPENEM 1000 MG: 1 INJECTION INTRAVENOUS at 09:47

## 2025-07-04 RX ADMIN — VANCOMYCIN HYDROCHLORIDE 1000 MG: 1 INJECTION, POWDER, LYOPHILIZED, FOR SOLUTION INTRAVENOUS at 22:46

## 2025-07-04 RX ADMIN — HYDROMORPHONE HYDROCHLORIDE 1.5 MG: 2 INJECTION, SOLUTION INTRAMUSCULAR; INTRAVENOUS; SUBCUTANEOUS at 04:04

## 2025-07-04 RX ADMIN — HYDROMORPHONE HYDROCHLORIDE 1.5 MG: 2 INJECTION, SOLUTION INTRAMUSCULAR; INTRAVENOUS; SUBCUTANEOUS at 13:56

## 2025-07-04 RX ADMIN — MAGNESIUM SULFATE HEPTAHYDRATE 2000 MG: 40 INJECTION, SOLUTION INTRAVENOUS at 09:37

## 2025-07-04 RX ADMIN — SODIUM CHLORIDE, PRESERVATIVE FREE 10 ML: 5 INJECTION INTRAVENOUS at 01:33

## 2025-07-04 RX ADMIN — SODIUM CHLORIDE, PRESERVATIVE FREE 10 ML: 5 INJECTION INTRAVENOUS at 22:46

## 2025-07-04 RX ADMIN — METOPROLOL TARTRATE 5 MG: 5 INJECTION INTRAVENOUS at 22:46

## 2025-07-04 RX ADMIN — LABETALOL HYDROCHLORIDE 20 MG: 5 INJECTION, SOLUTION INTRAVENOUS at 20:39

## 2025-07-04 RX ADMIN — MEROPENEM 1000 MG: 1 INJECTION INTRAVENOUS at 17:48

## 2025-07-04 RX ADMIN — METOPROLOL TARTRATE 5 MG: 5 INJECTION INTRAVENOUS at 17:41

## 2025-07-04 RX ADMIN — HYDROMORPHONE HYDROCHLORIDE 1.5 MG: 2 INJECTION, SOLUTION INTRAMUSCULAR; INTRAVENOUS; SUBCUTANEOUS at 11:52

## 2025-07-04 RX ADMIN — HYDROMORPHONE HYDROCHLORIDE 1 MG: 1 INJECTION, SOLUTION INTRAMUSCULAR; INTRAVENOUS; SUBCUTANEOUS at 18:10

## 2025-07-04 RX ADMIN — INSULIN HUMAN 2 UNITS: 100 INJECTION, SOLUTION PARENTERAL at 17:41

## 2025-07-04 RX ADMIN — SODIUM CHLORIDE, PRESERVATIVE FREE 10 ML: 5 INJECTION INTRAVENOUS at 09:28

## 2025-07-04 ASSESSMENT — PAIN DESCRIPTION - DESCRIPTORS
DESCRIPTORS: ACHING
DESCRIPTORS: SHARP;STABBING
DESCRIPTORS: ACHING
DESCRIPTORS: THROBBING
DESCRIPTORS: THROBBING;STABBING
DESCRIPTORS: ACHING

## 2025-07-04 ASSESSMENT — PAIN SCALES - GENERAL
PAINLEVEL_OUTOF10: 9
PAINLEVEL_OUTOF10: 0
PAINLEVEL_OUTOF10: 9
PAINLEVEL_OUTOF10: 10
PAINLEVEL_OUTOF10: 3
PAINLEVEL_OUTOF10: 0
PAINLEVEL_OUTOF10: 3
PAINLEVEL_OUTOF10: 2
PAINLEVEL_OUTOF10: 3
PAINLEVEL_OUTOF10: 2
PAINLEVEL_OUTOF10: 4
PAINLEVEL_OUTOF10: 10
PAINLEVEL_OUTOF10: 7
PAINLEVEL_OUTOF10: 10
PAINLEVEL_OUTOF10: 0

## 2025-07-04 ASSESSMENT — PAIN DESCRIPTION - ORIENTATION
ORIENTATION: POSTERIOR
ORIENTATION: LOWER
ORIENTATION: ANTERIOR;POSTERIOR
ORIENTATION: POSTERIOR
ORIENTATION: POSTERIOR
ORIENTATION: ANTERIOR

## 2025-07-04 ASSESSMENT — PAIN DESCRIPTION - PAIN TYPE
TYPE: CHRONIC PAIN

## 2025-07-04 ASSESSMENT — PAIN - FUNCTIONAL ASSESSMENT
PAIN_FUNCTIONAL_ASSESSMENT: ACTIVITIES ARE NOT PREVENTED

## 2025-07-04 ASSESSMENT — PAIN DESCRIPTION - ONSET
ONSET: ON-GOING

## 2025-07-04 ASSESSMENT — PAIN DESCRIPTION - LOCATION
LOCATION: ABDOMEN
LOCATION: BACK
LOCATION: ABDOMEN;BACK
LOCATION: BACK

## 2025-07-04 ASSESSMENT — PAIN DESCRIPTION - FREQUENCY
FREQUENCY: CONTINUOUS

## 2025-07-04 NOTE — PROGRESS NOTES
[facility]   Pharmacy Pharmacokinetic Monitoring Service - Vancomycin    Consulting Provider: Geetha Jade PA-C   Indication: Sepsis of Unknown Etiology - 7 day tx  Target Concentration: Goal AUC/MICHAEL 400-600 mg*hr/L  Day of Therapy: 4  Additional Antimicrobials:  Meropenem    Pertinent Laboratory Values:   Wt Readings from Last 1 Encounters:   07/04/25 82.1 kg (181 lb)     Temp Readings from Last 1 Encounters:   07/04/25 98.1 °F (36.7 °C) (Oral)     Estimated Creatinine Clearance: 84 mL/min (based on SCr of 0.66 mg/dL).  Recent Labs     07/03/25  0309 07/04/25  0439   CREATININE 0.87 0.66   BUN 12 12   WBC 5.0 8.9     Recent vancomycin administrations                     vancomycin (VANCOCIN) 1,000 mg in sodium chloride 0.9 % 250 mL (addEASE) IVPB (mg) 1,000 mg New Bag 07/03/25 2317     1,000 mg New Bag  1114    vancomycin (VANCOCIN) 750 mg in sodium chloride 0.9 % 250 mL (addEASE) IVPB (mg) 750 mg New Bag 07/02/25 2334     750 mg New Bag  1201    vancomycin (VANCOCIN) 750 mg in sodium chloride 0.9 % 250 mL (addEASE) IVPB (mg) 750 mg New Bag 07/01/25 2339             Plan:  Updated  mg/l.hr  Trough 16.3 mg/l  Recommend no dosing change at this time  Pharmacy will continue to monitor patient and adjust therapy as indicated    GEETHA PATTON RPH, BCPS  7/4/2025 7:12 AM   [phone]

## 2025-07-04 NOTE — PROGRESS NOTES
07/03/25  0309 07/04/25  0439   WBC 4.7 5.0 8.9   RBC 3.18* 3.00* 3.01*   HGB 9.0* 8.5* 8.5*   HCT 26.0* 25.0* 24.4*   PLT 64* 62* 82*   LYMPHOPCT 35 33 17*      Cardiac Enzymes No results for input(s): \"CKTOTAL\", \"CKMB\", \"CKMBINDEX\", \"TROPONINI\" in the last 72 hours.    Invalid input(s): \"LENARD\"   Coagulation Recent Labs     07/02/25  1934 07/02/25  2304 07/04/25  0439 07/04/25  0951   PROTIME 16.4*  --  24.9*  --    INR 1.3*  --  2.2*  --    APTT 46.9*   < > 56.4* 57.7*    < > = values in this interval not displayed.       Lipid Panel Lab Results   Component Value Date/Time    CHOL 234 06/01/2020 08:26 AM    TRIG 214 07/03/2025 03:09 AM    HDL 69 06/01/2020 08:26 AM    VLDL 31 06/01/2020 08:26 AM      BNP No results for input(s): \"BNP\" in the last 72 hours.   Liver Enzymes Recent Labs     07/04/25 0439   ALT 19   AST 23   ALKPHOS 62   BILITOT 0.4   BILIDIR 0.2      Thyroid Studies Lab Results   Component Value Date/Time    TSH 0.908 11/29/2024 09:27 AM              Pertinent Radiology/Procedures:  See electronic medical records for all procedures/Xrays and details which were not copied into this note but were reviewed prior to creation of Plan    Vascular duplex lower extremity arteries left  Result Date: 7/3/2025    No evidence of any significant stenosis noted in the vessels of the left lower extremity.     IR ASP ABSCESS/HEMATOMA/BULLA/CYST  Result Date: 7/3/2025  PROCEDURE:  ULTRASOUND GUIDED ASPIRATION HISTORY: BOONE GOMES is a 60 year old Female with abdominal fluid collection. :  Jody Zaragoza NP ATTENDING:  Joslyn Collier MD CONSENT:  After full discussion of the procedure, including risks, benefits and alternatives, both verbal and written consent were obtained. TECHNIQUE: A timeout was called to verify the correct patient, procedure, site and allergies. This procedure was performed at the bedside with ultrasound guidance. Preliminary ultrasound imaging of the abdomen right lower quadrant  demonstrated a fluid collection amenable to aspiration.  An appropriate site was marked. Images were archived to PACS.  The skin was prepped and draped in sterile fashion.  1% lidocaine was utilized for local anesthesia.  A small dermatotomy was made.  Under direct sonographic guidance, a 6 Slovenian centesis catheter needle was inserted into the abdominal fluid collection.  The needle was removed and approximately 54 ml of thin, dinesh fluid was aspirated.  The catheter was removed and pressure applied locally.  A dry sterile dressing was applied. There were no immediate complications.  The patient tolerated the procedure without difficulty. ESTIMATED BLOOD LOSS:  < 1 ml SPECIMENS:  Fluid sent for studies     Technically successful ultrasound guided aspiration of the right lower quadrant abdominal fluid collection. Electronically signed by YOHAN PRESCOTT    US GALLBLADDER RUQ  Result Date: 7/2/2025  Right EXAM:  US GALLBLADDER RUQ INDICATION: Rule out acute cholecystitis COMPARISON: Same day CTA abdomen/pelvis TECHNIQUE:  Limited ultrasound of the abdomen. In particular only the right upper quadrant was evaluated. FINDINGS: The hepatic surface is smooth. The hepatic echogenicity is within normal limits without a focal lesion. Gallbladder sludge without gallstones, gallbladder wall thickening or pericholecystic fluid. There is no intra or extrahepatic biliary ductal dilatation. The common bile duct is not visualized. The pancreas is not well visualized. The right kidney has some trace perinephric free fluid and is echogenic. Partially visualized right pleural effusion.     1. No findings of acute cholecystitis. 2. Partially visualized right pleural effusion and trace right-sided perinephric free fluid. Electronically signed by Sunny Kaur    CTA CHEST W WO CONTRAST  Result Date: 7/2/2025  EXAM:  CTA CHEST W WO CONTRAST, CTA ABDOMEN PELVIS W CONTRAST INDICATION: Tachycardia, hypotension, and lactic acidosis post surgery

## 2025-07-04 NOTE — PROGRESS NOTES
Reviewed results of LLE arterial duplex study performed yesterday. No evidence for any flow limiting disease.    - Continue with anticoagulation    Vascular surgery will sign off.     Imagin/3/25: Vascular duplex lower extremity arteries left      No evidence of any significant stenosis noted in the vessels of the left lower extremity.    Distal Common Femoral Artery: Multiphasic (normal) Doppler waveforms.   Profunda Artery: Multiphasic (normal) Doppler waveforms. .   Proximal Superficial Femoral Artery: Multiphasic (normal) Doppler waveforms.   Middle Superficial Femoral Artery: Multiphasic (normal) Doppler waveforms.   Distal Superficial Femoral Artery: Multiphasic (normal) Doppler waveforms.   Proximal Popliteal Artery: Multiphasic (normal) Doppler waveforms.   Distal Popliteal Artery: Multiphasic (normal) Doppler waveforms.   Anterior Tibial Artery: Multiphasic (normal) Doppler waveforms.   Posterior Tibial Artery: Multiphasic (normal) Doppler waveforms.

## 2025-07-04 NOTE — PROGRESS NOTES
Consult for TPN Dosing per Pharmacy by Dr. Long    Consult provided for this 60 y.o. female, for indication of Nutrition    Day of Therapy 5    Dosing Weight:  52.3 kg    Labs:  BMP BMP:   @[id number](na,k,cl,co2,AGAP,glu,bun,crea,GFRAA,GFRNA)@       CMP CMP:   @[id number](na,k,cl,co2,AGAP,glu,bun,crea,GFRAA,GFRNA,ca,mg,phos,alb,tbil,TP,ALB,GLOB,AGRAT,sgot,ALT,GPT)@      Ca/ Phos Lab Results   Component Value Date/Time    PHOS 2.7 07/04/2025 04:39 AM       Mag    Lab Results   Component Value Date/Time    MG 1.6 07/04/2025 04:39 AM      Tg No components found for: \"TGL\"   Albumin No results found for: \"TP\"      Kcal requirements:  25-35 ( 1308 - 1830 kcal/day )     Macronutrients - provided by premixed Clinemix E  5%AA/15%Dextrose with electrolytes in 1000 ml bag:     Protein                                        50 g/day = 200 kcal  Dextrose                                   150 g/day = 510 kcal  Lipids 20%                                  20 g/day = 180 kcal ( administered separately )                                                                         890 kcal total     TPN  to run at 42 ml/hr ( 1000 ml/24 hrs) and titrated to goal per patient tolerance.  Same TPN/Lipids as 7/3 for today      MD to replete electrolytes acutely outside of TPN as needed.   MD to add/adjust/dc maintenance IV Fluid as needed for fluid requirements.     Additional recommendations/Orders:   Corrective insulin coverage with Regular Insulin q6h while on TPN per protocol.    BMP, Mag, Phos ordered daily  Triglycerides, Prealbumin, CMP ordered upon initiation and weekly      Pharmacy to follow daily and will make changes based on labs/clinical status.      GEETHA PATTON, [facility], BCPS  [phone]

## 2025-07-04 NOTE — PROGRESS NOTES
[facility]  (A Division of [facility])  Ursula Li MD   Office Tel:  [phone] Option #8                                                               Date of Admission: 6/25/2025       ID FU for antimicrobial management GNR bacteremia requested by Dr Long   PCP: Nae Kaiser, DESHAUN - NP    C/C: Vominting/abdominal pain/constipation-2 to 3 days on admission- 6/25    Current Antimicrobials:    Prior Antimicrobials:  Vanco 6/30 to date  Meropenem 7/2 to date   Pip tazo X1- 6/25  Pip tazo 6/29 to 7/2     Allergy to antibiotics- NA       Assessment:     Acutely and severely Ill patient with:    ESBL E.coli bacteremia-- of abdominal source likley, doubt CRBSI, doubt urinary source-UA clear  Bacteroides bacteremia-- + on 7/3  SBO- persistent loop distension on imaging. History of prior surgery  S/P X-lap, lysis of adhesion, SB resection, side to side anastomosis- 6/29  Severe sepsis- KE/lactic acidosis  Bandemia 29%-->-> 8%-> 12%-> 21% 7/4  Abdominal fluid collection on CT with mild rim enhancement- probable abscess Vs leak?  Distended GB on CT, no acute cholecystitis on US  Procal >100-6/30    Adenovirus infection- Positive on resp viral panel    Micro:  6/30: Respiratory  viral panel for adenovirus is positive. Negative for Corona virus- COVID 19 and non COVID 19, human meta pneumovirus, rhino virus, Influenza A/B, parinflunenza 1-4, RSV negative. Bacterial PCR for bordetella, chlamydia pneumoniae and mycoplasma negative.      7/ 1 - blood cutlure X2: 2/4 E coli- ESBL by biofire, In progress        -UA no pyuria       Multiple abnormality on CT CAP 7/2  Bilateral pleural effusions : worsened compared to CT from 6/30/2025.   Atelectasis of nearly the entire right lower lobe and multiple segments of the   right middle and left lower lobes.       Abdominal aorta with 13mm filing defect  Mild left hydronephrosis and hydroureter to the level of the UVJ. No  note comment no infection finding.    Had RRT on 6/30- with hypotension/AMS. Transferred to ICU, but was not placed on pressor. Imaging done on 7/1- showed multiple abnormalities as mentioned above, including abd abscess of 5cm.    On exam, awake but lethagic. Doesn't answer to questions- seems tired. Per nursing, she is on different drips including heparin. No pressor need so far       Past Surgical History:   Procedure Laterality Date    GI      recloser cloystomy 2005    IR ASP ABSCESS/HEMATOMA/BULLA/CYST  7/3/2025    IR ASP ABSCESS/HEMATOMA/BULLA/CYST 7/3/2025 Jody Zaragoza APRN - CNP [facility] RAD ANGIO IR    LAPAROTOMY N/A 6/29/2025    LAPAROTOMY EXPLORATORY, LYSIS OF ADHESIONS,  SMALL BOWEL RESECTION performed by Carlos Palencia MD at [facility] MAIN OR     Family History   Problem Relation Age of Onset    Liver Disease Mother     No Known Problems Father     Lung Cancer Sister     Lung Cancer Brother          Medications:  Current Facility-Administered Medications   Medication Dose Route Frequency Provider Last Rate Last Admin    hydrALAZINE (APRESOLINE) injection 20 mg  20 mg IntraVENous Q6H PRN Ted Angel MD        labetalol (NORMODYNE;TRANDATE) injection 20 mg  20 mg IntraVENous Q4H PRN Ted Angel MD   20 mg at 07/04/25 0133    PN-Adult Premix 5/15 - Standard Electrolytes - Central Line   IntraVENous Continuous TPN Melnia Long MD        fat emulsion (INTRALIPID/NUTRILIPID) 20 % infusion 100 mL  100 mL IntraVENous Daily Melina Long MD        PN-Adult Premix 5/15 - Standard Electrolytes - Central Line   IntraVENous Continuous TPN Melina Long MD 42 mL/hr at 07/03/25 1843 New Bag at 07/03/25 1843    vancomycin (VANCOCIN) 1,000 mg in sodium chloride 0.9 % 250 mL (addEASE) IVPB  1,000 mg IntraVENous Q12H Elias Jade PA-C   Stopped at 07/04/25 0017    meropenem (MERREM) 1,000 mg in sodium chloride 0.9 % 100 mL IVPB (addEASE)  1,000 mg IntraVENous Q8H Ursula Li MD   Stopped at 07/04/25 0346    0.9 % sodium

## 2025-07-04 NOTE — PLAN OF CARE
Problem: Pain  Goal: Verbalizes/displays adequate comfort level or baseline comfort level  7/3/2025 2258 by Vicky Gaspar RN  Outcome: Progressing     Problem: ABCDS Injury Assessment  Goal: Absence of physical injury  Outcome: Progressing  Flowsheets (Taken 7/3/2025 1942)  Absence of Physical Injury: Implement safety measures based on patient assessment     Problem: Gastrointestinal - Adult  Goal: Maintains or returns to baseline bowel function  7/3/2025 2258 by Vicky Gaspar RN  Outcome: Progressing  Flowsheets (Taken 7/3/2025 1942)  Maintains or returns to baseline bowel function:   Assess bowel function   Administer IV fluids as ordered to ensure adequate hydration   Administer ordered medications as needed   Encourage mobilization and activity     Problem: Metabolic/Fluid and Electrolytes - Adult  Goal: Electrolytes maintained within normal limits  7/3/2025 2258 by Vicky Gaspar RN  Outcome: Progressing  Flowsheets (Taken 7/3/2025 1942)  Electrolytes maintained within normal limits:   Monitor labs and assess patient for signs and symptoms of electrolyte imbalances   Administer electrolyte replacement as ordered   Monitor response to electrolyte replacements, including repeat lab results as appropriate   Fluid restriction as ordered    Problem: Safety - Adult  Goal: Free from fall injury  7/3/2025 2258 by Vicky Gaspar RN  Outcome: Progressing  Flowsheets (Taken 7/3/2025 1942)  Free From Fall Injury: Instruct family/caregiver on patient safety     Problem: Skin/Tissue Integrity  Goal: Skin integrity remains intact  Description: 1.  Monitor for areas of redness and/or skin breakdown  2.  Assess vascular access sites hourly  3.  Every 4-6 hours minimum:  Change oxygen saturation probe site  4.  Every 4-6 hours:  If on nasal continuous positive airway pressure, respiratory therapy assess nares and determine need for appliance change or resting period  7/3/2025 2258 by

## 2025-07-04 NOTE — PROGRESS NOTES
1942: shift handoff completed with KACY Rahman RN, including argatroban rate change. Informed Pt that this nurse was going to see another pt and then would return with pt's medications. Pt verbalized understanding and asked for her phone. Dialed room phone for pt and confirmed it was ringing before leaving room.    1945: pt's son called ICU and stated pt was c/o not receiving her pain medications and nurses not entering her room for several hours. Updated pt's son.     2006: medicated per MAR for pain and elevated BP. Assessment and peripheral neuro check per flowsheets.    2036: pt reports pain improved.    2116: medicated per MAR, pt denies pain at this time.     2200: peripheral neuro check per flowsheets. Pt resting quietly with eyes closed, no complaints at this time, callbell and phone within reach.    2313: fingerstick blood glucose checked, bloodwork drawn for lab. Pt reports 10/10 pain, informed pt this RN would be back shortly with pain medication. Pt verbalized understanding.    2336: medicated per MAR for pain and elevated BP.     0000: Pt reports pain improved. Reassessment per flowsheets, byrne bag emptied. Peripheral neuro check per flowsheets.    0015: medicated per MAR (argatroban rate change with JIMMY Pyle RN).    0046: medicated per MAR (antibiotic). Pt resting quietly with eyes closed, no complaints at this time, callbell and phone within reach.    0133: medicated per MAR for elevated BP. Asked pt if she was having pain, pt denied. Reminded pt to use call bell if pain returns. Pt verbalized understanding.     0200: peripheral neuro check per flowsheets. Pt denies pain at this time.    0300: visual check on pt from hallway. Pt resting quietly with eyes closed, callbell and phone within reach.    0403: pt's son called ICU and stated pt had called him, c/o not receiving her pain medications and nurses not entering her room for several hours. Updated pt's son that pt had been calling out into hallway  instead of using callbell, and primary RN had not heard d/t providing care to another pt. Charge nurse GABRIELA Martinez RN had responded to pt and was in the process of bringing pt her pain medication at this time. Pt's son verbalized understanding.    0410: peripheral neuro check per flowsheets.    0439: bloodwork drawn for lab, pt reports pain improved. Pt resting quietly with eyes closed, no complaints at this time, callbell and phone within reach.     0540: medicated per MAR (argatroban rate change with JIMMY Pyle RN).    0543: FSBG checked, pt resting quietly in bed with eyes closed.    0559: medicated per MAR, peripheral neuro check per flowsheets. Asked if she was having pain, pt denied. Pt requesting blanket, warm blanket given, pt left with callbell and phone within reach.    0650: medicated per MAR (lipids d/c). Pt resting quietly in bed with eyes closed, callbell and phone within reach.    0707: shift handoff given to KACY Rahman RN, including review of medications and verifying argatroban rate.

## 2025-07-04 NOTE — PLAN OF CARE
Problem: Pain  Goal: Verbalizes/displays adequate comfort level or baseline comfort level  Outcome: Progressing  Flowsheets (Taken 7/3/2025 1740)  Verbalizes/displays adequate comfort level or baseline comfort level:   Encourage patient to monitor pain and request assistance   Assess pain using appropriate pain scale   Administer analgesics based on type and severity of pain and evaluate response   Implement non-pharmacological measures as appropriate and evaluate response   Notify Licensed Independent Practitioner if interventions unsuccessful or patient reports new pain     Problem: Gastrointestinal - Adult  Goal: Maintains or returns to baseline bowel function  Outcome: Progressing  Flowsheets (Taken 7/4/2025 0800)  Maintains or returns to baseline bowel function: Assess bowel function     Problem: Metabolic/Fluid and Electrolytes - Adult  Goal: Electrolytes maintained within normal limits  Outcome: Progressing  Flowsheets (Taken 7/4/2025 0800)  Electrolytes maintained within normal limits:   Monitor labs and assess patient for signs and symptoms of electrolyte imbalances   Administer electrolyte replacement as ordered   Monitor response to electrolyte replacements, including repeat lab results as appropriate     Problem: Cardiovascular - Adult  Goal: Maintains optimal cardiac output and hemodynamic stability  Outcome: Progressing  Flowsheets (Taken 7/4/2025 0800)  Maintains optimal cardiac output and hemodynamic stability:   Monitor blood pressure and heart rate   Monitor urine output and notify Licensed Independent Practitioner for values outside of normal range   Assess for signs of decreased cardiac output     Problem: Skin/Tissue Integrity  Goal: Skin integrity remains intact  Description: 1.  Monitor for areas of redness and/or skin breakdown  2.  Assess vascular access sites hourly  3.  Every 4-6 hours minimum:  Change oxygen saturation probe site  4.  Every 4-6 hours:  If on nasal continuous positive

## 2025-07-04 NOTE — PROGRESS NOTES
[facility]  Pulmonary, Critical Care, and Sleep Medicine    Name: Kiara Nance MRN: [id number]   : 1965 Hospital: [facility]    Date: 2025  Room: [address] [place] [address] Note                                              Consult requesting physician: Dr. Long  Reason for Consult: Hypotension    IMPRESSION:         Active Hospital Problems    Diagnosis Date Noted    Aortic thrombus (HCC) [I74.10] 2025     Priority: High    SBO (small bowel obstruction) (HCC) [K56.609] 2025     Priority: High    Hypotension [I95.9] 2025     Priority: Medium    Adenovirus infection [B34.0] 2025    Bacteremia [R78.81] 2025    Status post surgery [Z98.890] 2025    Hydronephrosis [N13.30] 2025    Sepsis (HCC) [A41.9] 2025    Metabolic acidosis [E87.20] 2025    Acute renal failure [N17.9] 2025    Hypokalemia [E87.6] 2025    Asthma [J45.909] 2025    Benign essential HTN [I10] 2021        Code status: Full Code       RECOMMENDATIONS:     Respiratory: History of asthma.  No asthma exacerbation.  Continue albuterol nebulized as needed.  Encouraged to use incentive spirometry.  Appears to protect airway well.    ABG 2025 at the time of RRT and transferred to ICU: pH 7.45, pCO2 29.6, , SpO2 85.4%; on 2 LPM O2 NC.  Currently on 3 LPM O2 NC; SpO2 in high 90s.    CT chest 2025: Small bilateral pleural effusions.  Bibasilar airspace consolidation, likely atelectasis but pneumonia cannot be ruled out.  CTA chest 25: No PE. Bilateral pleural effusion worsened with atelectasis of nearly entire RLL and multiple segments of RML and LLL.  These CT findings are related to abdominal distention and pleural effusion with volume overload and compressive atelectasis.    Keep SPO2 >=92%. HOB 30 degree elevation all the time. Aggressive pulmonary toileting. Aspiration precautions. Incentive spirometry.    CVS: History of

## 2025-07-05 ENCOUNTER — APPOINTMENT (OUTPATIENT)
Facility: HOSPITAL | Age: 60
DRG: 230 | End: 2025-07-05
Payer: MEDICAID

## 2025-07-05 LAB
ALBUMIN SERPL-MCNC: 1.6 G/DL (ref 3.4–5)
ALBUMIN/GLOB SERPL: 0.5 (ref 0.8–1.7)
ALP SERPL-CCNC: 57 U/L (ref 45–117)
ALT SERPL-CCNC: 18 U/L (ref 10–35)
ANION GAP SERPL CALC-SCNC: 10 MMOL/L (ref 3–18)
APTT PPP: 64.6 SEC (ref 21.7–34.2)
APTT PPP: 67.2 SEC (ref 21.7–34.2)
APTT PPP: 71.8 SEC (ref 21.7–34.2)
APTT PPP: 72.2 SEC (ref 21.7–34.2)
AST SERPL-CCNC: 27 U/L (ref 10–38)
BASOPHILS # BLD: 0 K/UL (ref 0–0.1)
BASOPHILS NFR BLD: 0 % (ref 0–2)
BILIRUB DIRECT SERPL-MCNC: 0.2 MG/DL (ref 0–0.2)
BILIRUB SERPL-MCNC: 0.3 MG/DL (ref 0.2–1)
BUN SERPL-MCNC: 11 MG/DL (ref 6–23)
BUN/CREAT SERPL: 18 (ref 12–20)
CALCIUM SERPL-MCNC: 7.7 MG/DL (ref 8.5–10.1)
CHLORIDE SERPL-SCNC: 101 MMOL/L (ref 98–107)
CO2 SERPL-SCNC: 25 MMOL/L (ref 21–32)
CREAT SERPL-MCNC: 0.63 MG/DL (ref 0.6–1.3)
DIFFERENTIAL METHOD BLD: ABNORMAL
EOSINOPHIL # BLD: 0 K/UL (ref 0–0.4)
EOSINOPHIL NFR BLD: 0 % (ref 0–5)
ERYTHROCYTE [DISTWIDTH] IN BLOOD BY AUTOMATED COUNT: 13.9 % (ref 11.6–14.5)
GLOBULIN SER CALC-MCNC: 3.1 G/DL (ref 2–4)
GLUCOSE BLD STRIP.AUTO-MCNC: 206 MG/DL (ref 70–110)
GLUCOSE BLD STRIP.AUTO-MCNC: 213 MG/DL (ref 70–110)
GLUCOSE BLD STRIP.AUTO-MCNC: 218 MG/DL (ref 70–110)
GLUCOSE BLD STRIP.AUTO-MCNC: 218 MG/DL (ref 70–110)
GLUCOSE SERPL-MCNC: 236 MG/DL (ref 74–108)
HCT VFR BLD AUTO: 23.6 % (ref 35–45)
HGB BLD-MCNC: 8.1 G/DL (ref 12–16)
IMM GRANULOCYTES # BLD AUTO: 0 K/UL
IMM GRANULOCYTES NFR BLD AUTO: 0 %
LYMPHOCYTES # BLD: 2.34 K/UL (ref 0.9–3.6)
LYMPHOCYTES NFR BLD: 19 % (ref 21–52)
MAGNESIUM SERPL-MCNC: 1.8 MG/DL (ref 1.6–2.6)
MCH RBC QN AUTO: 28 PG (ref 24–34)
MCHC RBC AUTO-ENTMCNC: 34.3 G/DL (ref 31–37)
MCV RBC AUTO: 81.7 FL (ref 78–100)
MONOCYTES # BLD: 0.37 K/UL (ref 0.05–1.2)
MONOCYTES NFR BLD: 3 % (ref 3–10)
NEUTS BAND NFR BLD MANUAL: 9 % (ref 0–5)
NEUTS SEG # BLD: 9.59 K/UL (ref 1.8–8)
NEUTS SEG NFR BLD: 69 % (ref 40–73)
NRBC # BLD: 0.02 K/UL (ref 0–0.01)
NRBC BLD-RTO: 0.2 PER 100 WBC
PHOSPHATE SERPL-MCNC: 2.8 MG/DL (ref 2.5–4.9)
PLATELET # BLD AUTO: 99 K/UL (ref 135–420)
PLATELET COMMENT: ABNORMAL
PMV BLD AUTO: 11.7 FL (ref 9.2–11.8)
POTASSIUM SERPL-SCNC: 4 MMOL/L (ref 3.5–5.5)
PROCALCITONIN SERPL-MCNC: 4.05 NG/ML
PROT SERPL-MCNC: 4.6 G/DL (ref 6.4–8.2)
RBC # BLD AUTO: 2.89 M/UL (ref 4.2–5.3)
RBC MORPH BLD: ABNORMAL
SODIUM SERPL-SCNC: 137 MMOL/L (ref 136–145)
TRIGL SERPL-MCNC: 217 MG/DL (ref 0–150)
WBC # BLD AUTO: 12.3 K/UL (ref 4.6–13.2)

## 2025-07-05 PROCEDURE — 6360000002 HC RX W HCPCS: Performed by: INTERNAL MEDICINE

## 2025-07-05 PROCEDURE — 2500000003 HC RX 250 WO HCPCS: Performed by: INTERNAL MEDICINE

## 2025-07-05 PROCEDURE — 2500000003 HC RX 250 WO HCPCS: Performed by: HOSPITALIST

## 2025-07-05 PROCEDURE — 6360000002 HC RX W HCPCS: Performed by: PHYSICIAN ASSISTANT

## 2025-07-05 PROCEDURE — 82962 GLUCOSE BLOOD TEST: CPT

## 2025-07-05 PROCEDURE — 80076 HEPATIC FUNCTION PANEL: CPT

## 2025-07-05 PROCEDURE — 6370000000 HC RX 637 (ALT 250 FOR IP): Performed by: INTERNAL MEDICINE

## 2025-07-05 PROCEDURE — 2580000003 HC RX 258: Performed by: INTERNAL MEDICINE

## 2025-07-05 PROCEDURE — 87103 BLOOD FUNGUS CULTURE: CPT

## 2025-07-05 PROCEDURE — 2700000000 HC OXYGEN THERAPY PER DAY

## 2025-07-05 PROCEDURE — 6360000002 HC RX W HCPCS: Performed by: HOSPITALIST

## 2025-07-05 PROCEDURE — 87449 NOS EACH ORGANISM AG IA: CPT

## 2025-07-05 PROCEDURE — 84478 ASSAY OF TRIGLYCERIDES: CPT

## 2025-07-05 PROCEDURE — 2580000003 HC RX 258: Performed by: HOSPITALIST

## 2025-07-05 PROCEDURE — 6360000002 HC RX W HCPCS: Performed by: SURGERY

## 2025-07-05 PROCEDURE — 85025 COMPLETE CBC W/AUTO DIFF WBC: CPT

## 2025-07-05 PROCEDURE — 83735 ASSAY OF MAGNESIUM: CPT

## 2025-07-05 PROCEDURE — 2580000003 HC RX 258: Performed by: PHYSICIAN ASSISTANT

## 2025-07-05 PROCEDURE — 6370000000 HC RX 637 (ALT 250 FOR IP): Performed by: HOSPITALIST

## 2025-07-05 PROCEDURE — 84100 ASSAY OF PHOSPHORUS: CPT

## 2025-07-05 PROCEDURE — 1100000000 HC RM PRIVATE

## 2025-07-05 PROCEDURE — 71045 X-RAY EXAM CHEST 1 VIEW: CPT

## 2025-07-05 PROCEDURE — 84145 PROCALCITONIN (PCT): CPT

## 2025-07-05 PROCEDURE — 85730 THROMBOPLASTIN TIME PARTIAL: CPT

## 2025-07-05 PROCEDURE — 80048 BASIC METABOLIC PNL TOTAL CA: CPT

## 2025-07-05 RX ORDER — CALCIUM GLUCONATE 20 MG/ML
2000 INJECTION, SOLUTION INTRAVENOUS ONCE
Status: COMPLETED | OUTPATIENT
Start: 2025-07-05 | End: 2025-07-05

## 2025-07-05 RX ORDER — CLONIDINE 0.1 MG/24H
1 PATCH, EXTENDED RELEASE TRANSDERMAL WEEKLY
Status: DISCONTINUED | OUTPATIENT
Start: 2025-07-05 | End: 2025-07-06

## 2025-07-05 RX ORDER — METOCLOPRAMIDE HYDROCHLORIDE 5 MG/ML
5 INJECTION INTRAMUSCULAR; INTRAVENOUS EVERY 8 HOURS
Status: DISCONTINUED | OUTPATIENT
Start: 2025-07-05 | End: 2025-07-15 | Stop reason: HOSPADM

## 2025-07-05 RX ADMIN — METOCLOPRAMIDE 5 MG: 5 INJECTION, SOLUTION INTRAMUSCULAR; INTRAVENOUS at 17:51

## 2025-07-05 RX ADMIN — HYDROMORPHONE HYDROCHLORIDE 1.5 MG: 2 INJECTION, SOLUTION INTRAMUSCULAR; INTRAVENOUS; SUBCUTANEOUS at 15:06

## 2025-07-05 RX ADMIN — HYDROMORPHONE HYDROCHLORIDE 1.5 MG: 2 INJECTION, SOLUTION INTRAMUSCULAR; INTRAVENOUS; SUBCUTANEOUS at 21:57

## 2025-07-05 RX ADMIN — LABETALOL HYDROCHLORIDE 20 MG: 5 INJECTION, SOLUTION INTRAVENOUS at 18:32

## 2025-07-05 RX ADMIN — INSULIN HUMAN 2 UNITS: 100 INJECTION, SOLUTION PARENTERAL at 06:16

## 2025-07-05 RX ADMIN — INSULIN HUMAN 2 UNITS: 100 INJECTION, SOLUTION PARENTERAL at 18:33

## 2025-07-05 RX ADMIN — HYDROMORPHONE HYDROCHLORIDE 1.5 MG: 2 INJECTION, SOLUTION INTRAMUSCULAR; INTRAVENOUS; SUBCUTANEOUS at 01:45

## 2025-07-05 RX ADMIN — CALCIUM GLUCONATE 2000 MG: 20 INJECTION, SOLUTION INTRAVENOUS at 06:19

## 2025-07-05 RX ADMIN — METOPROLOL TARTRATE 5 MG: 5 INJECTION INTRAVENOUS at 04:59

## 2025-07-05 RX ADMIN — INSULIN HUMAN 2 UNITS: 100 INJECTION, SOLUTION PARENTERAL at 00:52

## 2025-07-05 RX ADMIN — MEROPENEM 1000 MG: 1 INJECTION INTRAVENOUS at 17:54

## 2025-07-05 RX ADMIN — INSULIN HUMAN 2 UNITS: 100 INJECTION, SOLUTION PARENTERAL at 11:18

## 2025-07-05 RX ADMIN — ACETAMINOPHEN 650 MG: 650 SUPPOSITORY RECTAL at 18:33

## 2025-07-05 RX ADMIN — ASCORBIC ACID, VITAMIN A PALMITATE, CHOLECALCIFEROL, THIAMINE HYDROCHLORIDE, RIBOFLAVIN-5 PHOSPHATE SODIUM, PYRIDOXINE HYDROCHLORIDE, NIACINAMIDE, DEXPANTHENOL, ALPHA-TOCOPHEROL ACETATE, VITAMIN K1, FOLIC ACID, BIOTIN, CYANOCOBALAMIN: 200; 3300; 200; 6; 3.6; 6; 40; 15; 10; 150; 600; 60; 5 INJECTION, SOLUTION INTRAVENOUS at 18:03

## 2025-07-05 RX ADMIN — MEROPENEM 1000 MG: 1 INJECTION INTRAVENOUS at 00:55

## 2025-07-05 RX ADMIN — HYDROMORPHONE HYDROCHLORIDE 1 MG: 1 INJECTION, SOLUTION INTRAMUSCULAR; INTRAVENOUS; SUBCUTANEOUS at 04:58

## 2025-07-05 RX ADMIN — LABETALOL HYDROCHLORIDE 20 MG: 5 INJECTION, SOLUTION INTRAVENOUS at 07:47

## 2025-07-05 RX ADMIN — METOPROLOL TARTRATE 5 MG: 5 INJECTION INTRAVENOUS at 11:05

## 2025-07-05 RX ADMIN — METOPROLOL TARTRATE 5 MG: 5 INJECTION INTRAVENOUS at 15:03

## 2025-07-05 RX ADMIN — HYDROMORPHONE HYDROCHLORIDE 1.5 MG: 2 INJECTION, SOLUTION INTRAMUSCULAR; INTRAVENOUS; SUBCUTANEOUS at 11:04

## 2025-07-05 RX ADMIN — HYDROMORPHONE HYDROCHLORIDE 1.5 MG: 2 INJECTION, SOLUTION INTRAMUSCULAR; INTRAVENOUS; SUBCUTANEOUS at 19:33

## 2025-07-05 RX ADMIN — MEROPENEM 1000 MG: 1 INJECTION INTRAVENOUS at 08:56

## 2025-07-05 RX ADMIN — METOCLOPRAMIDE 5 MG: 5 INJECTION, SOLUTION INTRAMUSCULAR; INTRAVENOUS at 11:03

## 2025-07-05 RX ADMIN — SODIUM CHLORIDE, PRESERVATIVE FREE 10 ML: 5 INJECTION INTRAVENOUS at 07:48

## 2025-07-05 RX ADMIN — VANCOMYCIN HYDROCHLORIDE 1000 MG: 1 INJECTION, POWDER, LYOPHILIZED, FOR SOLUTION INTRAVENOUS at 11:03

## 2025-07-05 RX ADMIN — HYDROMORPHONE HYDROCHLORIDE 1 MG: 1 INJECTION, SOLUTION INTRAMUSCULAR; INTRAVENOUS; SUBCUTANEOUS at 08:15

## 2025-07-05 RX ADMIN — METOPROLOL TARTRATE 5 MG: 5 INJECTION INTRAVENOUS at 23:35

## 2025-07-05 RX ADMIN — SODIUM CHLORIDE, PRESERVATIVE FREE 40 MG: 5 INJECTION INTRAVENOUS at 07:48

## 2025-07-05 RX ADMIN — I.V. FAT EMULSION 150 ML: 20 EMULSION INTRAVENOUS at 18:04

## 2025-07-05 RX ADMIN — MICAFUNGIN SODIUM 100 MG: 100 INJECTION, POWDER, LYOPHILIZED, FOR SOLUTION INTRAVENOUS at 22:02

## 2025-07-05 ASSESSMENT — PAIN SCALES - GENERAL
PAINLEVEL_OUTOF10: 10
PAINLEVEL_OUTOF10: 9
PAINLEVEL_OUTOF10: 7
PAINLEVEL_OUTOF10: 3
PAINLEVEL_OUTOF10: 2
PAINLEVEL_OUTOF10: 0
PAINLEVEL_OUTOF10: 9
PAINLEVEL_OUTOF10: 8
PAINLEVEL_OUTOF10: 2
PAINLEVEL_OUTOF10: 6
PAINLEVEL_OUTOF10: 4
PAINLEVEL_OUTOF10: 10

## 2025-07-05 ASSESSMENT — PAIN DESCRIPTION - ORIENTATION
ORIENTATION: POSTERIOR
ORIENTATION: POSTERIOR;ANTERIOR
ORIENTATION: OTHER (COMMENT)
ORIENTATION: POSTERIOR;ANTERIOR
ORIENTATION: POSTERIOR
ORIENTATION: MID
ORIENTATION: POSTERIOR;ANTERIOR

## 2025-07-05 ASSESSMENT — PAIN DESCRIPTION - DESCRIPTORS
DESCRIPTORS: ACHING
DESCRIPTORS: DISCOMFORT
DESCRIPTORS: THROBBING;STABBING
DESCRIPTORS: THROBBING;STABBING
DESCRIPTORS: DISCOMFORT
DESCRIPTORS: ACHING
DESCRIPTORS: THROBBING

## 2025-07-05 ASSESSMENT — PAIN DESCRIPTION - PAIN TYPE
TYPE: CHRONIC PAIN

## 2025-07-05 ASSESSMENT — PAIN DESCRIPTION - LOCATION
LOCATION: BACK
LOCATION: BACK
LOCATION: GENERALIZED
LOCATION: ABDOMEN;BACK
LOCATION: ABDOMEN

## 2025-07-05 ASSESSMENT — PAIN - FUNCTIONAL ASSESSMENT
PAIN_FUNCTIONAL_ASSESSMENT: ACTIVITIES ARE NOT PREVENTED
PAIN_FUNCTIONAL_ASSESSMENT: ACTIVITIES ARE NOT PREVENTED
PAIN_FUNCTIONAL_ASSESSMENT: PREVENTS OR INTERFERES SOME ACTIVE ACTIVITIES AND ADLS
PAIN_FUNCTIONAL_ASSESSMENT: ACTIVITIES ARE NOT PREVENTED

## 2025-07-05 ASSESSMENT — PAIN DESCRIPTION - ONSET
ONSET: ON-GOING
ONSET: GRADUAL

## 2025-07-05 ASSESSMENT — PAIN DESCRIPTION - FREQUENCY
FREQUENCY: INTERMITTENT
FREQUENCY: CONTINUOUS
FREQUENCY: CONTINUOUS

## 2025-07-05 NOTE — PROGRESS NOTES
Afebrile vital signs are stable.  She is much more awake and alert.  Will clamp NG tube and try sips.  Her abdomen is a little distended however much softer.  Her incision looks good.  Will also start her on Reglan given her history of dysmotility.  She may require small bowel study for therapeutic and also diagnoses tomorrow or Monday.

## 2025-07-05 NOTE — PROGRESS NOTES
[facility]  Pulmonary, Critical Care, and Sleep Medicine    Name: Kiara Nance MRN: [id number]   : 1965 Hospital: [facility]    Date: 2025  Room: [address] [place] [address] Note                                              Consult requesting physician: Dr. Long  Reason for Consult: Hypotension    IMPRESSION:         Active Hospital Problems    Diagnosis Date Noted    Aortic thrombus (HCC) [I74.10] 2025     Priority: High    SBO (small bowel obstruction) (HCC) [K56.609] 2025     Priority: High    Hypotension [I95.9] 2025     Priority: Medium    Adenovirus infection [B34.0] 2025    Bacteremia [R78.81] 2025    Status post surgery [Z98.890] 2025    Hydronephrosis [N13.30] 2025    Sepsis (HCC) [A41.9] 2025    Metabolic acidosis [E87.20] 2025    Acute renal failure [N17.9] 2025    Hypokalemia [E87.6] 2025    Asthma [J45.909] 2025    Benign essential HTN [I10] 2021        Code status: Full Code       RECOMMENDATIONS:     Respiratory: History of asthma.  No asthma exacerbation.  Continue albuterol nebulized as needed.  Encouraged to use incentive spirometry.  Appears to protect airway well.    ABG 2025 at the time of RRT and transferred to ICU: pH 7.45, pCO2 29.6, , SpO2 85.4%; on 2 LPM O2 NC.  Currently on 3 LPM O2 NC; SpO2 in high 90s.    CT chest 2025: Small bilateral pleural effusions.  Bibasilar airspace consolidation, likely atelectasis but pneumonia cannot be ruled out.  CTA chest 25: No PE. Bilateral pleural effusion worsened with atelectasis of nearly entire RLL and multiple segments of RML and LLL.  These CT findings are related to abdominal distention and pleural effusion with volume overload and compressive atelectasis.    CXR as needed.    Pleural effusion due to volume overload, atelectasis.  Atelectasis due to pleural effusion and abdominal distention.  Incentive  PM    GLUCOSEU Negative 06/30/2025 11:13 PM    KETUA Negative 06/30/2025 11:13 PM    BILIRUBINUR Negative 06/30/2025 11:13 PM    NITRU Negative 06/30/2025 11:13 PM    LEUKOCYTESUR Negative 06/30/2025 11:13 PM        Micro  Results       Procedure Component Value Units Date/Time    Culture, Blood 2 [[id number]] Collected: 07/03/25 1414    Order Status: Completed Specimen: Blood Updated: 07/04/25 0856     Special Requests --        RIGHT  HAND       Culture NO GROWTH AFTER 18 HOURS       Culture, Blood 1 [[id number]] Collected: 07/03/25 1351    Order Status: Completed Specimen: Blood Updated: 07/04/25 0856     Special Requests --        NO SPECIAL REQUESTS  RIGHT  ARM       Culture NO GROWTH AFTER 18 HOURS       Culture, Body Fluid (with Gram Stain) [[id number]] Collected: 07/03/25 1200    Order Status: Completed Specimen: Body Fluid from Aspirate Updated: 07/04/25 1405     Special Requests NO SPECIAL REQUESTS        Gram Stain NO WBC'S SEEN         NO ORGANISMS SEEN        Culture       Culture performed on Unspun Fluid            NO GROWTH THUS FAR       Culture, Anaerobic [[id number]] Collected: 07/03/25 1200    Order Status: Completed Specimen: Abscess Updated: 07/04/25 1405     Special Requests NO SPECIAL REQUESTS        Culture NO GROWTH THUS FAR       Culture, Fungus [[id number]] Collected: 07/03/25 1200    Order Status: Sent Specimen: Abscess Updated: 07/03/25 2219    Culture, Anaerobic [[id number]] Collected: 07/03/25 1145    Order Status: Canceled Specimen: Abdomen     Culture, Body Fluid (with Gram Stain) [[id number]] Collected: 07/03/25 1145    Order Status: Canceled Specimen: Body Fluid from Aspirate     Culture, Fungus [[id number]] Collected: 07/03/25 1145    Order Status: Canceled Specimen: Abdomen     Culture, Blood 1 [[id number]]  (Abnormal) Collected: 07/01/25 0017    Order Status: Completed Specimen: Blood Updated: 07/03/25 1437     Special Requests --        NO SPECIAL REQUESTS  RIGHT       Gram

## 2025-07-05 NOTE — PROGRESS NOTES
Hematology / Oncology Initial Consult Note    Admit Date: 6/25/2025    Reason for Consult: Anticoagulation recommendations/thrombocytopenia    Requesting Physician: Dr. Long    Assessment:     Kiara Nance is a 60 y.o., Black / , female, who I have been asked to see for thrombocytopenia.     Principal Problem:    SBO (small bowel obstruction) (HCC)  Active Problems:    Aortic thrombus (HCC)    Benign essential HTN    Hypokalemia    Asthma    Acute renal failure    Hydronephrosis    Sepsis (HCC)    Metabolic acidosis    Hypotension    Adenovirus infection    Bacteremia    Status post surgery  Resolved Problems:    * No resolved hospital problems. *    Aortic filling defect/mural thrombus: noted on CT 7/2/25. Vascular surgery was consulted with no recommendation for acute intervention, but recommended anticoagulation.   Thrombocytopenia: noted since 7/1/25. She had been on lovenox since 6/25/25. 4T HIT score is high with timing of platelet drop 5 days after Lovenox, decrease to 50% of platelets and akiko>20, and likely new thrombosis. It is very reasonable to do argatroban as HIT is possible. Will follow screening test and then do VIKASH testing to confirm. Drug-induced thrombocytopenia from Meropenem is possible, but this was just started on 7/2/25 and platelet decrease predated the start of medication. She is tachycardic and has E coli bacteremia, so DIC is possible. Review of peripheral smear with no evidence of schistocytes to suggest TTP.   Sepsis and E coli bacteremia: on Vanc and Meropenem. ID on board. CT with no abscess  Small bowel obstruction: s/p exlap on 6/39/25          Plan:     - Agree with Argatroban gtt, plts improving to 99  - Will follow up HIT panel and send for VIKASH to confirm  - Can continue anticoagulation as long as platelets >50  - Transfuse to maintain hg>7 and Plt>10  - Will continue to follow    Nimco Cai MD  [facility]        History of Present     acetaminophen (TYLENOL) tablet 650 mg  650 mg Oral Q6H PRN    Or    acetaminophen (TYLENOL) suppository 650 mg  650 mg Rectal Q6H PRN       Prior to Admission medications    Medication Sig Start Date End Date Taking? Authorizing Provider   zolpidem (AMBIEN) 5 MG tablet Take 1 tablet by mouth nightly as needed for Sleep for up to 60 days. Max Daily Amount: 5 mg 6/5/25 8/4/25 Yes Nae Kaiser APRN - NP   tiZANidine (ZANAFLEX) 4 MG tablet TAKE 1 TABLET BY MOUTH EVERY 8 HOURS AS NEEDED 5/1/25  Yes Nae Kaiser APRN - NP   amLODIPine (NORVASC) 10 MG tablet Take 1 tablet by mouth daily 4/10/25  Yes Nae Kaiser APRN - NP   albuterol sulfate HFA (PROVENTIL;VENTOLIN;PROAIR) 108 (90 Base) MCG/ACT inhaler TAKE 2 PUFFS BY MOUTH EVERY 4 HOURS AS NEEDED FOR WHEEZE 4/10/25  Yes Nae Kaiser APRN - NP   fluticasone (FLONASE) 50 MCG/ACT nasal spray 1 spray by Each Nostril route daily 3/27/25  Yes Susanne Jackson MD   amitriptyline (ELAVIL) 50 MG tablet Take 1 tablet by mouth nightly 2/7/25  Yes Lin Cardenas APRN - NP   rimegepant sulfate 75 MG TBDP Take 75 mg by mouth daily as needed (migraines) 11/27/24  Yes Lin Cardenas APRN - NP   pregabalin (LYRICA) 50 MG capsule Take 1 capsule by mouth nightly as needed (nerve pain) for up to 90 days. Max Daily Amount: 50 mg 11/27/24 6/26/25 Yes Lin Cardenas APRN - NP   simvastatin (ZOCOR) 5 MG tablet Take 1 tablet by mouth nightly 10/28/24  Yes Nae Kaiser APRN - NP   hydroCHLOROthiazide (HYDRODIURIL) 25 MG tablet Take 2 tablets by mouth every morning 10/28/24  Yes Nae Kaiser APRN - NP   butalbital-acetaminophen-caffeine (FIORICET, ESGIC) -40 MG per tablet Take 1 tablet by mouth every 6 hours as needed for Headaches 10/28/24  Yes Nae Kaiser, APRN - NP   propranolol (INDERAL LA) 80 MG extended release capsule Take 1 capsule by mouth daily 6/30/25   Lin Cardenas, DESHAUN - NP   fluticasone (FLOVENT HFA) 220

## 2025-07-05 NOTE — PROGRESS NOTES
Called for fever 102.7.    Pt on broad spec Abx already for bacteremia.   Also has URI. She is on TPN.   Will send off Fungitell, fungal blood CX and start empiric Micafungin.

## 2025-07-05 NOTE — PROGRESS NOTES
Consult for TPN Dosing per Pharmacy by Dr. Melina Long  Consult provided for this 60 y.o. female, for indication of Nutrition  Day of Therapy: 6    Weight: 82.1 kg (181 lb)  Ideal body weight: 45.5 kg (100 lb 4.9 oz)  TPN Dosing Weight: 52.3 kg    Labs:  CMP CMP:    Lab Results   Component Value Date/Time     07/05/2025 01:50 AM    K 4.0 07/05/2025 01:50 AM     07/05/2025 01:50 AM    CO2 25 07/05/2025 01:50 AM    BUN 11 07/05/2025 01:50 AM    GFRAA 78 06/01/2020 08:26 AM    ALT 18 07/05/2025 01:50 AM    GLOB 3.1 07/05/2025 01:50 AM       Ca/ Phos Lab Results   Component Value Date/Time    CALCIUM 7.7 07/05/2025 01:50 AM    CALCIUM 8.0 07/04/2025 04:39 AM    CALCIUM 8.4 07/03/2025 03:09 AM       Lab Results   Component Value Date/Time    PHOS 2.8 07/05/2025 01:50 AM    PHOS 2.7 07/04/2025 04:39 AM    PHOS 3.0 07/03/2025 11:14 PM          Mag Lab Results   Component Value Date/Time    MG 1.8 07/05/2025 01:50 AM    MG 1.6 07/04/2025 04:39 AM    MG 1.8 07/03/2025 03:09 AM         Tg Lab Results   Component Value Date/Time    TRIG 217 07/05/2025 01:50 AM    TRIG 214 07/04/2025 04:39 AM    TRIG 214 07/03/2025 03:09 AM          Albumin No results found for: \"TP\"    Kcal Requirement: 25 - 35 kcal/kg -- 1307.5 kcal - 1830.5 kcal      Macronutrients - provided by premixed Clinimix E 5%AA/15%Dextrose with electrolytes in 2000mL bag:      Macronutrients for July 5, 2025                           Component                                g/day                                  kcal   Protein 75 g/day 255 kcal   Dextrose 225 g/day 900 kcal   Lipids 30 g/day 270 kcal   Total kcals  1425 kcal        TPN  to be administered at 63mL/hr goal macronutrients (1500mL/24 hrs) and titrated to goal per patient tolerance.      MD to replete electrolytes acutely outside of TPN as needed.   MD to add/adjust/dc maintenance IV Fluid as needed for fluid requirements.     Additional recommendations/Orders:   Corrective insulin coverage with

## 2025-07-05 NOTE — PROGRESS NOTES
[facility]   Pharmacy Pharmacokinetic Monitoring Service - Vancomycin    Consulting Provider: Elias Jade PA-C   Indication: Sepsis of Unknown Etiology x 7 days  Target Concentration: Goal AUC/MICHAEL 400-600 mg*hr/L  Day of Therapy: 5  Additional Antimicrobials: Merrem    Pertinent Laboratory Values:   Wt Readings from Last 1 Encounters:   07/04/25 82.1 kg (181 lb)     Temp Readings from Last 1 Encounters:   07/05/25 97.9 °F (36.6 °C) (Axillary)     Estimated Creatinine Clearance: 88 mL/min (based on SCr of 0.63 mg/dL).  Recent Labs     07/04/25  0439 07/05/25  0150   CREATININE 0.66 0.63   BUN 12 11   WBC 8.9 12.3     Procalcitonin: 4.05 (7/5/25 @ 0150)    MRSA Nasal Swab: N/A. Non-respiratory infection.    Recent vancomycin administrations                     vancomycin (VANCOCIN) 1,000 mg in sodium chloride 0.9 % 250 mL (addEASE) IVPB (mg) 1,000 mg New Bag 07/05/25 1103     1,000 mg New Bag 07/04/25 2246     1,000 mg New Bag  1120     1,000 mg New Bag 07/03/25 2317     1,000 mg New Bag  1114    vancomycin (VANCOCIN) 750 mg in sodium chloride 0.9 % 250 mL (addEASE) IVPB (mg) 750 mg New Bag 07/02/25 2334                Plan:  Current dosing regimen is therapeutic  Continue current dose of Vancomycin 1000 mg IV q12h  Estimated AUC(ss): 522 mg/L.hr  Estimated T(ss): 15.5 mg/L  Pharmacy will continue to monitor patient and adjust therapy as indicated    Thank you for the consult,  Matilde Barnes [facility]  7/5/2025 12:51 PM

## 2025-07-05 NOTE — PLAN OF CARE
Problem: Pain  Goal: Verbalizes/displays adequate comfort level or baseline comfort level  7/4/2025 2107 by Cindi Martinez RN  Outcome: Progressing  Flowsheets (Taken 7/4/2025 2000)  Verbalizes/displays adequate comfort level or baseline comfort level:   Encourage patient to monitor pain and request assistance   Assess pain using appropriate pain scale   Administer analgesics based on type and severity of pain and evaluate response   Implement non-pharmacological measures as appropriate and evaluate response   Consider cultural and social influences on pain and pain management   Notify Licensed Independent Practitioner if interventions unsuccessful or patient reports new pain  7/4/2025 1450 by Rica Rahman RN  Outcome: Progressing  Flowsheets (Taken 7/3/2025 1740)  Verbalizes/displays adequate comfort level or baseline comfort level:   Encourage patient to monitor pain and request assistance   Assess pain using appropriate pain scale   Administer analgesics based on type and severity of pain and evaluate response   Implement non-pharmacological measures as appropriate and evaluate response   Notify Licensed Independent Practitioner if interventions unsuccessful or patient reports new pain     Problem: ABCDS Injury Assessment  Goal: Absence of physical injury  Outcome: Progressing  Flowsheets (Taken 7/3/2025 1942 by Vicky Gaspar, RN)  Absence of Physical Injury: Implement safety measures based on patient assessment     Problem: Gastrointestinal - Adult  Goal: Maintains or returns to baseline bowel function  7/4/2025 2107 by Cindi Martinez RN  Outcome: Progressing  Flowsheets (Taken 7/4/2025 2000)  Maintains or returns to baseline bowel function:   Assess bowel function   Encourage oral fluids to ensure adequate hydration   Administer IV fluids as ordered to ensure adequate hydration   Administer ordered medications as needed   Encourage mobilization and activity   Nutrition consult to assist

## 2025-07-05 NOTE — PLAN OF CARE
Problem: Pain  Goal: Verbalizes/displays adequate comfort level or baseline comfort level  Outcome: Progressing  Flowsheets (Taken 7/5/2025 1233)  Verbalizes/displays adequate comfort level or baseline comfort level:   Encourage patient to monitor pain and request assistance   Assess pain using appropriate pain scale   Administer analgesics based on type and severity of pain and evaluate response   Implement non-pharmacological measures as appropriate and evaluate response   Notify Licensed Independent Practitioner if interventions unsuccessful or patient reports new pain     Problem: Gastrointestinal - Adult  Goal: Maintains or returns to baseline bowel function  Outcome: Progressing  Flowsheets (Taken 7/5/2025 0730)  Maintains or returns to baseline bowel function: Assess bowel function     Problem: Skin/Tissue Integrity - Adult  Goal: Incisions, wounds, or drain sites healing without S/S of infection  Outcome: Progressing  Flowsheets (Taken 7/5/2025 1234)  Incisions, Wounds, or Drain Sites Healing Without Sign and Symptoms of Infection: Implement wound care per orders     Problem: Cardiovascular - Adult  Goal: Maintains optimal cardiac output and hemodynamic stability  Outcome: Progressing  Flowsheets (Taken 7/5/2025 0730)  Maintains optimal cardiac output and hemodynamic stability:   Monitor blood pressure and heart rate   Monitor urine output and notify Licensed Independent Practitioner for values outside of normal range   Assess for signs of decreased cardiac output

## 2025-07-05 NOTE — PROGRESS NOTES
CONTRAST INDICATION: Tachycardia, hypotension, and lactic acidosis post surgery evaluate for PE, ischemic bowel, abscess COMPARISON: CT chest/abdomen/pelvis 6/30/2025, CT abdomen/pelvis 6/28/2025 CONTRAST: 150 mL of Isovue-370. ORAL CONTRAST: None TECHNIQUE: Helical thin section chest CT following intravenous administration of nonionic contrast was performed according to departmental PE protocol. Coronal and sagittal reformats were performed. 3D post processing was performed. Then, CT angiography axial images were obtained through the abdomen and pelvis. Coronal and sagittal reconstructions were generated. Additionally, multiplanar reformats including volume rendered images and 3D/MIPs were created. CT dose reduction was achieved through use of a standardized protocol tailored for this examination and automatic exposure control for dose modulation. FINDINGS: PULMONARY ARTERIES: This is a good quality study for the evaluation of pulmonary embolism to the segmental arterial level. There is no pulmonary embolism to this level. MEDIASTINUM: No mass or lymphadenopathy. SIS: No mass or lymphadenopathy. THORACIC AORTA: No aneurysm. HEART: Four-chamber cardiac enlargement a left PICC terminates at the superior cavoatrial junction. ESOPHAGUS: No wall thickening or dilatation. An enteric tube passes through the esophagus and terminates in the stomach. TRACHEA/BRONCHI: Patent. PLEURA: Moderate right and small left pleural effusions have enlarged compared to CT from 6/30/2025. No pneumothorax. LUNGS: There is bibasilar volume loss with collapse of nearly the entire right lower lobe except for the superior segment as well as collapse of multiple segments of the right middle and left lower lobes. No nodule, mass, or airspace disease within the aerated lung. BONES: No aggressive bone lesion or fracture. ABDOMINAL AORTA: There is a 13 mm filling defect adherent to the posterior wall of the abdominal aorta at the origin of the  or mass effect. The basilar cisterns are open. No CT evidence of acute infarct. The bone windows demonstrate no abnormalities. The visualized portions of the paranasal sinuses and mastoid air cells are clear.     No acute process identified Electronically signed by Huyen Salazar    CT ABDOMEN PELVIS W IV CONTRAST Additional Contrast? Oral  Result Date: 6/28/2025  INDICATION: Ascites, bowel obstruction CT of the abdomen and pelvis is performed with 5 mm collimation. Study is performed with PO and 100 cc of nonionic Isovue 370. Sagittal and coronal reformatted images were also performed. CT dose reduction was achieved with the use of the standardized protocol tailored for this examination and automatic exposure control for dose modulation. Direct comparison is made to prior CT dated June 25, 2025. Findings: Lung bases: There is a small right pleural effusion and right basilar patchy airspace consolidation, new compared to prior CT dated June 25, 2025. There is new, mild left basilar atelectasis Liver: The liver is normal. Adrenals: Adrenal glands are normal. Pancreas: The pancreas is normal. Gallbladder: The gallbladder is normal. Kidneys: The kidneys are normal. Spleen: The spleen is normal. Lymph nodes. There is no rickie hepatitis, mesenteric, retroperitoneal or pelvic lymphadenopathy. Bowel: There is a persistent markedly dilated loop of bowel in the right abdomen, likely small bowel and likely representing a closed loop obstruction. This loop of bowel contains gas and fluid and is unopacified with oral contrast. Remaining small bowel loops are nondilated. There is no pneumatosis. There is no mesenteric vein gas. Appendix: The appendix is normal. Urinary bladder: Urinary bladder is partially filled and grossly normal. Miscellaneous: There is trace perihepatic and pelvic free intraperitoneal fluid. There is no free intraperitoneal gas. There is no focal fluid collection to suggest abscess. There are multiple uterine

## 2025-07-06 ENCOUNTER — APPOINTMENT (OUTPATIENT)
Facility: HOSPITAL | Age: 60
DRG: 230 | End: 2025-07-06
Attending: INTERNAL MEDICINE
Payer: MEDICAID

## 2025-07-06 ENCOUNTER — APPOINTMENT (OUTPATIENT)
Facility: HOSPITAL | Age: 60
DRG: 230 | End: 2025-07-06
Payer: MEDICAID

## 2025-07-06 LAB
ALBUMIN SERPL-MCNC: 1.7 G/DL (ref 3.4–5)
ALBUMIN/GLOB SERPL: 0.4 (ref 0.8–1.7)
ALP SERPL-CCNC: 57 U/L (ref 45–117)
ALT SERPL-CCNC: 21 U/L (ref 10–35)
ANION GAP SERPL CALC-SCNC: 10 MMOL/L (ref 3–18)
APPEARANCE UR: CLEAR
APTT PPP: 66.2 SEC (ref 21.7–34.2)
APTT PPP: 69.2 SEC (ref 21.7–34.2)
APTT PPP: 74.2 SEC (ref 21.7–34.2)
APTT PPP: 80.3 SEC (ref 21.7–34.2)
APTT PPP: 82 SEC (ref 21.7–34.2)
AST SERPL-CCNC: 31 U/L (ref 10–38)
BACTERIA URNS QL MICRO: NEGATIVE /HPF
BASOPHILS # BLD: 0 K/UL (ref 0–0.1)
BASOPHILS NFR BLD: 0 % (ref 0–2)
BILIRUB DIRECT SERPL-MCNC: 0.3 MG/DL (ref 0–0.2)
BILIRUB SERPL-MCNC: 0.5 MG/DL (ref 0.2–1)
BILIRUB UR QL: NEGATIVE
BUN SERPL-MCNC: 12 MG/DL (ref 6–23)
BUN/CREAT SERPL: 18 (ref 12–20)
CALCIUM SERPL-MCNC: 8.9 MG/DL (ref 8.5–10.1)
CHLORIDE SERPL-SCNC: 98 MMOL/L (ref 98–107)
CO2 SERPL-SCNC: 25 MMOL/L (ref 21–32)
COLOR UR: YELLOW
CREAT SERPL-MCNC: 0.69 MG/DL (ref 0.6–1.3)
DIFFERENTIAL METHOD BLD: ABNORMAL
ECHO AO ROOT DIAM: 2.5 CM
ECHO AO ROOT INDEX: 1.4 CM/M2
ECHO AV AREA PEAK VELOCITY: 2.2 CM2
ECHO AV AREA VTI: 2.4 CM2
ECHO AV AREA/BSA PEAK VELOCITY: 1.2 CM2/M2
ECHO AV AREA/BSA VTI: 1.3 CM2/M2
ECHO AV MEAN GRADIENT: 8 MMHG
ECHO AV MEAN VELOCITY: 1.3 M/S
ECHO AV PEAK GRADIENT: 14 MMHG
ECHO AV PEAK VELOCITY: 1.8 M/S
ECHO AV VELOCITY RATIO: 0.83
ECHO AV VTI: 27.3 CM
ECHO BSA: 1.86 M2
ECHO EST RA PRESSURE: 3 MMHG
ECHO LA DIAMETER INDEX: 1.56 CM/M2
ECHO LA DIAMETER: 2.8 CM
ECHO LA TO AORTIC ROOT RATIO: 1.12
ECHO LA VOL A-L A2C: 28 ML (ref 22–52)
ECHO LA VOL A-L A4C: 35 ML (ref 22–52)
ECHO LA VOL BP: 33 ML (ref 22–52)
ECHO LA VOL MOD A2C: 27 ML (ref 22–52)
ECHO LA VOL MOD A4C: 34 ML (ref 22–52)
ECHO LA VOL/BSA BIPLANE: 18 ML/M2 (ref 16–34)
ECHO LA VOLUME AREA LENGTH: 34 ML
ECHO LA VOLUME INDEX A-L A2C: 16 ML/M2 (ref 16–34)
ECHO LA VOLUME INDEX A-L A4C: 20 ML/M2 (ref 16–34)
ECHO LA VOLUME INDEX AREA LENGTH: 19 ML/M2 (ref 16–34)
ECHO LA VOLUME INDEX MOD A2C: 15 ML/M2 (ref 16–34)
ECHO LA VOLUME INDEX MOD A4C: 19 ML/M2 (ref 16–34)
ECHO LV E' LATERAL VELOCITY: 10.52 CM/S
ECHO LV E' SEPTAL VELOCITY: 7.19 CM/S
ECHO LV EDV A2C: 34 ML
ECHO LV EDV A4C: 43 ML
ECHO LV EDV BP: 38 ML (ref 56–104)
ECHO LV EDV INDEX A4C: 24 ML/M2
ECHO LV EDV INDEX BP: 21 ML/M2
ECHO LV EDV NDEX A2C: 19 ML/M2
ECHO LV EF PHYSICIAN: 65 %
ECHO LV EJECTION FRACTION A2C: 72 %
ECHO LV EJECTION FRACTION A4C: 61 %
ECHO LV EJECTION FRACTION BIPLANE: 67 % (ref 55–100)
ECHO LV ESV A2C: 9 ML
ECHO LV ESV A4C: 17 ML
ECHO LV ESV BP: 13 ML (ref 19–49)
ECHO LV ESV INDEX A2C: 5 ML/M2
ECHO LV ESV INDEX A4C: 9 ML/M2
ECHO LV ESV INDEX BP: 7 ML/M2
ECHO LV FRACTIONAL SHORTENING: 40 % (ref 28–44)
ECHO LV INTERNAL DIMENSION DIASTOLE INDEX: 2.23 CM/M2
ECHO LV INTERNAL DIMENSION DIASTOLIC: 4 CM (ref 3.9–5.3)
ECHO LV INTERNAL DIMENSION SYSTOLIC INDEX: 1.34 CM/M2
ECHO LV INTERNAL DIMENSION SYSTOLIC: 2.4 CM
ECHO LV IVSD: 0.8 CM (ref 0.6–0.9)
ECHO LV MASS 2D: 101.4 G (ref 67–162)
ECHO LV MASS INDEX 2D: 56.7 G/M2 (ref 43–95)
ECHO LV POSTERIOR WALL DIASTOLIC: 0.9 CM (ref 0.6–0.9)
ECHO LV RELATIVE WALL THICKNESS RATIO: 0.45
ECHO LVOT AREA: 2.8 CM2
ECHO LVOT AV VTI INDEX: 0.87
ECHO LVOT DIAM: 1.9 CM
ECHO LVOT MEAN GRADIENT: 5 MMHG
ECHO LVOT PEAK GRADIENT: 9 MMHG
ECHO LVOT PEAK VELOCITY: 1.5 M/S
ECHO LVOT STROKE VOLUME INDEX: 37.7 ML/M2
ECHO LVOT SV: 67.4 ML
ECHO LVOT VTI: 23.8 CM
ECHO MV A VELOCITY: 0.97 M/S
ECHO MV AREA VTI: 3.3 CM2
ECHO MV E DECELERATION TIME (DT): 160.2 MS
ECHO MV E VELOCITY: 0.7 M/S
ECHO MV E/A RATIO: 0.72
ECHO MV E/E' LATERAL: 6.65
ECHO MV E/E' RATIO (AVERAGED): 8.19
ECHO MV E/E' SEPTAL: 9.74
ECHO MV LVOT VTI INDEX: 0.87
ECHO MV MAX VELOCITY: 0.9 M/S
ECHO MV MEAN GRADIENT: 2 MMHG
ECHO MV MEAN VELOCITY: 0.7 M/S
ECHO MV PEAK GRADIENT: 3 MMHG
ECHO MV VTI: 20.6 CM
ECHO PV MAX VELOCITY: 0.9 M/S
ECHO PV MEAN GRADIENT: 2 MMHG
ECHO PV MEAN VELOCITY: 0.6 M/S
ECHO PV PEAK GRADIENT: 3 MMHG
ECHO RA END SYSTOLIC VOLUME APICAL 4 CHAMBER INDEX BSA: 12 ML/M2
ECHO RA VOLUME: 22 ML
ECHO RIGHT VENTRICULAR SYSTOLIC PRESSURE (RVSP): 44 MMHG
ECHO RV FREE WALL PEAK S': 14.1 CM/S
ECHO RV INTERNAL DIMENSION: 3.2 CM
ECHO RV TAPSE: 1.8 CM (ref 1.7–?)
ECHO RVOT MEAN GRADIENT: 1 MMHG
ECHO RVOT PEAK GRADIENT: 3 MMHG
ECHO RVOT PEAK VELOCITY: 0.8 M/S
ECHO RVOT VTI: 16.8 CM
ECHO TV REGURGITANT MAX VELOCITY: 3.19 M/S
ECHO TV REGURGITANT PEAK GRADIENT: 41 MMHG
EOSINOPHIL # BLD: 0.15 K/UL (ref 0–0.4)
EOSINOPHIL NFR BLD: 1 % (ref 0–5)
EPITH CASTS URNS QL MICRO: ABNORMAL /LPF (ref 0–5)
ERYTHROCYTE [DISTWIDTH] IN BLOOD BY AUTOMATED COUNT: 13.7 % (ref 11.6–14.5)
GLOBULIN SER CALC-MCNC: 4.1 G/DL (ref 2–4)
GLUCOSE BLD STRIP.AUTO-MCNC: 286 MG/DL (ref 70–110)
GLUCOSE BLD STRIP.AUTO-MCNC: 297 MG/DL (ref 70–110)
GLUCOSE BLD STRIP.AUTO-MCNC: 319 MG/DL (ref 70–110)
GLUCOSE BLD STRIP.AUTO-MCNC: 374 MG/DL (ref 70–110)
GLUCOSE SERPL-MCNC: 357 MG/DL (ref 74–108)
GLUCOSE UR STRIP.AUTO-MCNC: >1000 MG/DL
HCT VFR BLD AUTO: 23.1 % (ref 35–45)
HGB BLD-MCNC: 7.9 G/DL (ref 12–16)
HGB UR QL STRIP: ABNORMAL
IMM GRANULOCYTES # BLD AUTO: 0 K/UL
IMM GRANULOCYTES NFR BLD AUTO: 0 %
KETONES UR QL STRIP.AUTO: NEGATIVE MG/DL
LEUKOCYTE ESTERASE UR QL STRIP.AUTO: NEGATIVE
LYMPHOCYTES # BLD: 1.21 K/UL (ref 0.9–3.6)
LYMPHOCYTES NFR BLD: 8 % (ref 21–52)
MAGNESIUM SERPL-MCNC: 1.7 MG/DL (ref 1.6–2.6)
MCH RBC QN AUTO: 27.7 PG (ref 24–34)
MCHC RBC AUTO-ENTMCNC: 34.2 G/DL (ref 31–37)
MCV RBC AUTO: 81.1 FL (ref 78–100)
MONOCYTES # BLD: 0.6 K/UL (ref 0.05–1.2)
MONOCYTES NFR BLD: 4 % (ref 3–10)
NEUTS SEG # BLD: 13.14 K/UL (ref 1.8–8)
NEUTS SEG NFR BLD: 87 % (ref 40–73)
NITRITE UR QL STRIP.AUTO: NEGATIVE
NRBC # BLD: 0.05 K/UL (ref 0–0.01)
NRBC BLD-RTO: 0.3 PER 100 WBC
PH UR STRIP: 7 (ref 5–8)
PHOSPHATE SERPL-MCNC: 2.6 MG/DL (ref 2.5–4.9)
PLATELET # BLD AUTO: 158 K/UL (ref 135–420)
PLATELET COMMENT: ABNORMAL
PMV BLD AUTO: 12.1 FL (ref 9.2–11.8)
POTASSIUM SERPL-SCNC: 4 MMOL/L (ref 3.5–5.5)
PROT SERPL-MCNC: 5.8 G/DL (ref 6.4–8.2)
PROT UR STRIP-MCNC: NEGATIVE MG/DL
RBC # BLD AUTO: 2.85 M/UL (ref 4.2–5.3)
RBC #/AREA URNS HPF: NEGATIVE /HPF (ref 0–5)
RBC MORPH BLD: ABNORMAL
SODIUM SERPL-SCNC: 133 MMOL/L (ref 136–145)
SP GR UR REFRACTOMETRY: 1.01 (ref 1–1.03)
TRIGL SERPL-MCNC: 207 MG/DL (ref 0–150)
UROBILINOGEN UR QL STRIP.AUTO: 0.2 EU/DL (ref 0.2–1)
WBC # BLD AUTO: 15.1 K/UL (ref 4.6–13.2)
WBC MORPH BLD: ABNORMAL
WBC URNS QL MICRO: NEGATIVE /HPF (ref 0–5)
YEAST URNS QL MICRO: ABNORMAL

## 2025-07-06 PROCEDURE — 2500000003 HC RX 250 WO HCPCS: Performed by: INTERNAL MEDICINE

## 2025-07-06 PROCEDURE — 6360000002 HC RX W HCPCS: Performed by: INTERNAL MEDICINE

## 2025-07-06 PROCEDURE — 2580000003 HC RX 258: Performed by: PHYSICIAN ASSISTANT

## 2025-07-06 PROCEDURE — 85025 COMPLETE CBC W/AUTO DIFF WBC: CPT

## 2025-07-06 PROCEDURE — 36415 COLL VENOUS BLD VENIPUNCTURE: CPT

## 2025-07-06 PROCEDURE — 1100000000 HC RM PRIVATE

## 2025-07-06 PROCEDURE — 2580000003 HC RX 258: Performed by: HOSPITALIST

## 2025-07-06 PROCEDURE — 82542 COL CHROMOTOGRAPHY QUAL/QUAN: CPT

## 2025-07-06 PROCEDURE — 84100 ASSAY OF PHOSPHORUS: CPT

## 2025-07-06 PROCEDURE — 85730 THROMBOPLASTIN TIME PARTIAL: CPT

## 2025-07-06 PROCEDURE — 2580000003 HC RX 258: Performed by: INTERNAL MEDICINE

## 2025-07-06 PROCEDURE — 80048 BASIC METABOLIC PNL TOTAL CA: CPT

## 2025-07-06 PROCEDURE — 71260 CT THORAX DX C+: CPT

## 2025-07-06 PROCEDURE — 2500000003 HC RX 250 WO HCPCS: Performed by: HOSPITALIST

## 2025-07-06 PROCEDURE — 6360000002 HC RX W HCPCS: Performed by: SURGERY

## 2025-07-06 PROCEDURE — 80076 HEPATIC FUNCTION PANEL: CPT

## 2025-07-06 PROCEDURE — 82962 GLUCOSE BLOOD TEST: CPT

## 2025-07-06 PROCEDURE — 93306 TTE W/DOPPLER COMPLETE: CPT

## 2025-07-06 PROCEDURE — 6360000002 HC RX W HCPCS: Performed by: PHYSICIAN ASSISTANT

## 2025-07-06 PROCEDURE — 6360000004 HC RX CONTRAST MEDICATION: Performed by: INTERNAL MEDICINE

## 2025-07-06 PROCEDURE — 87040 BLOOD CULTURE FOR BACTERIA: CPT

## 2025-07-06 PROCEDURE — 2500000003 HC RX 250 WO HCPCS

## 2025-07-06 PROCEDURE — 2700000000 HC OXYGEN THERAPY PER DAY

## 2025-07-06 PROCEDURE — 6370000000 HC RX 637 (ALT 250 FOR IP): Performed by: INTERNAL MEDICINE

## 2025-07-06 PROCEDURE — 6360000002 HC RX W HCPCS: Performed by: HOSPITALIST

## 2025-07-06 PROCEDURE — 6370000000 HC RX 637 (ALT 250 FOR IP): Performed by: HOSPITALIST

## 2025-07-06 PROCEDURE — 83735 ASSAY OF MAGNESIUM: CPT

## 2025-07-06 PROCEDURE — 84478 ASSAY OF TRIGLYCERIDES: CPT

## 2025-07-06 PROCEDURE — 81001 URINALYSIS AUTO W/SCOPE: CPT

## 2025-07-06 RX ORDER — ZIPRASIDONE MESYLATE 20 MG/ML
20 INJECTION, POWDER, LYOPHILIZED, FOR SOLUTION INTRAMUSCULAR
Status: COMPLETED | OUTPATIENT
Start: 2025-07-06 | End: 2025-07-06

## 2025-07-06 RX ORDER — INSULIN GLARGINE 100 [IU]/ML
10 INJECTION, SOLUTION SUBCUTANEOUS DAILY
Status: DISCONTINUED | OUTPATIENT
Start: 2025-07-06 | End: 2025-07-07

## 2025-07-06 RX ORDER — DIAZEPAM 10 MG/2ML
5 INJECTION, SOLUTION INTRAMUSCULAR; INTRAVENOUS ONCE
Status: COMPLETED | OUTPATIENT
Start: 2025-07-06 | End: 2025-07-06

## 2025-07-06 RX ORDER — WATER 10 ML/10ML
INJECTION INTRAMUSCULAR; INTRAVENOUS; SUBCUTANEOUS
Status: COMPLETED
Start: 2025-07-06 | End: 2025-07-06

## 2025-07-06 RX ORDER — IOPAMIDOL 612 MG/ML
100 INJECTION, SOLUTION INTRAVASCULAR
Status: COMPLETED | OUTPATIENT
Start: 2025-07-06 | End: 2025-07-06

## 2025-07-06 RX ORDER — CLONIDINE 0.1 MG/24H
1 PATCH, EXTENDED RELEASE TRANSDERMAL WEEKLY
Status: DISCONTINUED | OUTPATIENT
Start: 2025-07-12 | End: 2025-07-14

## 2025-07-06 RX ADMIN — MICAFUNGIN SODIUM 150 MG: 50 INJECTION, POWDER, LYOPHILIZED, FOR SOLUTION INTRAVENOUS at 20:36

## 2025-07-06 RX ADMIN — METOPROLOL TARTRATE 5 MG: 5 INJECTION INTRAVENOUS at 22:55

## 2025-07-06 RX ADMIN — MEROPENEM 1000 MG: 1 INJECTION INTRAVENOUS at 01:23

## 2025-07-06 RX ADMIN — MEROPENEM 1000 MG: 1 INJECTION INTRAVENOUS at 09:52

## 2025-07-06 RX ADMIN — ACETAMINOPHEN 650 MG: 650 SUPPOSITORY RECTAL at 09:12

## 2025-07-06 RX ADMIN — ZIPRASIDONE MESYLATE 20 MG: 20 INJECTION, POWDER, LYOPHILIZED, FOR SOLUTION INTRAMUSCULAR at 06:36

## 2025-07-06 RX ADMIN — INSULIN GLARGINE 10 UNITS: 100 INJECTION, SOLUTION SUBCUTANEOUS at 10:22

## 2025-07-06 RX ADMIN — SODIUM CHLORIDE, PRESERVATIVE FREE 10 ML: 5 INJECTION INTRAVENOUS at 07:51

## 2025-07-06 RX ADMIN — ASCORBIC ACID, VITAMIN A PALMITATE, CHOLECALCIFEROL, THIAMINE HYDROCHLORIDE, RIBOFLAVIN-5 PHOSPHATE SODIUM, PYRIDOXINE HYDROCHLORIDE, NIACINAMIDE, DEXPANTHENOL, ALPHA-TOCOPHEROL ACETATE, VITAMIN K1, FOLIC ACID, BIOTIN, CYANOCOBALAMIN: 200; 3300; 200; 6; 3.6; 6; 40; 15; 10; 150; 600; 60; 5 INJECTION, SOLUTION INTRAVENOUS at 18:34

## 2025-07-06 RX ADMIN — INSULIN HUMAN 4 UNITS: 100 INJECTION, SOLUTION PARENTERAL at 18:37

## 2025-07-06 RX ADMIN — HYDROMORPHONE HYDROCHLORIDE 1 MG: 1 INJECTION, SOLUTION INTRAMUSCULAR; INTRAVENOUS; SUBCUTANEOUS at 03:08

## 2025-07-06 RX ADMIN — SODIUM CHLORIDE 2.1 MCG/KG/MIN: 0.9 INJECTION, SOLUTION INTRAVENOUS at 03:30

## 2025-07-06 RX ADMIN — LABETALOL HYDROCHLORIDE 20 MG: 5 INJECTION, SOLUTION INTRAVENOUS at 02:49

## 2025-07-06 RX ADMIN — HYDROMORPHONE HYDROCHLORIDE 1.5 MG: 2 INJECTION, SOLUTION INTRAMUSCULAR; INTRAVENOUS; SUBCUTANEOUS at 22:46

## 2025-07-06 RX ADMIN — I.V. FAT EMULSION 150 ML: 20 EMULSION INTRAVENOUS at 18:24

## 2025-07-06 RX ADMIN — INSULIN HUMAN 8 UNITS: 100 INJECTION, SOLUTION PARENTERAL at 10:22

## 2025-07-06 RX ADMIN — METOPROLOL TARTRATE 5 MG: 5 INJECTION INTRAVENOUS at 18:19

## 2025-07-06 RX ADMIN — WATER 10 ML: 1 INJECTION INTRAMUSCULAR; INTRAVENOUS; SUBCUTANEOUS at 06:36

## 2025-07-06 RX ADMIN — METOPROLOL TARTRATE 5 MG: 5 INJECTION INTRAVENOUS at 10:22

## 2025-07-06 RX ADMIN — DIAZEPAM 5 MG: 5 INJECTION, SOLUTION INTRAMUSCULAR; INTRAVENOUS at 05:16

## 2025-07-06 RX ADMIN — MEROPENEM 1000 MG: 1 INJECTION INTRAVENOUS at 18:24

## 2025-07-06 RX ADMIN — VANCOMYCIN HYDROCHLORIDE 1000 MG: 1 INJECTION, POWDER, LYOPHILIZED, FOR SOLUTION INTRAVENOUS at 00:01

## 2025-07-06 RX ADMIN — METOCLOPRAMIDE 5 MG: 5 INJECTION, SOLUTION INTRAMUSCULAR; INTRAVENOUS at 01:15

## 2025-07-06 RX ADMIN — SODIUM CHLORIDE, PRESERVATIVE FREE 10 ML: 5 INJECTION INTRAVENOUS at 20:36

## 2025-07-06 RX ADMIN — INSULIN HUMAN 4 UNITS: 100 INJECTION, SOLUTION PARENTERAL at 00:12

## 2025-07-06 RX ADMIN — METOCLOPRAMIDE 5 MG: 5 INJECTION, SOLUTION INTRAMUSCULAR; INTRAVENOUS at 18:37

## 2025-07-06 RX ADMIN — ACETAMINOPHEN 650 MG: 650 SUPPOSITORY RECTAL at 02:34

## 2025-07-06 RX ADMIN — VANCOMYCIN HYDROCHLORIDE 1000 MG: 1 INJECTION, POWDER, LYOPHILIZED, FOR SOLUTION INTRAVENOUS at 10:35

## 2025-07-06 RX ADMIN — METOPROLOL TARTRATE 5 MG: 5 INJECTION INTRAVENOUS at 05:16

## 2025-07-06 RX ADMIN — IOPAMIDOL 100 ML: 612 INJECTION, SOLUTION INTRAVENOUS at 12:59

## 2025-07-06 RX ADMIN — HYDROMORPHONE HYDROCHLORIDE 1.5 MG: 2 INJECTION, SOLUTION INTRAMUSCULAR; INTRAVENOUS; SUBCUTANEOUS at 13:18

## 2025-07-06 RX ADMIN — VANCOMYCIN HYDROCHLORIDE 1000 MG: 1 INJECTION, POWDER, LYOPHILIZED, FOR SOLUTION INTRAVENOUS at 22:58

## 2025-07-06 RX ADMIN — HYDROMORPHONE HYDROCHLORIDE 1.5 MG: 2 INJECTION, SOLUTION INTRAMUSCULAR; INTRAVENOUS; SUBCUTANEOUS at 18:18

## 2025-07-06 RX ADMIN — LABETALOL HYDROCHLORIDE 20 MG: 5 INJECTION, SOLUTION INTRAVENOUS at 07:48

## 2025-07-06 RX ADMIN — METOCLOPRAMIDE 5 MG: 5 INJECTION, SOLUTION INTRAMUSCULAR; INTRAVENOUS at 07:49

## 2025-07-06 RX ADMIN — HYDROMORPHONE HYDROCHLORIDE 1.5 MG: 2 INJECTION, SOLUTION INTRAMUSCULAR; INTRAVENOUS; SUBCUTANEOUS at 07:53

## 2025-07-06 RX ADMIN — HYDRALAZINE HYDROCHLORIDE 20 MG: 20 INJECTION INTRAMUSCULAR; INTRAVENOUS at 01:52

## 2025-07-06 RX ADMIN — HYDROMORPHONE HYDROCHLORIDE 1.5 MG: 2 INJECTION, SOLUTION INTRAMUSCULAR; INTRAVENOUS; SUBCUTANEOUS at 01:44

## 2025-07-06 RX ADMIN — SODIUM CHLORIDE, PRESERVATIVE FREE 40 MG: 5 INJECTION INTRAVENOUS at 07:49

## 2025-07-06 ASSESSMENT — PAIN - FUNCTIONAL ASSESSMENT
PAIN_FUNCTIONAL_ASSESSMENT: ACTIVITIES ARE NOT PREVENTED

## 2025-07-06 ASSESSMENT — PAIN DESCRIPTION - ONSET
ONSET: ON-GOING
ONSET: ON-GOING

## 2025-07-06 ASSESSMENT — PAIN DESCRIPTION - FREQUENCY
FREQUENCY: CONTINUOUS
FREQUENCY: INTERMITTENT
FREQUENCY: CONTINUOUS

## 2025-07-06 ASSESSMENT — PAIN DESCRIPTION - ORIENTATION
ORIENTATION: MID
ORIENTATION: ANTERIOR
ORIENTATION: ANTERIOR
ORIENTATION: LEFT
ORIENTATION: ANTERIOR

## 2025-07-06 ASSESSMENT — PAIN SCALES - GENERAL
PAINLEVEL_OUTOF10: 8
PAINLEVEL_OUTOF10: 10
PAINLEVEL_OUTOF10: 2
PAINLEVEL_OUTOF10: 3
PAINLEVEL_OUTOF10: 8
PAINLEVEL_OUTOF10: 8
PAINLEVEL_OUTOF10: 10
PAINLEVEL_OUTOF10: 0
PAINLEVEL_OUTOF10: 8

## 2025-07-06 ASSESSMENT — PAIN DESCRIPTION - LOCATION
LOCATION: ABDOMEN
LOCATION: ABDOMEN
LOCATION: BACK
LOCATION: ABDOMEN

## 2025-07-06 ASSESSMENT — PAIN DESCRIPTION - PAIN TYPE
TYPE: SURGICAL PAIN;CHRONIC PAIN
TYPE: CHRONIC PAIN
TYPE: CHRONIC PAIN

## 2025-07-06 ASSESSMENT — PAIN DESCRIPTION - DESCRIPTORS
DESCRIPTORS: ACHING

## 2025-07-06 NOTE — PROGRESS NOTES
Restraints Note.    Pt has become altered and aggressive.  Pulling at lines and tubes.  Striking out at RN's.  Attempts to deescalate verbally have not been successful.  Behavior jeopardizing safety of herself and staff.  Non violent soft b/l wrist restraints ordered.  Pt seen at bedside.  Will continue to monitor.

## 2025-07-06 NOTE — PROGRESS NOTES
Hematology / Oncology Initial Consult Note    Admit Date: 6/25/2025    Reason for Consult: Anticoagulation recommendations/thrombocytopenia    Requesting Physician: Dr. Long    Assessment:     Kiara Nance is a 60 y.o., Black / , female, who I have been asked to see for thrombocytopenia.     Principal Problem:    SBO (small bowel obstruction) (HCC)  Active Problems:    Aortic thrombus (HCC)    Benign essential HTN    Hypokalemia    Asthma    Acute renal failure    Hydronephrosis    Sepsis (HCC)    Metabolic acidosis    Hypotension    Adenovirus infection    Bacteremia    Status post surgery  Resolved Problems:    * No resolved hospital problems. *    Aortic filling defect/mural thrombus: noted on CT 7/2/25. Vascular surgery was consulted with no recommendation for acute intervention, but recommended anticoagulation.   Thrombocytopenia: noted since 7/1/25. She had been on lovenox since 6/25/25. 4T HIT score is high with timing of platelet drop 5 days after Lovenox, decrease to 50% of platelets and akiko>20, and likely new thrombosis. It is very reasonable to do argatroban as HIT is possible. Will follow screening test and then do VIKASH testing to confirm. Drug-induced thrombocytopenia from Meropenem is possible, but this was just started on 7/2/25 and platelet decrease predated the start of medication. She is tachycardic and has E coli bacteremia, so DIC is possible. Review of peripheral smear with no evidence of schistocytes to suggest TTP.   Sepsis and E coli bacteremia: on Vanc and Meropenem. ID on board. 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 While DIC is possible as a cause, she continues with fevers, so would expect her platelets to get worse, not better, if from ongoing DIC.   - Recommend continuing argatroban and will send VIKASH

## 2025-07-06 NOTE — PROGRESS NOTES
0600 - Alerted by primary nurse that patient is becoming violent, attempting to get out of bed, pulling on lines and attempting to hit RN. Upon arrival to room, patient had strong  to both primary nurse's arms and would not release her. I called for help from another nurse on the unit and we successfully got the patient to release the nurse and repositioned in bed. Patient states she does not trust us, that we kidnapped her, and she will hurt us if we come closer. Was unable to calm and redirect patient, Nursing Supervisor notified and MD notified.     0620 - Patient still not allowing primary nurse to perform patient care, sonReynold, called to help calm patient, patient very confused and would not speak to son.     0630 - After more reattempts to calm and reorientate patient, orders received to give Geodon IM.

## 2025-07-06 NOTE — PLAN OF CARE
Problem: Pain  Goal: Verbalizes/displays adequate comfort level or baseline comfort level  Outcome: Progressing  Flowsheets (Taken 7/6/2025 1607)  Verbalizes/displays adequate comfort level or baseline comfort level:   Encourage patient to monitor pain and request assistance   Assess pain using appropriate pain scale   Administer analgesics based on type and severity of pain and evaluate response   Implement non-pharmacological measures as appropriate and evaluate response   Notify Licensed Independent Practitioner if interventions unsuccessful or patient reports new pain     Problem: Gastrointestinal - Adult  Goal: Maintains or returns to baseline bowel function  Outcome: Progressing  Flowsheets (Taken 7/6/2025 0800)  Maintains or returns to baseline bowel function: Assess bowel function     Problem: Metabolic/Fluid and Electrolytes - Adult  Goal: Electrolytes maintained within normal limits  Outcome: Progressing  Flowsheets (Taken 7/6/2025 0800)  Electrolytes maintained within normal limits:   Monitor labs and assess patient for signs and symptoms of electrolyte imbalances   Administer electrolyte replacement as ordered   Monitor response to electrolyte replacements, including repeat lab results as appropriate     Problem: Cardiovascular - Adult  Goal: Maintains optimal cardiac output and hemodynamic stability  Outcome: Progressing  Flowsheets (Taken 7/6/2025 0800)  Maintains optimal cardiac output and hemodynamic stability:   Monitor blood pressure and heart rate   Monitor urine output and notify Licensed Independent Practitioner for values outside of normal range   Assess for signs of decreased cardiac output     Problem: Skin/Tissue Integrity - Adult  Goal: Incisions, wounds, or drain sites healing without S/S of infection  Outcome: Progressing  Flowsheets (Taken 7/6/2025 0800)  Incisions, Wounds, or Drain Sites Healing Without Sign and Symptoms of Infection:   ADMISSION and DAILY: Assess and document risk

## 2025-07-06 NOTE — PROGRESS NOTES
Hospitalist Progress Note      Patient: Kiara Nance MRN: [id number]  CSN: [id number]    YOB: 1965  Age: 60 y.o.  Sex: female    DOA: 6/25/2025 LOS:  LOS: 11 days      PCP: Nae Kaiser, APRN - NP       Summary:        60 year old female abdominal pain, distention, constipation, and vomiting , she was admitted for small bowel obstruction.  She received surgery and developed metabolic acidosis, luis and ileus and  sepsis. Bcx esbl   ID on board, cta done on 7/2 with no-occlusive aortic thrombus on the posterior wall at the level of the SMA and fluid collection and thrombocytopenia  Assessment/Plan:    Principal Problem:    SBO (small bowel obstruction) (HCC)  Active Problems:    Aortic thrombus (HCC)    Benign essential HTN    Hypokalemia    Asthma    Acute renal failure    Hydronephrosis    Sepsis (HCC)    Metabolic acidosis    Hypotension    Adenovirus infection    Bacteremia    Status post surgery  Resolved Problems:    * No resolved hospital problems. *     7/5/25  More awake today flirting with doctors  Older sister at bedside motivating her to drink  NG tube clamped abdomen softer  Pain medicine changed to every 4 hours 1.5  BP elevated  She would like to go to her sisters birthday [facility]    7/6  Mental status change and fever over night  Fungal cultures obtained started on antifunctal  Discussed with  ID pulmonary and surgery   Repeat CT  For me oriented not confused following commands     Sepsis blood culture E. coli  ID has been consulted.  Antibiotic changed to meropenem and vancomycin  also on antifungal   Lactic acid wnl now   Bcx  esbl   Ultrasound no acute choleyistitis      Arotic thrombosis and thrombocytopenia  Continue argatroban, vascular surgery has been consulted, no intervention needed  Recommended continue anti-AC  Bp control add clonidine patch  Check echo    Thombocytopenia  Hematology consulted  No evidence of TTP  HIT ab noted  this examination and automatic exposure control for dose modulation. Direct comparison is made to prior CT dated June 25, 2025. Findings: Lung bases: There is a small right pleural effusion and right basilar patchy airspace consolidation, new compared to prior CT dated June 25, 2025. There is new, mild left basilar atelectasis Liver: The liver is normal. Adrenals: Adrenal glands are normal. Pancreas: The pancreas is normal. Gallbladder: The gallbladder is normal. Kidneys: The kidneys are normal. Spleen: The spleen is normal. Lymph nodes. There is no rickie hepatitis, mesenteric, retroperitoneal or pelvic lymphadenopathy. Bowel: There is a persistent markedly dilated loop of bowel in the right abdomen, likely small bowel and likely representing a closed loop obstruction. This loop of bowel contains gas and fluid and is unopacified with oral contrast. Remaining small bowel loops are nondilated. There is no pneumatosis. There is no mesenteric vein gas. Appendix: The appendix is normal. Urinary bladder: Urinary bladder is partially filled and grossly normal. Miscellaneous: There is trace perihepatic and pelvic free intraperitoneal fluid. There is no free intraperitoneal gas. There is no focal fluid collection to suggest abscess. There are multiple uterine masses consistent with fibroids.     1. New, small right pleural effusion and right basilar patchy airspace consolidation. New mild left basilar atelectasis. 2. Persistent, markedly dilated loop of bowel in the right abdomen, as described above Electronically signed by Sarmad Roman MD    XR CHEST 1 VIEW  Result Date: 6/28/2025  INDICATION:  Proper placement of NG tube. COMPARISON: KUB June 27, 2025 FINDINGS: AP radiograph of the chest demonstrates nasogastric tube tip projects over the stomach. Dilated gas-filled bowel loops in the right lower abdomen similar to prior study. Bibasilar atelectasis.     Nasogastric tube tip extends to the stomach. Unchanged dilated bowel

## 2025-07-06 NOTE — PROGRESS NOTES
CT abdomen/pelvis report reviewed: 2 separate fluid collection.  Dr. Itz Reyes already spoken with IR and planning for IR guided drainage tomorrow; holding argatroban about 5 hours before the procedure.  Continue antimicrobials.    Elvis Perez

## 2025-07-06 NOTE — PROGRESS NOTES
[facility]  (A Division of [facility])  Meaghan Lewis MD, covering for Ursula Li MD   Office Tel:  [phone] Option #8  Text                                                                Date of Admission: 6/25/2025       ID FU for antimicrobial management GNR bacteremia requested by Dr Long   PCP: Nae Kaiser, APRN - NP    C/C: Vominting/abdominal pain/constipation-2 to 3 days on admission- 6/25    Current Antimicrobials:    Prior Antimicrobials:  Vanco 6/30 to date  Meropenem 7/2 to date  Micafungen  7/5 Pip tazo X1- 6/25  Pip tazo 6/29 to 7/2     Allergy to antibiotics- NA       Assessment:     Acutely and severely Ill patient with:        New fever-/ leucocytosis  suggesting uncontrolled infection or lack of \" source control\"         Antifungal started 7/4         Repeat micro IP         Agree with plan for draining new collection found on CT and sending specimen for micro -  for culture  to  direct therapy                       ESBL E.coli bacteremia-- of abdominal source briiley, doubt CRBSI, doubt urinary source-UA clear  Bacteroides bacteremia-- + on 7/3  SBO- persistent loop distension on imaging. History of prior surgery  S/P X-lap, lysis of adhesion, SB resection, side to side anastomosis- 6/29  Severe sepsis- KE/lactic acidosis  Bandemia 29%-->-> 8%-> 12%-> 21% 7/4  Abdominal fluid collection on CT with mild rim enhancement- probable abscess Vs leak?  Distended GB on CT, no acute cholecystitis on US  Procal >100-6/30    Adenovirus infection- Positive on resp viral panel    Micro:  6/30: Respiratory  viral panel for adenovirus is positive. Negative for Corona virus- COVID 19 and non COVID 19, human meta pneumovirus, rhino virus, Influenza A/B, parinflunenza 1-4, RSV negative. Bacterial PCR for bordetella, chlamydia pneumoniae and mycoplasma negative.      7/ 1 - blood cutlure X2: 2/4 E coli- ESBL by biofire, In progress

## 2025-07-06 NOTE — PROGRESS NOTES
Patient agitated and anxious and attempted to pull out lines and exit the bed. This RN attempted to reorient the patient. Patient begin screaming and grabbing at this RN. This RN requested help from Charge nurse. Patient is in bed resting and refusing meds and has asked staff not to touch her.

## 2025-07-06 NOTE — PROGRESS NOTES
Cardiomediastinal silhouette: Left upper extremity PICC terminates at the expected level of the right atrium. Gastric decompression tube and proximal sidehole overlie the expected location of the stomach. Lungs: Bibasilar opacity is similar. Pleura: Pleural effusions are similar to prior. No pneumothorax. Bones: No acute displaced fracture. Soft tissues: Surgical staples overlie the right hemiabdomen.     Similar small bilateral pleural effusions and passive atelectasis of the lower lobes. Electronically signed by Sunny Kaur    Vascular duplex lower extremity arteries left  Result Date: 7/3/2025    No evidence of any significant stenosis noted in the vessels of the left lower extremity.     IR ASP ABSCESS/HEMATOMA/BULLA/CYST  Result Date: 7/3/2025  PROCEDURE:  ULTRASOUND GUIDED ASPIRATION HISTORY: BONOE GOMES is a 60 year old Female with abdominal fluid collection. :  Jody Zaragoza NP ATTENDING:  Joslyn Collier MD CONSENT:  After full discussion of the procedure, including risks, benefits and alternatives, both verbal and written consent were obtained. TECHNIQUE: A timeout was called to verify the correct patient, procedure, site and allergies. This procedure was performed at the bedside with ultrasound guidance. Preliminary ultrasound imaging of the abdomen right lower quadrant demonstrated a fluid collection amenable to aspiration.  An appropriate site was marked. Images were archived to PACS.  The skin was prepped and draped in sterile fashion.  1% lidocaine was utilized for local anesthesia.  A small dermatotomy was made.  Under direct sonographic guidance, a 6 Afghan centesis catheter needle was inserted into the abdominal fluid collection.  The needle was removed and approximately 54 ml of thin, dinesh fluid was aspirated.  The catheter was removed and pressure applied locally.  A dry sterile dressing was applied. There were no immediate complications.  The patient tolerated the procedure without  hydroureter to the level of the ureterovesicular junction; no obstructing mass or stone. STOMACH: An enteric tube terminates in the stomach. SMALL BOWEL: Small bowel resection with anastomosis in the right lower abdomen. Dilated loops of small bowel in the right abdomen have worsened. The small bowel now measures up to 5 cm in diameter. There is no small bowel wall thickening or pneumatosis. COLON: Right and transverse colectomy with anastomosis in the left mid abdomen. Enteric contrast from a prior exam as reached the colon and rectum. No colonic wall thickening or pneumatosis. APPENDIX: Surgically absent PERITONEUM: Locules of gas in the left paracolic gutter are similar to prior. There is a right lower quadrant 5.0 x 5.0 x 5.5 cm fluid collection which appears to be outside of the small bowel and is adjacent to the small bowel anastomosis. It demonstrates mild rim enhancement and a locule of internal gas. RETROPERITONEUM: No lymphadenopathy or aortic aneurysm. REPRODUCTIVE ORGANS: Enlarged fibroid uterus. URINARY BLADDER: Decompressed by a Merrill catheter. BONES: No destructive bone lesion. ABDOMINAL WALL: Midline laparotomy. ADDITIONAL COMMENTS: N/A     CHEST: 1.  No pulmonary embolism. 2.  Bilateral pleural effusions have worsened compared to CT from 6/30/2025. Atelectasis of nearly the entire right lower lobe and multiple segments of the right middle and left lower lobes. ABDOMEN and PELVIS: 3.  A 13 mm filling defect in the abdominal aorta is new. It is adherent to the posterior wall of the aorta at the level of the superior mesenteric artery. This could reflect rapidly progressive mural thrombus or large adherent embolus, although would be an atypical location for an embolus. No other thrombi or emboli are seen. No evidence of bowel ischemia. 4.  Surgical changes of a small bowel resection with anastomosis in the right lower quadrant. 5.  Just inferior to the anastomosis there is a 5.5 cm fluid collection

## 2025-07-06 NOTE — PROGRESS NOTES
Spoke to IR on Call Dr Willams  [phone]  Plan for drain tomorrow at 9 am   Npo after MN   Hold Argotroban 4-6 prior to procedure   Will confirm with hematology and pharmacy

## 2025-07-06 NOTE — PROGRESS NOTES
[facility]   Pharmacy Pharmacokinetic Monitoring Service - Vancomycin    Consulting Provider: Elias Jade PA-C   Indication: Sepsis of Unknown Etiology x 7 days  Target Concentration: Goal AUC/MICHAEL 400-600 mg*hr/L  Day of Therapy: 7  Additional Antimicrobials: Micafungin / Merrem    Pertinent Laboratory Values:   Wt Readings from Last 1 Encounters:   07/06/25 82.1 kg (181 lb)     Temp Readings from Last 1 Encounters:   07/06/25 99.5 °F (37.5 °C) (Oral)     Estimated Creatinine Clearance: 80 mL/min (based on SCr of 0.69 mg/dL).  Recent Labs     07/05/25  0150 07/06/25  0811   CREATININE 0.63 0.69   BUN 11 12   WBC 12.3 15.1*     Procalcitonin: 4.05 (7/5/25 @ 0150)     MRSA Nasal Swab: N/A. Non-respiratory infection.    Recent vancomycin administrations                     vancomycin (VANCOCIN) 1,000 mg in sodium chloride 0.9 % 250 mL (addEASE) IVPB (mg) 1,000 mg New Bag 07/06/25 1035     1,000 mg New Bag  0001     1,000 mg New Bag 07/05/25 1103     1,000 mg New Bag 07/04/25 2246     1,000 mg New Bag  1120     1,000 mg New Bag 07/03/25 2317             Plan:  Current dosing regimen is therapeutic  Continue current dose of Vancomycin 1000 mg IV q12h  Estimated AUC(ss): 564 mg/L.h  Estimated T(ss): 17.2 mg/L  Pharmacy will continue to monitor patient and adjust therapy as indicated    Thank you for the consult,  Matilde Barnes RPH  7/6/2025 12:56 PM

## 2025-07-06 NOTE — PLAN OF CARE
Problem: Pain  Goal: Verbalizes/displays adequate comfort level or baseline comfort level  7/5/2025 2315 by Cindi Martinez RN  Flowsheets (Taken 7/5/2025 1933)  Verbalizes/displays adequate comfort level or baseline comfort level:   Encourage patient to monitor pain and request assistance   Assess pain using appropriate pain scale   Administer analgesics based on type and severity of pain and evaluate response   Implement non-pharmacological measures as appropriate and evaluate response   Consider cultural and social influences on pain and pain management   Notify Licensed Independent Practitioner if interventions unsuccessful or patient reports new pain  7/5/2025 1233 by Rica Rahman RN  Outcome: Progressing  Flowsheets (Taken 7/5/2025 1233)  Verbalizes/displays adequate comfort level or baseline comfort level:   Encourage patient to monitor pain and request assistance   Assess pain using appropriate pain scale   Administer analgesics based on type and severity of pain and evaluate response   Implement non-pharmacological measures as appropriate and evaluate response   Notify Licensed Independent Practitioner if interventions unsuccessful or patient reports new pain     Problem: Gastrointestinal - Adult  Goal: Maintains or returns to baseline bowel function  7/5/2025 2315 by Cindi Martinez RN  Flowsheets (Taken 7/5/2025 1930)  Maintains or returns to baseline bowel function:   Assess bowel function   Encourage oral fluids to ensure adequate hydration   Administer IV fluids as ordered to ensure adequate hydration   Administer ordered medications as needed   Encourage mobilization and activity   Nutrition consult to assist patient with appropriate food choices  7/5/2025 1233 by Rica Rahman RN  Outcome: Progressing  Flowsheets (Taken 7/5/2025 0730)  Maintains or returns to baseline bowel function: Assess bowel function     Problem: Metabolic/Fluid and Electrolytes - Adult  Goal: Electrolytes

## 2025-07-06 NOTE — PROGRESS NOTES
Consult for TPN Dosing per Pharmacy by Dr. Melina Long  Consult provided for this 60 y.o. female, for indication of Nutrition  Day of Therapy: 7    Weight: 82.1 kg (181 lb)  Ideal body weight: 45.5 kg (100 lb 4.9 oz)  TPN Dosing Weight: 52.3 kg    Labs:  CMP Lab Results   Component Value Date/Time     07/06/2025 08:11 AM    K 4.0 07/06/2025 08:11 AM    CL 98 07/06/2025 08:11 AM    CO2 25 07/06/2025 08:11 AM    BUN 12 07/06/2025 08:11 AM    GFRAA 78 06/01/2020 08:26 AM    ALT 21 07/06/2025 08:11 AM    GLOB 4.1 07/06/2025 08:11 AM       Ca/ Phos Lab Results   Component Value Date/Time    CALCIUM 8.9 07/06/2025 08:11 AM    CALCIUM 7.7 07/05/2025 01:50 AM    CALCIUM 8.0 07/04/2025 04:39 AM       Lab Results   Component Value Date/Time    PHOS 2.6 07/06/2025 08:11 AM    PHOS 2.8 07/05/2025 01:50 AM    PHOS 2.7 07/04/2025 04:39 AM          Mag Lab Results   Component Value Date/Time    MG 1.7 07/06/2025 08:11 AM    MG 1.8 07/05/2025 01:50 AM    MG 1.6 07/04/2025 04:39 AM         Tg Lab Results   Component Value Date/Time    TRIG 207 07/06/2025 08:10 AM    TRIG 217 07/05/2025 01:50 AM    TRIG 214 07/04/2025 04:39 AM          Albumin No results found for: \"TP\"    Kcal Requirements: 25 - 35 kcal/kg -- 1307.5 kcal - 1830.5 kcal      Macronutrients - provided by premixed Clinimix E 5%AA/15%Dextrose with electrolytes in 2000mL bag:      Macronutrients for July 6, 2025                           Component                                g/day                                  kcal   Protein 100 g/day 340 kcal   Dextrose 300 g/day 1200 kcal   Lipids 30 g/day 270 kcal   Total kcals  1810 kcal        TPN  to be initiated at full goal macronutrients (2000mL/24 hrs) and titrated to goal per patient tolerance.      MD to replete electrolytes acutely outside of TPN as needed.   MD to add/adjust/dc maintenance IV Fluid as needed for fluid requirements.     Additional recommendations/Orders:   Corrective insulin coverage with Regular

## 2025-07-06 NOTE — PROGRESS NOTES
2125 - Patient agitated, stating she wants to go home. Failed all orientation questions. MD notified. Nursing Supervisor notified, sitter to come to bedside to sit with patient.     2300 - Report given to INES Keller RN.

## 2025-07-06 NOTE — PROGRESS NOTES
Patient has fever.  Vital signs are stable  Abdomen is unchanged soft slightly distended.  She seems to have tolerated her NGT being clamped.  White blood cell count is slightly elevated.  She did have positive blood cultures however her aspiration of her abdominal fluid collection yielded no organisms and was sterile.  She will have a CT scan with oral contrast today.  Right now I would keep her NG tube clamped.

## 2025-07-07 ENCOUNTER — APPOINTMENT (OUTPATIENT)
Facility: HOSPITAL | Age: 60
DRG: 230 | End: 2025-07-07
Payer: MEDICAID

## 2025-07-07 PROBLEM — D64.9 ANEMIA: Status: ACTIVE | Noted: 2025-07-07

## 2025-07-07 PROBLEM — D75.829 HEPARIN INDUCED THROMBOCYTOPENIA: Status: ACTIVE | Noted: 2025-07-07

## 2025-07-07 LAB
ALBUMIN SERPL-MCNC: 1.8 G/DL (ref 3.4–5)
ALBUMIN/GLOB SERPL: 0.4 (ref 0.8–1.7)
ALP SERPL-CCNC: 62 U/L (ref 45–117)
ALT SERPL-CCNC: 31 U/L (ref 10–35)
ANION GAP SERPL CALC-SCNC: 9 MMOL/L (ref 3–18)
APTT PPP: 51.5 SEC (ref 21.7–34.2)
AST SERPL-CCNC: 42 U/L (ref 10–38)
BACTERIA SPEC CULT: NORMAL
BASOPHILS # BLD: 0.05 K/UL (ref 0–0.1)
BASOPHILS NFR BLD: 0.4 % (ref 0–2)
BILIRUB DIRECT SERPL-MCNC: 0.2 MG/DL (ref 0–0.2)
BILIRUB SERPL-MCNC: 0.3 MG/DL (ref 0.2–1)
BUN SERPL-MCNC: 13 MG/DL (ref 6–23)
BUN/CREAT SERPL: 21 (ref 12–20)
CALCIUM SERPL-MCNC: 8.8 MG/DL (ref 8.5–10.1)
CHLORIDE SERPL-SCNC: 100 MMOL/L (ref 98–107)
CO2 SERPL-SCNC: 26 MMOL/L (ref 21–32)
CREAT SERPL-MCNC: 0.61 MG/DL (ref 0.6–1.3)
DIFFERENTIAL METHOD BLD: ABNORMAL
EOSINOPHIL # BLD: 0.15 K/UL (ref 0–0.4)
EOSINOPHIL NFR BLD: 1.1 % (ref 0–5)
ERYTHROCYTE [DISTWIDTH] IN BLOOD BY AUTOMATED COUNT: 13.8 % (ref 11.6–14.5)
GLOBULIN SER CALC-MCNC: 4.1 G/DL (ref 2–4)
GLUCOSE BLD STRIP.AUTO-MCNC: 246 MG/DL (ref 70–110)
GLUCOSE BLD STRIP.AUTO-MCNC: 326 MG/DL (ref 70–110)
GLUCOSE BLD STRIP.AUTO-MCNC: 326 MG/DL (ref 70–110)
GLUCOSE BLD STRIP.AUTO-MCNC: 359 MG/DL (ref 70–110)
GLUCOSE BLD STRIP.AUTO-MCNC: 387 MG/DL (ref 70–110)
GLUCOSE SERPL-MCNC: 314 MG/DL (ref 74–108)
GRAM STN SPEC: NORMAL
GRAM STN SPEC: NORMAL
HCT VFR BLD AUTO: 21.5 % (ref 35–45)
HGB BLD-MCNC: 7.4 G/DL (ref 12–16)
IMM GRANULOCYTES # BLD AUTO: 0.29 K/UL (ref 0–0.04)
IMM GRANULOCYTES NFR BLD AUTO: 2.1 % (ref 0–0.5)
LYMPHOCYTES # BLD: 1.29 K/UL (ref 0.9–3.3)
LYMPHOCYTES NFR BLD: 9.3 % (ref 21–52)
MAGNESIUM SERPL-MCNC: 1.8 MG/DL (ref 1.6–2.6)
MCH RBC QN AUTO: 27.9 PG (ref 24–34)
MCHC RBC AUTO-ENTMCNC: 34.4 G/DL (ref 31–37)
MCV RBC AUTO: 81.1 FL (ref 78–100)
MONOCYTES # BLD: 0.52 K/UL (ref 0.05–1.2)
MONOCYTES NFR BLD: 3.7 % (ref 3–10)
NEUTS SEG # BLD: 11.57 K/UL (ref 1.8–8)
NEUTS SEG NFR BLD: 83.4 % (ref 40–73)
NRBC # BLD: 0.05 K/UL (ref 0–0.01)
NRBC BLD-RTO: 0.4 PER 100 WBC
PHOSPHATE SERPL-MCNC: 2.2 MG/DL (ref 2.5–4.9)
PLATELET # BLD AUTO: 207 K/UL (ref 135–420)
PMV BLD AUTO: 11.9 FL (ref 9.2–11.8)
POTASSIUM SERPL-SCNC: 3.9 MMOL/L (ref 3.5–5.5)
PROT SERPL-MCNC: 5.9 G/DL (ref 6.4–8.2)
RBC # BLD AUTO: 2.65 M/UL (ref 4.2–5.3)
SERVICE CMNT-IMP: NORMAL
SERVICE CMNT-IMP: NORMAL
SODIUM SERPL-SCNC: 135 MMOL/L (ref 136–145)
TRIGL SERPL-MCNC: 251 MG/DL (ref 0–150)
VANCOMYCIN SERPL-MCNC: 19.5 UG/ML (ref 5–40)
WBC # BLD AUTO: 13.9 K/UL (ref 4.6–13.2)

## 2025-07-07 PROCEDURE — 6370000000 HC RX 637 (ALT 250 FOR IP): Performed by: INTERNAL MEDICINE

## 2025-07-07 PROCEDURE — 80076 HEPATIC FUNCTION PANEL: CPT

## 2025-07-07 PROCEDURE — 83735 ASSAY OF MAGNESIUM: CPT

## 2025-07-07 PROCEDURE — 6360000002 HC RX W HCPCS: Performed by: HOSPITALIST

## 2025-07-07 PROCEDURE — 70450 CT HEAD/BRAIN W/O DYE: CPT

## 2025-07-07 PROCEDURE — 6360000002 HC RX W HCPCS: Performed by: INTERNAL MEDICINE

## 2025-07-07 PROCEDURE — 84100 ASSAY OF PHOSPHORUS: CPT

## 2025-07-07 PROCEDURE — 85025 COMPLETE CBC W/AUTO DIFF WBC: CPT

## 2025-07-07 PROCEDURE — 2580000003 HC RX 258: Performed by: INTERNAL MEDICINE

## 2025-07-07 PROCEDURE — 2500000003 HC RX 250 WO HCPCS: Performed by: INTERNAL MEDICINE

## 2025-07-07 PROCEDURE — 87077 CULTURE AEROBIC IDENTIFY: CPT

## 2025-07-07 PROCEDURE — 99153 MOD SED SAME PHYS/QHP EA: CPT

## 2025-07-07 PROCEDURE — 6360000002 HC RX W HCPCS: Performed by: SURGERY

## 2025-07-07 PROCEDURE — 85730 THROMBOPLASTIN TIME PARTIAL: CPT

## 2025-07-07 PROCEDURE — 87186 SC STD MICRODIL/AGAR DIL: CPT

## 2025-07-07 PROCEDURE — 0W9G3ZZ DRAINAGE OF PERITONEAL CAVITY, PERCUTANEOUS APPROACH: ICD-10-PCS | Performed by: INTERNAL MEDICINE

## 2025-07-07 PROCEDURE — 2500000003 HC RX 250 WO HCPCS: Performed by: HOSPITALIST

## 2025-07-07 PROCEDURE — 84478 ASSAY OF TRIGLYCERIDES: CPT

## 2025-07-07 PROCEDURE — 1100000000 HC RM PRIVATE

## 2025-07-07 PROCEDURE — 80202 ASSAY OF VANCOMYCIN: CPT

## 2025-07-07 PROCEDURE — 80048 BASIC METABOLIC PNL TOTAL CA: CPT

## 2025-07-07 PROCEDURE — 6370000000 HC RX 637 (ALT 250 FOR IP): Performed by: HOSPITALIST

## 2025-07-07 PROCEDURE — 87205 SMEAR GRAM STAIN: CPT

## 2025-07-07 PROCEDURE — 6360000002 HC RX W HCPCS

## 2025-07-07 PROCEDURE — 2709999900 CT ABSCESS DRAINAGE W CATH PLACEMENT S&I

## 2025-07-07 PROCEDURE — 87070 CULTURE OTHR SPECIMN AEROBIC: CPT

## 2025-07-07 PROCEDURE — 87075 CULTR BACTERIA EXCEPT BLOOD: CPT

## 2025-07-07 PROCEDURE — 82962 GLUCOSE BLOOD TEST: CPT

## 2025-07-07 PROCEDURE — 99152 MOD SED SAME PHYS/QHP 5/>YRS: CPT

## 2025-07-07 RX ORDER — MIDAZOLAM HYDROCHLORIDE 1 MG/ML
INJECTION, SOLUTION INTRAMUSCULAR; INTRAVENOUS PRN
Status: COMPLETED | OUTPATIENT
Start: 2025-07-07 | End: 2025-07-07

## 2025-07-07 RX ORDER — GLUCAGON 1 MG/ML
1 KIT INJECTION PRN
Status: DISCONTINUED | OUTPATIENT
Start: 2025-07-07 | End: 2025-07-08 | Stop reason: SDUPTHER

## 2025-07-07 RX ORDER — DEXTROSE MONOHYDRATE 100 MG/ML
INJECTION, SOLUTION INTRAVENOUS CONTINUOUS PRN
Status: DISCONTINUED | OUTPATIENT
Start: 2025-07-07 | End: 2025-07-08 | Stop reason: SDUPTHER

## 2025-07-07 RX ORDER — LABETALOL HYDROCHLORIDE 5 MG/ML
40 INJECTION, SOLUTION INTRAVENOUS EVERY 6 HOURS
Status: DISCONTINUED | OUTPATIENT
Start: 2025-07-07 | End: 2025-07-08

## 2025-07-07 RX ORDER — INSULIN GLARGINE 100 [IU]/ML
6 INJECTION, SOLUTION SUBCUTANEOUS ONCE
Status: COMPLETED | OUTPATIENT
Start: 2025-07-07 | End: 2025-07-07

## 2025-07-07 RX ORDER — INSULIN GLARGINE 100 [IU]/ML
26 INJECTION, SOLUTION SUBCUTANEOUS DAILY
Status: DISCONTINUED | OUTPATIENT
Start: 2025-07-08 | End: 2025-07-15 | Stop reason: HOSPADM

## 2025-07-07 RX ORDER — ARGATROBAN 1 MG/ML
.0625-1 INJECTION INTRAVENOUS CONTINUOUS
Status: DISCONTINUED | OUTPATIENT
Start: 2025-07-07 | End: 2025-07-07

## 2025-07-07 RX ORDER — GLUCAGON 1 MG/ML
1 KIT INJECTION PRN
Status: DISCONTINUED | OUTPATIENT
Start: 2025-07-07 | End: 2025-07-15 | Stop reason: HOSPADM

## 2025-07-07 RX ORDER — DEXTROSE MONOHYDRATE 100 MG/ML
INJECTION, SOLUTION INTRAVENOUS CONTINUOUS PRN
Status: DISCONTINUED | OUTPATIENT
Start: 2025-07-07 | End: 2025-07-15 | Stop reason: HOSPADM

## 2025-07-07 RX ORDER — FENTANYL CITRATE 50 UG/ML
INJECTION, SOLUTION INTRAMUSCULAR; INTRAVENOUS PRN
Status: COMPLETED | OUTPATIENT
Start: 2025-07-07 | End: 2025-07-07

## 2025-07-07 RX ORDER — INSULIN GLARGINE 100 [IU]/ML
16 INJECTION, SOLUTION SUBCUTANEOUS DAILY
Status: DISCONTINUED | OUTPATIENT
Start: 2025-07-08 | End: 2025-07-07

## 2025-07-07 RX ORDER — INSULIN LISPRO 100 [IU]/ML
0-16 INJECTION, SOLUTION INTRAVENOUS; SUBCUTANEOUS EVERY 6 HOURS SCHEDULED
Status: DISCONTINUED | OUTPATIENT
Start: 2025-07-08 | End: 2025-07-15 | Stop reason: HOSPADM

## 2025-07-07 RX ADMIN — FENTANYL CITRATE 50 MCG: 50 INJECTION INTRAMUSCULAR; INTRAVENOUS at 10:10

## 2025-07-07 RX ADMIN — MICAFUNGIN SODIUM 150 MG: 50 INJECTION, POWDER, LYOPHILIZED, FOR SOLUTION INTRAVENOUS at 21:06

## 2025-07-07 RX ADMIN — MEROPENEM 1000 MG: 1 INJECTION INTRAVENOUS at 08:12

## 2025-07-07 RX ADMIN — HYDROMORPHONE HYDROCHLORIDE 1 MG: 1 INJECTION, SOLUTION INTRAMUSCULAR; INTRAVENOUS; SUBCUTANEOUS at 21:16

## 2025-07-07 RX ADMIN — INSULIN GLARGINE 10 UNITS: 100 INJECTION, SOLUTION SUBCUTANEOUS at 08:20

## 2025-07-07 RX ADMIN — HYDROMORPHONE HYDROCHLORIDE 1 MG: 1 INJECTION, SOLUTION INTRAMUSCULAR; INTRAVENOUS; SUBCUTANEOUS at 16:53

## 2025-07-07 RX ADMIN — MIDAZOLAM 0.5 MG: 1 INJECTION INTRAMUSCULAR; INTRAVENOUS at 10:49

## 2025-07-07 RX ADMIN — MIDAZOLAM 0.5 MG: 1 INJECTION INTRAMUSCULAR; INTRAVENOUS at 10:35

## 2025-07-07 RX ADMIN — METOCLOPRAMIDE 5 MG: 5 INJECTION, SOLUTION INTRAMUSCULAR; INTRAVENOUS at 00:31

## 2025-07-07 RX ADMIN — METOPROLOL TARTRATE 5 MG: 5 INJECTION INTRAVENOUS at 05:07

## 2025-07-07 RX ADMIN — FENTANYL CITRATE 25 MCG: 50 INJECTION INTRAMUSCULAR; INTRAVENOUS at 10:41

## 2025-07-07 RX ADMIN — MEROPENEM 1000 MG: 1 INJECTION INTRAVENOUS at 15:28

## 2025-07-07 RX ADMIN — MEROPENEM 1000 MG: 1 INJECTION INTRAVENOUS at 00:32

## 2025-07-07 RX ADMIN — LABETALOL HYDROCHLORIDE 40 MG: 5 INJECTION, SOLUTION INTRAVENOUS at 21:06

## 2025-07-07 RX ADMIN — SODIUM CHLORIDE, PRESERVATIVE FREE 40 MG: 5 INJECTION INTRAVENOUS at 08:12

## 2025-07-07 RX ADMIN — HYDROMORPHONE HYDROCHLORIDE 1.5 MG: 2 INJECTION, SOLUTION INTRAMUSCULAR; INTRAVENOUS; SUBCUTANEOUS at 01:37

## 2025-07-07 RX ADMIN — POTASSIUM PHOSPHATE, MONOBASIC AND POTASSIUM PHOSPHATE, DIBASIC 15 MMOL: 224; 236 INJECTION, SOLUTION, CONCENTRATE INTRAVENOUS at 17:15

## 2025-07-07 RX ADMIN — METOPROLOL TARTRATE 5 MG: 5 INJECTION INTRAVENOUS at 16:49

## 2025-07-07 RX ADMIN — MIDAZOLAM 0.5 MG: 1 INJECTION INTRAMUSCULAR; INTRAVENOUS at 10:41

## 2025-07-07 RX ADMIN — SODIUM CHLORIDE 2.3 MCG/KG/MIN: 0.9 INJECTION, SOLUTION INTRAVENOUS at 18:36

## 2025-07-07 RX ADMIN — MIDAZOLAM 0.5 MG: 1 INJECTION INTRAMUSCULAR; INTRAVENOUS at 10:43

## 2025-07-07 RX ADMIN — LABETALOL HYDROCHLORIDE 20 MG: 5 INJECTION, SOLUTION INTRAVENOUS at 19:06

## 2025-07-07 RX ADMIN — SODIUM CHLORIDE, PRESERVATIVE FREE 10 ML: 5 INJECTION INTRAVENOUS at 08:14

## 2025-07-07 RX ADMIN — VANCOMYCIN HYDROCHLORIDE 1250 MG: 10 INJECTION, POWDER, LYOPHILIZED, FOR SOLUTION INTRAVENOUS at 21:24

## 2025-07-07 RX ADMIN — METOCLOPRAMIDE 5 MG: 5 INJECTION, SOLUTION INTRAMUSCULAR; INTRAVENOUS at 08:14

## 2025-07-07 RX ADMIN — HYDROMORPHONE HYDROCHLORIDE 1.5 MG: 2 INJECTION, SOLUTION INTRAMUSCULAR; INTRAVENOUS; SUBCUTANEOUS at 06:12

## 2025-07-07 RX ADMIN — FENTANYL CITRATE 25 MCG: 50 INJECTION INTRAMUSCULAR; INTRAVENOUS at 10:33

## 2025-07-07 RX ADMIN — INSULIN HUMAN 6 UNITS: 100 INJECTION, SOLUTION PARENTERAL at 00:34

## 2025-07-07 RX ADMIN — SODIUM CHLORIDE, PRESERVATIVE FREE 10 ML: 5 INJECTION INTRAVENOUS at 20:59

## 2025-07-07 RX ADMIN — MIDAZOLAM 1 MG: 1 INJECTION INTRAMUSCULAR; INTRAVENOUS at 10:09

## 2025-07-07 RX ADMIN — MIDAZOLAM 1 MG: 1 INJECTION INTRAMUSCULAR; INTRAVENOUS at 10:16

## 2025-07-07 RX ADMIN — FENTANYL CITRATE 50 MCG: 50 INJECTION INTRAMUSCULAR; INTRAVENOUS at 10:15

## 2025-07-07 RX ADMIN — METOCLOPRAMIDE 5 MG: 5 INJECTION, SOLUTION INTRAMUSCULAR; INTRAVENOUS at 16:49

## 2025-07-07 RX ADMIN — INSULIN HUMAN 6 UNITS: 100 INJECTION, SOLUTION PARENTERAL at 05:27

## 2025-07-07 RX ADMIN — LABETALOL HYDROCHLORIDE 20 MG: 5 INJECTION, SOLUTION INTRAVENOUS at 03:22

## 2025-07-07 RX ADMIN — HYDROMORPHONE HYDROCHLORIDE 1 MG: 1 INJECTION, SOLUTION INTRAMUSCULAR; INTRAVENOUS; SUBCUTANEOUS at 11:48

## 2025-07-07 RX ADMIN — LABETALOL HYDROCHLORIDE 20 MG: 5 INJECTION, SOLUTION INTRAVENOUS at 08:25

## 2025-07-07 RX ADMIN — HYDRALAZINE HYDROCHLORIDE 20 MG: 20 INJECTION INTRAMUSCULAR; INTRAVENOUS at 01:18

## 2025-07-07 RX ADMIN — INSULIN HUMAN 8 UNITS: 100 INJECTION, SOLUTION PARENTERAL at 20:37

## 2025-07-07 RX ADMIN — INSULIN GLARGINE 6 UNITS: 100 INJECTION, SOLUTION SUBCUTANEOUS at 09:07

## 2025-07-07 RX ADMIN — ASCORBIC ACID, VITAMIN A PALMITATE, CHOLECALCIFEROL, THIAMINE HYDROCHLORIDE, RIBOFLAVIN-5 PHOSPHATE SODIUM, PYRIDOXINE HYDROCHLORIDE, NIACINAMIDE, DEXPANTHENOL, ALPHA-TOCOPHEROL ACETATE, VITAMIN K1, FOLIC ACID, BIOTIN, CYANOCOBALAMIN: 200; 3300; 200; 6; 3.6; 6; 40; 15; 10; 150; 600; 60; 5 INJECTION, SOLUTION INTRAVENOUS at 20:58

## 2025-07-07 RX ADMIN — FENTANYL CITRATE 25 MCG: 50 INJECTION INTRAMUSCULAR; INTRAVENOUS at 10:28

## 2025-07-07 RX ADMIN — METOPROLOL TARTRATE 5 MG: 5 INJECTION INTRAVENOUS at 12:07

## 2025-07-07 RX ADMIN — MIDAZOLAM 0.5 MG: 1 INJECTION INTRAMUSCULAR; INTRAVENOUS at 10:28

## 2025-07-07 RX ADMIN — INSULIN HUMAN 2 UNITS: 100 INJECTION, SOLUTION PARENTERAL at 12:20

## 2025-07-07 RX ADMIN — FENTANYL CITRATE 25 MCG: 50 INJECTION INTRAMUSCULAR; INTRAVENOUS at 10:43

## 2025-07-07 ASSESSMENT — PAIN DESCRIPTION - DESCRIPTORS
DESCRIPTORS: ACHING
DESCRIPTORS: ACHING
DESCRIPTORS: ACHING;DISCOMFORT
DESCRIPTORS: ACHING;BURNING

## 2025-07-07 ASSESSMENT — PAIN DESCRIPTION - ORIENTATION
ORIENTATION: ANTERIOR;MID
ORIENTATION: LEFT
ORIENTATION: ANTERIOR
ORIENTATION: MID;ANTERIOR
ORIENTATION: ANTERIOR;MID

## 2025-07-07 ASSESSMENT — PAIN DESCRIPTION - LOCATION
LOCATION: ABDOMEN

## 2025-07-07 ASSESSMENT — PAIN SCALES - GENERAL
PAINLEVEL_OUTOF10: 2
PAINLEVEL_OUTOF10: 9
PAINLEVEL_OUTOF10: 10
PAINLEVEL_OUTOF10: 0
PAINLEVEL_OUTOF10: 2
PAINLEVEL_OUTOF10: 5
PAINLEVEL_OUTOF10: 10
PAINLEVEL_OUTOF10: 3
PAINLEVEL_OUTOF10: 7

## 2025-07-07 NOTE — PROGRESS NOTES
Consult for TPN Dosing per Pharmacy by Dr. Long  Consult provided for this 60 y.o. female, for indication of Nutrition  Day of Therapy 8    Weight: 82.1 kg (181 lb)    Adjusted wt: 54.6 kg     Labs:  BMP Lab Results   Component Value Date/Time     07/07/2025 01:55 AM    K 3.9 07/07/2025 01:55 AM     07/07/2025 01:55 AM    CO2 26 07/07/2025 01:55 AM    BUN 13 07/07/2025 01:55 AM    CREATININE 0.61 07/07/2025 01:55 AM    GLUCOSE 314 07/07/2025 01:55 AM    CALCIUM 8.8 07/07/2025 01:55 AM    LABGLOM >90 07/07/2025 01:55 AM          Ca/ Phos Lab Results   Component Value Date/Time    PHOS 2.2 07/07/2025 01:55 AM       Mag    Lab Results   Component Value Date/Time    MG 1.8 07/07/2025 01:55 AM      Tg No components found for: \"TGL\"   Albumin No results found for: \"TP\"      Kcal requirements:  25-35 kcal/kg ( 1365 - 1911 kcal/day )     Macronutrients - provided by premixed Clinimix E  5%AA/15%Dextrose with electrolytes in 2000 ml bag:  Protein                                        75 g/day = 300 kcal  Dextrose                                   225 g/day = 765 kcal  Lipids 20%                                  30 g/day = 290 kcal ( administered separately )                                                                       1355 kcal total     Discussed with Dr. Dallas ==> will decrease TPN rate to 63 ml/hr as blood glucose running in the 300 range ( 326 / 314 / 326 ) .    MD to replete electrolytes acutely outside of TPN as needed.   K = 3.9  Phos = 2.2  Verbal order received for Kphos 15 mmol x 1     MD to add/adjust/dc maintenance IV Fluid as needed for fluid requirements.     Additional recommendations/Orders:   Corrective insulin coverage with Regular Insulin q6h while on TPN.  Pt also receiving Lantus insulin.   BMP, Mag, Phos ordered daily  Triglycerides,CMP ordered upon initiation and weekly    Pharmacy to follow daily and will make changes based on labs/clinical status.      KERRIE WAGONER [facility]

## 2025-07-07 NOTE — PROGRESS NOTES
VSS  Abd - no changes    Agree with IR drainage of abd collections, hopefully they can leave drain in this time.

## 2025-07-07 NOTE — PROGRESS NOTES
Care Mgmt Assistant, assisting Care Mgr Kieran Oneill RN with Discharge Planning needs for patient.  Referrals sent to area Skilled Nursing Facilities (SNF's) for review and consideration for placement.  Referrals sent to area Home Health Agencies for review and consideration for acceptance for possible home care needs.    Will follow along for further discharge plans.

## 2025-07-07 NOTE — OR NURSING
TRANSFER - OUT REPORT:    Verbal report given to Lukas NELSON on Kiara Nance  being transferred to ICU for routine progression of patient care       Report consisted of patient's Situation, Background, Assessment and   Recommendations(SBAR).     Information from the following report(s) Nurse Handoff Report, MAR, and Event Log was reviewed with the receiving nurse.           Lines:   PICC 06/30/25 Left Brachial (Active)   Central Line Being Utilized Yes 07/07/25 0400   Criteria for Appropriate Use Total parenteral nutrition 07/07/25 0400   Site Assessment Clean, dry & intact 07/07/25 0400   Phlebitis Assessment No symptoms 07/07/25 0400   Infiltration Assessment 0 07/07/25 0400   Lumen #1 Color/Status Infusing 07/07/25 0400   Lumen #2 Color/Status Infusing 07/07/25 0400   Line Care Connections checked and tightened 07/07/25 0400   Alcohol Cap Used No 07/07/25 0400   Date of Last Dressing Change 07/05/25 07/07/25 0400   Dressing Type Transparent w/CHG gel 07/07/25 0400   Dressing Status Clean, dry & intact 07/07/25 0400   Dressing Intervention Dressing changed 07/05/25 0800       Extended Dwell Peripherial IV 06/29/25 Right Brachial (Active)   Criteria for Appropriate Use Irritant/vesicant medication 07/07/25 0616   Site Assessment Clean, dry & intact 07/07/25 0616   Phlebitis Assessment No symptoms 07/07/25 0616   Infiltration Assessment 0 07/07/25 0616   Line Status Infusing 07/07/25 0616   Line Care Connections checked and tightened 07/07/25 0616   Alcohol Cap Used No 07/07/25 0616   Date of Last Dressing Change 07/05/25 07/07/25 0616   Dressing Type Transparent w/CHG gel 07/07/25 0616   Dressing Status Clean, dry & intact 07/07/25 0616   Dressing Intervention New;Dressing changed 07/05/25 0530        Opportunity for questions and clarification was provided.      Patient transported with:  Monitor and Registered Nurse

## 2025-07-07 NOTE — OR NURSING
Spoke to son who is educated on procedure and sedation. Telephone consent witnessed by 2 nurses obtained from son. Pt is NPO, argatroban held for appropriate time.

## 2025-07-07 NOTE — PROGRESS NOTES
PALLIATIVE MEDICINE    AMD STATUS: NO DOCUMENTS ON FILE    CODE STATUS:FULL CODE    Seen today in Rm 109. Patient lying in bed supine, asleep.  Did not attempt to wake patient.  Patient waiting for IR to place abdominal drain due to fluid collection in abdomen.   Palliative medicine will  continue to follow Kiara Nance and her family during her admission.     Sil Smith RN  Palliative Medicine  [phone]

## 2025-07-07 NOTE — PLAN OF CARE
Problem: Pain  Goal: Verbalizes/displays adequate comfort level or baseline comfort level  7/6/2025 2106 by Komal Smart RN  Outcome: Progressing  7/6/2025 1607 by Rica Rahman RN  Outcome: Progressing  Flowsheets (Taken 7/6/2025 1607)  Verbalizes/displays adequate comfort level or baseline comfort level:   Encourage patient to monitor pain and request assistance   Assess pain using appropriate pain scale   Administer analgesics based on type and severity of pain and evaluate response   Implement non-pharmacological measures as appropriate and evaluate response   Notify Licensed Independent Practitioner if interventions unsuccessful or patient reports new pain     Problem: ABCDS Injury Assessment  Goal: Absence of physical injury  Outcome: Progressing     Problem: Gastrointestinal - Adult  Goal: Maintains or returns to baseline bowel function  7/6/2025 2106 by Komal Smart RN  Outcome: Progressing  7/6/2025 1607 by Rica Rahman RN  Outcome: Progressing  Flowsheets (Taken 7/6/2025 0800)  Maintains or returns to baseline bowel function: Assess bowel function     Problem: Metabolic/Fluid and Electrolytes - Adult  Goal: Electrolytes maintained within normal limits  7/6/2025 2106 by Komal Smart RN  Outcome: Progressing  7/6/2025 1607 by Rica Rahman RN  Outcome: Progressing  Flowsheets (Taken 7/6/2025 0800)  Electrolytes maintained within normal limits:   Monitor labs and assess patient for signs and symptoms of electrolyte imbalances   Administer electrolyte replacement as ordered   Monitor response to electrolyte replacements, including repeat lab results as appropriate     Problem: Safety - Adult  Goal: Free from fall injury  7/6/2025 2106 by Komal Smart RN  Outcome: Progressing  7/6/2025 1607 by Rica Rahman RN  Outcome: Progressing  Flowsheets (Taken 7/6/2025 1607)  Free From Fall Injury: Instruct family/caregiver on patient safety     Problem: Skin/Tissue Integrity  Goal:  Skin integrity remains intact  Description: 1.  Monitor for areas of redness and/or skin breakdown  2.  Assess vascular access sites hourly  3.  Every 4-6 hours minimum:  Change oxygen saturation probe site  4.  Every 4-6 hours:  If on nasal continuous positive airway pressure, respiratory therapy assess nares and determine need for appliance change or resting period  Outcome: Progressing  Flowsheets  Taken 7/6/2025 2000 by Komal Smart RN  Skin Integrity Remains Intact:   Monitor for areas of redness and/or skin breakdown   Assess vascular access sites hourly   Turn and reposition as indicated   Pressure redistribution bed/mattress (bed type)   Check visual cues for pain   Monitor skin under medical devices  Taken 7/6/2025 0800 by Rica Rahman RN  Skin Integrity Remains Intact:   Monitor for areas of redness and/or skin breakdown   Positioning devices   Pressure redistribution bed/mattress (bed type)   Check visual cues for pain   Monitor skin under medical devices     Problem: Discharge Planning  Goal: Discharge to home or other facility with appropriate resources  Outcome: Progressing  Flowsheets (Taken 7/6/2025 0800 by Rica Rahman RN)  Discharge to home or other facility with appropriate resources:   Identify barriers to discharge with patient and caregiver   Identify discharge learning needs (meds, wound care, etc)     Problem: Cardiovascular - Adult  Goal: Maintains optimal cardiac output and hemodynamic stability  7/6/2025 2106 by Komal Smart RN  Outcome: Progressing  7/6/2025 1607 by Rica Rahman RN  Outcome: Progressing  Flowsheets (Taken 7/6/2025 0800)  Maintains optimal cardiac output and hemodynamic stability:   Monitor blood pressure and heart rate   Monitor urine output and notify Licensed Independent Practitioner for values outside of normal range   Assess for signs of decreased cardiac output  Goal: Absence of cardiac dysrhythmias or at baseline  7/6/2025 2106 by Berry

## 2025-07-07 NOTE — PROGRESS NOTES
ICU    Name Relation Mobile   Reynold Kothari [phone]       Called twice; left VM to call icu for updates.    Mingo Dallas MD

## 2025-07-07 NOTE — CARE COORDINATION
Patient is not medically ready for DC. Possible HH or SNF needed at DC, CM will continue to follow.

## 2025-07-07 NOTE — PLAN OF CARE
Problem: Pain  Goal: Verbalizes/displays adequate comfort level or baseline comfort level  Outcome: Progressing  Flowsheets (Taken 7/6/2025 1607 by Rica Rahman, RN)  Verbalizes/displays adequate comfort level or baseline comfort level:   Encourage patient to monitor pain and request assistance   Assess pain using appropriate pain scale   Administer analgesics based on type and severity of pain and evaluate response   Implement non-pharmacological measures as appropriate and evaluate response   Notify Licensed Independent Practitioner if interventions unsuccessful or patient reports new pain     Problem: ABCDS Injury Assessment  Goal: Absence of physical injury  Outcome: Progressing  Flowsheets (Taken 7/3/2025 1942 by Vicky Gaspar, RN)  Absence of Physical Injury: Implement safety measures based on patient assessment     Problem: Gastrointestinal - Adult  Goal: Maintains or returns to baseline bowel function  Outcome: Progressing  Flowsheets (Taken 7/6/2025 0800 by Rica Rahman RN)  Maintains or returns to baseline bowel function: Assess bowel function     Problem: Metabolic/Fluid and Electrolytes - Adult  Goal: Electrolytes maintained within normal limits  Outcome: Progressing  Flowsheets (Taken 7/6/2025 0800 by Rica Rahman RN)  Electrolytes maintained within normal limits:   Monitor labs and assess patient for signs and symptoms of electrolyte imbalances   Administer electrolyte replacement as ordered   Monitor response to electrolyte replacements, including repeat lab results as appropriate     Problem: Cardiovascular - Adult  Goal: Maintains optimal cardiac output and hemodynamic stability  Outcome: Progressing  Flowsheets (Taken 7/6/2025 0800 by Rica Rahman RN)  Maintains optimal cardiac output and hemodynamic stability:   Monitor blood pressure and heart rate   Monitor urine output and notify Licensed Independent Practitioner for values outside of normal range   Assess for signs of  facility with appropriate resources:   Identify barriers to discharge with patient and caregiver   Identify discharge learning needs (meds, wound care, etc)     Problem: Safety - Medical Restraint  Goal: Remains free of injury from restraints (Restraint for Interference with Medical Device)  Description: INTERVENTIONS:  1. Determine that other, less restrictive measures have been tried or would not be effective before applying the restraint  2. Evaluate the patient's condition at the time of restraint application  3. Inform patient/family regarding the reason for restraint  4. Q2H: Monitor safety, psychosocial status, comfort, nutrition and hydration  Outcome: Completed  Note: Not in restraints     Problem: Confusion  Goal: Confusion, delirium, dementia, or psychosis is improved or at baseline  Description: INTERVENTIONS:  1. Assess for possible contributors to thought disturbance, including medications, impaired vision or hearing, underlying metabolic abnormalities, dehydration, psychiatric diagnoses, and notify attending LIP  2. Hunt high risk fall precautions, as indicated  3. Provide frequent short contacts to provide reality reorientation, refocusing and direction  4. Decrease environmental stimuli, including noise as appropriate  5. Monitor and intervene to maintain adequate nutrition, hydration, elimination, sleep and activity  6. If unable to ensure safety without constant attention obtain sitter and review sitter guidelines with assigned personnel  7. Initiate Psychosocial CNS and Spiritual Care consult, as indicated  Outcome: Progressing  Flowsheets  Taken 7/7/2025 1719 by Pro Dangelo RN  Effect of thought disturbance (confusion, delirium, dementia, or psychosis) are managed with adequate functional status: Assess for contributors to thought disturbance, including medications, impaired vision or hearing, underlying metabolic abnormalities, dehydration, psychiatric diagnoses, notify LIP  Taken

## 2025-07-07 NOTE — PRE SEDATION
INTERVENTIONAL RADIOLOGY  Preoperative History & Physical    Patient:  Kiara Nance  :  1965  Age:  60 y.o.  MRN:  [id number]    Today's Date:  2025  Admission Date:  2025  Hospital Day:  12  Consult requested by:  John Dotson MD       CC / HPI   Kiara Nance is a 60 y.o. female with a history of small bowel obstruction s/p SB resection 25, bacteremia, post op fluid collection RLQ s/p aspiration 7/3- no growth. Continues to have fever, ID following. Repeat CT shows smaller RLQ fluid collection, increased LLQ fluid collection.    IR consulted for abdominal fluid collection drainage.    Patient seen in CT. C/o abdominal pain.     PAST MEDICAL HISTORY  Past Medical History:   Diagnosis Date    Anxiety 24    Since I found out about the aneurysm    Asthma     SOB    Chronic back pain     Chronic pain     joint pain    Depression 24    Headache     Hypertension     Knee meniscus pain 2017    Memory disorder     Just be forgetting things sometimes    Mild intermittent asthma without complication 2017    Neck pain 22    Neuropathy About 2 years now    Sinus congestion     Vitamin D deficiency        PAST SURGICAL HISTORY  Past Surgical History:   Procedure Laterality Date    GI      recloser cloystomy     IR ASP ABSCESS/HEMATOMA/BULLA/CYST  7/3/2025    IR ASP ABSCESS/HEMATOMA/BULLA/CYST 7/3/2025 Jody Zaragoza, APRN - CNP [facility] RAD ANGIO IR    LAPAROTOMY N/A 2025    LAPAROTOMY EXPLORATORY, LYSIS OF ADHESIONS,  SMALL BOWEL RESECTION performed by Carlos Palencia MD at [facility] MAIN OR       CURRENT MEDICATIONS  Current Facility-Administered Medications   Medication Dose Route Frequency Provider Last Rate Last Admin    insulin glargine (LANTUS) injection vial 10 Units  10 Units SubCUTAneous Daily Elvis Perez MD   10 Units at 25 0820    PN-Adult Premix 5/15 - Standard Electrolytes - Central Line   IntraVENous Continuous TPN Melina Long MD 84 mL/hr at 25

## 2025-07-07 NOTE — PROGRESS NOTES
[Urine:5650; Drains:20]  General: Alert, NAD, AOX self and place moaning not clear speech  CVS: Regular rate and rhythm, no m/c/r, S1/S2    tachhycardia   Lungs: Clear to auscultation bilaterally, no wheezes, rhonchi, or rales diminished breath sounds   Abdomen: tender, +distention, Normal Bowel sounds  hypoactive   Extremities: No c/c/e     Neuro:grossly non-focal neurologic exam, follows  simple commands        Intake/Output  In: 2823.6 [I.V.:104.1; Other:10]  Out: 3320 [Urine:3300; Drains:20]       Pertinent Labs:    Susceptibility data from last 90 days.  Collected Specimen Info Organism Amikacin Ampicillin Ampicillin + Sulbactam Cefazolin Cefepime Ceftazidime Ceftriaxone Ciprofloxacin Gentamicin Levofloxacin Piperacillin + Tazobactam Spec Grav, Fluid Tobramcyin   07/01/25 Blood Bacteroides fragilis                07/01/25 Blood Escherichia coli  S  R  I  R  R  R  R  R  S  R  S  S  S     Collected Specimen Info Organism Trimethoprim + Sulfamethoxazole   07/01/25 Blood Bacteroides fragilis    07/01/25 Blood Escherichia coli  S     Results       Procedure Component Value Units Date/Time    Culture, Blood 2 [[id number]] Collected: 07/03/25 1414    Order Status: Completed Specimen: Blood Updated: 07/04/25 0856     Special Requests --        RIGHT  HAND       Culture NO GROWTH AFTER 18 HOURS       Culture, Blood 1 [[id number]] Collected: 07/03/25 1351    Order Status: Completed Specimen: Blood Updated: 07/04/25 0856     Special Requests --        NO SPECIAL REQUESTS  RIGHT  ARM       Culture NO GROWTH AFTER 18 HOURS       Culture, Body Fluid (with Gram Stain) [[id number]] Collected: 07/03/25 1200    Order Status: Completed Specimen: Body Fluid from Aspirate Updated: 07/03/25 2254     Special Requests NO SPECIAL REQUESTS        Gram Stain NO WBC'S SEEN         NO ORGANISMS SEEN        Culture PENDING    Culture, Anaerobic [[id number]] Collected: 07/03/25 1200    Order Status: Sent Specimen: Abscess Updated: 07/03/25  Results:       Chemistry Recent Labs     07/05/25  0150 07/06/25  0811 07/07/25  0155   GLUCOSE 236* 357* 314*    133* 135*   K 4.0 4.0 3.9    98 100   CO2 25 25 26   BUN 11 12 13   CREATININE 0.63 0.69 0.61   CALCIUM 7.7* 8.9 8.8   BILITOT 0.3 0.5 0.3   AST 27 31 42*   ALT 18 21 31   ALKPHOS 57 57 62   GLOB 3.1 4.1* 4.1*   LABGLOM >90 >90 >90      CBC w/Diff Recent Labs     07/05/25  0150 07/06/25  0811 07/07/25  0155   WBC 12.3 15.1* 13.9*   RBC 2.89* 2.85* 2.65*   HGB 8.1* 7.9* 7.4*   HCT 23.6* 23.1* 21.5*   PLT 99* 158 207   LYMPHOPCT 19* 8* 9.3*      Cardiac Enzymes No results for input(s): \"CKTOTAL\", \"CKMB\", \"CKMBINDEX\", \"TROPONINI\" in the last 72 hours.    Invalid input(s): \"LENARD\"   Coagulation Recent Labs     07/06/25  1325 07/06/25  1855   APTT 80.3* 82.0*       Lipid Panel Lab Results   Component Value Date/Time    CHOL 234 06/01/2020 08:26 AM    TRIG 207 07/06/2025 08:10 AM    HDL 69 06/01/2020 08:26 AM    VLDL 31 06/01/2020 08:26 AM      BNP No results for input(s): \"BNP\" in the last 72 hours.   Liver Enzymes Recent Labs     07/07/25  0155   ALT 31   AST 42*   ALKPHOS 62   BILITOT 0.3   BILIDIR 0.2      Thyroid Studies Lab Results   Component Value Date/Time    TSH 0.908 11/29/2024 09:27 AM              Pertinent Radiology/Procedures:  See electronic medical records for all procedures/Xrays and details which were not copied into this note but were reviewed prior to creation of Plan    CT ABSCESS DRAINAGE W CATH PLACEMENT S&I  Result Date: 7/7/2025  PROCEDURE:  CT GUIDED DRAINAGE HISTORY: BOONE GOMES is a 60 year old Female with abdominal abscess. :  Jody Zaragoza NP ATTENDING:  Jerry Caldwell MD CONSENT:  After full discussion of the procedure, including risks, benefits and alternatives, both verbal and written consent were obtained. TECHNIQUE: A timeout was called to verify the correct patient, procedure, site and allergies. The patient was placed supine on the CT table.  CT dose

## 2025-07-07 NOTE — PROGRESS NOTES
Nucleated RBCs 0.4 (H) 0  WBC    nRBC 0.05 (H) 0.00 - 0.01 K/uL    Neutrophils % 83.4 (H) 40.0 - 73.0 %    Lymphocytes % 9.3 (L) 21.0 - 52.0 %    Monocytes % 3.7 3.0 - 10.0 %    Eosinophils % 1.1 0.0 - 5.0 %    Basophils % 0.4 0.0 - 2.0 %    Immature Granulocytes % 2.1 (H) 0.0 - 0.5 %    Neutrophils Absolute 11.57 (H) 1.80 - 8.00 K/UL    Lymphocytes Absolute 1.29 0.90 - 3.30 K/UL    Monocytes Absolute 0.52 0.05 - 1.20 K/UL    Eosinophils Absolute 0.15 0.00 - 0.40 K/UL    Basophils Absolute 0.05 0.00 - 0.10 K/UL    Immature Granulocytes Absolute 0.29 (H) 0.00 - 0.04 K/UL    Differential Type AUTOMATED     POCT Glucose    Collection Time: 07/07/25  5:17 AM   Result Value Ref Range    POC Glucose 326 (H) 70 - 110 mg/dL         CMP Recent Labs     07/05/25  0150 07/06/25  0811 07/07/25  0155    133* 135*   K 4.0 4.0 3.9    98 100   CO2 25 25 26   BUN 11 12 13   ANIONGAP 10 10 9   CREATININE 0.63 0.69 0.61   GLUCOSE 236* 357* 314*   CALCIUM 7.7* 8.9 8.8   GLOB 3.1 4.1* 4.1*   BILITOT 0.3 0.5 0.3   ALKPHOS 57 57 62   AST 27 31 42*   ALT 18 21 31        Ionized Calcium:   No components found for: \"IONCA\"    No components found for: \"IONCA\"  Invalid input(s): \"IONCA\"    Magnesium:   Recent Labs     07/05/25  0150 07/06/25  0811 07/07/25  0155   MG 1.8 1.7 1.8        Phosphorus:   Recent Labs     07/05/25  0150 07/06/25  0811 07/07/25  0155   PHOS 2.8 2.6 2.2*       Amylase, Lipase:   Lipase   Date/Time Value Ref Range Status   06/25/2025 09:21 PM 24 13 - 75 U/L Final     No results for input(s): \"AMYLASE\", \"LIPASE\" in the last 72 hours.    Lipase:   Lipase   Date/Time Value Ref Range Status   06/25/2025 09:21 PM 24 13 - 75 U/L Final     No results for input(s): \"LIPASE\" in the last 72 hours.    Ammonis:   Lab Results   Component Value Date/Time    AMMONIA 29 07/01/2025 12:17 AM      No results for input(s): \"AMMONIA\" in the last 72 hours.       CBC w/Diff Recent Labs     07/05/25  0150 07/06/25  0811  the bowel. FINDINGS: MEDIASTINUM: No mass or lymphadenopathy. SIS: No gross pathology. THORACIC AORTA: No dissection or aneurysm. MAIN PULMONARY ARTERY: Normal in caliber. TRACHEA/BRONCHI: Patent. ESOPHAGUS: Enteric tube in the esophagus terminating in the stomach. HEART: Normal in size.  Coronary artery calcium: present. PLEURA: Small bilateral pleural effusions right greater than left. LUNGS: Bibasilar airspace consolidation, likely atelectatic but infection not excluded. LIVER: No mass or biliary dilatation. GALLBLADDER: Vicarious excretion of contrast in the gallbladder. No gallbladder wall thickening. SPLEEN: No mass. PANCREAS: No gross pathology. ADRENALS: Unremarkable. KIDNEYS: Moderate left hydroureteronephrosis. No distal ureteral stone. No right hydronephrosis. STOMACH: Enteric tube terminating in the stomach. SMALL BOWEL: Dilated fluid-filled small bowel loops, normal in caliber in the pelvis upstream from the ileocolic anastomosis. No discrete transition point. COLON: Right and transverse colectomy. Small locules of gas in the left paracolic gutter. No drainable fluid collection. APPENDIX: Absent PERITONEUM: No ascites or pneumoperitoneum. RETROPERITONEUM: No lymphadenopathy or aortic aneurysm. REPRODUCTIVE ORGANS: Lobulated uterus consistent with fibroids. URINARY BLADDER: Contracted urinary bladder containing a Merrill catheter. BONES: No destructive bone lesion. ADDITIONAL COMMENTS: N/A     1.  Postsurgical changes from partial colectomy with ileocolic anastomosis. Dilated fluid-filled small bowel loops suggestive of ileus. 2.  No drainable fluid collection. 3.  Postoperative locules of gas in the left paracolic gutter. 4.  Moderate left hydronephrosis without evidence of obstructive stone. 5.  Enlarged fibroid uterus. 6.  Bibasilar atelectasis with small bilateral pleural effusions right greater than left. Electronically signed by YOHAN PRESCOTT    CT HEAD WO CONTRAST  Result Date: 6/30/2025  EXAM: CT

## 2025-07-07 NOTE — PROGRESS NOTES
[facility]   Pharmacy Pharmacokinetic Monitoring Service - Vancomycin    Consulting Provider: Dr. Long   Indication: sepsis of unknown etiology - continue x 5 more days from today 7/7/25 per Dr. Dallas   Target Concentration: Goal AUC/MICHAEL 400-600 mg*hr/L  Day of Therapy: 8  Additional Antimicrobials: Meropenem / Micafungin     Pertinent Laboratory Values:   Wt Readings from Last 1 Encounters:   07/06/25 82.1 kg (181 lb)     Temp Readings from Last 1 Encounters:   07/07/25 100.1 °F (37.8 °C) (Oral)     Estimated Creatinine Clearance: 91 mL/min (based on SCr of 0.61 mg/dL).  Recent Labs     07/06/25  0811 07/07/25  0155   CREATININE 0.69 0.61   BUN 12 13   WBC 15.1* 13.9*     Assessment:  Date Current Dose Concentration AUC   7/7/25  Vanc 1 gm IV q12h  Random = 19.5 402   Note: Serum concentrations collected for AUC dosing may appear elevated if collected in close proximity to the dose administered, this is not necessarily an indication of toxicity    Plan:  Renal function improving ==> will increase dose to Vancomycin 1250 mg IV q12h to ensure remains in goal AUC range of 400 - 600 mg/l.h   Predicted  mg/l.h and trough 13.1 mg/l  Repeat vancomycin concentration ordered for 7/8/25 with am labs    Pharmacy will continue to monitor patient and adjust therapy as indicated    KERRIE WAGONER RPH, BCPS   7/7/2025 9:45 AM

## 2025-07-07 NOTE — PROGRESS NOTES
WRONG DIAGNOSTIC IMMAGING FOR REASON FOR EXAM. MRI IS THE PREFFERED IMAGING STUDY FOR THE REASON NOT CT. 1433 RR      UNABLE TO REACH ICU AT THIS TIME 1400. PT HAS HAD IV CONTRAST. RR

## 2025-07-07 NOTE — PROGRESS NOTES
Called CT to verify time for scan but technician advised that pt could not have it done at this time because ordered diagnostic was W/O  contrast but pt had received contrast late afternoon 7/7/25 and it would still be in her system so exam would have to be completed at 1300 later today.

## 2025-07-07 NOTE — PROGRESS NOTES
Hematology / Oncology Initial Consult Note    Admit Date: 6/25/2025    Reason for Consult: Anticoagulation recommendations/thrombocytopenia    Requesting Physician: Dr. Long    Assessment:     Kiara Nance is a 60 y.o., Black / , female, who I have been asked to see for thrombocytopenia.     Principal Problem:    SBO (small bowel obstruction) (HCC)  Active Problems:    Aortic thrombus (HCC)    Benign essential HTN    Hypokalemia    Asthma    Acute renal failure    Hydronephrosis    Sepsis (HCC)    Metabolic acidosis    Hypotension    Adenovirus infection    Bacteremia    Status post surgery  Resolved Problems:    * No resolved hospital problems. *    Aortic filling defect/mural thrombus: noted on CT 7/2/25. Vascular surgery was consulted with no recommendation for acute intervention, but recommended anticoagulation.   Thrombocytopenia: noted since 7/1/25. She had been on lovenox since 6/25/25. 4T HIT score is high with timing of platelet drop 5 days after Lovenox, decrease to 50% of platelets and akiko>20, and likely new thrombosis. It is very reasonable to do argatroban as HIT is possible. Will follow screening test and then do VIKASH testing to confirm. Drug-induced thrombocytopenia from Meropenem is possible, but this was just started on 7/2/25 and platelet decrease predated the start of medication. She is tachycardic and has E coli bacteremia, so DIC is possible. Review of peripheral smear with no evidence of schistocytes to suggest TTP.   Sepsis and E coli bacteremia: on Vanc and Meropenem. ID on board. 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 While DIC is possible as a cause, she continues with fevers, so would expect her platelets to get worse, not better, if from ongoing DIC.   - Recommend continuing argatroban and will send VIKASH  testing  - Platelets now within normal in 200's  - Once she no longer has procedures, can transition argatroban to fondaparinux or DOAC  - Transfuse to maintain hg>7 and Plt>10  - Will continue to follow    Nimco Cai MD  [facility]        History of Present Illness: Kiara Nance is a 60 y.o. female who was admitted on 6/25/25 for abdominal pain, nausea, and vomiting secondary to small bowel obstruction. She was started on Lovenox ppx at that time. She underwent an exlap with lysius of adhesions on 6/29/25. CT A/P on 7/2/25 made note of a \"13 mm filling defect in the abdominal aorta is new. It is adherent to the posterior wall of the aorta at the level of the superior mesenteric artery. This could reflect rapidly progressive mural thrombus or large adherent embolus, although would be an atypical location for an embolus. No other thrombi or emboli are seen. No evidence of bowel ischemia.\" Vascular surgery was consulted with recommendation for anticoagulation. Given new thrombocytopenia since 7/1/25, she was started on argatroban and HIT panel sent. Hematology consulted for further evaluation.     No acute overnight events  Plan for small bowel follow through today  Plts now in normal range      Past Medical History:   Diagnosis Date    Anxiety 09/04/24    Since I found out about the aneurysm    Asthma     SOB    Chronic back pain     Chronic pain     joint pain    Depression 09/04/24    Headache     Hypertension     Knee meniscus pain 9/7/2017    Memory disorder     Just be forgetting things sometimes    Mild intermittent asthma without complication 9/7/2017    Neck pain 09/04/24    Neuropathy About 2 years now    Sinus congestion     Vitamin D deficiency        Past Surgical History:   Procedure Laterality Date    GI      recloser cloystomy 2005    IR ASP ABSCESS/HEMATOMA/BULLA/CYST  7/3/2025    IR ASP ABSCESS/HEMATOMA/BULLA/CYST 7/3/2025 Jody Zaragoza, APRN - CNP [facility] RAD ANGIO IR

## 2025-07-07 NOTE — PROGRESS NOTES
[facility]  (A Division of [facility] [place])  Ursula Li MD   Office Tel:  [phone] Option #8                                                               Date of Admission: 6/25/2025       ID FU for antimicrobial management GNR bacteremia requested by Dr Long   PCP: Nae Kaiser, APRN - NP    C/C: Vominting/abdominal pain/constipation-2 to 3 days on admission- 6/25    Current Antimicrobials:    Prior Antimicrobials:  Vanco 6/30 to date  Meropenem 7/2 to date  Micafungin 150  7/5 to date   Pip tazo X1- 6/25  Pip tazo 6/29 to 7/2     Allergy to antibiotics- NA       Assessment:     Acutely and severely Ill patient with:    ESBL E.coli and Bacteroids bacteremia-low grade-- of abdominal source likley   Doubt CRBSI, doubt urinary source-UA clear    Bacteroides bacteremia-- + on 7/3  SBO- persistent loop distension on imaging. History of prior surgery  S/P X-lap, lysis of adhesion, SB resection, side to side anastomosis- 6/29  Severe sepsis- KE/lactic acidosis  Bandemia 29%-->-> 8%-> 12%-> 21% -> 9% 7/5, leucocytosis going up  Abdominal fluid collection on CT with mild rim enhancement- probable abscess Vs leak? CT 7/2  Post op fluid collection/Abscess on cT 7/6/24- S/P PILAR drain placement by IR     CT AP 7/6/25  1. Small bilateral pleural effusions and bibasilar dependent consolidation.  2. 1 cm rounded filling defect adherent to the posterior wall of the abdominal  aorta at the level of the SMA, unchanged.  3. Right anterior pelvic fluid measuring 2.4 cm x 3.2 cm and measuring  approximately 5.1 cm x 4.7 cm on prior CT dated July 2, 2025.  4. 4.5 cm x 2.8 cm gas and fluid containing collection within the left  hemipelvis, as described above.  5. Mild dilatation of the left and right renal collecting systems and visualized  left and right ureters.  6. Several dilated loops of small bowel. Degree of small bowel dilatation is  improved.     Distended GB on CT, no  acute cholecystitis on US      Adenovirus infection- Positive on resp viral panel    Micro:  6/30: Respiratory  viral panel for adenovirus is positive. Negative for Corona virus- COVID 19 and non COVID 19, human meta pneumovirus, rhino virus, Influenza A/B, parinflunenza 1-4, RSV negative. Bacterial PCR for bordetella, chlamydia pneumoniae and mycoplasma negative.      7/ 1 - blood cutlure X2: 2/4 E coli- ESBL  and Bacteriodes- 1/4 bottle each        -UA no pyuria    7/3- blood culture: NGSF       - aspirated abd fluid collection- NGSF    7/6- blood cuture: NGSF        -UA no pyuria    Procal >100-6/30-> 4.05 7/5/25       Multiple abnormality on CT CAP 7/2  Bilateral pleural effusions : worsened compared to CT from 6/30/2025.   Atelectasis of nearly the entire right lower lobe and multiple segments of the   right middle and left lower lobes.       Abdominal aorta with 13mm filing defect  Mild left hydronephrosis and hydroureter to the level of the UVJ. No obstructing mass or stone.-- FU US recommended by Urology. No intervention    Medical History:   Diagnosis Date    Anxiety 09/04/24    Since I found out about the aneurysm    Asthma     SOB    Chronic back pain     Chronic pain     joint pain    Depression 09/04/24    Headache     Hypertension     Knee meniscus pain 9/7/2017    Memory disorder     Just be forgetting things sometimes    Mild intermittent asthma without complication 9/7/2017    Neck pain 09/04/24    Neuropathy About 2 years now    Sinus congestion     Vitamin D deficiency        Recommendation -- ID related:     Continue with meropenem/vanco   Cont with Micafungin, added over weekend    Subjective:     Patient seen and examined for follow-up in ICU   Doesn't voice complaint, wants to drink    Fever over weekend  Remains tachycardic, On TPN  Not on pressor  Underwent IR placement of PILAR over left pelvis- after finding of fluid collection on 7/6/25 CT      Tolerating therapy without any obvious adverse

## 2025-07-07 NOTE — PROCEDURES
Interventional Radiology Brief Postoperative Note    Procedure Date:  7/7/2025    Procedure:  CT Guided Drainage Abdominal Abscess    :  Jody Zaragoza NP    Attending:  Jerry Caldwell MD    Preoperative Diagnosis:  LLQ Abdominal Abscess    Postoperative Diagnosis:  Same    Complications:  None immediate.    Estimated Blood Loss:  <5 mL    Procedure Findings:  Technically successful CT guided left lower quadrant abdominal abscess drainage with moderate sedation. 10 Sami locking pigtail catheter in place, secured with suture and stay-fix device. 5 mL cloudy, tan-pink fluid aspirated. Drain attached to PILAR bulb suction.    Specimens:  Fluid sent for analysis.    Condition:  The patient tolerated the procedure without difficulty and remained in stable condition throughout.    Drain care: flush with 10 mL sterile saline q shift, document output.    Discussed with primary RN.    Jody Zaragoza, APRN - CNP  [facility]

## 2025-07-08 LAB
ANION GAP SERPL CALC-SCNC: 11 MMOL/L (ref 3–18)
APTT PPP: 86.6 SEC (ref 21.7–34.2)
APTT PPP: 91.7 SEC (ref 21.7–34.2)
BUN SERPL-MCNC: 13 MG/DL (ref 6–23)
BUN/CREAT SERPL: 22 (ref 12–20)
CALCIUM SERPL-MCNC: 8.1 MG/DL (ref 8.5–10.1)
CHLORIDE SERPL-SCNC: 99 MMOL/L (ref 98–107)
CO2 SERPL-SCNC: 25 MMOL/L (ref 21–32)
CREAT SERPL-MCNC: 0.61 MG/DL (ref 0.6–1.3)
GLUCOSE BLD STRIP.AUTO-MCNC: 173 MG/DL (ref 70–110)
GLUCOSE BLD STRIP.AUTO-MCNC: 230 MG/DL (ref 70–110)
GLUCOSE BLD STRIP.AUTO-MCNC: 239 MG/DL (ref 70–110)
GLUCOSE BLD STRIP.AUTO-MCNC: 276 MG/DL (ref 70–110)
GLUCOSE BLD STRIP.AUTO-MCNC: 354 MG/DL (ref 70–110)
GLUCOSE SERPL-MCNC: 337 MG/DL (ref 74–108)
MAGNESIUM SERPL-MCNC: 1.9 MG/DL (ref 1.6–2.6)
PHOSPHATE SERPL-MCNC: 3.1 MG/DL (ref 2.5–4.9)
POTASSIUM SERPL-SCNC: 4.4 MMOL/L (ref 3.5–5.5)
SODIUM SERPL-SCNC: 135 MMOL/L (ref 136–145)
VANCOMYCIN SERPL-MCNC: 15.6 UG/ML (ref 5–40)

## 2025-07-08 PROCEDURE — 80048 BASIC METABOLIC PNL TOTAL CA: CPT

## 2025-07-08 PROCEDURE — 2580000003 HC RX 258: Performed by: INTERNAL MEDICINE

## 2025-07-08 PROCEDURE — 2500000003 HC RX 250 WO HCPCS: Performed by: HOSPITALIST

## 2025-07-08 PROCEDURE — 6360000002 HC RX W HCPCS: Performed by: HOSPITALIST

## 2025-07-08 PROCEDURE — 6360000002 HC RX W HCPCS: Performed by: INTERNAL MEDICINE

## 2025-07-08 PROCEDURE — 80202 ASSAY OF VANCOMYCIN: CPT

## 2025-07-08 PROCEDURE — 6370000000 HC RX 637 (ALT 250 FOR IP): Performed by: INTERNAL MEDICINE

## 2025-07-08 PROCEDURE — 83735 ASSAY OF MAGNESIUM: CPT

## 2025-07-08 PROCEDURE — 6360000002 HC RX W HCPCS: Performed by: SURGERY

## 2025-07-08 PROCEDURE — 82962 GLUCOSE BLOOD TEST: CPT

## 2025-07-08 PROCEDURE — 2500000003 HC RX 250 WO HCPCS: Performed by: INTERNAL MEDICINE

## 2025-07-08 PROCEDURE — 1100000000 HC RM PRIVATE

## 2025-07-08 PROCEDURE — 85730 THROMBOPLASTIN TIME PARTIAL: CPT

## 2025-07-08 PROCEDURE — 84100 ASSAY OF PHOSPHORUS: CPT

## 2025-07-08 RX ORDER — LABETALOL HYDROCHLORIDE 5 MG/ML
20 INJECTION, SOLUTION INTRAVENOUS EVERY 6 HOURS
Status: DISCONTINUED | OUTPATIENT
Start: 2025-07-08 | End: 2025-07-13

## 2025-07-08 RX ADMIN — HYDROMORPHONE HYDROCHLORIDE 1.5 MG: 2 INJECTION, SOLUTION INTRAMUSCULAR; INTRAVENOUS; SUBCUTANEOUS at 09:40

## 2025-07-08 RX ADMIN — INSULIN LISPRO 8 UNITS: 100 INJECTION, SOLUTION INTRAVENOUS; SUBCUTANEOUS at 23:55

## 2025-07-08 RX ADMIN — INSULIN LISPRO 16 UNITS: 100 INJECTION, SOLUTION INTRAVENOUS; SUBCUTANEOUS at 01:33

## 2025-07-08 RX ADMIN — SODIUM CHLORIDE 2.5 MCG/KG/MIN: 0.9 INJECTION, SOLUTION INTRAVENOUS at 21:14

## 2025-07-08 RX ADMIN — HYDROMORPHONE HYDROCHLORIDE 1.5 MG: 2 INJECTION, SOLUTION INTRAMUSCULAR; INTRAVENOUS; SUBCUTANEOUS at 20:46

## 2025-07-08 RX ADMIN — SODIUM CHLORIDE, PRESERVATIVE FREE 10 ML: 5 INJECTION INTRAVENOUS at 20:40

## 2025-07-08 RX ADMIN — HYDROMORPHONE HYDROCHLORIDE 1 MG: 1 INJECTION, SOLUTION INTRAMUSCULAR; INTRAVENOUS; SUBCUTANEOUS at 16:44

## 2025-07-08 RX ADMIN — MEROPENEM 1000 MG: 1 INJECTION INTRAVENOUS at 09:09

## 2025-07-08 RX ADMIN — LABETALOL HYDROCHLORIDE 20 MG: 5 INJECTION, SOLUTION INTRAVENOUS at 13:25

## 2025-07-08 RX ADMIN — HYDROMORPHONE HYDROCHLORIDE 1 MG: 1 INJECTION, SOLUTION INTRAMUSCULAR; INTRAVENOUS; SUBCUTANEOUS at 01:33

## 2025-07-08 RX ADMIN — METOCLOPRAMIDE 5 MG: 5 INJECTION, SOLUTION INTRAMUSCULAR; INTRAVENOUS at 18:24

## 2025-07-08 RX ADMIN — VANCOMYCIN HYDROCHLORIDE 1250 MG: 10 INJECTION, POWDER, LYOPHILIZED, FOR SOLUTION INTRAVENOUS at 20:39

## 2025-07-08 RX ADMIN — HYDROMORPHONE HYDROCHLORIDE 1.5 MG: 2 INJECTION, SOLUTION INTRAMUSCULAR; INTRAVENOUS; SUBCUTANEOUS at 04:05

## 2025-07-08 RX ADMIN — METOCLOPRAMIDE 5 MG: 5 INJECTION, SOLUTION INTRAMUSCULAR; INTRAVENOUS at 09:02

## 2025-07-08 RX ADMIN — HYDROMORPHONE HYDROCHLORIDE 1 MG: 1 INJECTION, SOLUTION INTRAMUSCULAR; INTRAVENOUS; SUBCUTANEOUS at 13:21

## 2025-07-08 RX ADMIN — LABETALOL HYDROCHLORIDE 40 MG: 5 INJECTION, SOLUTION INTRAVENOUS at 04:06

## 2025-07-08 RX ADMIN — INSULIN LISPRO 4 UNITS: 100 INJECTION, SOLUTION INTRAVENOUS; SUBCUTANEOUS at 05:33

## 2025-07-08 RX ADMIN — METOCLOPRAMIDE 5 MG: 5 INJECTION, SOLUTION INTRAMUSCULAR; INTRAVENOUS at 00:49

## 2025-07-08 RX ADMIN — MICAFUNGIN SODIUM 150 MG: 50 INJECTION, POWDER, LYOPHILIZED, FOR SOLUTION INTRAVENOUS at 18:24

## 2025-07-08 RX ADMIN — ASCORBIC ACID, VITAMIN A PALMITATE, CHOLECALCIFEROL, THIAMINE HYDROCHLORIDE, RIBOFLAVIN-5 PHOSPHATE SODIUM, PYRIDOXINE HYDROCHLORIDE, NIACINAMIDE, DEXPANTHENOL, ALPHA-TOCOPHEROL ACETATE, VITAMIN K1, FOLIC ACID, BIOTIN, CYANOCOBALAMIN: 200; 3300; 200; 6; 3.6; 6; 40; 15; 10; 150; 600; 60; 5 INJECTION, SOLUTION INTRAVENOUS at 18:32

## 2025-07-08 RX ADMIN — VANCOMYCIN HYDROCHLORIDE 1250 MG: 10 INJECTION, POWDER, LYOPHILIZED, FOR SOLUTION INTRAVENOUS at 09:24

## 2025-07-08 RX ADMIN — LABETALOL HYDROCHLORIDE 40 MG: 5 INJECTION, SOLUTION INTRAVENOUS at 09:03

## 2025-07-08 RX ADMIN — LABETALOL HYDROCHLORIDE 20 MG: 5 INJECTION, SOLUTION INTRAVENOUS at 19:19

## 2025-07-08 RX ADMIN — MEROPENEM 1000 MG: 1 INJECTION INTRAVENOUS at 18:20

## 2025-07-08 RX ADMIN — MEROPENEM 1000 MG: 1 INJECTION INTRAVENOUS at 00:47

## 2025-07-08 RX ADMIN — INSULIN LISPRO 4 UNITS: 100 INJECTION, SOLUTION INTRAVENOUS; SUBCUTANEOUS at 13:17

## 2025-07-08 RX ADMIN — SODIUM CHLORIDE, PRESERVATIVE FREE 40 MG: 5 INJECTION INTRAVENOUS at 09:01

## 2025-07-08 RX ADMIN — ACETAMINOPHEN 650 MG: 325 TABLET ORAL at 18:55

## 2025-07-08 RX ADMIN — INSULIN GLARGINE 26 UNITS: 100 INJECTION, SOLUTION SUBCUTANEOUS at 09:19

## 2025-07-08 RX ADMIN — SODIUM CHLORIDE, PRESERVATIVE FREE 10 ML: 5 INJECTION INTRAVENOUS at 20:39

## 2025-07-08 RX ADMIN — SODIUM CHLORIDE, PRESERVATIVE FREE 10 ML: 5 INJECTION INTRAVENOUS at 09:01

## 2025-07-08 ASSESSMENT — PAIN DESCRIPTION - LOCATION
LOCATION: ABDOMEN

## 2025-07-08 ASSESSMENT — PAIN DESCRIPTION - DESCRIPTORS
DESCRIPTORS: ACHING
DESCRIPTORS: ACHING
DESCRIPTORS: ACHING;BURNING
DESCRIPTORS: THROBBING

## 2025-07-08 ASSESSMENT — PAIN DESCRIPTION - ORIENTATION
ORIENTATION: POSTERIOR
ORIENTATION: LEFT
ORIENTATION: ANTERIOR;MID
ORIENTATION: ANTERIOR
ORIENTATION: LEFT

## 2025-07-08 ASSESSMENT — PAIN SCALES - GENERAL
PAINLEVEL_OUTOF10: 10
PAINLEVEL_OUTOF10: 8
PAINLEVEL_OUTOF10: 10
PAINLEVEL_OUTOF10: 0
PAINLEVEL_OUTOF10: 2
PAINLEVEL_OUTOF10: 2
PAINLEVEL_OUTOF10: 10
PAINLEVEL_OUTOF10: 8
PAINLEVEL_OUTOF10: 5

## 2025-07-08 NOTE — PROGRESS NOTES
pertussis by PCR Not detected        Chlamydophila Pneumonia PCR Not detected        Mycoplasma pneumo by PCR Not detected             Labs: Results:       Chemistry Recent Labs     07/06/25  0811 07/07/25  0155 07/08/25  0124   GLUCOSE 357* 314* 337*   * 135* 135*   K 4.0 3.9 4.4   CL 98 100 99   CO2 25 26 25   BUN 12 13 13   CREATININE 0.69 0.61 0.61   CALCIUM 8.9 8.8 8.1*   BILITOT 0.5 0.3  --    AST 31 42*  --    ALT 21 31  --    ALKPHOS 57 62  --    GLOB 4.1* 4.1*  --    LABGLOM >90 >90 >90      CBC w/Diff Recent Labs     07/06/25  0811 07/07/25  0155   WBC 15.1* 13.9*   RBC 2.85* 2.65*   HGB 7.9* 7.4*   HCT 23.1* 21.5*    207   LYMPHOPCT 8* 9.3*      Cardiac Enzymes No results for input(s): \"CKTOTAL\", \"CKMB\", \"CKMBINDEX\", \"TROPONINI\" in the last 72 hours.    Invalid input(s): \"LENARD\"   Coagulation Recent Labs     07/08/25  0511 07/08/25  1014   APTT 86.6* 91.7*       Lipid Panel Lab Results   Component Value Date/Time    CHOL 234 06/01/2020 08:26 AM    TRIG 251 07/07/2025 01:55 AM    HDL 69 06/01/2020 08:26 AM    VLDL 31 06/01/2020 08:26 AM      BNP No results for input(s): \"BNP\" in the last 72 hours.   Liver Enzymes Recent Labs     07/07/25  0155   ALT 31   AST 42*   ALKPHOS 62   BILITOT 0.3   BILIDIR 0.2      Thyroid Studies Lab Results   Component Value Date/Time    TSH 0.908 11/29/2024 09:27 AM              Pertinent Radiology/Procedures:  See electronic medical records for all procedures/Xrays and details which were not copied into this note but were reviewed prior to creation of Plan    CT ABSCESS DRAINAGE W CATH PLACEMENT S&I  Result Date: 7/7/2025  PROCEDURE:  CT GUIDED DRAINAGE HISTORY: BOONE GOMES is a 60 year old Female with abdominal abscess. :  Jody Zaragoza NP ATTENDING:  Jerry Caldwell MD CONSENT:  After full discussion of the procedure, including risks, benefits and alternatives, both verbal and written consent were obtained. TECHNIQUE: A timeout was called to verify the  artery atherosclerotic calcification is visualized. Bones: No lytic or sclerotic osseous lesion is visualized. Abdomen/pelvis: Liver: There is a subcentimeter hypodensity within the right hepatic lobe, too small to further characterize, but unchanged Spleen: The spleen is normal. Pancreas: The pancreas is normal. Adrenals: The adrenals are normal. Gallbladder: Vicarious excretion of contrast is noted within the gallbladder gallbladder is otherwise normal. Kidneys: There is mild dilatation of the left and right renal collecting systems in the visualized proximal left and right ureters. Bowel: There are several dilated loops of small bowel. Degree of small bowel dilatation has improved.. Remaining small bowel loops are nondilated. No transition point is visualized. Patient is status post right and transverse colectomy. There is fluid adjacent to the anastomosis, unchanged, however there is no extravasation of oral contrast. Aorta: Within the abdominal aorta, at the level of the SMA, there is a 1 cm rounded filling defect which is unchanged compared to prior CT dated July 2, 2025. Urinary bladder: Merrill catheter is present within the urinary bladder. Bones: No lytic or sclerotic osseous lesion is visualized. Miscellaneous: There is a small amount of free intraperitoneal fluid. Within the anterior right pelvis, there is trapped fluid measuring approximately 2.4 cm x 3.2 cm and measuring approximately 5.1 cm x 4.7 cm on prior CT dated July 2, 2025.. Within the left hemipelvis, there is a 4.5 cm x 2.8 cm gas and fluid containing collection measuring approximately 3.8 cm x 1.9 cm on prior CT dated July 2, 2025. There are several masses within the uterus, consistent with fibroids.     1. Small bilateral pleural effusions and bibasilar dependent consolidation. 2. 1 cm rounded filling defect adherent to the posterior wall of the abdominal aorta at the level of the SMA, unchanged. 3. Right anterior pelvic fluid measuring 2.4

## 2025-07-08 NOTE — PROGRESS NOTES
Palliative Medicine    CODE STATUS: FULL CODE    AMD Status: none on file. Her son, Reynold, is her closest living relative.     1200 Seen today in room 109.  Lying supine with eyes closed. Did not attempt to awaken. Respirations unlabored on room air.     Left abdominal indwelling drainage catheter placed yesterday. 60 ml drained overnight.    Disposition plan: to be determined based on response to medical treatment, medical recommendations,  and patient/family decisions    Palliative Medicine will continue to follow Kiara Nance  and her family during her hospitalization and support them as they make healthcare decisions and define goals of care.    Annia Astorga, RN, MSN  Palliative Medicine  P: [phone]

## 2025-07-08 NOTE — PROGRESS NOTES
Consult for TPN Dosing per Pharmacy by Dr. Long  Consult provided for this 60 y.o. female, for indication of Nutrition  Day of Therapy 9     Weight: 82.1 kg (181 lb)    TPN dosing Weight: 54.6 kg       Labs:  BMP BMP:  Lab Results   Component Value Date/Time     07/08/2025 01:24 AM    K 4.4 07/08/2025 01:24 AM    CL 99 07/08/2025 01:24 AM    CO2 25 07/08/2025 01:24 AM    BUN 13 07/08/2025 01:24 AM    CREATININE 0.61 07/08/2025 01:24 AM    GLUCOSE 337 07/08/2025 01:24 AM    CALCIUM 8.1 07/08/2025 01:24 AM         CMP CMP:      Lab Results   Component Value Date/Time    BUN 13 07/08/2025 01:24 AM    BUN 13 07/07/2025 01:55 AM    BUN 12 07/06/2025 08:11 AM    Creatinine 0.61 07/08/2025 01:24 AM    Creatinine 0.61 07/07/2025 01:55 AM    Creatinine 0.69 07/06/2025 08:11 AM    Sodium 135 (L) 07/08/2025 01:24 AM    Sodium 135 (L) 07/07/2025 01:55 AM    Sodium 133 (L) 07/06/2025 08:11 AM    Potassium 4.4 07/08/2025 01:24 AM    Potassium 3.9 07/07/2025 01:55 AM    Potassium 4.0 07/06/2025 08:11 AM    CO2 25 07/08/2025 01:24 AM    CO2 26 07/07/2025 01:55 AM    CO2 25 07/06/2025 08:11 AM    Chloride 99 07/08/2025 01:24 AM    Chloride 100 07/07/2025 01:55 AM    Chloride 98 07/06/2025 08:11 AM    Magnesium 1.9 07/08/2025 01:24 AM    Magnesium 1.8 07/07/2025 01:55 AM    Magnesium 1.7 07/06/2025 08:11 AM    Phosphorus 3.1 07/08/2025 01:24 AM    Phosphorus 2.2 (L) 07/07/2025 01:55 AM    Phosphorus 2.6 07/06/2025 08:11 AM    AST 42 (H) 07/07/2025 01:55 AM    AST 31 07/06/2025 08:11 AM    ALT 31 07/07/2025 01:55 AM    ALT 21 07/06/2025 08:11 AM      Ca/ Phos Lab Results   Component Value Date/Time    PHOS 3.1 07/08/2025 01:24 AM       Mag    Lab Results   Component Value Date/Time    MG 1.9 07/08/2025 01:24 AM        Triglycerides   251  (7/7/25)         Kcal requirements:  25-35 kcal/kg ( 1365 - 1911 kcal/day )      Macronutrients (7/8/25) - provided by premixed Clinimix E  5%AA/15%Dextrose with electrolytes in 2000 ml

## 2025-07-08 NOTE — PLAN OF CARE
Problem: Pain  Goal: Verbalizes/displays adequate comfort level or baseline comfort level  7/8/2025 0212 by Komal Smart RN  Outcome: Progressing  7/7/2025 1719 by Pro Dangelo RN  Outcome: Progressing  Flowsheets (Taken 7/6/2025 1607 by Rica Rahman RN)  Verbalizes/displays adequate comfort level or baseline comfort level:   Encourage patient to monitor pain and request assistance   Assess pain using appropriate pain scale   Administer analgesics based on type and severity of pain and evaluate response   Implement non-pharmacological measures as appropriate and evaluate response   Notify Licensed Independent Practitioner if interventions unsuccessful or patient reports new pain     Problem: ABCDS Injury Assessment  Goal: Absence of physical injury  7/8/2025 0212 by Kmoal Smart RN  Outcome: Progressing  7/7/2025 1719 by Pro Dangelo RN  Outcome: Progressing  Flowsheets (Taken 7/3/2025 1942 by Vicky Gaspar RN)  Absence of Physical Injury: Implement safety measures based on patient assessment     Problem: Gastrointestinal - Adult  Goal: Maintains or returns to baseline bowel function  7/8/2025 0212 by Komal Smart RN  Outcome: Progressing  7/7/2025 1719 by Pro Dangelo RN  Outcome: Progressing  Flowsheets (Taken 7/6/2025 0800 by Rica Rahman RN)  Maintains or returns to baseline bowel function: Assess bowel function     Problem: Metabolic/Fluid and Electrolytes - Adult  Goal: Electrolytes maintained within normal limits  7/8/2025 0212 by Komal Smart RN  Outcome: Progressing  7/7/2025 1719 by Pro Dangelo RN  Outcome: Progressing  Flowsheets (Taken 7/6/2025 0800 by Rica Rahman RN)  Electrolytes maintained within normal limits:   Monitor labs and assess patient for signs and symptoms of electrolyte imbalances   Administer electrolyte replacement as ordered   Monitor response to electrolyte replacements, including repeat lab results as appropriate

## 2025-07-08 NOTE — PROGRESS NOTES
[facility]   Pharmacy Pharmacokinetic Monitoring Service - Vancomycin    Consulting Provider:  KARO Hatch  Indication: Sepsis  Target Concentration: Goal AUC/MICHAEL 400-600 mg*hr/L  Day of Therapy: 9  Additional Antimicrobials: meropenem / micafungin    Pertinent Laboratory Values:   Wt Readings from Last 1 Encounters:   07/06/25 82.1 kg (181 lb)     Temp Readings from Last 1 Encounters:   07/08/25 98.6 °F (37 °C) (Oral)     Estimated Creatinine Clearance: 91 mL/min (based on SCr of 0.61 mg/dL).  Recent Labs     07/06/25  0811 07/07/25  0155 07/08/25  0124   CREATININE 0.69 0.61 0.61   BUN 12 13 13   WBC 15.1* 13.9*  --      Recent vancomycin administrations                     vancomycin (VANCOCIN) 1250 mg in sodium chloride 0.9% 250 mL IVPB (mg) 1,250 mg New Bag 07/08/25 0924     1,250 mg New Bag 07/07/25 2124    Vancomycin random level due 7/8/25 with am labs ()  Given 07/08/25 0407    Vancomycin random level due 7/7/25 with am labs ()  Given 07/07/25 0519    vancomycin (VANCOCIN) 1,000 mg in sodium chloride 0.9 % 250 mL (addEASE) IVPB (mg) 1,000 mg New Bag 07/06/25 2258     1,000 mg New Bag  1035     1,000 mg New Bag  0001                  Assessment:  Date/Time Current Dose Concentration Timing of Concentration (h) AUC (ss)   7/8/25 at 12:55 Vancomycin 1250 mg IV q12h Random level = 15.6  ~ 4 hours after prior dose 446 mg/L.hr   Note: Serum concentrations collected for AUC dosing may appear elevated if collected in close proximity to the dose administered, this is not necessarily an indication of toxicity    Plan:  Current dosing regimen is therapeutic  Continue current dose:  Vancomycin 1250 mg IV q12h        Est AUC (ss): 446 mg/L.hr   Est trough (ss): 10.7 mg/L  Pharmacy will continue to monitor patient and adjust therapy as indicated      Brittaney Keys, PharmD  7/8/2025 12:56 PM

## 2025-07-08 NOTE — PROGRESS NOTES
POD#10  No new surg issues, nicolas some liquids w NGT clamped  Afeb, tachy improving    Abd - distension and BS cont to slowly improve, Incision clean    WBC improved, e'lytes ok, gluc still elev    Slowly improving s/p exp lap, postop ileus slowly resolving  S/p IR drainage yest - f/u cx  Poss remove NGT tomorrow if exam cont to improve  Cont IM/ICU/ID care

## 2025-07-08 NOTE — PROGRESS NOTES
FINDINGS: The ventricles and cortical sulci are within normal limits. No evidence for acute intracranial hemorrhage, midline shift, or mass effect. Mild bilateral subcortical and periventricular areas of patchy low attenuation is nonspecific but likely related to changes of chronic small vessel ischemic disease. The basal cisterns are patent. The visualized paranasal sinuses and mastoid air cells are clear. No calvarial abnormality.     No evidence for acute intracranial abnormality Electronically signed by Jerry Caldwell    CT CHEST ABDOMEN PELVIS W CONTRAST Additional Contrast? Oral  Result Date: 7/6/2025  INDICATION: Sepsis, status post surgery, fever. CT of the chest, abdomen and pelvis is performed with 5 mm collimation. Study is performed with PO and 100 cc of nonionic IV Isovue-370. Sagittal and coronal reformatted images were also performed. CT dose reduction was achieved with the use of the standardized protocol tailored for this examination and automatic exposure control for dose modulation. Direct comparison is made to prior CT dated July 2, 2025. Chest: Tubes and lines: NG tube extends to the stomach. Left-sided PICC line extends to the distal SVC. Lungs/ pleura: There are small bilateral pleural effusions and there is bibasilar dependent consolidation. Lymph nodes: There is no mediastinal, hilar or axillary lymphadenopathy. Heart: The heart is normal in size and there is no pericardial fluid. No coronary artery atherosclerotic calcification is visualized. Bones: No lytic or sclerotic osseous lesion is visualized. Abdomen/pelvis: Liver: There is a subcentimeter hypodensity within the right hepatic lobe, too small to further characterize, but unchanged Spleen: The spleen is normal. Pancreas: The pancreas is normal. Adrenals: The adrenals are normal. Gallbladder: Vicarious excretion of contrast is noted within the gallbladder gallbladder is otherwise normal. Kidneys: There is mild dilatation of the left  demonstrates mild rim enhancement and a locule of internal gas. RETROPERITONEUM: No lymphadenopathy or aortic aneurysm. REPRODUCTIVE ORGANS: Enlarged fibroid uterus. URINARY BLADDER: Decompressed by a Merrill catheter. BONES: No destructive bone lesion. ABDOMINAL WALL: Midline laparotomy. ADDITIONAL COMMENTS: N/A     CHEST: 1.  No pulmonary embolism. 2.  Bilateral pleural effusions have worsened compared to CT from 6/30/2025. Atelectasis of nearly the entire right lower lobe and multiple segments of the right middle and left lower lobes. ABDOMEN and PELVIS: 3.  A 13 mm filling defect in the abdominal aorta is new. It is adherent to the posterior wall of the aorta at the level of the superior mesenteric artery. This could reflect rapidly progressive mural thrombus or large adherent embolus, although would be an atypical location for an embolus. No other thrombi or emboli are seen. No evidence of bowel ischemia. 4.  Surgical changes of a small bowel resection with anastomosis in the right lower quadrant. 5.  Just inferior to the anastomosis there is a 5.5 cm fluid collection which could reflect developing abscess. 6.  Dilated loops of small bowel have worsened. There is still no discrete transition point, and this may reflect ongoing ileus. Oral contrast from a prior exam has progressed to the rectum. 7.  Locules of gas in the left paracolic gutter are unchanged. 8.  Distended gallbladder without other evidence of acute cholecystitis on CT. However, if there is a strong concern for cholecystitis, would obtain gallbladder ultrasound. 9.  Unchanged mild left hydronephrosis and hydroureter to the level of the UVJ. No obstructing mass or stone. 10.  Enlarged fibroid uterus. Electronically signed by Saloni Hector    CTA ABDOMEN PELVIS W CONTRAST  Result Date: 7/2/2025  EXAM:  CTA CHEST W WO CONTRAST, CTA ABDOMEN PELVIS W CONTRAST INDICATION: Tachycardia, hypotension, and lactic acidosis post surgery evaluate for PE, ischemic

## 2025-07-08 NOTE — PLAN OF CARE
Problem: Pain  Goal: Verbalizes/displays adequate comfort level or baseline comfort level  Outcome: Progressing     Problem: ABCDS Injury Assessment  Goal: Absence of physical injury  Outcome: Progressing     Problem: Gastrointestinal - Adult  Goal: Maintains or returns to baseline bowel function  Outcome: Progressing  Flowsheets  Taken 7/8/2025 1430 by Lukas Ledezma RN  Maintains or returns to baseline bowel function:   Assess bowel function   Encourage oral fluids to ensure adequate hydration   Administer IV fluids as ordered to ensure adequate hydration   Administer ordered medications as needed  Taken 7/8/2025 0800 by John Perera RN  Maintains or returns to baseline bowel function:   Assess bowel function   Encourage oral fluids to ensure adequate hydration   Administer IV fluids as ordered to ensure adequate hydration   Administer ordered medications as needed   Encourage mobilization and activity   Nutrition consult to assist patient with appropriate food choices     Problem: Metabolic/Fluid and Electrolytes - Adult  Goal: Electrolytes maintained within normal limits  Outcome: Progressing  Flowsheets  Taken 7/8/2025 1430 by Lukas Ledezma RN  Electrolytes maintained within normal limits:   Monitor labs and assess patient for signs and symptoms of electrolyte imbalances   Administer electrolyte replacement as ordered   Monitor response to electrolyte replacements, including repeat lab results as appropriate  Taken 7/8/2025 0800 by John Perera RN  Electrolytes maintained within normal limits:   Monitor labs and assess patient for signs and symptoms of electrolyte imbalances   Administer electrolyte replacement as ordered   Monitor response to electrolyte replacements, including repeat lab results as appropriate   Fluid restriction as ordered   Instruct patient on fluid and nutrition restrictions as appropriate     Problem: Safety - Adult  Goal: Free from fall injury  Outcome:

## 2025-07-08 NOTE — PROGRESS NOTES
Comprehensive High Risk Nutrition Assessment Follow-Up    Type and Reason for Visit:  Reassess    Nutrition Recommendations/Plan:   Reassess if becomes intubated  If confused would get swallow SLP eval for PO safety   Noted NGT clamped cont to monitor GI status, nicolas PO and ability to adv diet  Cont TPN as nicolas ?consider add lipid back in tmrw   Cont monitor ck Tg, bilirubins, LFTs monitor hold vs cycle lipid if Tg >=400-500  Cont to check Phos, Mg, K replete as needed   Optimize BG levels -- ?increasing lantus / SSI as needed while on TPN - defer to MD  Saeed to monitor POC/NCP, wt trends, renal fx, lytes, UOP, bowel fx, skin integrity/wound healing and adjust recs as needed     Malnutrition Assessment:  Malnutrition Status:  At risk for malnutrition (07/08/25 1359)      Nutrition Assessment:    61yo F remains in ICU with bacteremia, SBO, heparin-induced thrombocytopenia, aortic thrombus, anemia, on argatroban, TPN gtt, no n/v per MD noted. discussed pt in IDR today. nursing reports improved mentation, +sitter, NGT clamp, no BM, +bowel sounds, +byrne, no pressors. s/p drain abd abscess 7/7. started clears today. remains on TPN day 9 mostly meeting needs. holding lipid today r/t Tg 251 and cont decreased TPN rate r/t elevated BG per pharmacy noted discussed w/MD. no PI per wound eval. overall wt is up from admit if correct ?fluid vs accuracy. good UOP. On admit wt appeared trends up from wt hx if correct wt hx trends ~66-68kg Nov 2024-April 2025.     Nutrition Related Findings:      Wound Type: None       Current Nutrition Intake & Therapies:    Average Meal Intake:  (clears)  Average Supplements Intake: None Ordered  TPN-Adult Premix 5/15 - Standard Electrolytes - Central Line  ADULT DIET; Clear Liquid  PN-Adult Premix 5/15 - Standard Electrolytes - Central Line  Current Parenteral Nutrition Orders: Day 9  \"[Macronutrients (7/8/25) - provided by premixed Clinimix E  5%AA/15%Dextrose with electrolytes in 2000 ml

## 2025-07-08 NOTE — WOUND CARE
ICU Rounding  Wound care nurse rounded on patient for skin issues.  Patient has a Jabari score of 14.  Does patient have any pressure injury?no per primary nurse LESLIE Lopez     Skin Care & Pressure Relief Recommendations  Minimize layers of linen  Pads under patient to optimize support surface  Turn/reposition approximately every 2 hours  Use pillow wedges to maintain positioning but do not put pillow directly over wounds  Manage incontinence   Promote continence; Skin Protective lotion/cream to buttocks and sacrum daily and as needed with incontinence care  Offload heels pillows    Consult wound care if any wounds noted during admission.  Wound Care nurse will continue to follow during ICU admission.

## 2025-07-09 ENCOUNTER — APPOINTMENT (OUTPATIENT)
Facility: HOSPITAL | Age: 60
DRG: 230 | End: 2025-07-09
Payer: MEDICAID

## 2025-07-09 LAB
ALBUMIN SERPL-MCNC: 1.7 G/DL (ref 3.4–5)
ALBUMIN/GLOB SERPL: 0.4 (ref 0.8–1.7)
ALP SERPL-CCNC: 61 U/L (ref 45–117)
ALT SERPL-CCNC: 38 U/L (ref 10–35)
ANION GAP SERPL CALC-SCNC: 9 MMOL/L (ref 3–18)
AST SERPL-CCNC: 41 U/L (ref 10–38)
BACTERIA SPEC CULT: NORMAL
BASOPHILS # BLD: 0.04 K/UL (ref 0–0.1)
BASOPHILS NFR BLD: 0.4 % (ref 0–2)
BILIRUB SERPL-MCNC: 0.2 MG/DL (ref 0.2–1)
BUN SERPL-MCNC: 13 MG/DL (ref 6–23)
BUN/CREAT SERPL: 24 (ref 12–20)
CA-I SERPL-SCNC: 1.22 MMOL/L (ref 1.12–1.32)
CALCIUM SERPL-MCNC: 8.3 MG/DL (ref 8.5–10.1)
CHLORIDE SERPL-SCNC: 99 MMOL/L (ref 98–107)
CO2 SERPL-SCNC: 27 MMOL/L (ref 21–32)
CREAT SERPL-MCNC: 0.55 MG/DL (ref 0.6–1.3)
DIFFERENTIAL METHOD BLD: ABNORMAL
EOSINOPHIL # BLD: 0.17 K/UL (ref 0–0.4)
EOSINOPHIL NFR BLD: 1.6 % (ref 0–5)
ERYTHROCYTE [DISTWIDTH] IN BLOOD BY AUTOMATED COUNT: 14.3 % (ref 11.6–14.5)
FUNGITELL INTERPRETATION: NORMAL
FUNGITELL: <31.25 PG/ML
GLOBULIN SER CALC-MCNC: 4.4 G/DL (ref 2–4)
GLUCOSE BLD STRIP.AUTO-MCNC: 167 MG/DL (ref 70–110)
GLUCOSE BLD STRIP.AUTO-MCNC: 212 MG/DL (ref 70–110)
GLUCOSE BLD STRIP.AUTO-MCNC: 238 MG/DL (ref 70–110)
GLUCOSE BLD STRIP.AUTO-MCNC: 242 MG/DL (ref 70–110)
GLUCOSE BLD STRIP.AUTO-MCNC: 280 MG/DL (ref 70–110)
GLUCOSE SERPL-MCNC: 228 MG/DL (ref 74–108)
HCT VFR BLD AUTO: 19.7 % (ref 35–45)
HCT VFR BLD AUTO: 23.2 % (ref 35–45)
HGB BLD-MCNC: 6.7 G/DL (ref 12–16)
HGB BLD-MCNC: 8 G/DL (ref 12–16)
HISTORY CHECK: NORMAL
IMM GRANULOCYTES # BLD AUTO: 0.14 K/UL (ref 0–0.04)
IMM GRANULOCYTES NFR BLD AUTO: 1.3 % (ref 0–0.5)
LYMPHOCYTES # BLD: 0.91 K/UL (ref 0.9–3.3)
LYMPHOCYTES NFR BLD: 8.5 % (ref 21–52)
Lab: NORMAL
MAGNESIUM SERPL-MCNC: 2 MG/DL (ref 1.6–2.6)
MCH RBC QN AUTO: 27.9 PG (ref 24–34)
MCHC RBC AUTO-ENTMCNC: 34 G/DL (ref 31–37)
MCV RBC AUTO: 82.1 FL (ref 78–100)
MONOCYTES # BLD: 0.41 K/UL (ref 0.05–1.2)
MONOCYTES NFR BLD: 3.8 % (ref 3–10)
NEUTS SEG # BLD: 8.99 K/UL (ref 1.8–8)
NEUTS SEG NFR BLD: 84.4 % (ref 40–73)
NRBC # BLD: 0.04 K/UL (ref 0–0.01)
NRBC BLD-RTO: 0.4 PER 100 WBC
PHOSPHATE SERPL-MCNC: 2.9 MG/DL (ref 2.5–4.9)
PLATELET # BLD AUTO: 421 K/UL (ref 135–420)
PMV BLD AUTO: 11.3 FL (ref 9.2–11.8)
POTASSIUM SERPL-SCNC: 4.2 MMOL/L (ref 3.5–5.5)
PROT SERPL-MCNC: 6 G/DL (ref 6.4–8.2)
RBC # BLD AUTO: 2.4 M/UL (ref 4.2–5.3)
REFERENCE VALUE: NORMAL
RESULT: NEGATIVE
SERVICE CMNT-IMP: NORMAL
SODIUM SERPL-SCNC: 135 MMOL/L (ref 136–145)
WBC # BLD AUTO: 11.1 K/UL (ref 4.6–13.2)

## 2025-07-09 PROCEDURE — 6360000002 HC RX W HCPCS: Performed by: HOSPITALIST

## 2025-07-09 PROCEDURE — 86923 COMPATIBILITY TEST ELECTRIC: CPT

## 2025-07-09 PROCEDURE — 6360000002 HC RX W HCPCS: Performed by: SURGERY

## 2025-07-09 PROCEDURE — 74018 RADEX ABDOMEN 1 VIEW: CPT

## 2025-07-09 PROCEDURE — 2500000003 HC RX 250 WO HCPCS: Performed by: INTERNAL MEDICINE

## 2025-07-09 PROCEDURE — 6370000000 HC RX 637 (ALT 250 FOR IP): Performed by: INTERNAL MEDICINE

## 2025-07-09 PROCEDURE — 6360000002 HC RX W HCPCS: Performed by: INTERNAL MEDICINE

## 2025-07-09 PROCEDURE — 82330 ASSAY OF CALCIUM: CPT

## 2025-07-09 PROCEDURE — P9016 RBC LEUKOCYTES REDUCED: HCPCS

## 2025-07-09 PROCEDURE — 97530 THERAPEUTIC ACTIVITIES: CPT

## 2025-07-09 PROCEDURE — 85014 HEMATOCRIT: CPT

## 2025-07-09 PROCEDURE — 1100000003 HC PRIVATE W/ TELEMETRY

## 2025-07-09 PROCEDURE — 2580000003 HC RX 258: Performed by: INTERNAL MEDICINE

## 2025-07-09 PROCEDURE — 85025 COMPLETE CBC W/AUTO DIFF WBC: CPT

## 2025-07-09 PROCEDURE — 2500000003 HC RX 250 WO HCPCS: Performed by: HOSPITALIST

## 2025-07-09 PROCEDURE — 97162 PT EVAL MOD COMPLEX 30 MIN: CPT

## 2025-07-09 PROCEDURE — 85018 HEMOGLOBIN: CPT

## 2025-07-09 PROCEDURE — 86901 BLOOD TYPING SEROLOGIC RH(D): CPT

## 2025-07-09 PROCEDURE — 82962 GLUCOSE BLOOD TEST: CPT

## 2025-07-09 PROCEDURE — 80053 COMPREHEN METABOLIC PANEL: CPT

## 2025-07-09 PROCEDURE — 97116 GAIT TRAINING THERAPY: CPT

## 2025-07-09 PROCEDURE — 97535 SELF CARE MNGMENT TRAINING: CPT

## 2025-07-09 PROCEDURE — P9047 ALBUMIN (HUMAN), 25%, 50ML: HCPCS | Performed by: INTERNAL MEDICINE

## 2025-07-09 PROCEDURE — 83735 ASSAY OF MAGNESIUM: CPT

## 2025-07-09 PROCEDURE — 97166 OT EVAL MOD COMPLEX 45 MIN: CPT

## 2025-07-09 PROCEDURE — 30233N1 TRANSFUSION OF NONAUTOLOGOUS RED BLOOD CELLS INTO PERIPHERAL VEIN, PERCUTANEOUS APPROACH: ICD-10-PCS | Performed by: INTERNAL MEDICINE

## 2025-07-09 PROCEDURE — 36430 TRANSFUSION BLD/BLD COMPNT: CPT

## 2025-07-09 PROCEDURE — 86850 RBC ANTIBODY SCREEN: CPT

## 2025-07-09 PROCEDURE — 71045 X-RAY EXAM CHEST 1 VIEW: CPT

## 2025-07-09 PROCEDURE — 84100 ASSAY OF PHOSPHORUS: CPT

## 2025-07-09 PROCEDURE — 86900 BLOOD TYPING SEROLOGIC ABO: CPT

## 2025-07-09 RX ORDER — ALBUMIN (HUMAN) 12.5 G/50ML
25 SOLUTION INTRAVENOUS ONCE
Status: COMPLETED | OUTPATIENT
Start: 2025-07-09 | End: 2025-07-09

## 2025-07-09 RX ORDER — FUROSEMIDE 10 MG/ML
20 INJECTION INTRAMUSCULAR; INTRAVENOUS ONCE
Status: COMPLETED | OUTPATIENT
Start: 2025-07-09 | End: 2025-07-09

## 2025-07-09 RX ORDER — SODIUM CHLORIDE 9 MG/ML
INJECTION, SOLUTION INTRAVENOUS PRN
Status: DISCONTINUED | OUTPATIENT
Start: 2025-07-09 | End: 2025-07-15 | Stop reason: HOSPADM

## 2025-07-09 RX ADMIN — VANCOMYCIN HYDROCHLORIDE 1250 MG: 10 INJECTION, POWDER, LYOPHILIZED, FOR SOLUTION INTRAVENOUS at 10:28

## 2025-07-09 RX ADMIN — LABETALOL HYDROCHLORIDE 20 MG: 5 INJECTION, SOLUTION INTRAVENOUS at 03:45

## 2025-07-09 RX ADMIN — ASCORBIC ACID, VITAMIN A PALMITATE, CHOLECALCIFEROL, THIAMINE HYDROCHLORIDE, RIBOFLAVIN-5 PHOSPHATE SODIUM, PYRIDOXINE HYDROCHLORIDE, NIACINAMIDE, DEXPANTHENOL, ALPHA-TOCOPHEROL ACETATE, VITAMIN K1, FOLIC ACID, BIOTIN, CYANOCOBALAMIN: 200; 3300; 200; 6; 3.6; 6; 40; 15; 10; 150; 600; 60; 5 INJECTION, SOLUTION INTRAVENOUS at 19:02

## 2025-07-09 RX ADMIN — SODIUM CHLORIDE, PRESERVATIVE FREE 40 MG: 5 INJECTION INTRAVENOUS at 09:51

## 2025-07-09 RX ADMIN — HYDROMORPHONE HYDROCHLORIDE 1 MG: 1 INJECTION, SOLUTION INTRAMUSCULAR; INTRAVENOUS; SUBCUTANEOUS at 19:06

## 2025-07-09 RX ADMIN — SODIUM CHLORIDE, PRESERVATIVE FREE 10 ML: 5 INJECTION INTRAVENOUS at 20:30

## 2025-07-09 RX ADMIN — LABETALOL HYDROCHLORIDE 20 MG: 5 INJECTION, SOLUTION INTRAVENOUS at 15:08

## 2025-07-09 RX ADMIN — FUROSEMIDE 20 MG: 10 INJECTION, SOLUTION INTRAMUSCULAR; INTRAVENOUS at 13:47

## 2025-07-09 RX ADMIN — HYDROMORPHONE HYDROCHLORIDE 1 MG: 1 INJECTION, SOLUTION INTRAMUSCULAR; INTRAVENOUS; SUBCUTANEOUS at 22:26

## 2025-07-09 RX ADMIN — INSULIN LISPRO 4 UNITS: 100 INJECTION, SOLUTION INTRAVENOUS; SUBCUTANEOUS at 06:30

## 2025-07-09 RX ADMIN — LABETALOL HYDROCHLORIDE 20 MG: 5 INJECTION, SOLUTION INTRAVENOUS at 18:17

## 2025-07-09 RX ADMIN — ACETAMINOPHEN 650 MG: 325 TABLET ORAL at 10:01

## 2025-07-09 RX ADMIN — MEROPENEM 1000 MG: 1 INJECTION INTRAVENOUS at 09:55

## 2025-07-09 RX ADMIN — HYDROMORPHONE HYDROCHLORIDE 1 MG: 1 INJECTION, SOLUTION INTRAMUSCULAR; INTRAVENOUS; SUBCUTANEOUS at 15:10

## 2025-07-09 RX ADMIN — INSULIN LISPRO 4 UNITS: 100 INJECTION, SOLUTION INTRAVENOUS; SUBCUTANEOUS at 12:06

## 2025-07-09 RX ADMIN — METOCLOPRAMIDE 5 MG: 5 INJECTION, SOLUTION INTRAMUSCULAR; INTRAVENOUS at 01:11

## 2025-07-09 RX ADMIN — MICAFUNGIN SODIUM 150 MG: 50 INJECTION, POWDER, LYOPHILIZED, FOR SOLUTION INTRAVENOUS at 19:05

## 2025-07-09 RX ADMIN — METOCLOPRAMIDE 5 MG: 5 INJECTION, SOLUTION INTRAMUSCULAR; INTRAVENOUS at 23:51

## 2025-07-09 RX ADMIN — ALBUMIN (HUMAN) 25 G: 0.25 INJECTION, SOLUTION INTRAVENOUS at 13:47

## 2025-07-09 RX ADMIN — VANCOMYCIN HYDROCHLORIDE 1250 MG: 10 INJECTION, POWDER, LYOPHILIZED, FOR SOLUTION INTRAVENOUS at 22:18

## 2025-07-09 RX ADMIN — SODIUM CHLORIDE, PRESERVATIVE FREE 10 ML: 5 INJECTION INTRAVENOUS at 09:52

## 2025-07-09 RX ADMIN — MEROPENEM 1000 MG: 1 INJECTION INTRAVENOUS at 01:10

## 2025-07-09 RX ADMIN — ACETAMINOPHEN 650 MG: 325 TABLET ORAL at 20:33

## 2025-07-09 RX ADMIN — HYDROMORPHONE HYDROCHLORIDE 1 MG: 1 INJECTION, SOLUTION INTRAMUSCULAR; INTRAVENOUS; SUBCUTANEOUS at 09:44

## 2025-07-09 RX ADMIN — LABETALOL HYDROCHLORIDE 20 MG: 5 INJECTION, SOLUTION INTRAVENOUS at 09:39

## 2025-07-09 RX ADMIN — HYDROMORPHONE HYDROCHLORIDE 1.5 MG: 2 INJECTION, SOLUTION INTRAMUSCULAR; INTRAVENOUS; SUBCUTANEOUS at 05:44

## 2025-07-09 RX ADMIN — INSULIN GLARGINE 26 UNITS: 100 INJECTION, SOLUTION SUBCUTANEOUS at 09:38

## 2025-07-09 RX ADMIN — MEROPENEM 1000 MG: 1 INJECTION INTRAVENOUS at 17:40

## 2025-07-09 RX ADMIN — INSULIN LISPRO 4 UNITS: 100 INJECTION, SOLUTION INTRAVENOUS; SUBCUTANEOUS at 18:26

## 2025-07-09 RX ADMIN — HYDRALAZINE HYDROCHLORIDE 20 MG: 20 INJECTION INTRAMUSCULAR; INTRAVENOUS at 20:35

## 2025-07-09 RX ADMIN — METOCLOPRAMIDE 5 MG: 5 INJECTION, SOLUTION INTRAMUSCULAR; INTRAVENOUS at 09:55

## 2025-07-09 RX ADMIN — HYDROMORPHONE HYDROCHLORIDE 1.5 MG: 2 INJECTION, SOLUTION INTRAMUSCULAR; INTRAVENOUS; SUBCUTANEOUS at 01:11

## 2025-07-09 RX ADMIN — METOCLOPRAMIDE 5 MG: 5 INJECTION, SOLUTION INTRAMUSCULAR; INTRAVENOUS at 17:37

## 2025-07-09 ASSESSMENT — PAIN SCALES - GENERAL
PAINLEVEL_OUTOF10: 0
PAINLEVEL_OUTOF10: 3
PAINLEVEL_OUTOF10: 0
PAINLEVEL_OUTOF10: 10
PAINLEVEL_OUTOF10: 8
PAINLEVEL_OUTOF10: 9
PAINLEVEL_OUTOF10: 10
PAINLEVEL_OUTOF10: 4
PAINLEVEL_OUTOF10: 9
PAINLEVEL_OUTOF10: 10

## 2025-07-09 ASSESSMENT — PAIN DESCRIPTION - LOCATION
LOCATION: ABDOMEN
LOCATION: ABDOMEN
LOCATION: ABDOMEN;BACK
LOCATION: ABDOMEN
LOCATION: ABDOMEN
LOCATION: BACK;ABDOMEN

## 2025-07-09 ASSESSMENT — PAIN DESCRIPTION - PAIN TYPE: TYPE: SURGICAL PAIN

## 2025-07-09 ASSESSMENT — PAIN DESCRIPTION - DESCRIPTORS
DESCRIPTORS: THROBBING
DESCRIPTORS: STABBING;DISCOMFORT;TENDER
DESCRIPTORS: STABBING;SHARP
DESCRIPTORS: THROBBING

## 2025-07-09 ASSESSMENT — PAIN DESCRIPTION - ORIENTATION
ORIENTATION: MID
ORIENTATION: POSTERIOR
ORIENTATION: MID
ORIENTATION: POSTERIOR;ANTERIOR

## 2025-07-09 ASSESSMENT — PAIN DESCRIPTION - ONSET: ONSET: ON-GOING

## 2025-07-09 NOTE — PROGRESS NOTES
Occupational Therapy Goals:  Initiated 7/9/2025 to be met within 7-10 days.  Short Term Goals  Time Frame for Short Term Goals: 7 days  Short Term Goal 1: Patient will complete toilet transfer with SBA  Short Term Goal 2: Patient will complete bathing with SBA  Short Term Goal 3: Patient will complete grooming in standing at sink with SBA  Short Term Goal 4: Patient will complete dressing with SBA  Short Term Goal 5: Patient will complete toileting with SBA    OCCUPATIONAL THERAPY EVALUATION/TREATMENT    Patient: Kiara Nance (60 y.o. female)  Date: 7/9/2025  Primary Diagnosis: SBO (small bowel obstruction) ([facility]) [K56.609]  Procedure(s) (LRB):  LAPAROTOMY EXPLORATORY, LYSIS OF ADHESIONS,  SMALL BOWEL RESECTION (N/A) 10 Days Post-Op   Precautions: Isolation, Contact Precautions (Droplet +),  ,  ,  ,  , Spinal Precautions: No Bending, No Lifting, No Twisting (s/p abdominal surgery),  ,               PLOF: Patient completed ADLs independently with No device    ASSESSMENT :  RN cleared patient for participation in OT evaluation. Patient presented supine in bed with gripper socks, drain, cardiac monitor, pulse oximeter, and IV line. Patient with no visitors present for entire session. Patient completed assessment of ADLs and BUE strength, ROM (see details below). Due to deficits (listed below) patient has decreased independence with ADLs and functional mobility and would benefit from continued occupational therapy services.  ,    TREATMENT:  Patient completed supine to sit via log roll. Patient completed sit to stand with RW, transferred [place]  [facility] Produced urine and bowel movement. Patient unable to reach to wipe from behind. Assist for hygiene. Completed hand hygiene in sitting. Patient returned to bed, returned to supine via log roll. Patient left with call light and needs in reach.    DEFICITS/IMPAIRMENTS:  Performance deficits / Impairments: Decreased functional mobility ;Decreased high-level IADLs;Decreased ADL

## 2025-07-09 NOTE — PROGRESS NOTES
Hematology / Oncology Initial Consult Note    Admit Date: 6/25/2025    Reason for Consult: Anticoagulation recommendations/thrombocytopenia    Requesting Physician: Dr. Long    Assessment:     Kiara Nance is a 60 y.o., Black / , female, who I have been asked to see for thrombocytopenia.     Principal Problem:    SBO (small bowel obstruction) (HCC)  Active Problems:    Aortic thrombus (HCC)    Benign essential HTN    Hypokalemia    Asthma    Acute renal failure    Hydronephrosis    Sepsis (HCC)    Metabolic acidosis    Hypotension    Adenovirus infection    Bacteremia    Status post surgery    Anemia    Heparin induced thrombocytopenia  Resolved Problems:    * No resolved hospital problems. *    Aortic filling defect/mural thrombus: noted on CT 7/2/25. Vascular surgery was consulted with no recommendation for acute intervention, but recommended anticoagulation.   Thrombocytopenia: noted since 7/1/25. She had been on lovenox since 6/25/25. 4T HIT score is high with timing of platelet drop 5 days after Lovenox, decrease to 50% of platelets and akiko>20, and likely new thrombosis. It is very reasonable to do argatroban as HIT is possible. Will follow screening test and then do VIKASH testing to confirm. Drug-induced thrombocytopenia from Meropenem is possible, but this was just started on 7/2/25 and platelet decrease predated the start of medication. She is tachycardic and has E coli bacteremia, so DIC is possible. Review of peripheral smear with no evidence of schistocytes to suggest TTP.   Sepsis and E coli bacteremia: on Vanc and Meropenem. ID on board. 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 Recommend continuing argatroban. Pending VIKASH  - Platelets now elevated at 421- reactive  - Once she no longer has procedures, can transition argatroban  days. Max Daily Amount: 50 mg 24 Yes Lin Cardenas APRN - NP   simvastatin (ZOCOR) 5 MG tablet Take 1 tablet by mouth nightly 10/28/24  Yes Nae Kaiser APRN - NP   hydroCHLOROthiazide (HYDRODIURIL) 25 MG tablet Take 2 tablets by mouth every morning 10/28/24  Yes Nae Kaiser APRN - NP   butalbital-acetaminophen-caffeine (FIORICET, ESGIC) -40 MG per tablet Take 1 tablet by mouth every 6 hours as needed for Headaches 10/28/24  Yes Nae Kaiser APRN - NP   propranolol (INDERAL LA) 80 MG extended release capsule Take 1 capsule by mouth daily 25   Lin Cardenas APRN - NP   fluticasone (FLOVENT HFA) 220 MCG/ACT inhaler Inhale 1 puff into the lungs 2 times daily  Patient not taking: Reported on 2025 10/28/24   Nae Kaiser APRN - NP       Allergies   Allergen Reactions    Meloxicam Hives    Nsaids Hives, Itching and Nausea Only    Sulfa Antibiotics Hives, Itching and Nausea Only    Aspirin     Gabapentin Other (See Comments)     Hives and itching  Other reaction(s): Other (see comments)  Hives and itching         Physical Exam:    Temp (24hrs), Av.9 °F (37.2 °C), Min:98 °F (36.7 °C), Max:100.7 °F (38.2 °C)  VSIP@IOBRIEF      General: Alert , Oriented, in no distress  HEENT: no pallor, anicteric sclera, oral pharynx without lesion   No cervical, supraclavicular, axillary and inguinal lymphadenopathy palpated  Heart: regular rate, and rhythm, without murmur, gallop or rubbing  Lungs:breathing comfortably on room air, clear to auscultation and percussion bilaterally  ABD: bowel sound present, soft, nondistended, nontender, no hepatosplenomegaly or mass  Extremities: warm, well perfused, no edema  MSK: no tenderness along the spine or long bones  Skin: No rash  Neuro: non-focal    Imaging:   CHEST:  1.  No pulmonary embolism.  2.  Bilateral pleural effusions have worsened compared to CT from 2025.  Atelectasis of nearly the entire right lower lobe

## 2025-07-09 NOTE — PROGRESS NOTES
0430 - HgB 6.7,  MD notified, orders received to transfuse 1 unit. Patient is A&Ox4, gave consent to receive blood transfusing, signed consent in patient's chart. Agatroban stopped per MD.

## 2025-07-09 NOTE — PROGRESS NOTES
Consult for TPN Dosing per Pharmacy by Dr. Long  Consult provided for this 60 y.o. female, for indication of Nutrition  Day of Therapy 10     Weight: 82.1 kg (181 lb)    TPN dosing Weight: 54.6 kg       Labs:  BMP BMP:  Lab Results   Component Value Date/Time     07/09/2025 03:52 AM    K 4.2 07/09/2025 03:52 AM    CL 99 07/09/2025 03:52 AM    CO2 27 07/09/2025 03:52 AM    BUN 13 07/09/2025 03:52 AM    CREATININE 0.55 07/09/2025 03:52 AM    GLUCOSE 228 07/09/2025 03:52 AM    CALCIUM 8.3 07/09/2025 03:52 AM         CMP CMP:      Lab Results   Component Value Date/Time    BUN 13 07/09/2025 03:52 AM    BUN 13 07/08/2025 01:24 AM    BUN 13 07/07/2025 01:55 AM    Creatinine 0.55 (L) 07/09/2025 03:52 AM    Creatinine 0.61 07/08/2025 01:24 AM    Creatinine 0.61 07/07/2025 01:55 AM    Sodium 135 (L) 07/09/2025 03:52 AM    Sodium 135 (L) 07/08/2025 01:24 AM    Sodium 135 (L) 07/07/2025 01:55 AM    Potassium 4.2 07/09/2025 03:52 AM    Potassium 4.4 07/08/2025 01:24 AM    Potassium 3.9 07/07/2025 01:55 AM    CO2 27 07/09/2025 03:52 AM    CO2 25 07/08/2025 01:24 AM    CO2 26 07/07/2025 01:55 AM    Chloride 99 07/09/2025 03:52 AM    Chloride 99 07/08/2025 01:24 AM    Chloride 100 07/07/2025 01:55 AM    Magnesium 2.0 07/09/2025 03:52 AM    Magnesium 1.9 07/08/2025 01:24 AM    Magnesium 1.8 07/07/2025 01:55 AM    Phosphorus 2.9 07/09/2025 03:52 AM    Phosphorus 3.1 07/08/2025 01:24 AM    Phosphorus 2.2 (L) 07/07/2025 01:55 AM    AST 41 (H) 07/09/2025 03:52 AM    AST 42 (H) 07/07/2025 01:55 AM    ALT 38 (H) 07/09/2025 03:52 AM    ALT 31 07/07/2025 01:55 AM      Ca/ Phos Lab Results   Component Value Date/Time    PHOS 2.9 07/09/2025 03:52 AM       Mag    Lab Results   Component Value Date/Time    MG 2.0 07/09/2025 03:52 AM        Triglycerides   251  (7/7/25)         Kcal requirements:  25-35 kcal/kg ( 1365 - 1911 kcal/day )      Macronutrients (7/9/25) - provided by premixed Clinimix E  5%AA/15%Dextrose with electrolytes in 2000

## 2025-07-09 NOTE — PROGRESS NOTES
Received persectserve message from Lukas Joe. Temp of 101.3. patient already on meropenem and vancomycin.    Toño Byrd MD

## 2025-07-09 NOTE — PROGRESS NOTES
[facility]  (A Division of [facility])  Ursula Li MD   Office Tel:  [phone] Option #8                                                               Date of Admission: 6/25/2025       ID FU for antimicrobial management GNR bacteremia requested by Dr Long   PCP: Nae Kaiser, APRN - NP    C/C: Vominting/abdominal pain/constipation-2 to 3 days on admission- 6/25    NB: seen before noon, charted late    Current Antimicrobials:    Prior Antimicrobials:  Vanco 6/30 to date #9  Meropenem 7/2 to date #7  Micafungin 150  7/5 to date # 4   Pip tazo X1- 6/25  Pip tazo 6/29 to 7/2     Allergy to antibiotics- NA       Assessment:     Acutely and severely Ill patient with:    Ongoing Fever <101, tachycardia= due to below  Post op fluid collection/Abscess on cT 7/6/24- S/P PILAR drain placement by IR   Abdominal fluid collection on CT with mild rim enhancement- probable abscess Vs leak? CT 7/2    ESBL E.coli and Bacteroids bacteremia-low grade-- of abdominal source likley   Doubt CRBSI, doubt urinary source-UA clear    Bacteroides bacteremia-- + on 7/3  SBO- persistent loop distension on imaging. History of prior surgery  S/P X-lap, lysis of adhesion, SB resection, side to side anastomosis- 6/29  Severe sepsis- KE/lactic acidosis  Bandemia 29%-->-> 8%-> 12%-> 21% -> 9% 7/5, resolved    CT AP 7/6/25  1. Small bilateral pleural effusions and bibasilar dependent consolidation.  2. 1 cm rounded filling defect adherent to the posterior wall of the abdominal  aorta at the level of the SMA, unchanged.  3. Right anterior pelvic fluid measuring 2.4 cm x 3.2 cm and measuring  approximately 5.1 cm x 4.7 cm on prior CT dated July 2, 2025.  4. 4.5 cm x 2.8 cm gas and fluid containing collection within the left  hemipelvis, as described above.  5. Mild dilatation of the left and right renal collecting systems and visualized  left and right ureters.  6. Several dilated loops of small

## 2025-07-09 NOTE — PROGRESS NOTES
Physical Therapy Goals:  Initiated 7/9/2025 to be met within 7-10 days.  Short Term Goals  Short Term Goal 1: Patient will demonstrate S/SBA with all bed mobility in prep for acitivities EOB.  Short Term Goal 2: Patient will demonstrate S/LRAD with STS transfers in prep for ambulation  Short Term Goal 3: Patient will demonstrate ambulation 100 ft S/LRAD for increased independence/endurance with navigation of environment.  Short Term Goal 4: Patient will demonstrate 1-2 box step negotiation with SBA/CGA/B UE for curb negotiation as indicated upon DC.    PHYSICAL THERAPY EVALUATION    Patient: Kiara Nance (60 y.o. female)  Date: 7/9/2025  Primary Diagnosis: SBO (small bowel obstruction) (HCC) [K56.609]  Procedure(s) (LRB):  LAPAROTOMY EXPLORATORY, LYSIS OF ADHESIONS,  SMALL BOWEL RESECTION (N/A) 10 Days Post-Op   Precautions: Isolation, Contact Precautions (Droplet +),Spinal Precautions: No Bending, No Lifting, No Twisting (s/p abdominal surgery)  PLOF: Independent community ambulator no AD, active , retired    ASSESSMENT :  The patient is seen on ICU following admission for above dx. Pt received supine in bed and reports 10/10 pain unchanged through out eval.  R PIV, tele leads, and L PILAR drain in place. Patient on RA SpO2 remained 100% during eval. Patient with deficits as noted in: BLE strength, ROM, coordination, balance, endurance, and pain in abdomen. These impairments are impacting functional mobility and independence. Pt demonstrates supine<>sit Mod A x2, sit to stand with RW/Mod A. Able to complete stand pivot transfer and sidestep 2 ft at EOB with RW/Min A. Patient require vc/manual cues/visual cues for sequencing, safety and hand/foot placement with all mobility. Gait pattern demonstrates slow leatha, decreased step length and clearance bilaterally, antalgia, fwd trunk flexion. Pt left supine in bed with all needs in reach and lines intact. Telesitter active and nurses John & Jacob notified     IR ASP ABSCESS/HEMATOMA/BULLA/CYST  7/3/2025    IR ASP ABSCESS/HEMATOMA/BULLA/CYST 7/3/2025 Jody Zaragoza, APRN - CNP [facility]    LAPAROTOMY N/A 6/29/2025    LAPAROTOMY EXPLORATORY, LYSIS OF ADHESIONS,  SMALL BOWEL RESECTION performed by Carlos Palencia MD at [facility]     Barriers to Learning/Limitations: none  Compensate with: visual, verbal, tactile, kinesthetic cues/model  Home Situation:  Social/Functional History  Lives With: Son  Type of Home: Apartment (2nd floor with elevator)  Home Layout: One level  Bathroom Shower/Tub: Walk-in shower  Bathroom Toilet: Standard  Bathroom Equipment: Grab bars in shower  Home Equipment: None  Prior Level of Assist for ADLs: Independent  Prior Level of Assist for Homemaking: Independent  Prior Level of Assist for Ambulation: Independent community ambulator, with or without device  Prior Level of Assist for Transfers: Independent  Active : Yes  Mode of Transportation: [facility]  Occupation: Retired  Critical Behavior:  Overall Orientation Status: WFL  Overall Cognitive Status: WFL  Safety Judgement: Decreased awareness of need for safety      Strength:    Strength: Generally decreased, functional    Tone & Sensation:   NT    Range Of Motion:  AROM: Generally decreased, functional    Functional Mobility:  Bed Mobility:    Bed Mobility Training  Bed Mobility Training: Yes  Overall Level of Assistance: Partial/Moderate assistance;2 Person assistance  Interventions: Safety awareness training;Verbal cues;Visual cues;Tactile cues  Rolling: Partial/Moderate assistance  Supine to Sit: 2 Person assistance;Partial/Moderate assistance  Sit to Supine: Partial/Moderate assistance;2 Person assistance  Transfers:    Transfer Training  Transfer Training: Yes  Interventions: Safety awareness training;Verbal cues;Visual cues;Tactile cues  Sit to Stand: Partial/Moderate assistance  Stand to Sit: Partial/Moderate assistance  Stand Pivot Transfers: Partial/Moderate

## 2025-07-09 NOTE — PROGRESS NOTES
[facility]  Pulmonary, Critical Care, and Sleep Medicine    Name: Kiara Nance MRN: [id number]   : 1965 Hospital: [facility]    Date: 2025  Room: [address] [place] [address] Note                                              Consult requesting physician: Dr. Long  Reason for Consult: Hypotension    IMPRESSION:     -Bacteremia  - Small bowel obstruction  - Heparin-induced thrombocytopenia  - Aortic thrombus  - Anemia    Active Hospital Problems    Diagnosis Date Noted    Aortic thrombus (HCC) [I74.10] 2025     Priority: High    Anemia [D64.9] 2025    Heparin induced thrombocytopenia [D75.829] 2025    Adenovirus infection [B34.0] 2025    Bacteremia [R78.81] 2025    Status post surgery [Z98.890] 2025    Hydronephrosis [N13.30] 2025    Sepsis (HCC) [A41.9] 2025    Metabolic acidosis [E87.20] 2025    Hypotension [I95.9] 2025    Acute renal failure [N17.9] 2025    Hypokalemia [E87.6] 2025    Asthma [J45.909] 2025    SBO (small bowel obstruction) (HCC) [K56.609] 2025    Benign essential HTN [I10] 2021        Code status: Full Code       RECOMMENDATIONS:     Respiratory: History of asthma.  Stable respirations; patient remains on room air.  Albuterol nebs as needed.  Chest x-ray done today reviewed images and report-cardiac size normal, no focal consolidations, bilateral small pleural effusions-left greater than right, left arm PICC line in good position, NG tube in good position.  CT chest -findings of small bilateral pleural effusions, with bibasal atelectasis; continue incentive spirometry as tolerated.  Keep SPO2 >=92%. HOB 30 degree elevation all the time.  Aspiration precautions.     CVS: History of HTN.  Antihypertensives-IV labetalol, clonidine patch.  Prn IV hydralazine or labetalol for SBP >160 mmHg.  Once oral intake improves, switch to antihypertensives.  Diuretic-Lasix 20 mg  abnormalities. The visualized portions of the paranasal sinuses and mastoid air cells are clear.     No acute process identified Electronically signed by Huyen Salazar    CT ABDOMEN PELVIS W IV CONTRAST Additional Contrast? Oral  Result Date: 6/28/2025  INDICATION: Ascites, bowel obstruction CT of the abdomen and pelvis is performed with 5 mm collimation. Study is performed with PO and 100 cc of nonionic Isovue 370. Sagittal and coronal reformatted images were also performed. CT dose reduction was achieved with the use of the standardized protocol tailored for this examination and automatic exposure control for dose modulation. Direct comparison is made to prior CT dated June 25, 2025. Findings: Lung bases: There is a small right pleural effusion and right basilar patchy airspace consolidation, new compared to prior CT dated June 25, 2025. There is new, mild left basilar atelectasis Liver: The liver is normal. Adrenals: Adrenal glands are normal. Pancreas: The pancreas is normal. Gallbladder: The gallbladder is normal. Kidneys: The kidneys are normal. Spleen: The spleen is normal. Lymph nodes. There is no rickie hepatitis, mesenteric, retroperitoneal or pelvic lymphadenopathy. Bowel: There is a persistent markedly dilated loop of bowel in the right abdomen, likely small bowel and likely representing a closed loop obstruction. This loop of bowel contains gas and fluid and is unopacified with oral contrast. Remaining small bowel loops are nondilated. There is no pneumatosis. There is no mesenteric vein gas. Appendix: The appendix is normal. Urinary bladder: Urinary bladder is partially filled and grossly normal. Miscellaneous: There is trace perihepatic and pelvic free intraperitoneal fluid. There is no free intraperitoneal gas. There is no focal fluid collection to suggest abscess. There are multiple uterine masses consistent with fibroids.     1. New, small right pleural effusion and right basilar patchy airspace

## 2025-07-09 NOTE — PROGRESS NOTES
PALLIATIVE MEDICINE    NO AMD ON FILE    Reynold Kothari, oleg,  [phone].  In absence of AMD document legal next of kin is default decision maker.     CODE STATUS: FULL CODE    Seen today in Rm 109.  PT currently working with patient.   Returned. Patient was lying supine in bed. Flat. States, \"I feel so drained. It felt weird to get up and walk. I used the bedside toilet and went poop and pee.\" She is AAOX4.  Abdomen is distended.  Pt with NG tube. Tolerating clear liquid diet via NGT.   NGT removed. Tray with clear liquid at bedside.     Per patient, oleg Flaherty will be here tomorrow for a visit.     Palliative Medicine will continue to follow Kiara Nance and her family during her hospitalization and support them as they make healthcare decisions and define goals of care.     Sil Smith RN  Palliative Medicine  [phone]

## 2025-07-09 NOTE — PLAN OF CARE
Problem: Pain  Goal: Verbalizes/displays adequate comfort level or baseline comfort level  Outcome: Progressing  Flowsheets  Taken 7/9/2025 0600 by Cindi Martinez RN  Verbalizes/displays adequate comfort level or baseline comfort level:   Encourage patient to monitor pain and request assistance   Notify Licensed Independent Practitioner if interventions unsuccessful or patient reports new pain   Assess pain using appropriate pain scale   Administer analgesics based on type and severity of pain and evaluate response   Implement non-pharmacological measures as appropriate and evaluate response   Consider cultural and social influences on pain and pain management  Taken 7/9/2025 0000 by Cindi Martinez RN  Verbalizes/displays adequate comfort level or baseline comfort level:   Encourage patient to monitor pain and request assistance   Assess pain using appropriate pain scale   Administer analgesics based on type and severity of pain and evaluate response   Implement non-pharmacological measures as appropriate and evaluate response   Consider cultural and social influences on pain and pain management   Notify Licensed Independent Practitioner if interventions unsuccessful or patient reports new pain     Problem: ABCDS Injury Assessment  Goal: Absence of physical injury  Outcome: Progressing     Problem: Gastrointestinal - Adult  Goal: Maintains or returns to baseline bowel function  Outcome: Progressing     Problem: Metabolic/Fluid and Electrolytes - Adult  Goal: Electrolytes maintained within normal limits  Outcome: Progressing     Problem: Safety - Adult  Goal: Free from fall injury  Outcome: Progressing     Problem: Skin/Tissue Integrity  Goal: Skin integrity remains intact  Description: 1.  Monitor for areas of redness and/or skin breakdown  2.  Assess vascular access sites hourly  3.  Every 4-6 hours minimum:  Change oxygen saturation probe site  4.  Every 4-6 hours:  If on nasal continuous positive airway  pressure, respiratory therapy assess nares and determine need for appliance change or resting period  Outcome: Progressing  Flowsheets (Taken 7/9/2025 0800)  Skin Integrity Remains Intact:   Assess vascular access sites hourly   Monitor for areas of redness and/or skin breakdown   Every 4-6 hours minimum:  Change oxygen saturation probe site   Every 4-6 hours:  If on nasal continuous positive airway pressure, assess nares and determine need for appliance change or resting period   Turn and reposition as indicated   Assess need for specialty bed   Positioning devices   Pressure redistribution bed/mattress (bed type)   Monitor skin under medical devices   Check visual cues for pain     Problem: Discharge Planning  Goal: Discharge to home or other facility with appropriate resources  Outcome: Progressing     Problem: Cardiovascular - Adult  Goal: Maintains optimal cardiac output and hemodynamic stability  Outcome: Progressing  Goal: Absence of cardiac dysrhythmias or at baseline  Outcome: Progressing     Problem: Skin/Tissue Integrity - Adult  Goal: Incisions, wounds, or drain sites healing without S/S of infection  Outcome: Progressing  Flowsheets (Taken 7/9/2025 0800)  Incisions, Wounds, or Drain Sites Healing Without Sign and Symptoms of Infection:   Implement wound care per orders   Initiate isolation precautions as appropriate   Initiate pressure ulcer prevention bundle as indicated     Problem: Confusion  Goal: Confusion, delirium, dementia, or psychosis is improved or at baseline  Description: INTERVENTIONS:  1. Assess for possible contributors to thought disturbance, including medications, impaired vision or hearing, underlying metabolic abnormalities, dehydration, psychiatric diagnoses, and notify attending LIP  2. [place] high risk fall precautions, as indicated  3. Provide frequent short contacts to provide reality reorientation, refocusing and direction  4. Decrease environmental stimuli, including noise

## 2025-07-09 NOTE — PROGRESS NOTES
[facility]   Pharmacy Pharmacokinetic Monitoring Service - Vancomycin    Consulting Provider:  KARO Hatch  Indication: Sepsis  Target Concentration: Goal AUC/MICHAEL 400-600 mg*hr/L  Day of Therapy: 10  Additional Antimicrobials: meropenem / micafungin      Pertinent Laboratory Values:   Wt Readings from Last 1 Encounters:   07/06/25 82.1 kg (181 lb)     Temp Readings from Last 1 Encounters:   07/09/25 (!) 100.7 °F (38.2 °C) (Oral)     Estimated Creatinine Clearance: 101 mL/min (A) (based on SCr of 0.55 mg/dL (L)).  Recent Labs     07/07/25  0155 07/08/25  0124 07/09/25  0352   CREATININE 0.61 0.61 0.55*   BUN 13 13 13   WBC 13.9*  --  11.1       Pertinent Cultures:  Culture Date Source Results   7/7/25 Body fluid aspirate / abscess Gram neg rods   7/6/25 blood  No growth 3 days     Recent vancomycin administrations                     vancomycin (VANCOCIN) 1250 mg in sodium chloride 0.9% 250 mL IVPB (mg) 1,250 mg New Bag 07/08/25 2039     1,250 mg New Bag  0924     1,250 mg New Bag 07/07/25 2124    Vancomycin random level due 7/8/25 with am labs ()  Given 07/08/25 0407    Vancomycin random level due 7/7/25 with am labs ()  Given 07/07/25 0519    vancomycin (VANCOCIN) 1,000 mg in sodium chloride 0.9 % 250 mL (addEASE) IVPB (mg) 1,000 mg New Bag 07/06/25 2258     1,000 mg New Bag  1035                  Assessment:  Date/Time Current Dose AUC (ss)   7/9/25 at 10:30 Vancomycin 1250 mg IV q12h 477 mg/L.hr   Note: Serum concentrations collected for AUC dosing may appear elevated if collected in close proximity to the dose administered, this is not necessarily an indication of toxicity    Plan:  Current dosing regimen is therapeutic  Continue current dose:  Vancomycin 1250 mg IV q12h  Est AUC (ss): 477 mg/L.hr     Est trough (ss): 11.9 mg/L  Pharmacy will continue to monitor patient and adjust therapy as indicated    Thank you for the consult,  Brittaney Keys, PharmD  7/9/2025 10:28 AM

## 2025-07-09 NOTE — PROGRESS NOTES
POD#11  No new surg issues, nicolas some liquids w NGT clamped, no flatus  Afeb, tachy improving    Abd - distension cont to slowly improve, Incision clean w dressing, BS minimal    WBC improved, e'lytes ok, creat nl, gluc still elev    Slowly improving s/p exp lap, postop ileus slowly resolving  S/p IR drainage - f/u cx  Cont NG clamp until flatus, less distension  Cont TPN  Cont IM/ICU/ID care

## 2025-07-09 NOTE — WOUND CARE
ICU Rounding  Wound care nurse rounded on patient for skin issues.  Patient has a Jabari score of 14.  Does patient have any pressure injury?no per visual assessment with  primary nurse LESLIE cm     Skin Care & Pressure Relief Recommendations  Minimize layers of linen  Pads under patient to optimize support surface  Turn/reposition approximately every 2 hours  Use pillow wedges to maintain positioning but do not put pillow directly over wounds  Manage incontinence   Promote continence; Skin Protective lotion/cream to buttocks and sacrum daily and as needed with incontinence care  Offload heels pillows    Consult wound care if any wounds noted during admission.  Wound Care nurse will continue to follow during ICU admission.

## 2025-07-10 LAB
ABO + RH BLD: NORMAL
ALBUMIN SERPL-MCNC: 2.2 G/DL (ref 3.4–5)
ALBUMIN/GLOB SERPL: 0.5 (ref 0.8–1.7)
ALP SERPL-CCNC: 68 U/L (ref 45–117)
ALT SERPL-CCNC: 31 U/L (ref 10–35)
ANION GAP SERPL CALC-SCNC: 12 MMOL/L (ref 3–18)
AST SERPL-CCNC: 28 U/L (ref 10–38)
BACTERIA SPEC CULT: ABNORMAL
BASOPHILS # BLD: 0 K/UL (ref 0–0.1)
BASOPHILS NFR BLD: 0 % (ref 0–2)
BILIRUB SERPL-MCNC: 0.4 MG/DL (ref 0.2–1)
BLD PROD TYP BPU: NORMAL
BLOOD BANK BLOOD PRODUCT EXPIRATION DATE: NORMAL
BLOOD BANK DISPENSE STATUS: NORMAL
BLOOD BANK ISBT PRODUCT BLOOD TYPE: 7300
BLOOD BANK PRODUCT CODE: NORMAL
BLOOD BANK UNIT TYPE AND RH: NORMAL
BLOOD GROUP ANTIBODIES SERPL: NORMAL
BPU ID: NORMAL
BUN SERPL-MCNC: 12 MG/DL (ref 6–23)
BUN/CREAT SERPL: 22 (ref 12–20)
CA-I BLD-SCNC: 1.12 MMOL/L (ref 1.12–1.32)
CALCIUM SERPL-MCNC: 9.1 MG/DL (ref 8.5–10.1)
CHLORIDE SERPL-SCNC: 99 MMOL/L (ref 98–107)
CO2 SERPL-SCNC: 24 MMOL/L (ref 21–32)
CREAT SERPL-MCNC: 0.57 MG/DL (ref 0.6–1.3)
CROSSMATCH RESULT: NORMAL
DIFFERENTIAL METHOD BLD: ABNORMAL
EOSINOPHIL # BLD: 0 K/UL (ref 0–0.4)
EOSINOPHIL NFR BLD: 0 % (ref 0–5)
ERYTHROCYTE [DISTWIDTH] IN BLOOD BY AUTOMATED COUNT: 14.1 % (ref 11.6–14.5)
GLOBULIN SER CALC-MCNC: 4.8 G/DL (ref 2–4)
GLUCOSE BLD STRIP.AUTO-MCNC: 181 MG/DL (ref 70–110)
GLUCOSE BLD STRIP.AUTO-MCNC: 212 MG/DL (ref 70–110)
GLUCOSE BLD STRIP.AUTO-MCNC: 238 MG/DL (ref 70–110)
GLUCOSE SERPL-MCNC: 248 MG/DL (ref 74–108)
GRAM STN SPEC: ABNORMAL
GRAM STN SPEC: ABNORMAL
HCT VFR BLD AUTO: 26 % (ref 35–45)
HGB BLD-MCNC: 9.1 G/DL (ref 12–16)
IMM GRANULOCYTES # BLD AUTO: 0 K/UL
IMM GRANULOCYTES NFR BLD AUTO: 0 %
LYMPHOCYTES # BLD: 2.73 K/UL (ref 0.9–3.6)
LYMPHOCYTES NFR BLD: 26 % (ref 21–52)
MAGNESIUM SERPL-MCNC: 2.1 MG/DL (ref 1.6–2.6)
MCH RBC QN AUTO: 28.8 PG (ref 24–34)
MCHC RBC AUTO-ENTMCNC: 35 G/DL (ref 31–37)
MCV RBC AUTO: 82.3 FL (ref 78–100)
MONOCYTES # BLD: 0.21 K/UL (ref 0.05–1.2)
MONOCYTES NFR BLD: 2 % (ref 3–10)
NEUTS SEG # BLD: 7.56 K/UL (ref 1.8–8)
NEUTS SEG NFR BLD: 72 % (ref 40–73)
NRBC # BLD: 0 K/UL (ref 0–0.01)
NRBC BLD-RTO: 0 PER 100 WBC
PHOSPHATE SERPL-MCNC: 3 MG/DL (ref 2.5–4.9)
PLATELET # BLD AUTO: 498 K/UL (ref 135–420)
PLATELET COMMENT: ABNORMAL
PMV BLD AUTO: 10.4 FL (ref 9.2–11.8)
POTASSIUM SERPL-SCNC: 4 MMOL/L (ref 3.5–5.5)
PROT SERPL-MCNC: 7 G/DL (ref 6.4–8.2)
RBC # BLD AUTO: 3.16 M/UL (ref 4.2–5.3)
RBC MORPH BLD: ABNORMAL
SERVICE CMNT-IMP: ABNORMAL
SODIUM SERPL-SCNC: 135 MMOL/L (ref 136–145)
SPECIMEN EXP DATE BLD: NORMAL
SRA .2 IU/ML UFH SER-ACNC: <1 % (ref 0–20)
SRA 100IU/ML UFH SER-ACNC: <1 % (ref 0–20)
SRA UFH SER-IMP: NORMAL
TRIGL SERPL-MCNC: 225 MG/DL (ref 0–150)
UNIT DIVISION: 0
UNIT ISSUE DATE/TIME: NORMAL
VANCOMYCIN SERPL-MCNC: 20.1 UG/ML (ref 5–40)
WBC # BLD AUTO: 10.5 K/UL (ref 4.6–13.2)

## 2025-07-10 PROCEDURE — 2580000003 HC RX 258: Performed by: INTERNAL MEDICINE

## 2025-07-10 PROCEDURE — 6360000002 HC RX W HCPCS: Performed by: INTERNAL MEDICINE

## 2025-07-10 PROCEDURE — 84100 ASSAY OF PHOSPHORUS: CPT

## 2025-07-10 PROCEDURE — 82330 ASSAY OF CALCIUM: CPT

## 2025-07-10 PROCEDURE — 1100000003 HC PRIVATE W/ TELEMETRY

## 2025-07-10 PROCEDURE — 83735 ASSAY OF MAGNESIUM: CPT

## 2025-07-10 PROCEDURE — 84478 ASSAY OF TRIGLYCERIDES: CPT

## 2025-07-10 PROCEDURE — 6360000002 HC RX W HCPCS: Performed by: HOSPITALIST

## 2025-07-10 PROCEDURE — 97602 WOUND(S) CARE NON-SELECTIVE: CPT

## 2025-07-10 PROCEDURE — 2500000003 HC RX 250 WO HCPCS: Performed by: HOSPITALIST

## 2025-07-10 PROCEDURE — 6370000000 HC RX 637 (ALT 250 FOR IP): Performed by: INTERNAL MEDICINE

## 2025-07-10 PROCEDURE — 82962 GLUCOSE BLOOD TEST: CPT

## 2025-07-10 PROCEDURE — 85025 COMPLETE CBC W/AUTO DIFF WBC: CPT

## 2025-07-10 PROCEDURE — 2500000003 HC RX 250 WO HCPCS: Performed by: INTERNAL MEDICINE

## 2025-07-10 PROCEDURE — 80053 COMPREHEN METABOLIC PANEL: CPT

## 2025-07-10 PROCEDURE — 36415 COLL VENOUS BLD VENIPUNCTURE: CPT

## 2025-07-10 PROCEDURE — 80202 ASSAY OF VANCOMYCIN: CPT

## 2025-07-10 PROCEDURE — 6360000002 HC RX W HCPCS: Performed by: SURGERY

## 2025-07-10 RX ORDER — HYDROMORPHONE HYDROCHLORIDE 1 MG/ML
0.25 INJECTION, SOLUTION INTRAMUSCULAR; INTRAVENOUS; SUBCUTANEOUS EVERY 4 HOURS PRN
Status: DISCONTINUED | OUTPATIENT
Start: 2025-07-10 | End: 2025-07-13

## 2025-07-10 RX ADMIN — LEUCINE, PHENYLALANINE, LYSINE, METHIONINE, ISOLEUCINE, VALINE, HISTIDINE, THREONINE, TRYPTOPHAN, ALANINE, GLYCINE, ARGININE, PROLINE, SERINE, TYROSINE, SODIUM ACETATE, DIBASIC POTASSIUM PHOSPHATE, MAGNESIUM CHLORIDE, SODIUM CHLORIDE, CALCIUM CHLORIDE, DEXTROSE
365; 280; 290; 200; 300; 290; 240; 210; 90; 1035; 515; 575; 340; 250; 20; 340; 261; 51; 59; 33; 15 INJECTION INTRAVENOUS at 05:39

## 2025-07-10 RX ADMIN — ACETAMINOPHEN 650 MG: 325 TABLET ORAL at 17:34

## 2025-07-10 RX ADMIN — METOCLOPRAMIDE 5 MG: 5 INJECTION, SOLUTION INTRAMUSCULAR; INTRAVENOUS at 17:17

## 2025-07-10 RX ADMIN — MEROPENEM 1000 MG: 1 INJECTION INTRAVENOUS at 17:48

## 2025-07-10 RX ADMIN — SODIUM CHLORIDE, PRESERVATIVE FREE 10 ML: 5 INJECTION INTRAVENOUS at 20:15

## 2025-07-10 RX ADMIN — INSULIN LISPRO 4 UNITS: 100 INJECTION, SOLUTION INTRAVENOUS; SUBCUTANEOUS at 05:43

## 2025-07-10 RX ADMIN — MICAFUNGIN SODIUM 150 MG: 50 INJECTION, POWDER, LYOPHILIZED, FOR SOLUTION INTRAVENOUS at 17:53

## 2025-07-10 RX ADMIN — I.V. FAT EMULSION 150 ML: 20 EMULSION INTRAVENOUS at 21:54

## 2025-07-10 RX ADMIN — LABETALOL HYDROCHLORIDE 20 MG: 5 INJECTION, SOLUTION INTRAVENOUS at 20:14

## 2025-07-10 RX ADMIN — SODIUM CHLORIDE, PRESERVATIVE FREE 40 MG: 5 INJECTION INTRAVENOUS at 10:50

## 2025-07-10 RX ADMIN — SODIUM CHLORIDE, PRESERVATIVE FREE 10 ML: 5 INJECTION INTRAVENOUS at 10:53

## 2025-07-10 RX ADMIN — HYDROMORPHONE HYDROCHLORIDE 0.25 MG: 1 INJECTION, SOLUTION INTRAMUSCULAR; INTRAVENOUS; SUBCUTANEOUS at 15:27

## 2025-07-10 RX ADMIN — INSULIN LISPRO 4 UNITS: 100 INJECTION, SOLUTION INTRAVENOUS; SUBCUTANEOUS at 12:30

## 2025-07-10 RX ADMIN — HYDROMORPHONE HYDROCHLORIDE 0.25 MG: 1 INJECTION, SOLUTION INTRAMUSCULAR; INTRAVENOUS; SUBCUTANEOUS at 23:55

## 2025-07-10 RX ADMIN — INSULIN LISPRO 4 UNITS: 100 INJECTION, SOLUTION INTRAVENOUS; SUBCUTANEOUS at 17:35

## 2025-07-10 RX ADMIN — HYDROMORPHONE HYDROCHLORIDE 1 MG: 1 INJECTION, SOLUTION INTRAMUSCULAR; INTRAVENOUS; SUBCUTANEOUS at 06:40

## 2025-07-10 RX ADMIN — METOCLOPRAMIDE 5 MG: 5 INJECTION, SOLUTION INTRAMUSCULAR; INTRAVENOUS at 10:55

## 2025-07-10 RX ADMIN — ASCORBIC ACID, VITAMIN A PALMITATE, CHOLECALCIFEROL, THIAMINE HYDROCHLORIDE, RIBOFLAVIN-5 PHOSPHATE SODIUM, PYRIDOXINE HYDROCHLORIDE, NIACINAMIDE, DEXPANTHENOL, ALPHA-TOCOPHEROL ACETATE, VITAMIN K1, FOLIC ACID, BIOTIN, CYANOCOBALAMIN: 200; 3300; 200; 6; 3.6; 6; 40; 15; 10; 150; 600; 60; 5 INJECTION, SOLUTION INTRAVENOUS at 17:45

## 2025-07-10 RX ADMIN — HYDROMORPHONE HYDROCHLORIDE 0.25 MG: 1 INJECTION, SOLUTION INTRAMUSCULAR; INTRAVENOUS; SUBCUTANEOUS at 20:14

## 2025-07-10 RX ADMIN — INSULIN LISPRO 8 UNITS: 100 INJECTION, SOLUTION INTRAVENOUS; SUBCUTANEOUS at 00:04

## 2025-07-10 RX ADMIN — HYDROMORPHONE HYDROCHLORIDE 0.25 MG: 1 INJECTION, SOLUTION INTRAMUSCULAR; INTRAVENOUS; SUBCUTANEOUS at 11:23

## 2025-07-10 RX ADMIN — MEROPENEM 1000 MG: 1 INJECTION INTRAVENOUS at 00:14

## 2025-07-10 RX ADMIN — MEROPENEM 1000 MG: 1 INJECTION INTRAVENOUS at 11:15

## 2025-07-10 RX ADMIN — ACETAMINOPHEN 650 MG: 325 TABLET ORAL at 23:55

## 2025-07-10 RX ADMIN — LABETALOL HYDROCHLORIDE 20 MG: 5 INJECTION, SOLUTION INTRAVENOUS at 10:55

## 2025-07-10 RX ADMIN — LABETALOL HYDROCHLORIDE 20 MG: 5 INJECTION, SOLUTION INTRAVENOUS at 02:50

## 2025-07-10 RX ADMIN — ACETAMINOPHEN 650 MG: 325 TABLET ORAL at 10:48

## 2025-07-10 RX ADMIN — HYDROMORPHONE HYDROCHLORIDE 1 MG: 1 INJECTION, SOLUTION INTRAMUSCULAR; INTRAVENOUS; SUBCUTANEOUS at 02:49

## 2025-07-10 RX ADMIN — LABETALOL HYDROCHLORIDE 20 MG: 5 INJECTION, SOLUTION INTRAVENOUS at 17:17

## 2025-07-10 RX ADMIN — INSULIN GLARGINE 26 UNITS: 100 INJECTION, SOLUTION SUBCUTANEOUS at 10:56

## 2025-07-10 ASSESSMENT — PAIN SCALES - GENERAL
PAINLEVEL_OUTOF10: 9
PAINLEVEL_OUTOF10: 9
PAINLEVEL_OUTOF10: 10
PAINLEVEL_OUTOF10: 10
PAINLEVEL_OUTOF10: 6
PAINLEVEL_OUTOF10: 10
PAINLEVEL_OUTOF10: 10
PAINLEVEL_OUTOF10: 9
PAINLEVEL_OUTOF10: 10
PAINLEVEL_OUTOF10: 0
PAINLEVEL_OUTOF10: 0
PAINLEVEL_OUTOF10: 10
PAINLEVEL_OUTOF10: 0
PAINLEVEL_OUTOF10: 10

## 2025-07-10 ASSESSMENT — PAIN DESCRIPTION - DESCRIPTORS
DESCRIPTORS: PRESSURE;ACHING;TIGHTNESS
DESCRIPTORS: TIGHTNESS;ACHING;DISCOMFORT
DESCRIPTORS: DISCOMFORT;TIGHTNESS
DESCRIPTORS: ACHING
DESCRIPTORS: ACHING;DISCOMFORT;TENDER
DESCRIPTORS: ACHING

## 2025-07-10 ASSESSMENT — PAIN DESCRIPTION - ORIENTATION
ORIENTATION: MID
ORIENTATION: LEFT;MID
ORIENTATION: ANTERIOR
ORIENTATION: RIGHT;LEFT;MID
ORIENTATION: ANTERIOR
ORIENTATION: ANTERIOR
ORIENTATION: ANTERIOR;LOWER;POSTERIOR
ORIENTATION: MID;LOWER

## 2025-07-10 ASSESSMENT — PAIN DESCRIPTION - LOCATION
LOCATION: BACK;ABDOMEN
LOCATION: ABDOMEN
LOCATION: BACK;ABDOMEN
LOCATION: ABDOMEN
LOCATION: ABDOMEN

## 2025-07-10 ASSESSMENT — PAIN - FUNCTIONAL ASSESSMENT: PAIN_FUNCTIONAL_ASSESSMENT: ACTIVITIES ARE NOT PREVENTED

## 2025-07-10 ASSESSMENT — PAIN DESCRIPTION - PAIN TYPE: TYPE: ACUTE PAIN;SURGICAL PAIN

## 2025-07-10 NOTE — FLOWSHEET NOTE
Wound care nurse changed dressing      07/10/25 1542   Incision 06/04/25 Abdomen Medial;Upper   Date First Assessed/Time First Assessed: 06/04/25 1249   Present on Original Admission: No  Location: Abdomen  Incision Location Orientation: Medial;Upper  Incision Description (Comments): x1 incision   Dressing Status Old drainage noted   Dressing Change Due 07/11/25   Incision Cleansed Cleansed with saline   Dressing/Treatment Alginate;ABD pad;Silicone border;Tape/Soft cloth adhesive tape   Closure Staples   Margins Approximated   Incision Assessment Devitalized tissue  (tissue sloughing on either side of staples)   Drainage Amount Small (< 25%)   Drainage Description Serosanguinous   Odor None   Christiane-incision Assessment   (tissue sloughing)   Wound Follow Up   Require Follow Up Yes

## 2025-07-10 NOTE — PLAN OF CARE
Problem: Pain  Goal: Verbalizes/displays adequate comfort level or baseline comfort level  7/9/2025 2318 by Maylin Fontenot RN  Outcome: Progressing     Problem: ABCDS Injury Assessment  Goal: Absence of physical injury  7/9/2025 2318 by Maylin Fontenot RN  Outcome: Progressing     Problem: Gastrointestinal - Adult  Goal: Maintains or returns to baseline bowel function  7/9/2025 2318 by Maylin Fontenot RN  Outcome: Progressing  Flowsheets (Taken 7/9/2025 2009)  Maintains or returns to baseline bowel function:   Assess bowel function   Encourage oral fluids to ensure adequate hydration   Administer ordered medications as needed   Encourage mobilization and activity     Problem: Metabolic/Fluid and Electrolytes - Adult  Goal: Electrolytes maintained within normal limits  7/9/2025 2318 by Maylin Fontenot RN  Outcome: Progressing  Flowsheets (Taken 7/9/2025 2009)  Electrolytes maintained within normal limits:   Monitor labs and assess patient for signs and symptoms of electrolyte imbalances   Administer electrolyte replacement as ordered   Monitor response to electrolyte replacements, including repeat lab results as appropriate     Problem: Safety - Adult  Goal: Free from fall injury  7/9/2025 2318 by Maylin Fontenot RN  Outcome: Progressing     Problem: Discharge Planning  Goal: Discharge to home or other facility with appropriate resources  7/9/2025 2318 by Maylin Fontenot RN  Outcome: Progressing  Flowsheets (Taken 7/9/2025 2009)  Discharge to home or other facility with appropriate resources:   Identify barriers to discharge with patient and caregiver   Arrange for needed discharge resources and transportation as appropriate   Identify discharge learning needs (meds, wound care, etc)     Problem: Skin/Tissue Integrity  Goal: Skin integrity remains intact  Description: 1.  Monitor for areas of redness and/or skin breakdown  2.  Assess vascular access sites hourly  3.  Every 4-6 hours minimum:  Change oxygen saturation probe

## 2025-07-10 NOTE — PROGRESS NOTES
[facility]  (A Division of [facility], [facility])  Ursula Li MD   Office Tel:  [phone] Option #8                                                               Date of Admission: 6/25/2025       ID FU for antimicrobial management GNR bacteremia requested by Dr Long   PCP: Nae Kaiser, APRN - NP    C/C: Vominting/abdominal pain/constipation-2 to 3 days on admission- 6/25    NB: seen before noon, charted late    Current Antimicrobials:    Prior Antimicrobials:  Vanco 6/30 to date #10  Meropenem 7/2 to date #8  Micafungin 150  7/5 to date # 5   Pip tazo X1- 6/25  Pip tazo 6/29 to 7/2     Allergy to antibiotics- NA       Assessment:     Acutely and severely Ill patient with:    Ongoing Fever <101, tachycardia= due to below  Post op fluid collection/Abscess on cT 7/6/24- S/P PILAR drain placement by IR L pelvis - ESBL E.coli on cx  Abdominal fluid collection on CT with mild rim enhancement- probable abscess Vs leak? CT 7/2    ESBL E.coli and Bacteroids bacteremia-low grade-- of abdominal source likley    SBO- persistent loop distension on imaging. History of prior surgery  S/P X-lap, lysis of adhesion, SB resection, side to side anastomosis- 6/29  Severe sepsis- KE/lactic acidosis  Bandemia 29%-->resolved    CT AP 7/6/25  1. Small bilateral pleural effusions and bibasilar dependent consolidation.  2. 1 cm rounded filling defect adherent to the posterior wall of the abdominal  aorta at the level of the SMA, unchanged.  3. Right anterior pelvic fluid measuring 2.4 cm x 3.2 cm and measuring  approximately 5.1 cm x 4.7 cm on prior CT dated July 2, 2025.  4. 4.5 cm x 2.8 cm gas and fluid containing collection within the left  hemipelvis, as described above.  5. Mild dilatation of the left and right renal collecting systems and visualized  left and right ureters.  6. Several dilated loops of small bowel. Degree of small bowel dilatation is  improved.     Distended GB on CT, no  Av.8 °F (37.1 °C), Min:97 °F (36.1 °C), Max:100.4 °F (38 °C)      Lines: PICC-left, PIV-On TPN    General:   WD acutely sick lookging , on RA   Skin:   no diffuse rash  No peripheral IE finding on skin exam/ extremities   HEENT:  Normocephalic, atraumatic, EOMI, no scleral icterus or pallor; no conjunctival hemmohage;  No thrush. NGT       Lungs:   non-labored, bilaterally clear anteriorly   Heart:  Tachycardia, s1 and s2; no murmur   Abdomen:  soft, distended, large post surgical dressing on her abdomen  Left PILAR drain in pace- with purlent drain   Genitourinary: Merrill in place   Extremities:   no clubbing, cyanosis; no joint effusions or swelling;  muscle mass appropriate for age   Neurologic:  No gross focal sensory abnormalities;  Cranial nerves intact   Psychiatric:   appropriate and interactive.       Labs: Results:   Chemistry Recent Labs     25  0124 25  0352 07/10/25  0543   * 135* 135*   K 4.4 4.2 4.0   CL 99 99 99   CO2 25 27 24   BUN 13 13 12   GLOB  --  4.4* 4.8*      CBC w/Diff Recent Labs     25  0352 25  1214 07/10/25  0543   WBC 11.1  --  10.5   RBC 2.40*  --  3.16*   HGB 6.7* 8.0* 9.1*   HCT 19.7* 23.2* 26.0*   *  --  498*            No results found for: \"SDES\" No components found for: \"CULT\"         Imaging:   All imaging reviewed from Admission to present as per radiology interpretation in [facility]    Radiology reports reviewed:    IR PICC WO SQ PORT/PUMP > 5 YEARS  Result Date: 2025  PROCEDURE:  ULTRASOUND AND FLUOROSCOPIC GUIDED PICC PLACEMENT HISTORY: BOONE GOMES is a 60 year old Female with need TPN For nutrtion after surgery. :  Jody Zaragoza NP ATTENDING:  Jose Alfredo Perez MD CONSENT:  After full discussion of the procedure, including risks, benefits and alternatives, both verbal and written consent were obtained. TECHNIQUE: A timeout was called to verify the correct patient, procedure, site and allergies. Preliminary ultrasound

## 2025-07-10 NOTE — PLAN OF CARE
Problem: Pain  Goal: Verbalizes/displays adequate comfort level or baseline comfort level  Outcome: Progressing     Problem: ABCDS Injury Assessment  Goal: Absence of physical injury  Outcome: Progressing     Problem: Gastrointestinal - Adult  Goal: Maintains or returns to baseline bowel function  Outcome: Progressing     Problem: Metabolic/Fluid and Electrolytes - Adult  Goal: Electrolytes maintained within normal limits  Outcome: Progressing     Problem: Safety - Adult  Goal: Free from fall injury  Outcome: Progressing     Problem: Skin/Tissue Integrity  Goal: Skin integrity remains intact  Description: 1.  Monitor for areas of redness and/or skin breakdown  2.  Assess vascular access sites hourly  3.  Every 4-6 hours minimum:  Change oxygen saturation probe site  4.  Every 4-6 hours:  If on nasal continuous positive airway pressure, respiratory therapy assess nares and determine need for appliance change or resting period  Outcome: Progressing     Problem: Discharge Planning  Goal: Discharge to home or other facility with appropriate resources  Outcome: Progressing     Problem: Cardiovascular - Adult  Goal: Maintains optimal cardiac output and hemodynamic stability  Outcome: Progressing  Goal: Absence of cardiac dysrhythmias or at baseline  Outcome: Progressing     Problem: Skin/Tissue Integrity - Adult  Goal: Incisions, wounds, or drain sites healing without S/S of infection  Outcome: Progressing     Problem: Confusion  Goal: Confusion, delirium, dementia, or psychosis is improved or at baseline  Description: INTERVENTIONS:  1. Assess for possible contributors to thought disturbance, including medications, impaired vision or hearing, underlying metabolic abnormalities, dehydration, psychiatric diagnoses, and notify attending LIP  2. [place] high risk fall precautions, as indicated  3. Provide frequent short contacts to provide reality reorientation, refocusing and direction  4. Decrease environmental

## 2025-07-10 NOTE — PROGRESS NOTES
CTA CHEST W WO CONTRAST, CTA ABDOMEN PELVIS W CONTRAST INDICATION: Tachycardia, hypotension, and lactic acidosis post surgery evaluate for PE, ischemic bowel, abscess COMPARISON: CT chest/abdomen/pelvis 6/30/2025, CT abdomen/pelvis 6/28/2025 CONTRAST: 150 mL of Isovue-370. ORAL CONTRAST: None TECHNIQUE: Helical thin section chest CT following intravenous administration of nonionic contrast was performed according to departmental PE protocol. Coronal and sagittal reformats were performed. 3D post processing was performed. Then, CT angiography axial images were obtained through the abdomen and pelvis. Coronal and sagittal reconstructions were generated. Additionally, multiplanar reformats including volume rendered images and 3D/MIPs were created. CT dose reduction was achieved through use of a standardized protocol tailored for this examination and automatic exposure control for dose modulation. FINDINGS: PULMONARY ARTERIES: This is a good quality study for the evaluation of pulmonary embolism to the segmental arterial level. There is no pulmonary embolism to this level. MEDIASTINUM: No mass or lymphadenopathy. ISS: No mass or lymphadenopathy. THORACIC AORTA: No aneurysm. HEART: Four-chamber cardiac enlargement a left PICC terminates at the superior cavoatrial junction. ESOPHAGUS: No wall thickening or dilatation. An enteric tube passes through the esophagus and terminates in the stomach. TRACHEA/BRONCHI: Patent. PLEURA: Moderate right and small left pleural effusions have enlarged compared to CT from 6/30/2025. No pneumothorax. LUNGS: There is bibasilar volume loss with collapse of nearly the entire right lower lobe except for the superior segment as well as collapse of multiple segments of the right middle and left lower lobes. No nodule, mass, or airspace disease within the aerated lung. BONES: No aggressive bone lesion or fracture. ABDOMINAL AORTA: There is a 13 mm filling defect adherent to the posterior wall of  the right upper quadrant, likely secondary to postoperative adhesions. The findings were called to Dr. Guallpa on 6/25/2025 at 23:60 by myself.  [id number] Electronically signed by Marcos Bennett               TIME: 55 minutes were spent on the care of this patient today.  This time also includes physician non-face-to-face service time visit on the date of service, including:  Preparing to see the patient (eg, review of tests)  Obtaining and/or reviewing separately obtained history  Performing a medically necessary appropriate examination and/or evaluation  Counseling and educating the patient/family/caregiver  Ordering medications, tests, or procedures  Referring and communicating with other health care professionals as needed  Documenting clinical information in the electronic or other health record  Independently interpreting results (not reported separately) and communicating results to the patient/family/caregiver  Care coordination and discharge planning with Case Management.        Dear patient Kiara Nance, if you are reviewing this note and have a question regarding the medical terminology, please bring it with you to your next PCP visit.  Medical notes are meant to be a communication between medical professionals.  Additionally, a portion of this note were created using voice recognition function, syntax which may have escaped proofreading.    John Dotson MD

## 2025-07-10 NOTE — PROGRESS NOTES
Occupational Therapy Evaluation/Treatment Attempt    Chart reviewed. Attempted Occupational Therapy Evaluation/Treatment, however, patient abdominal dressing \"loose and pulling\" per patient. Observed and dressing beach not appear to be adequately secured. Patient reports already completed bathing, dressing, toileting, and oral hygiene today. Reports using walker to go to the bathroom.     Will follow up later as patient's schedule allows.   Thank you for this referral.  Kayla Valdes OTONEL, OTR/L

## 2025-07-10 NOTE — PROGRESS NOTES
[facility]  Pulmonary, Critical Care, and Sleep Medicine    Name: Kiara Nance MRN: [id number]   : 1965 Hospital: [facility]    Date: 7/10/2025  Room: [address] [place] [address] Note                                              Consult requesting physician: Dr. Long  Reason for Consult: Hypotension    IMPRESSION:     - Bacteremia  - Small bowel obstruction  - Heparin-induced thrombocytopenia  - Aortic thrombus  - Anemia    Active Hospital Problems    Diagnosis Date Noted    Aortic thrombus (HCC) [I74.10] 2025     Priority: High    Anemia [D64.9] 2025    Heparin induced thrombocytopenia [D75.829] 2025    Adenovirus infection [B34.0] 2025    Bacteremia [R78.81] 2025    Status post surgery [Z98.890] 2025    Hydronephrosis [N13.30] 2025    Sepsis (HCC) [A41.9] 2025    Metabolic acidosis [E87.20] 2025    Hypotension [I95.9] 2025    Acute renal failure [N17.9] 2025    Hypokalemia [E87.6] 2025    Asthma [J45.909] 2025    SBO (small bowel obstruction) (HCC) [K56.609] 2025    Benign essential HTN [I10] 2021        Code status: Full Code       RECOMMENDATIONS:     Respiratory: History of asthma.  Stable respirations; patient remains on room air.  Albuterol nebs as needed.  Chest x-ray done -cardiac size normal, no focal consolidations, bilateral small pleural effusions-left greater than right, left arm PICC line in good position, NG tube in good position.  CT chest -findings of small bilateral pleural effusions, with bibasal atelectasis; continue incentive spirometry as tolerated.  Keep SPO2 >=92%. HOB 30 degree elevation all the time.  Aspiration precautions.     CVS: History of HTN.  Blood pressure stable; patient on antihypertensives-recommend switching to oral medications.  CT abdomen- nonocclusive abdominal aortic thrombus; patient on argatroban drip; vascular surgery recommending  CODE NEURO HEAD WO CONTRAST INDICATION: Acute mental status change COMPARISON: 9/4/2024. CONTRAST: None. TECHNIQUE: Unenhanced CT of the head was performed using 5 mm images. Brain and bone windows were generated. Coronal and sagittal reformats. CT dose reduction was achieved through use of a standardized protocol tailored for this examination and automatic exposure control for dose modulation.  FINDINGS: The ventricles and sulci are normal in size, shape and configuration. There is no significant white matter disease. There is no intracranial hemorrhage, extra-axial collection, or mass effect. The basilar cisterns are open. No CT evidence of acute infarct. The bone windows demonstrate no abnormalities. The visualized portions of the paranasal sinuses and mastoid air cells are clear.     No acute process identified Electronically signed by Huyen Salazar    CT ABDOMEN PELVIS W IV CONTRAST Additional Contrast? Oral  Result Date: 6/28/2025  INDICATION: Ascites, bowel obstruction CT of the abdomen and pelvis is performed with 5 mm collimation. Study is performed with PO and 100 cc of nonionic Isovue 370. Sagittal and coronal reformatted images were also performed. CT dose reduction was achieved with the use of the standardized protocol tailored for this examination and automatic exposure control for dose modulation. Direct comparison is made to prior CT dated June 25, 2025. Findings: Lung bases: There is a small right pleural effusion and right basilar patchy airspace consolidation, new compared to prior CT dated June 25, 2025. There is new, mild left basilar atelectasis Liver: The liver is normal. Adrenals: Adrenal glands are normal. Pancreas: The pancreas is normal. Gallbladder: The gallbladder is normal. Kidneys: The kidneys are normal. Spleen: The spleen is normal. Lymph nodes. There is no rickie hepatitis, mesenteric, retroperitoneal or pelvic lymphadenopathy. Bowel: There is a persistent markedly dilated loop of

## 2025-07-10 NOTE — PROGRESS NOTES
She is doing well and looks much better.  She is awake alert no complaints.  She has had several bowel movements and is tolerating some liquids.  Her drain is draining pus and will stay in.  Her abdomen is benign  Incision looks good.  I would recommend increasing her diet and tapering off her TPN.

## 2025-07-10 NOTE — PROGRESS NOTES
Consult for TPN Dosing per Pharmacy by Dr. Long  Consult provided for this 60 y.o. female, for indication of Nutrition  Day of Therapy 11     Weight: 82.1 kg (181 lb)    TPN dosing Weight: 54.6 kg       Labs:  BMP BMP:  Lab Results   Component Value Date/Time     07/10/2025 05:43 AM    K 4.0 07/10/2025 05:43 AM    CL 99 07/10/2025 05:43 AM    CO2 24 07/10/2025 05:43 AM    BUN 12 07/10/2025 05:43 AM    CREATININE 0.57 07/10/2025 05:43 AM    GLUCOSE 248 07/10/2025 05:43 AM    CALCIUM 9.1 07/10/2025 05:43 AM         CMP CMP:      Lab Results   Component Value Date/Time    BUN 12 07/10/2025 05:43 AM    BUN 13 07/09/2025 03:52 AM    BUN 13 07/08/2025 01:24 AM    Creatinine 0.57 (L) 07/10/2025 05:43 AM    Creatinine 0.55 (L) 07/09/2025 03:52 AM    Creatinine 0.61 07/08/2025 01:24 AM    Sodium 135 (L) 07/10/2025 05:43 AM    Sodium 135 (L) 07/09/2025 03:52 AM    Sodium 135 (L) 07/08/2025 01:24 AM    Potassium 4.0 07/10/2025 05:43 AM    Potassium 4.2 07/09/2025 03:52 AM    Potassium 4.4 07/08/2025 01:24 AM    CO2 24 07/10/2025 05:43 AM    CO2 27 07/09/2025 03:52 AM    CO2 25 07/08/2025 01:24 AM    Chloride 99 07/10/2025 05:43 AM    Chloride 99 07/09/2025 03:52 AM    Chloride 99 07/08/2025 01:24 AM    Magnesium 2.1 07/10/2025 05:43 AM    Magnesium 2.0 07/09/2025 03:52 AM    Magnesium 1.9 07/08/2025 01:24 AM    Phosphorus 3.0 07/10/2025 05:43 AM    Phosphorus 2.9 07/09/2025 03:52 AM    Phosphorus 3.1 07/08/2025 01:24 AM    AST 28 07/10/2025 05:43 AM    AST 41 (H) 07/09/2025 03:52 AM    ALT 31 07/10/2025 05:43 AM    ALT 38 (H) 07/09/2025 03:52 AM      Ca/ Phos Lab Results   Component Value Date/Time    PHOS 3.0 07/10/2025 05:43 AM       Mag    Lab Results   Component Value Date/Time    MG 2.1 07/10/2025 05:43 AM        Triglycerides   225  (7/10/25)         Kcal requirements:  25-35 kcal/kg ( 1365 - 1911 kcal/day )      Pt has regular diet ordered, but is only nibbling small amount at present time.    TPN to continue for

## 2025-07-10 NOTE — PROGRESS NOTES
PALLIATIVE MEDICINE    After meeting with patient Kiara Nance, the goals of care have been defined.      Code status remains: FULL CODE     NO AMD ON FILE. According to [place] hierarchy of decision sonReynold is legal next of kin, therefore the default decision maker. Ms. Nance confirmed this. She stated, \"We're on the same page.\"      Thank you for consulting the Palliative Medicine team.    Sil Smith RN  Palliative Medicine  [phone]

## 2025-07-11 LAB
ALBUMIN SERPL-MCNC: 2 G/DL (ref 3.4–5)
ALBUMIN/GLOB SERPL: 0.5 (ref 0.8–1.7)
ALP SERPL-CCNC: 63 U/L (ref 45–117)
ALT SERPL-CCNC: 26 U/L (ref 10–35)
ANION GAP SERPL CALC-SCNC: 11 MMOL/L (ref 3–18)
AST SERPL-CCNC: 24 U/L (ref 10–38)
BASOPHILS # BLD: 0 K/UL (ref 0–0.1)
BASOPHILS NFR BLD: 0 % (ref 0–2)
BILIRUB SERPL-MCNC: <0.2 MG/DL (ref 0.2–1)
BUN SERPL-MCNC: 11 MG/DL (ref 6–23)
BUN/CREAT SERPL: 20 (ref 12–20)
CA-I BLD-SCNC: 1.21 MMOL/L (ref 1.12–1.32)
CALCIUM SERPL-MCNC: 8.8 MG/DL (ref 8.5–10.1)
CHLORIDE SERPL-SCNC: 101 MMOL/L (ref 98–107)
CO2 SERPL-SCNC: 24 MMOL/L (ref 21–32)
CREAT SERPL-MCNC: 0.56 MG/DL (ref 0.6–1.3)
DIFFERENTIAL METHOD BLD: ABNORMAL
EOSINOPHIL # BLD: 0.59 K/UL (ref 0–0.4)
EOSINOPHIL NFR BLD: 6 % (ref 0–5)
ERYTHROCYTE [DISTWIDTH] IN BLOOD BY AUTOMATED COUNT: 14.4 % (ref 11.6–14.5)
GLOBULIN SER CALC-MCNC: 4.4 G/DL (ref 2–4)
GLUCOSE BLD STRIP.AUTO-MCNC: 145 MG/DL (ref 70–110)
GLUCOSE BLD STRIP.AUTO-MCNC: 275 MG/DL (ref 70–110)
GLUCOSE SERPL-MCNC: 158 MG/DL (ref 74–108)
HCT VFR BLD AUTO: 23 % (ref 35–45)
HGB BLD-MCNC: 7.9 G/DL (ref 12–16)
IMM GRANULOCYTES # BLD AUTO: 0 K/UL
IMM GRANULOCYTES NFR BLD AUTO: 0 %
LYMPHOCYTES # BLD: 1.39 K/UL (ref 0.9–3.6)
LYMPHOCYTES NFR BLD: 14 % (ref 21–52)
MAGNESIUM SERPL-MCNC: 2.1 MG/DL (ref 1.6–2.6)
MCH RBC QN AUTO: 28.4 PG (ref 24–34)
MCHC RBC AUTO-ENTMCNC: 34.3 G/DL (ref 31–37)
MCV RBC AUTO: 82.7 FL (ref 78–100)
MONOCYTES # BLD: 0.5 K/UL (ref 0.05–1.2)
MONOCYTES NFR BLD: 5 % (ref 3–10)
NEUTS SEG # BLD: 7.42 K/UL (ref 1.8–8)
NEUTS SEG NFR BLD: 75 % (ref 40–73)
NRBC # BLD: 0 K/UL (ref 0–0.01)
NRBC BLD-RTO: 0 PER 100 WBC
PHOSPHATE SERPL-MCNC: 3.1 MG/DL (ref 2.5–4.9)
PLATELET # BLD AUTO: 515 K/UL (ref 135–420)
PLATELET COMMENT: ABNORMAL
PMV BLD AUTO: 10.3 FL (ref 9.2–11.8)
POTASSIUM SERPL-SCNC: 3.8 MMOL/L (ref 3.5–5.5)
PROT SERPL-MCNC: 6.4 G/DL (ref 6.4–8.2)
RBC # BLD AUTO: 2.78 M/UL (ref 4.2–5.3)
RBC MORPH BLD: ABNORMAL
RBC MORPH BLD: ABNORMAL
SODIUM SERPL-SCNC: 136 MMOL/L (ref 136–145)
WBC # BLD AUTO: 9.9 K/UL (ref 4.6–13.2)

## 2025-07-11 PROCEDURE — 6360000002 HC RX W HCPCS: Performed by: SURGERY

## 2025-07-11 PROCEDURE — 6370000000 HC RX 637 (ALT 250 FOR IP): Performed by: INTERNAL MEDICINE

## 2025-07-11 PROCEDURE — 6370000000 HC RX 637 (ALT 250 FOR IP): Performed by: HOSPITALIST

## 2025-07-11 PROCEDURE — 84100 ASSAY OF PHOSPHORUS: CPT

## 2025-07-11 PROCEDURE — 82330 ASSAY OF CALCIUM: CPT

## 2025-07-11 PROCEDURE — 6360000002 HC RX W HCPCS: Performed by: INTERNAL MEDICINE

## 2025-07-11 PROCEDURE — 85025 COMPLETE CBC W/AUTO DIFF WBC: CPT

## 2025-07-11 PROCEDURE — 83735 ASSAY OF MAGNESIUM: CPT

## 2025-07-11 PROCEDURE — 80053 COMPREHEN METABOLIC PANEL: CPT

## 2025-07-11 PROCEDURE — 1100000003 HC PRIVATE W/ TELEMETRY

## 2025-07-11 PROCEDURE — 82962 GLUCOSE BLOOD TEST: CPT

## 2025-07-11 PROCEDURE — 2500000003 HC RX 250 WO HCPCS: Performed by: HOSPITALIST

## 2025-07-11 PROCEDURE — 2500000003 HC RX 250 WO HCPCS: Performed by: INTERNAL MEDICINE

## 2025-07-11 PROCEDURE — 2580000003 HC RX 258: Performed by: INTERNAL MEDICINE

## 2025-07-11 PROCEDURE — 97530 THERAPEUTIC ACTIVITIES: CPT

## 2025-07-11 PROCEDURE — 6360000002 HC RX W HCPCS: Performed by: HOSPITALIST

## 2025-07-11 PROCEDURE — 36415 COLL VENOUS BLD VENIPUNCTURE: CPT

## 2025-07-11 RX ORDER — HYDROMORPHONE HYDROCHLORIDE 1 MG/ML
0.25 INJECTION, SOLUTION INTRAMUSCULAR; INTRAVENOUS; SUBCUTANEOUS ONCE
Status: DISCONTINUED | OUTPATIENT
Start: 2025-07-11 | End: 2025-07-15 | Stop reason: SDUPTHER

## 2025-07-11 RX ADMIN — INSULIN LISPRO 8 UNITS: 100 INJECTION, SOLUTION INTRAVENOUS; SUBCUTANEOUS at 01:58

## 2025-07-11 RX ADMIN — SODIUM CHLORIDE, PRESERVATIVE FREE 10 ML: 5 INJECTION INTRAVENOUS at 21:16

## 2025-07-11 RX ADMIN — METOCLOPRAMIDE 5 MG: 5 INJECTION, SOLUTION INTRAMUSCULAR; INTRAVENOUS at 08:56

## 2025-07-11 RX ADMIN — LABETALOL HYDROCHLORIDE 20 MG: 5 INJECTION, SOLUTION INTRAVENOUS at 16:50

## 2025-07-11 RX ADMIN — HYDROMORPHONE HYDROCHLORIDE 0.25 MG: 1 INJECTION, SOLUTION INTRAMUSCULAR; INTRAVENOUS; SUBCUTANEOUS at 17:10

## 2025-07-11 RX ADMIN — TIZANIDINE 4 MG: 4 TABLET ORAL at 21:16

## 2025-07-11 RX ADMIN — INSULIN GLARGINE 26 UNITS: 100 INJECTION, SOLUTION SUBCUTANEOUS at 08:55

## 2025-07-11 RX ADMIN — HYDRALAZINE HYDROCHLORIDE 20 MG: 20 INJECTION INTRAMUSCULAR; INTRAVENOUS at 09:04

## 2025-07-11 RX ADMIN — HYDROMORPHONE HYDROCHLORIDE 0.25 MG: 1 INJECTION, SOLUTION INTRAMUSCULAR; INTRAVENOUS; SUBCUTANEOUS at 12:26

## 2025-07-11 RX ADMIN — LABETALOL HYDROCHLORIDE 20 MG: 5 INJECTION, SOLUTION INTRAVENOUS at 21:15

## 2025-07-11 RX ADMIN — LABETALOL HYDROCHLORIDE 20 MG: 5 INJECTION, SOLUTION INTRAVENOUS at 02:15

## 2025-07-11 RX ADMIN — ACETAMINOPHEN 650 MG: 325 TABLET ORAL at 10:45

## 2025-07-11 RX ADMIN — LABETALOL HYDROCHLORIDE 20 MG: 5 INJECTION, SOLUTION INTRAVENOUS at 08:58

## 2025-07-11 RX ADMIN — METOCLOPRAMIDE 5 MG: 5 INJECTION, SOLUTION INTRAMUSCULAR; INTRAVENOUS at 01:59

## 2025-07-11 RX ADMIN — MEROPENEM 1000 MG: 1 INJECTION INTRAVENOUS at 02:03

## 2025-07-11 RX ADMIN — HYDROMORPHONE HYDROCHLORIDE 0.25 MG: 1 INJECTION, SOLUTION INTRAMUSCULAR; INTRAVENOUS; SUBCUTANEOUS at 21:15

## 2025-07-11 RX ADMIN — I.V. FAT EMULSION 150 ML: 20 EMULSION INTRAVENOUS at 19:24

## 2025-07-11 RX ADMIN — ASCORBIC ACID, VITAMIN A PALMITATE, CHOLECALCIFEROL, THIAMINE HYDROCHLORIDE, RIBOFLAVIN-5 PHOSPHATE SODIUM, PYRIDOXINE HYDROCHLORIDE, NIACINAMIDE, DEXPANTHENOL, ALPHA-TOCOPHEROL ACETATE, VITAMIN K1, FOLIC ACID, BIOTIN, CYANOCOBALAMIN: 200; 3300; 200; 6; 3.6; 6; 40; 15; 10; 150; 600; 60; 5 INJECTION, SOLUTION INTRAVENOUS at 19:23

## 2025-07-11 RX ADMIN — TIZANIDINE 4 MG: 4 TABLET ORAL at 03:36

## 2025-07-11 RX ADMIN — MEROPENEM 1000 MG: 1 INJECTION INTRAVENOUS at 09:07

## 2025-07-11 RX ADMIN — METOCLOPRAMIDE 5 MG: 5 INJECTION, SOLUTION INTRAMUSCULAR; INTRAVENOUS at 17:10

## 2025-07-11 RX ADMIN — MEROPENEM 1000 MG: 1 INJECTION INTRAVENOUS at 17:09

## 2025-07-11 RX ADMIN — HYDROMORPHONE HYDROCHLORIDE 0.25 MG: 1 INJECTION, SOLUTION INTRAMUSCULAR; INTRAVENOUS; SUBCUTANEOUS at 08:54

## 2025-07-11 RX ADMIN — SODIUM CHLORIDE, PRESERVATIVE FREE 40 MG: 5 INJECTION INTRAVENOUS at 08:59

## 2025-07-11 RX ADMIN — SODIUM CHLORIDE, PRESERVATIVE FREE 10 ML: 5 INJECTION INTRAVENOUS at 09:05

## 2025-07-11 ASSESSMENT — PAIN SCALES - GENERAL
PAINLEVEL_OUTOF10: 0
PAINLEVEL_OUTOF10: 10
PAINLEVEL_OUTOF10: 9
PAINLEVEL_OUTOF10: 5
PAINLEVEL_OUTOF10: 10
PAINLEVEL_OUTOF10: 0
PAINLEVEL_OUTOF10: 9
PAINLEVEL_OUTOF10: 7

## 2025-07-11 ASSESSMENT — PAIN DESCRIPTION - LOCATION
LOCATION: ABDOMEN

## 2025-07-11 ASSESSMENT — PAIN DESCRIPTION - ORIENTATION
ORIENTATION: LEFT
ORIENTATION: MID
ORIENTATION: ANTERIOR

## 2025-07-11 ASSESSMENT — PAIN DESCRIPTION - DESCRIPTORS
DESCRIPTORS: ACHING;DISCOMFORT;TIGHTNESS
DESCRIPTORS: ACHING
DESCRIPTORS: ACHING
DESCRIPTORS: SHARP;SHOOTING
DESCRIPTORS: SHARP;SHOOTING

## 2025-07-11 NOTE — PROGRESS NOTES
[facility]  (A Division of [facility])  Ursula Li MD   Office Tel:  [phone] Option #8                                                               Date of Admission: 6/25/2025       ID FU for antimicrobial management GNR bacteremia requested by Dr Long   PCP: Nae Kaiser, APRN - NP    C/C: Vominting/abdominal pain/constipation-2 to 3 days on admission- 6/25    NB: seen before noon, charted late    Current Antimicrobials:    Prior Antimicrobials:    Meropenem 7/2 to date    Pip tazo X1- 6/25  Pip tazo 6/29 to 7/2  Vanco 6/30 to date #10  Micafungin 150  7/5 to 7/10     Allergy to antibiotics- NA       Assessment:     Acutely and severely Ill patient with:    Fever due to below- improving    Post op fluid collection/Abscess on cT 7/6/24- S/P PILAR drain placement by IR L pelvis - ESBL E.coli on cx  Abdominal fluid collection on CT with mild rim enhancement- probable abscess Vs leak? CT 7/2    ESBL E.coli and Bacteroids bacteremia-low grade-- of abdominal source likley    SBO- persistent loop distension on imaging. History of prior surgery  S/P X-lap, lysis of adhesion, SB resection, side to side anastomosis- 6/29  Severe sepsis- KE/lactic acidosis  Bandemia 29%-->resolved    CT AP 7/6/25  1. Small bilateral pleural effusions and bibasilar dependent consolidation.  2. 1 cm rounded filling defect adherent to the posterior wall of the abdominal  aorta at the level of the SMA, unchanged.  3. Right anterior pelvic fluid measuring 2.4 cm x 3.2 cm and measuring  approximately 5.1 cm x 4.7 cm on prior CT dated July 2, 2025.  4. 4.5 cm x 2.8 cm gas and fluid containing collection within the left  hemipelvis, as described above.  5. Mild dilatation of the left and right renal collecting systems and visualized  left and right ureters.  6. Several dilated loops of small bowel. Degree of small bowel dilatation is  improved.     Distended GB on CT, no acute

## 2025-07-11 NOTE — PLAN OF CARE
Problem: Pain  Goal: Verbalizes/displays adequate comfort level or baseline comfort level  7/11/2025 0752 by Max Dotson RN  Outcome: Progressing  7/10/2025 2339 by Maylin Fontenot RN  Outcome: Progressing     Problem: ABCDS Injury Assessment  Goal: Absence of physical injury  7/11/2025 0752 by Max Dotson RN  Outcome: Progressing  7/10/2025 2339 by Maylin Fontenot RN  Outcome: Progressing     Problem: Gastrointestinal - Adult  Goal: Maintains or returns to baseline bowel function  7/11/2025 0752 by Max Dotson RN  Outcome: Progressing  7/10/2025 2339 by Maylin Fontenot RN  Outcome: Progressing  Flowsheets (Taken 7/10/2025 2019)  Maintains or returns to baseline bowel function:   Assess bowel function   Encourage oral fluids to ensure adequate hydration     Problem: Metabolic/Fluid and Electrolytes - Adult  Goal: Electrolytes maintained within normal limits  7/11/2025 0752 by Max Dotson RN  Outcome: Progressing  7/10/2025 2339 by Maylin Fontenot RN  Outcome: Progressing  Flowsheets (Taken 7/10/2025 2019)  Electrolytes maintained within normal limits:   Monitor labs and assess patient for signs and symptoms of electrolyte imbalances   Administer electrolyte replacement as ordered   Monitor response to electrolyte replacements, including repeat lab results as appropriate     Problem: Safety - Adult  Goal: Free from fall injury  7/11/2025 0752 by Max Dotson RN  Outcome: Progressing  7/10/2025 2339 by Maylin Fontenot RN  Outcome: Progressing     Problem: Skin/Tissue Integrity  Goal: Skin integrity remains intact  Description: 1.  Monitor for areas of redness and/or skin breakdown  2.  Assess vascular access sites hourly  3.  Every 4-6 hours minimum:  Change oxygen saturation probe site  4.  Every 4-6 hours:  If on nasal continuous positive airway pressure, respiratory therapy assess nares and determine need for appliance change or resting period  7/11/2025 0752 by Max Dotson RN  Outcome:

## 2025-07-11 NOTE — PROGRESS NOTES
7/7/2025  INDICATION:   metabolic encephalopathy EXAM:  HEAD CT WITHOUT CONTRAST COMPARISON: 6/30/2025 TECHNIQUE:  Routine noncontrast axial head CT was performed. Coronal and sagittal multiplanar reconstructions were obtained.  CT dose reduction was achieved through use of a standardized protocol tailored for this examination and automatic exposure control for dose modulation. FINDINGS: The ventricles and cortical sulci are within normal limits. No evidence for acute intracranial hemorrhage, midline shift, or mass effect. Mild bilateral subcortical and periventricular areas of patchy low attenuation is nonspecific but likely related to changes of chronic small vessel ischemic disease. The basal cisterns are patent. The visualized paranasal sinuses and mastoid air cells are clear. No calvarial abnormality.     No evidence for acute intracranial abnormality Electronically signed by Jerry Caldwell    CT CHEST ABDOMEN PELVIS W CONTRAST Additional Contrast? Oral  Result Date: 7/6/2025  INDICATION: Sepsis, status post surgery, fever. CT of the chest, abdomen and pelvis is performed with 5 mm collimation. Study is performed with PO and 100 cc of nonionic IV Isovue-370. Sagittal and coronal reformatted images were also performed. CT dose reduction was achieved with the use of the standardized protocol tailored for this examination and automatic exposure control for dose modulation. Direct comparison is made to prior CT dated July 2, 2025. Chest: Tubes and lines: NG tube extends to the stomach. Left-sided PICC line extends to the distal SVC. Lungs/ pleura: There are small bilateral pleural effusions and there is bibasilar dependent consolidation. Lymph nodes: There is no mediastinal, hilar or axillary lymphadenopathy. Heart: The heart is normal in size and there is no pericardial fluid. No coronary artery atherosclerotic calcification is visualized. Bones: No lytic or sclerotic osseous lesion is visualized. Abdomen/pelvis:  nodes. There is no rickie hepatitis, mesenteric, retroperitoneal or pelvic lymphadenopathy. Bowel: There is a persistent markedly dilated loop of bowel in the right abdomen, likely small bowel and likely representing a closed loop obstruction. This loop of bowel contains gas and fluid and is unopacified with oral contrast. Remaining small bowel loops are nondilated. There is no pneumatosis. There is no mesenteric vein gas. Appendix: The appendix is normal. Urinary bladder: Urinary bladder is partially filled and grossly normal. Miscellaneous: There is trace perihepatic and pelvic free intraperitoneal fluid. There is no free intraperitoneal gas. There is no focal fluid collection to suggest abscess. There are multiple uterine masses consistent with fibroids.     1. New, small right pleural effusion and right basilar patchy airspace consolidation. New mild left basilar atelectasis. 2. Persistent, markedly dilated loop of bowel in the right abdomen, as described above Electronically signed by Sarmad Roman MD    XR CHEST 1 VIEW  Result Date: 6/28/2025  INDICATION:  Proper placement of NG tube. COMPARISON: KUB June 27, 2025 FINDINGS: AP radiograph of the chest demonstrates nasogastric tube tip projects over the stomach. Dilated gas-filled bowel loops in the right lower abdomen similar to prior study. Bibasilar atelectasis.     Nasogastric tube tip extends to the stomach. Unchanged dilated bowel loops right lower quadrant, see recent CT. Electronically signed by Scooter Keller    XR ABDOMEN (KUB) (SINGLE AP VIEW)  Result Date: 6/27/2025  EXAM:  XR ABDOMEN (KUB) (SINGLE AP VIEW) INDICATION: NG tube placement COMPARISON: None. TECHNIQUE: Supine frontal abdomen (KUB). FINDINGS:. NG tube tip is in the stomach. No dilated small bowel. Stool and gas are present in the colon. No pathologic calcification. Osseous structures are age appropriate. There is marked dilatation of the sigmoid with an inverted appearance. Multiple omental

## 2025-07-11 NOTE — PROGRESS NOTES
I removed several staples today.  There was some cloudy murky serous drainage.  There is no evidence of fascial dehiscence.  The wound was packed and sterile dressings were applied.  We will continue to follow this.  She is on a diet and TPN should be weaning.  Will also try and do more activity get out of bed and physical therapy etc.

## 2025-07-11 NOTE — PROGRESS NOTES
medical records for all procedures/Xrays and details which were not copied into this note but were reviewed prior to creation of Plan    XR ABDOMEN (KUB) (SINGLE AP VIEW)  Result Date: 7/9/2025  EXAM:  XR ABDOMEN (KUB) (SINGLE AP VIEW) INDICATION: Ileus COMPARISON: 6/30/2025. TECHNIQUE: Supine frontal abdomen (KUB). FINDINGS: Residual contrast is noted in the colon. Left lower quadrant pigtail catheter. Cutaneous staples. Mild gaseous central small bowel without evidence of high-grade obstruction..     Postsurgical changes with mild gaseous central small bowel without high-grade obstruction Electronically signed by John Perez    XR CHEST PORTABLE  Result Date: 7/9/2025  EXAM:  XR CHEST PORTABLE INDICATION: Ileus COMPARISON: CT 7/6/2025. TECHNIQUE: Semiupright portable chest AP view FINDINGS: Cardiac monitoring leads are noted. Enteric tube traverses expected course to below the diaphragm into the left upper quadrant. Left-sided PICC traverses expected course to terminate in the region of the atriocaval junction. The cardiac silhouette is within normal limits. The pulmonary vasculature is within normal limits. The lungs and pleural spaces redemonstrate bilateral left greater than right pleural effusions with associated atelectasis but are otherwise clear with no pneumothorax. The visualized bones and upper abdomen are age-appropriate.     Left PICC and enteric tube in expected positions, appropriate for use. Left greater than right pleural effusions with associated atelectasis. Electronically signed by Marcos Bennett    CT ABSCESS DRAINAGE W CATH PLACEMENT S&I  Result Date: 7/7/2025  PROCEDURE:  CT GUIDED DRAINAGE HISTORY: BOONE GOMES is a 60 year old Female with abdominal abscess. :  Jody Zaragoza NP ATTENDING:  Jerry Caldwell MD CONSENT:  After full discussion of the procedure, including risks, benefits and alternatives, both verbal and written consent were obtained. TECHNIQUE: A timeout was called  COMPARISON: Same day CTA abdomen/pelvis TECHNIQUE:  Limited ultrasound of the abdomen. In particular only the right upper quadrant was evaluated. FINDINGS: The hepatic surface is smooth. The hepatic echogenicity is within normal limits without a focal lesion. Gallbladder sludge without gallstones, gallbladder wall thickening or pericholecystic fluid. There is no intra or extrahepatic biliary ductal dilatation. The common bile duct is not visualized. The pancreas is not well visualized. The right kidney has some trace perinephric free fluid and is echogenic. Partially visualized right pleural effusion.     1. No findings of acute cholecystitis. 2. Partially visualized right pleural effusion and trace right-sided perinephric free fluid. Electronically signed by Sunny Kaur    CTA CHEST W WO CONTRAST  Result Date: 7/2/2025  EXAM:  CTA CHEST W WO CONTRAST, CTA ABDOMEN PELVIS W CONTRAST INDICATION: Tachycardia, hypotension, and lactic acidosis post surgery evaluate for PE, ischemic bowel, abscess COMPARISON: CT chest/abdomen/pelvis 6/30/2025, CT abdomen/pelvis 6/28/2025 CONTRAST: 150 mL of Isovue-370. ORAL CONTRAST: None TECHNIQUE: Helical thin section chest CT following intravenous administration of nonionic contrast was performed according to departmental PE protocol. Coronal and sagittal reformats were performed. 3D post processing was performed. Then, CT angiography axial images were obtained through the abdomen and pelvis. Coronal and sagittal reconstructions were generated. Additionally, multiplanar reformats including volume rendered images and 3D/MIPs were created. CT dose reduction was achieved through use of a standardized protocol tailored for this examination and automatic exposure control for dose modulation. FINDINGS: PULMONARY ARTERIES: This is a good quality study for the evaluation of pulmonary embolism to the segmental arterial level. There is no pulmonary embolism to this level. MEDIASTINUM: No mass

## 2025-07-11 NOTE — PLAN OF CARE
Problem: Pain  Goal: Verbalizes/displays adequate comfort level or baseline comfort level  7/10/2025 2339 by Maylin Fontenot RN  Outcome: Progressing     Problem: ABCDS Injury Assessment  Goal: Absence of physical injury  7/10/2025 2339 by Maylin Fontenot RN  Outcome: Progressing     Problem: Gastrointestinal - Adult  Goal: Maintains or returns to baseline bowel function  7/10/2025 2339 by Maylin Fontenot RN  Outcome: Progressing  Flowsheets (Taken 7/10/2025 2019)  Maintains or returns to baseline bowel function:   Assess bowel function   Encourage oral fluids to ensure adequate hydration     Problem: Metabolic/Fluid and Electrolytes - Adult  Goal: Electrolytes maintained within normal limits  7/10/2025 2339 by Maylin Fontenot RN  Outcome: Progressing  Flowsheets (Taken 7/10/2025 2019)  Electrolytes maintained within normal limits:   Monitor labs and assess patient for signs and symptoms of electrolyte imbalances   Administer electrolyte replacement as ordered   Monitor response to electrolyte replacements, including repeat lab results as appropriate     Problem: Safety - Adult  Goal: Free from fall injury  7/10/2025 2339 by Maylin Fontenot RN  Outcome: Progressing     Problem: Skin/Tissue Integrity  Goal: Skin integrity remains intact  Description: 1.  Monitor for areas of redness and/or skin breakdown  2.  Assess vascular access sites hourly  3.  Every 4-6 hours minimum:  Change oxygen saturation probe site  4.  Every 4-6 hours:  If on nasal continuous positive airway pressure, respiratory therapy assess nares and determine need for appliance change or resting period  7/10/2025 2339 by Maylin Fontenot RN  Outcome: Progressing  Flowsheets  Taken 7/10/2025 2338  Skin Integrity Remains Intact:   Monitor for areas of redness and/or skin breakdown   Pressure redistribution bed/mattress (bed type)   Check visual cues for pain  Taken 7/10/2025 2019  Skin Integrity Remains Intact:   Monitor for areas of redness and/or skin

## 2025-07-11 NOTE — PROGRESS NOTES
Comprehensive High Risk Nutrition Assessment Follow-Up    Type and Reason for Visit:  Reassess    Nutrition Recommendations/Plan:   Strongly consider swallow SLP eval for PO safety given noted unclear speech   Cont diet as nicolas and cont TPN at goal until consistently consuming at least ~>=50% PO intakes   Cont monitor ck Tg, bilirubins, LFTs monitor hold vs cycle lipid if Tg >=400-500  Cont to check Phos, Mg, K replete as needed   Appears better BG control   Consider adding MVI w/min daily PO as nicolas   Cont to monitor POC/NCP, wt trends, renal fx, lytes, UOP, bowel fx, skin integrity/wound healing and adjust recs as needed     Malnutrition Assessment:  Malnutrition Status:  At risk for malnutrition (07/11/25 1255)      Nutrition Assessment:    61yo F out of ICU SBO, ileus, NGT out, +having BMs, s/p lap exp PAOLA, small bowel resection 6/29, resolved ARF, s/p drain abd abscess 7/7 per MD. Alert self and place moaning unclear speech per MD. started Reg diet 7/10 noted to be nicolas.  remains on TPN day 11 meeting needs added lipid back in TPN noted. wound nurse s/o. overall wt is up from admit if correct ?fluid vs accuracy. 800ml UOP. On admit wt appeared trended up from wt hx if correct wt hx trends ~66-68kg Nov 2024-April 2025. K 3.8, Cr 0.56, Mg 2.1, Phos 3.1, Tg 214-225 7/4-7/10.    Nutrition Related Findings:      Wound Type: Surgical Incision       Current Nutrition Intake & Therapies:    Average Meal Intake:  (noted nicolas Reg diet with no %PO intakes noted+ TPN)  Average Supplements Intake: None Ordered  ADULT DIET; Regular  TPN-Adult Premix 5/15 - Standard Electrolytes - Central Line  Current Parenteral Nutrition Orders: Day 11  \"[Macronutrients (7/10/25) - provided by premixed Clinimix E  5%AA/15%Dextrose with electrolytes in 2000 ml bag:  Protein                                        75 g/day = 300 kcal  Dextrose                                   225 g/day = 765 kcal  Lipids                                           30 g/day = 270 kcal   ____________________________________________                                                                       1335  kcal total  Triglycerides = 225  (7/10/25),   Resume lipids today (7/10/25)   TPN rate will continue at 63 ml/hr   Lipid rate = 13 ml/hr   (lipids to infuse over 12 hours)]\" per pharmacy    Anthropometric Measures:  Height: 152.4 cm (5')  Ideal Body Weight (IBW): 100 lbs (45 kg)    Admission Body Weight: 73 kg (160 lb 15 oz)  Current Body Weight: 82 kg (180 lb 12.4 oz), 160.9 % IBW. Weight Source: Bed scale  Current BMI (kg/m2): 35.3  Usual Body Weight: 66 kg (145 lb 8.1 oz)  % Weight Change (Calculated): 10.6  BMI Categories: Obese Class 1 (BMI 30.0-34.9)    Estimated Daily Nutrient Needs:  Energy Requirements Based On: Kcal/kg  Weight Used for Energy Requirements: Ideal  Energy (kcal/day): 1600  Weight Used for Protein Requirements: Ideal  Protein (g/day): 65-70  Method Used for Fluid Requirements: 1 ml/kcal  Fluid (ml/day): 1600    Nutrition Diagnosis:   Increased nutrient needs, Altered nutrition-related lab values related to altered GI function, Altered GI structure, pain, increase demand for energy/nutrients, cardiac dysfunction, endocrine dysfunction, impaired respiratory function, nausea/vomiting/diarrhea as evidenced by nutrition support - parenteral nutrition, lab values, GI abnormality, s/p surgery    Nutrition Interventions:   Food and/or Nutrient Delivery: Continue Current Diet, Vitamin Supplement, Continue Current Parenteral Nutrition  Nutrition Education/Counseling: Education/Counseling not indicated  Coordination of Nutrition Care: Continue to monitor while inpatient, Swallow Evaluation    Goals:  Goals: Meet at least 75% of estimated needs (prevent wt loss and skin breakdown)  Type of Goal: Continue current goal  Previous Goal Met: Progressing toward Goal(s)    Nutrition Monitoring and Evaluation:   Behavioral-Environmental Outcomes: None

## 2025-07-11 NOTE — WOUND CARE
Wound care nurse spoke with Dr. Abdi about patient.  Dr. ESTHER Abdi stated he did not think I needed to see the patient.  Wound care will sign off at this time.  If advanced wound care is needed and Surgeon is agreeable consult wound care.

## 2025-07-11 NOTE — PROGRESS NOTES
PHYSICAL THERAPY TREATMENT    Patient: Kiara Nance (60 y.o. female)  Date: 7/11/2025  Diagnosis: SBO (small bowel obstruction) ([facility]) [K56.609] SBO (small bowel obstruction) ([facility])  Procedure(s) (LRB):  LAPAROTOMY EXPLORATORY, LYSIS OF ADHESIONS,  SMALL BOWEL RESECTION (N/A) 12 Days Post-Op  Precautions: Isolation, Contact Precautions (Droplet +),  ,  ,  ,  , Spinal Precautions: No Bending, No Lifting, No Twisting (s/p abdominal surgery),  ,        ASSESSMENT:  Pt found in bed, C/o increased abdominal pain. After discussion of the role of PT in her hospital stay, she becam e agreeable. Pt refused to use log roll method due to excessive L sided discomfort, opting to pull to sit. Socks donned and pt provided RW. Pt noted feeling of pulling sensation, and need for lumbar support. D/w RN who considered wrap for support. Pt requested to return to bed, but able to stand and scoot laterally to \[facility] [place] [facility]\"". Pt was able to return to  with CGA. Used moment for education on log roll, before pt repeated transition back to sitting for RN assessment. After session, the writer Reached out to surgery and received an order for abdominal binder. Will F/u with binder for additional support.  Pt notes being up often and being fairly mobile. This has not been captured in PT sessions.     Progression toward goals: Fair  []      Improving appropriately and progressing toward goals  [x]      Improving slowly and progressing toward goals  []      Not making progress toward goals and plan of care will be adjusted     PLAN:  Patient continues to benefit from skilled intervention to address the above impairments.  Continue treatment per established plan of care.    Further Equipment Recommendations for Discharge: bedside commode and rolling walker    [facility]:   AM-PAC Inpatient Mobility without Stair Climbing Raw Score : 14    Current research shows that an AM-PAC score of 17 (13 without stairs) or less is not associated with a discharge to the

## 2025-07-11 NOTE — PROGRESS NOTES
Consult for TPN Dosing per Pharmacy by Dr. Long  Consult provided for this 60 y.o. female, for indication of Nutrition  Day of Therapy 12     Weight: 82.1 kg (181 lb)    TPN dosing Weight: 54.6 kg       Labs:  BMP BMP:  Lab Results   Component Value Date/Time     07/11/2025 04:58 AM    K 3.8 07/11/2025 04:58 AM     07/11/2025 04:58 AM    CO2 24 07/11/2025 04:58 AM    BUN 11 07/11/2025 04:58 AM    CREATININE 0.56 07/11/2025 04:58 AM    GLUCOSE 158 07/11/2025 04:58 AM    CALCIUM 8.8 07/11/2025 04:58 AM         CMP CMP:      Lab Results   Component Value Date/Time    BUN 11 07/11/2025 04:58 AM    BUN 12 07/10/2025 05:43 AM    BUN 13 07/09/2025 03:52 AM    Creatinine 0.56 (L) 07/11/2025 04:58 AM    Creatinine 0.57 (L) 07/10/2025 05:43 AM    Creatinine 0.55 (L) 07/09/2025 03:52 AM    Sodium 136 07/11/2025 04:58 AM    Sodium 135 (L) 07/10/2025 05:43 AM    Sodium 135 (L) 07/09/2025 03:52 AM    Potassium 3.8 07/11/2025 04:58 AM    Potassium 4.0 07/10/2025 05:43 AM    Potassium 4.2 07/09/2025 03:52 AM    CO2 24 07/11/2025 04:58 AM    CO2 24 07/10/2025 05:43 AM    CO2 27 07/09/2025 03:52 AM    Chloride 101 07/11/2025 04:58 AM    Chloride 99 07/10/2025 05:43 AM    Chloride 99 07/09/2025 03:52 AM    Magnesium 2.1 07/11/2025 04:58 AM    Magnesium 2.1 07/10/2025 05:43 AM    Magnesium 2.0 07/09/2025 03:52 AM    Phosphorus 3.1 07/11/2025 04:58 AM    Phosphorus 3.0 07/10/2025 05:43 AM    Phosphorus 2.9 07/09/2025 03:52 AM    AST 24 07/11/2025 04:58 AM    AST 28 07/10/2025 05:43 AM    AST 41 (H) 07/09/2025 03:52 AM    ALT 26 07/11/2025 04:58 AM    ALT 31 07/10/2025 05:43 AM    ALT 38 (H) 07/09/2025 03:52 AM      Ca/ Phos Lab Results   Component Value Date/Time    PHOS 3.1 07/11/2025 04:58 AM       Mag    Lab Results   Component Value Date/Time    MG 2.1 07/11/2025 04:58 AM        Triglycerides   225 (7/10/25)         Kcal requirements:  25-35 kcal/kg ( 1365 - 1911 kcal/day )      Pt has regular diet ordered, but is only

## 2025-07-12 LAB
ALBUMIN SERPL-MCNC: 2.1 G/DL (ref 3.4–5)
ALBUMIN/GLOB SERPL: 0.4 (ref 0.8–1.7)
ALP SERPL-CCNC: 68 U/L (ref 45–117)
ALT SERPL-CCNC: 27 U/L (ref 10–35)
ANION GAP SERPL CALC-SCNC: 11 MMOL/L (ref 3–18)
AST SERPL-CCNC: 28 U/L (ref 10–38)
BACTERIA SPEC CULT: NORMAL
BACTERIA SPEC CULT: NORMAL
BASOPHILS # BLD: 0.06 K/UL (ref 0–0.1)
BASOPHILS NFR BLD: 0.6 % (ref 0–2)
BILIRUB SERPL-MCNC: 0.2 MG/DL (ref 0.2–1)
BUN SERPL-MCNC: 12 MG/DL (ref 6–23)
BUN/CREAT SERPL: 18 (ref 12–20)
CALCIUM SERPL-MCNC: 8.9 MG/DL (ref 8.5–10.1)
CHLORIDE SERPL-SCNC: 102 MMOL/L (ref 98–107)
CO2 SERPL-SCNC: 23 MMOL/L (ref 21–32)
CREAT SERPL-MCNC: 0.65 MG/DL (ref 0.6–1.3)
DIFFERENTIAL METHOD BLD: ABNORMAL
EOSINOPHIL # BLD: 0.21 K/UL (ref 0–0.4)
EOSINOPHIL NFR BLD: 2.2 % (ref 0–5)
ERYTHROCYTE [DISTWIDTH] IN BLOOD BY AUTOMATED COUNT: 15 % (ref 11.6–14.5)
GLOBULIN SER CALC-MCNC: 4.8 G/DL (ref 2–4)
GLUCOSE BLD STRIP.AUTO-MCNC: 122 MG/DL (ref 70–110)
GLUCOSE BLD STRIP.AUTO-MCNC: 160 MG/DL (ref 70–110)
GLUCOSE BLD STRIP.AUTO-MCNC: 186 MG/DL (ref 70–110)
GLUCOSE BLD STRIP.AUTO-MCNC: 204 MG/DL (ref 70–110)
GLUCOSE BLD STRIP.AUTO-MCNC: 213 MG/DL (ref 70–110)
GLUCOSE BLD STRIP.AUTO-MCNC: 265 MG/DL (ref 70–110)
GLUCOSE SERPL-MCNC: 86 MG/DL (ref 74–108)
HCT VFR BLD AUTO: 23.8 % (ref 35–45)
HGB BLD-MCNC: 7.9 G/DL (ref 12–16)
IMM GRANULOCYTES # BLD AUTO: 0.06 K/UL (ref 0–0.04)
IMM GRANULOCYTES NFR BLD AUTO: 0.6 % (ref 0–0.5)
LYMPHOCYTES # BLD: 1.57 K/UL (ref 0.9–3.3)
LYMPHOCYTES NFR BLD: 16.8 % (ref 21–52)
MAGNESIUM SERPL-MCNC: 2.2 MG/DL (ref 1.6–2.6)
MCH RBC QN AUTO: 28.3 PG (ref 24–34)
MCHC RBC AUTO-ENTMCNC: 33.2 G/DL (ref 31–37)
MCV RBC AUTO: 85.3 FL (ref 78–100)
MONOCYTES # BLD: 0.77 K/UL (ref 0.05–1.2)
MONOCYTES NFR BLD: 8.2 % (ref 3–10)
NEUTS SEG # BLD: 6.7 K/UL (ref 1.8–8)
NEUTS SEG NFR BLD: 71.6 % (ref 40–73)
NRBC # BLD: 0 K/UL (ref 0–0.01)
NRBC BLD-RTO: 0 PER 100 WBC
PHOSPHATE SERPL-MCNC: 3.1 MG/DL (ref 2.5–4.9)
PLATELET # BLD AUTO: 600 K/UL (ref 135–420)
PMV BLD AUTO: 10.4 FL (ref 9.2–11.8)
POTASSIUM SERPL-SCNC: 4.2 MMOL/L (ref 3.5–5.5)
PROT SERPL-MCNC: 6.9 G/DL (ref 6.4–8.2)
RBC # BLD AUTO: 2.79 M/UL (ref 4.2–5.3)
SERVICE CMNT-IMP: NORMAL
SERVICE CMNT-IMP: NORMAL
SODIUM SERPL-SCNC: 136 MMOL/L (ref 136–145)
WBC # BLD AUTO: 9.4 K/UL (ref 4.6–13.2)

## 2025-07-12 PROCEDURE — 6370000000 HC RX 637 (ALT 250 FOR IP): Performed by: INTERNAL MEDICINE

## 2025-07-12 PROCEDURE — 82962 GLUCOSE BLOOD TEST: CPT

## 2025-07-12 PROCEDURE — 85025 COMPLETE CBC W/AUTO DIFF WBC: CPT

## 2025-07-12 PROCEDURE — 2500000003 HC RX 250 WO HCPCS: Performed by: HOSPITALIST

## 2025-07-12 PROCEDURE — 1100000003 HC PRIVATE W/ TELEMETRY

## 2025-07-12 PROCEDURE — 83735 ASSAY OF MAGNESIUM: CPT

## 2025-07-12 PROCEDURE — 6360000002 HC RX W HCPCS: Performed by: INTERNAL MEDICINE

## 2025-07-12 PROCEDURE — 2580000003 HC RX 258: Performed by: INTERNAL MEDICINE

## 2025-07-12 PROCEDURE — 6370000000 HC RX 637 (ALT 250 FOR IP): Performed by: HOSPITALIST

## 2025-07-12 PROCEDURE — 2500000003 HC RX 250 WO HCPCS: Performed by: INTERNAL MEDICINE

## 2025-07-12 PROCEDURE — 36415 COLL VENOUS BLD VENIPUNCTURE: CPT

## 2025-07-12 PROCEDURE — 80053 COMPREHEN METABOLIC PANEL: CPT

## 2025-07-12 PROCEDURE — 6360000002 HC RX W HCPCS: Performed by: HOSPITALIST

## 2025-07-12 PROCEDURE — 6360000002 HC RX W HCPCS: Performed by: SURGERY

## 2025-07-12 PROCEDURE — 84100 ASSAY OF PHOSPHORUS: CPT

## 2025-07-12 RX ORDER — SODIUM CHLORIDE, SODIUM LACTATE, POTASSIUM CHLORIDE, AND CALCIUM CHLORIDE .6; .31; .03; .02 G/100ML; G/100ML; G/100ML; G/100ML
500 INJECTION, SOLUTION INTRAVENOUS ONCE
Status: COMPLETED | OUTPATIENT
Start: 2025-07-12 | End: 2025-07-12

## 2025-07-12 RX ADMIN — HYDROMORPHONE HYDROCHLORIDE 0.25 MG: 1 INJECTION, SOLUTION INTRAMUSCULAR; INTRAVENOUS; SUBCUTANEOUS at 10:50

## 2025-07-12 RX ADMIN — METOCLOPRAMIDE 5 MG: 5 INJECTION, SOLUTION INTRAMUSCULAR; INTRAVENOUS at 10:51

## 2025-07-12 RX ADMIN — ONDANSETRON HYDROCHLORIDE 4 MG: 2 INJECTION INTRAMUSCULAR; INTRAVENOUS at 03:24

## 2025-07-12 RX ADMIN — ACETAMINOPHEN 650 MG: 325 TABLET ORAL at 03:27

## 2025-07-12 RX ADMIN — SODIUM CHLORIDE, PRESERVATIVE FREE 10 ML: 5 INJECTION INTRAVENOUS at 19:56

## 2025-07-12 RX ADMIN — INSULIN LISPRO 4 UNITS: 100 INJECTION, SOLUTION INTRAVENOUS; SUBCUTANEOUS at 01:01

## 2025-07-12 RX ADMIN — INSULIN LISPRO 4 UNITS: 100 INJECTION, SOLUTION INTRAVENOUS; SUBCUTANEOUS at 12:41

## 2025-07-12 RX ADMIN — METOCLOPRAMIDE 5 MG: 5 INJECTION, SOLUTION INTRAMUSCULAR; INTRAVENOUS at 01:00

## 2025-07-12 RX ADMIN — ACETAMINOPHEN 650 MG: 325 TABLET ORAL at 18:00

## 2025-07-12 RX ADMIN — HYDROMORPHONE HYDROCHLORIDE 0.25 MG: 1 INJECTION, SOLUTION INTRAMUSCULAR; INTRAVENOUS; SUBCUTANEOUS at 21:17

## 2025-07-12 RX ADMIN — LABETALOL HYDROCHLORIDE 20 MG: 5 INJECTION, SOLUTION INTRAVENOUS at 23:15

## 2025-07-12 RX ADMIN — SODIUM CHLORIDE, SODIUM LACTATE, POTASSIUM CHLORIDE, AND CALCIUM CHLORIDE 500 ML: .6; .31; .03; .02 INJECTION, SOLUTION INTRAVENOUS at 03:56

## 2025-07-12 RX ADMIN — INSULIN GLARGINE 26 UNITS: 100 INJECTION, SOLUTION SUBCUTANEOUS at 10:53

## 2025-07-12 RX ADMIN — HYDROMORPHONE HYDROCHLORIDE 0.25 MG: 1 INJECTION, SOLUTION INTRAMUSCULAR; INTRAVENOUS; SUBCUTANEOUS at 15:55

## 2025-07-12 RX ADMIN — SODIUM CHLORIDE, PRESERVATIVE FREE 40 MG: 5 INJECTION INTRAVENOUS at 10:51

## 2025-07-12 RX ADMIN — MEROPENEM 1000 MG: 1 INJECTION INTRAVENOUS at 11:05

## 2025-07-12 RX ADMIN — HYDROMORPHONE HYDROCHLORIDE 0.25 MG: 1 INJECTION, SOLUTION INTRAMUSCULAR; INTRAVENOUS; SUBCUTANEOUS at 05:18

## 2025-07-12 RX ADMIN — INSULIN LISPRO 4 UNITS: 100 INJECTION, SOLUTION INTRAVENOUS; SUBCUTANEOUS at 23:43

## 2025-07-12 RX ADMIN — MEROPENEM 1000 MG: 1 INJECTION INTRAVENOUS at 18:26

## 2025-07-12 RX ADMIN — METOCLOPRAMIDE 5 MG: 5 INJECTION, SOLUTION INTRAMUSCULAR; INTRAVENOUS at 18:25

## 2025-07-12 RX ADMIN — SODIUM CHLORIDE, PRESERVATIVE FREE 10 ML: 5 INJECTION INTRAVENOUS at 10:52

## 2025-07-12 RX ADMIN — TIZANIDINE 4 MG: 4 TABLET ORAL at 18:02

## 2025-07-12 RX ADMIN — ACETAMINOPHEN 650 MG: 325 TABLET ORAL at 10:48

## 2025-07-12 RX ADMIN — MEROPENEM 1000 MG: 1 INJECTION INTRAVENOUS at 01:01

## 2025-07-12 RX ADMIN — ASCORBIC ACID, VITAMIN A PALMITATE, CHOLECALCIFEROL, THIAMINE HYDROCHLORIDE, RIBOFLAVIN-5 PHOSPHATE SODIUM, PYRIDOXINE HYDROCHLORIDE, NIACINAMIDE, DEXPANTHENOL, ALPHA-TOCOPHEROL ACETATE, VITAMIN K1, FOLIC ACID, BIOTIN, CYANOCOBALAMIN: 200; 3300; 200; 6; 3.6; 6; 40; 15; 10; 150; 600; 60; 5 INJECTION, SOLUTION INTRAVENOUS at 18:28

## 2025-07-12 RX ADMIN — I.V. FAT EMULSION 150 ML: 20 EMULSION INTRAVENOUS at 18:29

## 2025-07-12 ASSESSMENT — PAIN SCALES - WONG BAKER
WONGBAKER_NUMERICALRESPONSE: NO HURT
WONGBAKER_NUMERICALRESPONSE: NO HURT
WONGBAKER_NUMERICALRESPONSE: HURTS A LITTLE BIT
WONGBAKER_NUMERICALRESPONSE: NO HURT

## 2025-07-12 ASSESSMENT — PAIN SCALES - GENERAL
PAINLEVEL_OUTOF10: 5
PAINLEVEL_OUTOF10: 0
PAINLEVEL_OUTOF10: 7
PAINLEVEL_OUTOF10: 10
PAINLEVEL_OUTOF10: 0
PAINLEVEL_OUTOF10: 9
PAINLEVEL_OUTOF10: 10
PAINLEVEL_OUTOF10: 6
PAINLEVEL_OUTOF10: 0
PAINLEVEL_OUTOF10: 9

## 2025-07-12 ASSESSMENT — PAIN DESCRIPTION - ORIENTATION
ORIENTATION: MID
ORIENTATION: MID
ORIENTATION: ANTERIOR
ORIENTATION: LEFT;MID
ORIENTATION: MID
ORIENTATION: ANTERIOR

## 2025-07-12 ASSESSMENT — PAIN DESCRIPTION - LOCATION
LOCATION: ABDOMEN

## 2025-07-12 ASSESSMENT — PAIN - FUNCTIONAL ASSESSMENT: PAIN_FUNCTIONAL_ASSESSMENT: ACTIVITIES ARE NOT PREVENTED

## 2025-07-12 ASSESSMENT — PAIN DESCRIPTION - DESCRIPTORS
DESCRIPTORS: SHARP
DESCRIPTORS: PRESSURE;ACHING
DESCRIPTORS: CRAMPING;DISCOMFORT

## 2025-07-12 NOTE — PROGRESS NOTES
Consult for TPN Dosing per Pharmacy by Dr. Long    Consult provided for this 60 y.o. female, for indication of Nutrition    Day of Therapy 12     TPN dosing Weight: 54.6 kg         Labs:  BMP BMP:        Lab Results   Component Value Date/Time      07/11/2025 04:58 AM     K 3.8 07/11/2025 04:58 AM      07/11/2025 04:58 AM     CO2 24 07/11/2025 04:58 AM     BUN 11 07/11/2025 04:58 AM     CREATININE 0.56 07/11/2025 04:58 AM     GLUCOSE 158 07/11/2025 04:58 AM     CALCIUM 8.8 07/11/2025 04:58 AM          CMP CMP:            Lab Results   Component Value Date/Time     BUN 11 07/11/2025 04:58 AM     BUN 12 07/10/2025 05:43 AM     BUN 13 07/09/2025 03:52 AM     Creatinine 0.56 (L) 07/11/2025 04:58 AM     Creatinine 0.57 (L) 07/10/2025 05:43 AM     Creatinine 0.55 (L) 07/09/2025 03:52 AM     Sodium 136 07/11/2025 04:58 AM     Sodium 135 (L) 07/10/2025 05:43 AM     Sodium 135 (L) 07/09/2025 03:52 AM     Potassium 3.8 07/11/2025 04:58 AM     Potassium 4.0 07/10/2025 05:43 AM     Potassium 4.2 07/09/2025 03:52 AM     CO2 24 07/11/2025 04:58 AM     CO2 24 07/10/2025 05:43 AM     CO2 27 07/09/2025 03:52 AM     Chloride 101 07/11/2025 04:58 AM     Chloride 99 07/10/2025 05:43 AM     Chloride 99 07/09/2025 03:52 AM     Magnesium 2.1 07/11/2025 04:58 AM     Magnesium 2.1 07/10/2025 05:43 AM     Magnesium 2.0 07/09/2025 03:52 AM     Phosphorus 3.1 07/11/2025 04:58 AM     Phosphorus 3.0 07/10/2025 05:43 AM     Phosphorus 2.9 07/09/2025 03:52 AM     AST 24 07/11/2025 04:58 AM     AST 28 07/10/2025 05:43 AM     AST 41 (H) 07/09/2025 03:52 AM     ALT 26 07/11/2025 04:58 AM     ALT 31 07/10/2025 05:43 AM     ALT 38 (H) 07/09/2025 03:52 AM      Ca/ Phos       Lab Results   Component Value Date/Time     PHOS 3.1 07/11/2025 04:58 AM       Mag          Lab Results   Component Value Date/Time     MG 2.1 07/11/2025 04:58 AM        Triglycerides   225 (7/10/25)            Kcal requirements:  25-35 kcal/kg ( 1365 - 1911 kcal/day )

## 2025-07-12 NOTE — PROGRESS NOTES
Hospitalist Progress Note    Patient: Kiara Nance MRN: [id number]  CSN: [id number]    YOB: 1965  Age: 60 y.o.  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  She received surgery and developed metabolic acidosis, luis and ileus and  sepsis. Bcx esbl   ID on board, cta done on 7/2 with no-occlusive aortic thrombus on the posterior wall at the level of the SMA and fluid collection and thrombocytopenia    Sepsis blood culture E. coli  ID has been consulted.  Antibiotic changed to meropenem and vancomycin 06/30 to 07/10, antifungal 07/05 to07/10  Lactic acid wnl now   Bcx  esbl   Ultrasound no acute choleyistitis   On droplet isolation 2nd to + adenovirus      Arotic thrombosis and thrombocytopenia  Continue argatroban, vascular surgery has been consulted, no intervention needed  Recommended continue anti-AC  Bp control add clonidine patch  Check echo     Thombocytopenia  Hematology consulted  No evidence of TTP  HIT ab noted negative but platlets improved when changed from heparin to Argotraban sero panel being sent  Now on Argotraban      metabolic acidosis and luis   Was on  bicarb drip hold per nephrologist improving   Cr back to normal     Hydronephrosis, left side no obstruction  Urology has been consulted.  Perinephric fluid noted

## 2025-07-12 NOTE — PROGRESS NOTES
Physical Therapy    1442: Pt sleepy, easily arouse but refusing PT, will follow up as schedule permits.

## 2025-07-12 NOTE — PLAN OF CARE
Problem: Pain  Goal: Verbalizes/displays adequate comfort level or baseline comfort level  7/12/2025 1655 by Willa Shipley RN  Outcome: Progressing  7/12/2025 0620 by Maylin Fontenot RN  Outcome: Progressing     Problem: ABCDS Injury Assessment  Goal: Absence of physical injury  7/12/2025 0620 by Maylin Fontenot RN  Outcome: Progressing     Problem: Gastrointestinal - Adult  Goal: Maintains or returns to baseline bowel function  7/12/2025 1655 by Willa Shipley RN  Outcome: Progressing  7/12/2025 0620 by Maylin Fontenot RN  Outcome: Progressing  Flowsheets (Taken 7/11/2025 1925)  Maintains or returns to baseline bowel function:   Assess bowel function   Encourage oral fluids to ensure adequate hydration   Administer ordered medications as needed   Encourage mobilization and activity     Problem: Metabolic/Fluid and Electrolytes - Adult  Goal: Electrolytes maintained within normal limits  7/12/2025 1655 by Willa Shipley RN  Outcome: Progressing  7/12/2025 0620 by Maylin Fontenot RN  Outcome: Progressing  Flowsheets (Taken 7/11/2025 1925)  Electrolytes maintained within normal limits:   Monitor labs and assess patient for signs and symptoms of electrolyte imbalances   Administer electrolyte replacement as ordered   Monitor response to electrolyte replacements, including repeat lab results as appropriate     Problem: Safety - Adult  Goal: Free from fall injury  7/12/2025 1655 by Willa Shipley RN  Outcome: Progressing  7/12/2025 0620 by aMylin Fontenot RN  Outcome: Progressing     Problem: Skin/Tissue Integrity  Goal: Skin integrity remains intact  Description: 1.  Monitor for areas of redness and/or skin breakdown  2.  Assess vascular access sites hourly  3.  Every 4-6 hours minimum:  Change oxygen saturation probe site  4.  Every 4-6 hours:  If on nasal continuous positive airway pressure, respiratory therapy assess nares and determine need for appliance change or resting period  7/12/2025 1655 by

## 2025-07-12 NOTE — PLAN OF CARE
Problem: Pain  Goal: Verbalizes/displays adequate comfort level or baseline comfort level  Outcome: Progressing     Problem: ABCDS Injury Assessment  Goal: Absence of physical injury  Outcome: Progressing     Problem: Gastrointestinal - Adult  Goal: Maintains or returns to baseline bowel function  Outcome: Progressing  Flowsheets (Taken 7/11/2025 1925)  Maintains or returns to baseline bowel function:   Assess bowel function   Encourage oral fluids to ensure adequate hydration   Administer ordered medications as needed   Encourage mobilization and activity     Problem: Metabolic/Fluid and Electrolytes - Adult  Goal: Electrolytes maintained within normal limits  Outcome: Progressing  Flowsheets (Taken 7/11/2025 1925)  Electrolytes maintained within normal limits:   Monitor labs and assess patient for signs and symptoms of electrolyte imbalances   Administer electrolyte replacement as ordered   Monitor response to electrolyte replacements, including repeat lab results as appropriate     Problem: Safety - Adult  Goal: Free from fall injury  Outcome: Progressing     Problem: Skin/Tissue Integrity  Goal: Skin integrity remains intact  Description: 1.  Monitor for areas of redness and/or skin breakdown  2.  Assess vascular access sites hourly  3.  Every 4-6 hours minimum:  Change oxygen saturation probe site  4.  Every 4-6 hours:  If on nasal continuous positive airway pressure, respiratory therapy assess nares and determine need for appliance change or resting period  Outcome: Progressing  Flowsheets (Taken 7/11/2025 1925)  Skin Integrity Remains Intact:   Monitor for areas of redness and/or skin breakdown   Turn and reposition as indicated   Pressure redistribution bed/mattress (bed type)   Check visual cues for pain     Problem: Discharge Planning  Goal: Discharge to home or other facility with appropriate resources  Outcome: Progressing  Flowsheets (Taken 7/11/2025 1925)  Discharge to home or other facility with

## 2025-07-12 NOTE — PROGRESS NOTES
Patient resting comfortably in bed.  The wound looks good, no significant drainage today.  Still not taking in a great deal of p.o.,  I encouraged her to eat and ambulate is much as she tolerates.

## 2025-07-13 LAB
ALBUMIN SERPL-MCNC: 2 G/DL (ref 3.4–5)
ALBUMIN/GLOB SERPL: 0.4 (ref 0.8–1.7)
ALP SERPL-CCNC: 67 U/L (ref 45–117)
ALT SERPL-CCNC: 29 U/L (ref 10–35)
ANION GAP SERPL CALC-SCNC: 12 MMOL/L (ref 3–18)
AST SERPL-CCNC: 30 U/L (ref 10–38)
BASOPHILS # BLD: 0.05 K/UL (ref 0–0.1)
BASOPHILS NFR BLD: 0.6 % (ref 0–2)
BILIRUB SERPL-MCNC: <0.2 MG/DL (ref 0.2–1)
BUN SERPL-MCNC: 13 MG/DL (ref 6–23)
BUN/CREAT SERPL: 19 (ref 12–20)
CALCIUM SERPL-MCNC: 8.6 MG/DL (ref 8.5–10.1)
CHLORIDE SERPL-SCNC: 99 MMOL/L (ref 98–107)
CO2 SERPL-SCNC: 23 MMOL/L (ref 21–32)
CREAT SERPL-MCNC: 0.67 MG/DL (ref 0.6–1.3)
DIFFERENTIAL METHOD BLD: ABNORMAL
EOSINOPHIL # BLD: 0.22 K/UL (ref 0–0.4)
EOSINOPHIL NFR BLD: 2.8 % (ref 0–5)
ERYTHROCYTE [DISTWIDTH] IN BLOOD BY AUTOMATED COUNT: 14.5 % (ref 11.6–14.5)
GLOBULIN SER CALC-MCNC: 4.8 G/DL (ref 2–4)
GLUCOSE BLD STRIP.AUTO-MCNC: 175 MG/DL (ref 70–110)
GLUCOSE BLD STRIP.AUTO-MCNC: 176 MG/DL (ref 70–110)
GLUCOSE BLD STRIP.AUTO-MCNC: 177 MG/DL (ref 70–110)
GLUCOSE BLD STRIP.AUTO-MCNC: 191 MG/DL (ref 70–110)
GLUCOSE SERPL-MCNC: 167 MG/DL (ref 74–108)
HCT VFR BLD AUTO: 24.2 % (ref 35–45)
HGB BLD-MCNC: 8.3 G/DL (ref 12–16)
IMM GRANULOCYTES # BLD AUTO: 0.05 K/UL (ref 0–0.04)
IMM GRANULOCYTES NFR BLD AUTO: 0.6 % (ref 0–0.5)
LYMPHOCYTES # BLD: 1.62 K/UL (ref 0.9–3.3)
LYMPHOCYTES NFR BLD: 20.9 % (ref 21–52)
MAGNESIUM SERPL-MCNC: 2.1 MG/DL (ref 1.6–2.6)
MCH RBC QN AUTO: 28.8 PG (ref 24–34)
MCHC RBC AUTO-ENTMCNC: 34.3 G/DL (ref 31–37)
MCV RBC AUTO: 84 FL (ref 78–100)
MONOCYTES # BLD: 0.72 K/UL (ref 0.05–1.2)
MONOCYTES NFR BLD: 9.3 % (ref 3–10)
NEUTS SEG # BLD: 5.08 K/UL (ref 1.8–8)
NEUTS SEG NFR BLD: 65.8 % (ref 40–73)
NRBC # BLD: 0 K/UL (ref 0–0.01)
NRBC BLD-RTO: 0 PER 100 WBC
PHOSPHATE SERPL-MCNC: 3 MG/DL (ref 2.5–4.9)
PLATELET # BLD AUTO: 606 K/UL (ref 135–420)
PMV BLD AUTO: 10 FL (ref 9.2–11.8)
POTASSIUM SERPL-SCNC: 4.1 MMOL/L (ref 3.5–5.5)
PROT SERPL-MCNC: 6.8 G/DL (ref 6.4–8.2)
RBC # BLD AUTO: 2.88 M/UL (ref 4.2–5.3)
SODIUM SERPL-SCNC: 134 MMOL/L (ref 136–145)
WBC # BLD AUTO: 7.7 K/UL (ref 4.6–13.2)

## 2025-07-13 PROCEDURE — 2500000003 HC RX 250 WO HCPCS: Performed by: INTERNAL MEDICINE

## 2025-07-13 PROCEDURE — 6360000002 HC RX W HCPCS: Performed by: INTERNAL MEDICINE

## 2025-07-13 PROCEDURE — 1100000003 HC PRIVATE W/ TELEMETRY

## 2025-07-13 PROCEDURE — 2500000003 HC RX 250 WO HCPCS: Performed by: HOSPITALIST

## 2025-07-13 PROCEDURE — 84100 ASSAY OF PHOSPHORUS: CPT

## 2025-07-13 PROCEDURE — 80053 COMPREHEN METABOLIC PANEL: CPT

## 2025-07-13 PROCEDURE — 6370000000 HC RX 637 (ALT 250 FOR IP): Performed by: INTERNAL MEDICINE

## 2025-07-13 PROCEDURE — 82962 GLUCOSE BLOOD TEST: CPT

## 2025-07-13 PROCEDURE — 6360000002 HC RX W HCPCS: Performed by: SURGERY

## 2025-07-13 PROCEDURE — 6370000000 HC RX 637 (ALT 250 FOR IP): Performed by: HOSPITALIST

## 2025-07-13 PROCEDURE — 6360000002 HC RX W HCPCS: Performed by: HOSPITALIST

## 2025-07-13 PROCEDURE — 6360000002 HC RX W HCPCS: Performed by: FAMILY MEDICINE

## 2025-07-13 PROCEDURE — 83735 ASSAY OF MAGNESIUM: CPT

## 2025-07-13 PROCEDURE — 85025 COMPLETE CBC W/AUTO DIFF WBC: CPT

## 2025-07-13 PROCEDURE — 36415 COLL VENOUS BLD VENIPUNCTURE: CPT

## 2025-07-13 PROCEDURE — 97116 GAIT TRAINING THERAPY: CPT

## 2025-07-13 PROCEDURE — 6370000000 HC RX 637 (ALT 250 FOR IP): Performed by: FAMILY MEDICINE

## 2025-07-13 PROCEDURE — 97530 THERAPEUTIC ACTIVITIES: CPT

## 2025-07-13 PROCEDURE — 2580000003 HC RX 258: Performed by: INTERNAL MEDICINE

## 2025-07-13 RX ORDER — HYDROCODONE BITARTRATE AND ACETAMINOPHEN 5; 325 MG/1; MG/1
2 TABLET ORAL EVERY 6 HOURS PRN
Refills: 0 | Status: DISCONTINUED | OUTPATIENT
Start: 2025-07-13 | End: 2025-07-15 | Stop reason: HOSPADM

## 2025-07-13 RX ORDER — HYDROMORPHONE HYDROCHLORIDE 1 MG/ML
0.25 INJECTION, SOLUTION INTRAMUSCULAR; INTRAVENOUS; SUBCUTANEOUS EVERY 6 HOURS PRN
Status: DISCONTINUED | OUTPATIENT
Start: 2025-07-13 | End: 2025-07-15 | Stop reason: HOSPADM

## 2025-07-13 RX ADMIN — SODIUM CHLORIDE, PRESERVATIVE FREE 10 ML: 5 INJECTION INTRAVENOUS at 10:49

## 2025-07-13 RX ADMIN — MEROPENEM 1000 MG: 1 INJECTION INTRAVENOUS at 01:22

## 2025-07-13 RX ADMIN — ACETAMINOPHEN 650 MG: 325 TABLET ORAL at 10:31

## 2025-07-13 RX ADMIN — TIZANIDINE 4 MG: 4 TABLET ORAL at 13:29

## 2025-07-13 RX ADMIN — MEROPENEM 1000 MG: 1 INJECTION INTRAVENOUS at 10:44

## 2025-07-13 RX ADMIN — LABETALOL HYDROCHLORIDE 20 MG: 5 INJECTION, SOLUTION INTRAVENOUS at 04:16

## 2025-07-13 RX ADMIN — MEROPENEM 1000 MG: 1 INJECTION INTRAVENOUS at 18:32

## 2025-07-13 RX ADMIN — TIZANIDINE 4 MG: 4 TABLET ORAL at 03:59

## 2025-07-13 RX ADMIN — INSULIN LISPRO 4 UNITS: 100 INJECTION, SOLUTION INTRAVENOUS; SUBCUTANEOUS at 06:36

## 2025-07-13 RX ADMIN — SODIUM CHLORIDE, PRESERVATIVE FREE 40 MG: 5 INJECTION INTRAVENOUS at 10:32

## 2025-07-13 RX ADMIN — SODIUM CHLORIDE, PRESERVATIVE FREE 10 ML: 5 INJECTION INTRAVENOUS at 21:19

## 2025-07-13 RX ADMIN — HYDROMORPHONE HYDROCHLORIDE 0.25 MG: 1 INJECTION, SOLUTION INTRAMUSCULAR; INTRAVENOUS; SUBCUTANEOUS at 10:40

## 2025-07-13 RX ADMIN — HYDROMORPHONE HYDROCHLORIDE 0.25 MG: 1 INJECTION, SOLUTION INTRAMUSCULAR; INTRAVENOUS; SUBCUTANEOUS at 06:36

## 2025-07-13 RX ADMIN — METOCLOPRAMIDE 5 MG: 5 INJECTION, SOLUTION INTRAMUSCULAR; INTRAVENOUS at 01:19

## 2025-07-13 RX ADMIN — HYDROMORPHONE HYDROCHLORIDE 0.25 MG: 1 INJECTION, SOLUTION INTRAMUSCULAR; INTRAVENOUS; SUBCUTANEOUS at 15:29

## 2025-07-13 RX ADMIN — INSULIN GLARGINE 26 UNITS: 100 INJECTION, SOLUTION SUBCUTANEOUS at 10:32

## 2025-07-13 RX ADMIN — ASCORBIC ACID, VITAMIN A PALMITATE, CHOLECALCIFEROL, THIAMINE HYDROCHLORIDE, RIBOFLAVIN-5 PHOSPHATE SODIUM, PYRIDOXINE HYDROCHLORIDE, NIACINAMIDE, DEXPANTHENOL, ALPHA-TOCOPHEROL ACETATE, VITAMIN K1, FOLIC ACID, BIOTIN, CYANOCOBALAMIN: 200; 3300; 200; 6; 3.6; 6; 40; 15; 10; 150; 600; 60; 5 INJECTION, SOLUTION INTRAVENOUS at 18:51

## 2025-07-13 RX ADMIN — POLYETHYLENE GLYCOL 3350 17 G: 17 POWDER, FOR SOLUTION ORAL at 10:31

## 2025-07-13 RX ADMIN — METOCLOPRAMIDE 5 MG: 5 INJECTION, SOLUTION INTRAMUSCULAR; INTRAVENOUS at 18:39

## 2025-07-13 RX ADMIN — HYDROMORPHONE HYDROCHLORIDE 0.25 MG: 1 INJECTION, SOLUTION INTRAMUSCULAR; INTRAVENOUS; SUBCUTANEOUS at 21:16

## 2025-07-13 RX ADMIN — I.V. FAT EMULSION 150 ML: 20 EMULSION INTRAVENOUS at 18:56

## 2025-07-13 RX ADMIN — METOCLOPRAMIDE 5 MG: 5 INJECTION, SOLUTION INTRAMUSCULAR; INTRAVENOUS at 10:33

## 2025-07-13 RX ADMIN — ACETAMINOPHEN 650 MG: 325 TABLET ORAL at 03:59

## 2025-07-13 RX ADMIN — HYDROMORPHONE HYDROCHLORIDE 0.25 MG: 1 INJECTION, SOLUTION INTRAMUSCULAR; INTRAVENOUS; SUBCUTANEOUS at 01:16

## 2025-07-13 RX ADMIN — HYDROCODONE BITARTRATE AND ACETAMINOPHEN 2 TABLET: 5; 325 TABLET ORAL at 18:21

## 2025-07-13 ASSESSMENT — PAIN SCALES - GENERAL
PAINLEVEL_OUTOF10: 0
PAINLEVEL_OUTOF10: 7
PAINLEVEL_OUTOF10: 9
PAINLEVEL_OUTOF10: 0
PAINLEVEL_OUTOF10: 9
PAINLEVEL_OUTOF10: 0
PAINLEVEL_OUTOF10: 9
PAINLEVEL_OUTOF10: 1
PAINLEVEL_OUTOF10: 10
PAINLEVEL_OUTOF10: 5
PAINLEVEL_OUTOF10: 0
PAINLEVEL_OUTOF10: 9
PAINLEVEL_OUTOF10: 6
PAINLEVEL_OUTOF10: 0
PAINLEVEL_OUTOF10: 0
PAINLEVEL_OUTOF10: 9

## 2025-07-13 ASSESSMENT — PAIN DESCRIPTION - DESCRIPTORS
DESCRIPTORS: SHARP;ACHING
DESCRIPTORS: THROBBING
DESCRIPTORS: THROBBING;SHARP

## 2025-07-13 ASSESSMENT — PAIN DESCRIPTION - ORIENTATION
ORIENTATION: MID
ORIENTATION: MID
ORIENTATION: ANTERIOR
ORIENTATION: MID
ORIENTATION: MID

## 2025-07-13 ASSESSMENT — PAIN - FUNCTIONAL ASSESSMENT
PAIN_FUNCTIONAL_ASSESSMENT: ACTIVITIES ARE NOT PREVENTED

## 2025-07-13 ASSESSMENT — PAIN SCALES - WONG BAKER
WONGBAKER_NUMERICALRESPONSE: NO HURT
WONGBAKER_NUMERICALRESPONSE: NO HURT
WONGBAKER_NUMERICALRESPONSE: HURTS A LITTLE BIT
WONGBAKER_NUMERICALRESPONSE: HURTS A LITTLE BIT
WONGBAKER_NUMERICALRESPONSE: NO HURT
WONGBAKER_NUMERICALRESPONSE: NO HURT
WONGBAKER_NUMERICALRESPONSE: HURTS A LITTLE BIT
WONGBAKER_NUMERICALRESPONSE: NO HURT

## 2025-07-13 ASSESSMENT — PAIN DESCRIPTION - LOCATION
LOCATION: ABDOMEN

## 2025-07-13 NOTE — PLAN OF CARE
Problem: Pain  Goal: Verbalizes/displays adequate comfort level or baseline comfort level  7/13/2025 0326 by Dougie Choudhury RN  Outcome: Progressing  7/12/2025 1655 by Willa Shipley RN  Outcome: Progressing     Problem: Safety - Adult  Goal: Free from fall injury  7/13/2025 0326 by Dougie Choudhury RN  Outcome: Progressing  7/12/2025 1655 by Willa Shipley RN  Outcome: Progressing     Problem: Gastrointestinal - Adult  Goal: Maintains or returns to baseline bowel function  7/13/2025 0326 by Dougie Choudhury RN  Outcome: Progressing  7/12/2025 1655 by Willa Shipley RN  Outcome: Progressing  Flowsheets (Taken 7/12/2025 1030)  Maintains or returns to baseline bowel function:   Assess bowel function   Encourage oral fluids to ensure adequate hydration   Administer IV fluids as ordered to ensure adequate hydration   Administer ordered medications as needed   Encourage mobilization and activity     Problem: Metabolic/Fluid and Electrolytes - Adult  Goal: Electrolytes maintained within normal limits  7/13/2025 0326 by Dougie Choudhury RN  Outcome: Progressing  7/12/2025 1655 by Willa Shipley RN  Outcome: Progressing  Flowsheets (Taken 7/12/2025 1030)  Electrolytes maintained within normal limits:   Monitor labs and assess patient for signs and symptoms of electrolyte imbalances   Administer electrolyte replacement as ordered   Fluid restriction as ordered   Monitor response to electrolyte replacements, including repeat lab results as appropriate   Instruct patient on fluid and nutrition restrictions as appropriate

## 2025-07-13 NOTE — PROGRESS NOTES
Patient states he feels well.  She ate well yesterday and is ambulating.  Continues to have flatus and bowel function.  The incision is healing well and no more purulent drainage from the midline.  The interventional radiology drain is draining minimally, pus, but it is draining around the drain.  Assessment: Postop ex lap small bowel resection for complete bowel obstruction with postoperative sepsis, improving  Recommendations: Patient will be ready for discharge in the next day or 2.  The interventional radiology drain may need to be upsized at its is draining around the drain.  Recommend weaning the TPN and discharged to home when stable.

## 2025-07-13 NOTE — PLAN OF CARE
Problem: Pain  Goal: Verbalizes/displays adequate comfort level or baseline comfort level  7/13/2025 1358 by Willa Shipley RN  Outcome: Not Progressing  7/13/2025 0326 by Dougie Choudhury RN  Outcome: Progressing     Problem: Skin/Tissue Integrity - Adult  Goal: Incisions, wounds, or drain sites healing without S/S of infection  7/13/2025 1358 by Willa Shipley RN  Outcome: Not Progressing  7/13/2025 0326 by Dougie Choudhury RN  Outcome: Progressing  Flowsheets (Taken 7/12/2025 2000)  Incisions, Wounds, or Drain Sites Healing Without Sign and Symptoms of Infection: Implement wound care per orders     Problem: Pain  Goal: Verbalizes/displays adequate comfort level or baseline comfort level  7/13/2025 1358 by Willa Shipley RN  Outcome: Not Progressing  7/13/2025 0326 by Dougie Choudhury RN  Outcome: Progressing     Problem: Skin/Tissue Integrity - Adult  Goal: Incisions, wounds, or drain sites healing without S/S of infection  7/13/2025 1358 by Willa Shipley RN  Outcome: Not Progressing  7/13/2025 0326 by Dougie Choudhury RN  Outcome: Progressing  Flowsheets (Taken 7/12/2025 2000)  Incisions, Wounds, or Drain Sites Healing Without Sign and Symptoms of Infection: Implement wound care per orders

## 2025-07-13 NOTE — PROGRESS NOTES
Consult for TPN Dosing per Pharmacy by Dr. Long    Consult provided for this 60 y.o. female, for indication of Nutrition    Day of Therapy 14    Dosing Weight: 54.6 kg                    Kcal requirements:  25-35 kcal/kg ( 1365 - 1911 kcal/day )      Macronutrients (7/13/25) - provided by premixed Clinimix E  5%AA/15%Dextrose with electrolytes in 2000 ml bag:     Protein                                        75 g/day = 300 kcal  Dextrose                                   225 g/day = 765 kcal  Lipids                                          30 g/day = 270 kcal   ____________________________________________                                                                       1335  kcal total     TPN rate will continue at 63 ml/hr   TPN also contains 10 ml MVI and 1 ml of trace elements     Lipid rate = 13 ml/hr   (lipids to infuse over 12 hours)     When TPN is weaned, close monitoring of blood glucose will be needed, and potentially Lantus adjustment.      MD to replete electrolytes acutely outside of TPN as needed.      MD to add/adjust/dc maintenance IV Fluid as needed for fluid requirements.     Additional recommendations/Orders:   Corrective insulin coverage changed to Humalog 4 times daily (by Dr. Angel) Pt also receiving Lantus insulin.   BMP, Mag, Phos ordered daily     Pharmacy to follow daily and will make changes based on labs/clinical status.           GEETHA PATTON, [facility], BCPS  [phone]

## 2025-07-13 NOTE — PROGRESS NOTES
PHYSICAL THERAPY TREATMENT    Patient: Kiara Nance (60 y.o. female)  Date: 7/13/2025  Diagnosis: SBO (small bowel obstruction) ([facility]) [K56.609] SBO (small bowel obstruction) (HCC)  Procedure(s) (LRB):  LAPAROTOMY EXPLORATORY, LYSIS OF ADHESIONS,  SMALL BOWEL RESECTION (N/A) 14 Days Post-Op  Precautions: Isolation, Contact Precautions (Droplet +),  ,  ,  ,  , Spinal Precautions: No Bending, No Lifting, No Twisting (s/p abdominal surgery),  ,        ASSESSMENT:  Pt foundresting in supine. She initially refused PT session, noting plan to potentially D/c tomorrow. Educated of role of PT in her care and benefit of assessment, prior to day of D/c. Pt agree to restroom amb. She demonstrates nearly ind bed mobility, but uses railings. After reaching EOB, pt required 7 min rest to prep for standing. Pt refused use of abdominal binder, noting feeling of support from newly placed bandage. No longer needing  assist for standing. Erratic movements with RW and refusing to follow safety commands. Pt addressed safety concern with \"I know, But trust me\", as entangles herself in IV line, by turning in opposite direction of instruction and backward step 5' to toilet. Return to bedside was fair. Pt will likely perform well enough for supervised  safety in home.  Refused any additional activity. Positioned for comfort in bed.     Progression toward goals: Fair+  []      Improving appropriately and progressing toward goals  [x]      Improving slowly and progressing toward goals  []      Not making progress toward goals and plan of care will be adjusted     PLAN:  Patient continues to benefit from skilled intervention to address the above impairments.  Continue treatment per established plan of care.    Further Equipment Recommendations for Discharge: bedside commode and rolling walker    [facility]:   AM-PAC Inpatient Mobility without Stair Climbing Raw Score : 18    Current research shows that an AM-PAC score of 17 (13 without stairs)

## 2025-07-13 NOTE — PROGRESS NOTES
Hospitalist Progress Note    Patient: Kiara Nance MRN: [id number]  CSN: [id number]    YOB: 1965  Age: 60 y.o.  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  She received surgery and developed metabolic acidosis, luis and ileus and  sepsis. Bcx esbl   ID on board, cta done on 7/2 with no-occlusive aortic thrombus on the posterior wall at the level of the SMA and fluid collection and thrombocytopenia     Sepsis blood culture E. coli  ID has been consulted.  Antibiotic changed to meropenem and vancomycin 06/30 to 07/10, antifungal 07/05 to07/10  Lactic acid wnl now   Bcx  esbl   Ultrasound no acute choleyistitis   On droplet isolation 2nd to + adenovirus      Arotic thrombosis and thrombocytopenia  Continue argatroban, vascular surgery has been consulted, no intervention needed  Recommended continue anti-AC  Bp control add clonidine patch  Check echo     Thombocytopenia  Hematology consulted  No evidence of TTP  HIT ab noted negative but platlets improved when changed from heparin to Argotraban sero panel being sent  Now on Argotraban      metabolic acidosis and luis   Was on  bicarb drip hold per nephrologist improving   Cr back to normal     Hydronephrosis, left side no obstruction  Urology has been consulted.  Perinephric fluid noted

## 2025-07-14 ENCOUNTER — APPOINTMENT (OUTPATIENT)
Facility: HOSPITAL | Age: 60
DRG: 230 | End: 2025-07-14
Payer: MEDICAID

## 2025-07-14 LAB
ANION GAP SERPL CALC-SCNC: 12 MMOL/L (ref 3–18)
BACTERIA SPEC CULT: NORMAL
BASOPHILS # BLD: 0.06 K/UL (ref 0–0.1)
BASOPHILS NFR BLD: 0.8 % (ref 0–2)
BUN SERPL-MCNC: 14 MG/DL (ref 6–23)
BUN/CREAT SERPL: 20 (ref 12–20)
CALCIUM SERPL-MCNC: 8.9 MG/DL (ref 8.5–10.1)
CHLORIDE SERPL-SCNC: 100 MMOL/L (ref 98–107)
CO2 SERPL-SCNC: 24 MMOL/L (ref 21–32)
CREAT SERPL-MCNC: 0.68 MG/DL (ref 0.6–1.3)
DIFFERENTIAL METHOD BLD: ABNORMAL
EOSINOPHIL # BLD: 0.29 K/UL (ref 0–0.4)
EOSINOPHIL NFR BLD: 3.9 % (ref 0–5)
ERYTHROCYTE [DISTWIDTH] IN BLOOD BY AUTOMATED COUNT: 14.3 % (ref 11.6–14.5)
GLUCOSE BLD STRIP.AUTO-MCNC: 104 MG/DL (ref 70–110)
GLUCOSE BLD STRIP.AUTO-MCNC: 152 MG/DL (ref 70–110)
GLUCOSE BLD STRIP.AUTO-MCNC: 155 MG/DL (ref 70–110)
GLUCOSE BLD STRIP.AUTO-MCNC: 184 MG/DL (ref 70–110)
GLUCOSE BLD STRIP.AUTO-MCNC: 186 MG/DL (ref 70–110)
GLUCOSE SERPL-MCNC: 122 MG/DL (ref 74–108)
HCT VFR BLD AUTO: 25.4 % (ref 35–45)
HGB BLD-MCNC: 8.5 G/DL (ref 12–16)
IMM GRANULOCYTES # BLD AUTO: 0.04 K/UL (ref 0–0.04)
IMM GRANULOCYTES NFR BLD AUTO: 0.5 % (ref 0–0.5)
LYMPHOCYTES # BLD: 1.58 K/UL (ref 0.9–3.3)
LYMPHOCYTES NFR BLD: 21.3 % (ref 21–52)
MAGNESIUM SERPL-MCNC: 2.2 MG/DL (ref 1.6–2.6)
MCH RBC QN AUTO: 28.2 PG (ref 24–34)
MCHC RBC AUTO-ENTMCNC: 33.5 G/DL (ref 31–37)
MCV RBC AUTO: 84.4 FL (ref 78–100)
MONOCYTES # BLD: 0.8 K/UL (ref 0.05–1.2)
MONOCYTES NFR BLD: 10.8 % (ref 3–10)
NEUTS SEG # BLD: 4.64 K/UL (ref 1.8–8)
NEUTS SEG NFR BLD: 62.7 % (ref 40–73)
NRBC # BLD: 0 K/UL (ref 0–0.01)
NRBC BLD-RTO: 0 PER 100 WBC
PHOSPHATE SERPL-MCNC: 3.8 MG/DL (ref 2.5–4.9)
PLATELET # BLD AUTO: 621 K/UL (ref 135–420)
PMV BLD AUTO: 9.5 FL (ref 9.2–11.8)
POTASSIUM SERPL-SCNC: 4.1 MMOL/L (ref 3.5–5.5)
RBC # BLD AUTO: 3.01 M/UL (ref 4.2–5.3)
SERVICE CMNT-IMP: NORMAL
SODIUM SERPL-SCNC: 135 MMOL/L (ref 136–145)
WBC # BLD AUTO: 7.4 K/UL (ref 4.6–13.2)

## 2025-07-14 PROCEDURE — 2500000003 HC RX 250 WO HCPCS: Performed by: INTERNAL MEDICINE

## 2025-07-14 PROCEDURE — 6370000000 HC RX 637 (ALT 250 FOR IP): Performed by: INTERNAL MEDICINE

## 2025-07-14 PROCEDURE — 36415 COLL VENOUS BLD VENIPUNCTURE: CPT

## 2025-07-14 PROCEDURE — 1100000003 HC PRIVATE W/ TELEMETRY

## 2025-07-14 PROCEDURE — 71045 X-RAY EXAM CHEST 1 VIEW: CPT

## 2025-07-14 PROCEDURE — 2500000003 HC RX 250 WO HCPCS: Performed by: HOSPITALIST

## 2025-07-14 PROCEDURE — 80048 BASIC METABOLIC PNL TOTAL CA: CPT

## 2025-07-14 PROCEDURE — 6370000000 HC RX 637 (ALT 250 FOR IP): Performed by: FAMILY MEDICINE

## 2025-07-14 PROCEDURE — 83735 ASSAY OF MAGNESIUM: CPT

## 2025-07-14 PROCEDURE — 2580000003 HC RX 258: Performed by: INTERNAL MEDICINE

## 2025-07-14 PROCEDURE — 6360000002 HC RX W HCPCS: Performed by: SURGERY

## 2025-07-14 PROCEDURE — 6360000002 HC RX W HCPCS: Performed by: HOSPITALIST

## 2025-07-14 PROCEDURE — 6360000002 HC RX W HCPCS: Performed by: INTERNAL MEDICINE

## 2025-07-14 PROCEDURE — 84100 ASSAY OF PHOSPHORUS: CPT

## 2025-07-14 PROCEDURE — 82962 GLUCOSE BLOOD TEST: CPT

## 2025-07-14 PROCEDURE — 85025 COMPLETE CBC W/AUTO DIFF WBC: CPT

## 2025-07-14 PROCEDURE — 6360000002 HC RX W HCPCS: Performed by: FAMILY MEDICINE

## 2025-07-14 RX ORDER — CLONIDINE 0.2 MG/24H
1 PATCH, EXTENDED RELEASE TRANSDERMAL WEEKLY
Status: DISCONTINUED | OUTPATIENT
Start: 2025-07-14 | End: 2025-07-15 | Stop reason: HOSPADM

## 2025-07-14 RX ADMIN — METOCLOPRAMIDE 5 MG: 5 INJECTION, SOLUTION INTRAMUSCULAR; INTRAVENOUS at 17:37

## 2025-07-14 RX ADMIN — HYDROMORPHONE HYDROCHLORIDE 0.25 MG: 1 INJECTION, SOLUTION INTRAMUSCULAR; INTRAVENOUS; SUBCUTANEOUS at 04:21

## 2025-07-14 RX ADMIN — SODIUM CHLORIDE, PRESERVATIVE FREE 10 ML: 5 INJECTION INTRAVENOUS at 20:13

## 2025-07-14 RX ADMIN — SODIUM CHLORIDE, PRESERVATIVE FREE 10 ML: 5 INJECTION INTRAVENOUS at 10:31

## 2025-07-14 RX ADMIN — MEROPENEM 1000 MG: 1 INJECTION INTRAVENOUS at 10:43

## 2025-07-14 RX ADMIN — APIXABAN 5 MG: 5 TABLET, FILM COATED ORAL at 20:08

## 2025-07-14 RX ADMIN — MEROPENEM 1000 MG: 1 INJECTION INTRAVENOUS at 17:41

## 2025-07-14 RX ADMIN — HYDROMORPHONE HYDROCHLORIDE 0.25 MG: 1 INJECTION, SOLUTION INTRAMUSCULAR; INTRAVENOUS; SUBCUTANEOUS at 17:33

## 2025-07-14 RX ADMIN — ONDANSETRON 4 MG: 4 TABLET, ORALLY DISINTEGRATING ORAL at 20:08

## 2025-07-14 RX ADMIN — HYDROCODONE BITARTRATE AND ACETAMINOPHEN 2 TABLET: 5; 325 TABLET ORAL at 13:38

## 2025-07-14 RX ADMIN — MEROPENEM 1000 MG: 1 INJECTION INTRAVENOUS at 01:41

## 2025-07-14 RX ADMIN — INSULIN LISPRO 4 UNITS: 100 INJECTION, SOLUTION INTRAVENOUS; SUBCUTANEOUS at 01:23

## 2025-07-14 RX ADMIN — INSULIN LISPRO 4 UNITS: 100 INJECTION, SOLUTION INTRAVENOUS; SUBCUTANEOUS at 13:38

## 2025-07-14 RX ADMIN — METOCLOPRAMIDE 5 MG: 5 INJECTION, SOLUTION INTRAMUSCULAR; INTRAVENOUS at 09:56

## 2025-07-14 RX ADMIN — HYDROMORPHONE HYDROCHLORIDE 0.25 MG: 1 INJECTION, SOLUTION INTRAMUSCULAR; INTRAVENOUS; SUBCUTANEOUS at 23:36

## 2025-07-14 RX ADMIN — INSULIN GLARGINE 26 UNITS: 100 INJECTION, SOLUTION SUBCUTANEOUS at 09:55

## 2025-07-14 RX ADMIN — SODIUM CHLORIDE, PRESERVATIVE FREE 40 MG: 5 INJECTION INTRAVENOUS at 09:55

## 2025-07-14 RX ADMIN — METOCLOPRAMIDE 5 MG: 5 INJECTION, SOLUTION INTRAMUSCULAR; INTRAVENOUS at 01:20

## 2025-07-14 RX ADMIN — HYDROMORPHONE HYDROCHLORIDE 0.25 MG: 1 INJECTION, SOLUTION INTRAMUSCULAR; INTRAVENOUS; SUBCUTANEOUS at 10:29

## 2025-07-14 RX ADMIN — HYDROCODONE BITARTRATE AND ACETAMINOPHEN 2 TABLET: 5; 325 TABLET ORAL at 20:08

## 2025-07-14 RX ADMIN — HYDROCODONE BITARTRATE AND ACETAMINOPHEN 2 TABLET: 5; 325 TABLET ORAL at 01:20

## 2025-07-14 ASSESSMENT — PAIN DESCRIPTION - ORIENTATION
ORIENTATION: ANTERIOR

## 2025-07-14 ASSESSMENT — PAIN DESCRIPTION - DESCRIPTORS
DESCRIPTORS: ACHING
DESCRIPTORS: SHARP
DESCRIPTORS: SHARP

## 2025-07-14 ASSESSMENT — PAIN SCALES - GENERAL
PAINLEVEL_OUTOF10: 9
PAINLEVEL_OUTOF10: 1
PAINLEVEL_OUTOF10: 9
PAINLEVEL_OUTOF10: 8
PAINLEVEL_OUTOF10: 9
PAINLEVEL_OUTOF10: 5
PAINLEVEL_OUTOF10: 9
PAINLEVEL_OUTOF10: 5
PAINLEVEL_OUTOF10: 2
PAINLEVEL_OUTOF10: 9
PAINLEVEL_OUTOF10: 10

## 2025-07-14 ASSESSMENT — PAIN DESCRIPTION - ONSET: ONSET: ON-GOING

## 2025-07-14 ASSESSMENT — PAIN DESCRIPTION - LOCATION
LOCATION: ABDOMEN

## 2025-07-14 ASSESSMENT — PAIN SCALES - WONG BAKER
WONGBAKER_NUMERICALRESPONSE: NO HURT
WONGBAKER_NUMERICALRESPONSE: HURTS LITTLE MORE
WONGBAKER_NUMERICALRESPONSE: HURTS LITTLE MORE

## 2025-07-14 ASSESSMENT — PAIN DESCRIPTION - FREQUENCY: FREQUENCY: INTERMITTENT

## 2025-07-14 ASSESSMENT — PAIN - FUNCTIONAL ASSESSMENT: PAIN_FUNCTIONAL_ASSESSMENT: ACTIVITIES ARE NOT PREVENTED

## 2025-07-14 NOTE — PROGRESS NOTES
[facility]  (A Division of [facility])    Follow-up Note      Date of Admission: 6/25/2025       Date of Note:  7/14/2025    Summary:  Ms Kiara Nance is a 60 y.o. female with PMH of diabetes, multiple intra-abdominal surgeries status post colostomy reversal ( 1995)  who presents with abdominal pain, nausea, vomiting, and finding of SBO as per admission notes. Seen and managed conservatively by surgery. On 6/29- she was placed on Pip tazo, and taken to OR and found to have SBO. Op note comment no infection finding.     Had RRT on 6/30- with hypotension/AMS. Transferred to ICU, but was not placed on pressor. Imaging done on 7/1- showed multiple abnormalities as mentioned above, including abd abscess of 5cm.    Phlebotomist    Today:  Chart reviewed - pt seen  Recovering.  Getting slowly better.  OOB.   Had PILAR re-configured earlier this AM    Tm <100  WBC 7.4k      Current Antimicrobials:    Prior Antimicrobials:  Meropenem IV (7/2-) #12 Pip tazo X1- 6/25  Pip tazo 6/29 to 7/2 #4  Vanco 6/30 to 7/5  #10  Micafungin 150  7/5 to 7/10  #5       Assessment: Rec / Plan:   ESBL E coli and B frag Bacteremia/Sepsis  Due to her gut abscess - normally go 14d total treatment but with her gut abscess - will continue carbapenem to kill both until abscess resolved.  No PO equivalent to kill both. -> flip #12    Time served counts   IntraABD abscess - IR POD #7  Would continue IV carbapenem (ertapenem once DC from hospital) at least for a few more weeks until CT resolution.    Ertapenem 1000mg IV daily  Terminate/last dose 4AUG25  Weekly labs (qMon) - CBC with diff and BMP (fax to Dr Li)  PICC Care/pull after last dose  Will need outpt F/U with Dr Li in next 1-2wks to guide length of therapy   HTN    KE  resolved    SBO          Lines / Catheters: L PICC and peripheral        Patient Active Problem List   Diagnosis    Prediabetes    Left hand paresthesia    Knee meniscus pain

## 2025-07-14 NOTE — PROGRESS NOTES
GENNY spoke with patients son regarding DC plan.   Patient to DC home IV meds. Referral sent to Cecy . Plan to return back to [facility] with son in [place] when stable. Patient stated that she will have someone that is able to stay with her. Son in aware and in agreement with plan. Cecy able to service patient in the home for nursing care at this time. Cecy to come to bedside 07/15 for teaching purposes. Patient will require medical transport home when stable. GENNY to follow.         [facility]- [phone]

## 2025-07-14 NOTE — CARE COORDINATION
Patient discussed in IDRs, plan is to wean off TPN as patient is tolerating PO. Patient plan is to DC to home with her son when stable. Will continue to follow.

## 2025-07-14 NOTE — CONSULTS
Wound care:    Keep incisions dry. Do not soak under water (bathtub, swimming pool, etc.). Please shower with baby shampoo, but do not take a bath. If the incision becomes wet, gently pat it dry with a clean towel; do not rub.      dissolvable suture in place:  Please apply bacitracin ointment to incisions twice daily. You have dissolvable suture in place. It does not need to be removed.      Other post-op instructions:    No lifting anything heavier than a gallon of milk until cleared in post-operative visit.    For non-epilepsy patients:  No driving until cleared in your post-operative appointment. No driving while on narcotics.      For all implant patients (e.g. neurostimulator, shunt, instrumented spine case):  discharged home on 5 days of P.O antibiotics (Keflex 500 QID if no history of allergy or MRSA; if MRSA +, Bactrim DS BID):  You now have an implanted device in place. It is imperative that any infection (such as a urinary tract infection) be treated immediately so that it cannot get into your bloodstream. If an infection ends up in your blood, it may infect the device, thus requiring us to remove it.    You have a pressure dressing bandage on. Please keep on for 48 hours, ok to remove after. Ok to remove dressing. Please do not remove absorbable suture. Ok to shower once dressing removed.     Please take medications as prescribed, and follow up with neurologist and primary care physician.   INTERVENTIONAL RADIOLOGY  Consult Note      Patient:  Kiara Nance  :  1965  Age:  60 y.o.  MRN:  [id number]    Today's Date:  2025  Admission Date:  2025  Hospital Day:  19  Consult requested by:  John Dotson MD     Reason for consult:  Drainage catheter placed  by IR. Consult placed to evaluate drain due to leaking around catheter site.    CURRENT MEDICATIONS  Current Facility-Administered Medications   Medication Dose Route Frequency Provider Last Rate Last Admin    TPN-Adult Premix 5/15 - Standard Electrolytes - Central Line   IntraVENous Continuous TPN Melina Long MD 63 mL/hr at 25 1851 New Bag at 25 1851    HYDROmorphone HCl PF (DILAUDID) injection 0.25 mg  0.25 mg IntraVENous Q6H PRN Eileen Pastor MD   0.25 mg at 25 1029    HYDROcodone-acetaminophen (NORCO) 5-325 MG per tablet 2 tablet  2 tablet Oral Q6H PRN Eileen Pastor MD   2 tablet at 25 0120    tiZANidine (ZANAFLEX) tablet 4 mg  4 mg Oral Q8H PRN Angela Fletcher DO   4 mg at 25 1329    HYDROmorphone HCl PF (DILAUDID) injection 0.25 mg  0.25 mg IntraVENous Once John Dotson MD        0.9 % sodium chloride infusion   IntraVENous PRN Angela Fletcher DO        argatroban 250 mg in sodium chloride 0.9 % 250 mL infusion  0.0625-10 mcg/kg/min IntraVENous Continuous John Dotson MD   Stopped at 25 0440    insulin glargine (LANTUS) injection vial 26 Units  26 Units SubCUTAneous Daily Ted Angel MD   26 Units at 25 0955    glucose chewable tablet 16 g  4 tablet Oral PRN Ted Angel MD        dextrose bolus 10% 125 mL  125 mL IntraVENous PRN Ted Angel MD        Or    dextrose bolus 10% 250 mL  250 mL IntraVENous PRN Ted Angel MD        glucagon injection 1 mg  1 mg SubCUTAneous PRN Ted Angel MD        dextrose 10 % infusion   IntraVENous Continuous PRN Ted Angel MD        insulin lispro (HUMALOG,ADMELOG) injection vial 0-16 Units

## 2025-07-14 NOTE — PROGRESS NOTES
Pharmacy Note:  TPN    TPN discontinued today (7/14/25)  by Dr. Dotson.   Pt has been eating well, > 50 % of meals, per RNMax  Discussed tapering of TPN.   TPN consult closed.     Pharmacy Recommendation for TPN Taper:  TPN to be tapered to prevent the risk of rebound hypoglycemia.  Current TPN rate: 63 ml/hr.  Decrease rate to 31 ml/hr and run for 2 hours, then decrease rate to 15 ml/hr for 2 hours.    Then stop TPN.  Blood glucose levels should be obtained at each rate reduction and 6 hours after TPN is stopped.

## 2025-07-14 NOTE — PROGRESS NOTES
Hospitalist Progress Note      Patient: Kiara Nance MRN: [id number]  CSN: [id number]    YOB: 1965  Age: 60 y.o.  Sex: female    DOA: 6/25/2025 LOS:  LOS: 19 days      PCP: Nae Kaiser, APRN - NP       Summary:      Kiara Nance is a 60 y.o. old female with PMHx of   who was admitted to [facility] 6/25/2025 with                 60 year old female abdominal pain, distention, constipation, and vomiting , she was admitted for small bowel obstruction.  She received surgery and developed metabolic acidosis, luis and ileus and  sepsis. Bcx esbl   ID on board, cta done on 7/2 with no-occlusive aortic thrombus on the posterior wall at the level of the SMA and fluid collection and thrombocytopenia         Assessment/Plan:    Principal Problem:    SBO (small bowel obstruction) (HCC)  Active Problems:    Aortic thrombus (HCC)    Benign essential HTN    Hypokalemia    Asthma    Acute renal failure    Hydronephrosis    Sepsis (HCC)    Metabolic acidosis    Hypotension    Adenovirus infection    Bacteremia    Status post surgery    Anemia    Heparin induced thrombocytopenia  Resolved Problems:    * No resolved hospital problems. *     7/14  Leaking around tube   Draining pus still  IR consult no large tube needed    ID gave abx for long term abx  With CT prior to d/c    Wants to go to son in [place]  [id number] IV daily  Terminate/last dose 4AUG25  Weekly labs (qMon) - CBC with diff and BMP (fax to Dr Li)  PICC Care/pull after last dose  Will need outpt F/U with Dr Li in next 1-2wks to guide length of therapy      sepsis blood culture E. coli  ID has been consulted.  Antibiotic changed to meropenem and vancomycin 06/30 to 07/10, antifungal 07/05 to07/10  Lactic acid wnl now   Bcx  esbl   Ultrasound no acute choleyistitis   On droplet isolation 2nd to + adenovirus  now off  Will need long term abx until 8/5  See ID alphonso     Arotic thrombosis and thrombocytopenia  Continue

## 2025-07-14 NOTE — PROGRESS NOTES
Care  assisting Care Mgr Arlene Pollard with Discharge Planning needs for patient.  Referral sent to [facility] ([email or web address] for review and consideration for acceptance for [facility].    Will follow along for further discharge planning needs.

## 2025-07-14 NOTE — PROGRESS NOTES
She is doing well she is afebrile.  Abdomen is benign.  Drain is still draining purulent material and less around the drain.  The drain did not get changed today.  He will be continued observation.  She is progressing towards discharge with IV antibiotics.

## 2025-07-14 NOTE — PROGRESS NOTES
Physician Progress Note      PATIENT:               BOONE GOMES  CSN #:                  [id number]  :                       1965  ADMIT DATE:       2025 9:04 PM  DISCH DATE:  RESPONDING  PROVIDER #:        John Dotsno MD          QUERY TEXT:    Malnutrition is documented in the medical record IMPN by John Dotson MD on 2025. Please provide additional clinical indicators supportive of   your documentation. Or please document if the diagnosis of?Malnutrition?has   been ruled out after study.    The clinical indicators include:  -60 y.o., SBO, Severe sepsis, metabolic acidosis and alkalosis,   hypophosphatemia, depression, hypokalemia, hypomagnesemia, anemia.    -\"Malnutrition Status: At risk for malnutrition. Current BMI (kg/m2): 35.3.  BMI Categories: Obese Class 1 (BMI 30.0-34.9).  Nutrition Interventions: Food and/or Nutrient Delivery: Continue Current   Parenteral Nutrition Nutrition Education/Counseling: Education/Counseling not   indicated Coordination of Nutrition Care: Continue to monitor while inpatient\"   (RD Note by Alis Guallpa RD on 2025)  -\"Weight: 82.1 kg (181 lb).TPN dosing Weight: 54.6 kg\" (Pharmacy Note by   Brittaney Keys [facility] on 2025)  -\"Malnutrition\" (IMPN by John Dotson MD on 2025)  - On TPN.    Thank you,  Yeni Main [facility].  Options provided:  -- Malnutrition present as evidenced by, Please document evidence.  -- Malnutrition was ruled out after study  -- Other - I will add my own diagnosis  -- Disagree - Not applicable / Not valid  -- Disagree - Clinically unable to determine / Unknown  -- Refer to Clinical Documentation Reviewer    PROVIDER RESPONSE TEXT:    Malnutrition is present as evidenced by low protein lack of food in 19 days    Query created by: Yeni Main on 2025 1:40 AM      QUERY TEXT:    Anemia is documented in the medical record Pulmonology PN by Mingo Dallas MD on 2025. Please

## 2025-07-14 NOTE — PLAN OF CARE
Problem: Skin/Tissue Integrity - Adult  Goal: Incisions, wounds, or drain sites healing without S/S of infection  7/13/2025 1358 by Willa Shipley RN  Outcome: Not Progressing     Problem: Pain  Goal: Verbalizes/displays adequate comfort level or baseline comfort level  7/13/2025 2228 by Tacho Long RN  Outcome: Progressing  7/13/2025 1358 by Willa Shipley RN  Outcome: Not Progressing     Problem: Skin/Tissue Integrity - Adult  Goal: Incisions, wounds, or drain sites healing without S/S of infection  7/13/2025 1358 by Willa Shipley, RN  Outcome: Not Progressing

## 2025-07-14 NOTE — PLAN OF CARE
Problem: Pain  Goal: Verbalizes/displays adequate comfort level or baseline comfort level  7/14/2025 0748 by Max Dotson RN  Outcome: Progressing  7/13/2025 2228 by Tacho Long RN  Outcome: Progressing     Problem: ABCDS Injury Assessment  Goal: Absence of physical injury  Outcome: Progressing     Problem: Gastrointestinal - Adult  Goal: Maintains or returns to baseline bowel function  7/14/2025 0748 by Max Dotson RN  Outcome: Progressing  7/13/2025 2228 by Tacho Long RN  Outcome: Progressing     Problem: Metabolic/Fluid and Electrolytes - Adult  Goal: Electrolytes maintained within normal limits  Outcome: Progressing     Problem: Safety - Adult  Goal: Free from fall injury  7/14/2025 0748 by Max Dotson RN  Outcome: Progressing  7/13/2025 2228 by Tacho Long RN  Outcome: Progressing     Problem: Skin/Tissue Integrity  Goal: Skin integrity remains intact  Description: 1.  Monitor for areas of redness and/or skin breakdown  2.  Assess vascular access sites hourly  3.  Every 4-6 hours minimum:  Change oxygen saturation probe site  4.  Every 4-6 hours:  If on nasal continuous positive airway pressure, respiratory therapy assess nares and determine need for appliance change or resting period  7/14/2025 0748 by Max Dotson RN  Outcome: Progressing  7/13/2025 2228 by Tacho Long RN  Outcome: Progressing     Problem: Discharge Planning  Goal: Discharge to home or other facility with appropriate resources  Outcome: Progressing     Problem: Cardiovascular - Adult  Goal: Maintains optimal cardiac output and hemodynamic stability  Outcome: Progressing  Goal: Absence of cardiac dysrhythmias or at baseline  Outcome: Progressing     Problem: Skin/Tissue Integrity - Adult  Goal: Incisions, wounds, or drain sites healing without S/S of infection  Outcome: Progressing     Problem: Confusion  Goal: Confusion, delirium, dementia, or psychosis is improved or at baseline  Description: INTERVENTIONS:  1.

## 2025-07-15 VITALS
HEIGHT: 60 IN | WEIGHT: 181 LBS | HEART RATE: 94 BPM | OXYGEN SATURATION: 97 % | TEMPERATURE: 99.1 F | DIASTOLIC BLOOD PRESSURE: 96 MMHG | RESPIRATION RATE: 20 BRPM | BODY MASS INDEX: 35.53 KG/M2 | SYSTOLIC BLOOD PRESSURE: 152 MMHG

## 2025-07-15 LAB
ANION GAP SERPL CALC-SCNC: 12 MMOL/L (ref 3–18)
BUN SERPL-MCNC: 14 MG/DL (ref 6–23)
BUN/CREAT SERPL: 19 (ref 12–20)
CALCIUM SERPL-MCNC: 9.1 MG/DL (ref 8.5–10.1)
CHLORIDE SERPL-SCNC: 100 MMOL/L (ref 98–107)
CO2 SERPL-SCNC: 25 MMOL/L (ref 21–32)
CREAT SERPL-MCNC: 0.73 MG/DL (ref 0.6–1.3)
GLUCOSE BLD STRIP.AUTO-MCNC: 106 MG/DL (ref 70–110)
GLUCOSE BLD STRIP.AUTO-MCNC: 109 MG/DL (ref 70–110)
GLUCOSE BLD STRIP.AUTO-MCNC: 85 MG/DL (ref 70–110)
GLUCOSE SERPL-MCNC: 77 MG/DL (ref 74–108)
MAGNESIUM SERPL-MCNC: 2.3 MG/DL (ref 1.6–2.6)
PHOSPHATE SERPL-MCNC: 4.1 MG/DL (ref 2.5–4.9)
POTASSIUM SERPL-SCNC: 4.1 MMOL/L (ref 3.5–5.5)
SODIUM SERPL-SCNC: 138 MMOL/L (ref 136–145)

## 2025-07-15 PROCEDURE — 2580000003 HC RX 258: Performed by: INTERNAL MEDICINE

## 2025-07-15 PROCEDURE — 6370000000 HC RX 637 (ALT 250 FOR IP): Performed by: FAMILY MEDICINE

## 2025-07-15 PROCEDURE — 6360000002 HC RX W HCPCS: Performed by: FAMILY MEDICINE

## 2025-07-15 PROCEDURE — 80048 BASIC METABOLIC PNL TOTAL CA: CPT

## 2025-07-15 PROCEDURE — 2500000003 HC RX 250 WO HCPCS: Performed by: HOSPITALIST

## 2025-07-15 PROCEDURE — 6360000002 HC RX W HCPCS: Performed by: HOSPITALIST

## 2025-07-15 PROCEDURE — 6370000000 HC RX 637 (ALT 250 FOR IP): Performed by: INTERNAL MEDICINE

## 2025-07-15 PROCEDURE — 6360000002 HC RX W HCPCS: Performed by: INTERNAL MEDICINE

## 2025-07-15 PROCEDURE — 6370000000 HC RX 637 (ALT 250 FOR IP): Performed by: HOSPITALIST

## 2025-07-15 PROCEDURE — 83735 ASSAY OF MAGNESIUM: CPT

## 2025-07-15 PROCEDURE — 2500000003 HC RX 250 WO HCPCS: Performed by: INTERNAL MEDICINE

## 2025-07-15 PROCEDURE — 82962 GLUCOSE BLOOD TEST: CPT

## 2025-07-15 PROCEDURE — 36415 COLL VENOUS BLD VENIPUNCTURE: CPT

## 2025-07-15 PROCEDURE — 6360000002 HC RX W HCPCS: Performed by: SURGERY

## 2025-07-15 PROCEDURE — 84100 ASSAY OF PHOSPHORUS: CPT

## 2025-07-15 RX ORDER — HYDROMORPHONE HYDROCHLORIDE 1 MG/ML
0.25 INJECTION, SOLUTION INTRAMUSCULAR; INTRAVENOUS; SUBCUTANEOUS ONCE
Status: COMPLETED | OUTPATIENT
Start: 2025-07-15 | End: 2025-07-15

## 2025-07-15 RX ORDER — AMITRIPTYLINE HYDROCHLORIDE 50 MG/1
50 TABLET ORAL NIGHTLY
Qty: 90 TABLET | Refills: 0 | Status: SHIPPED | OUTPATIENT
Start: 2025-07-15

## 2025-07-15 RX ORDER — POLYETHYLENE GLYCOL 3350 17 G/17G
17 POWDER, FOR SOLUTION ORAL DAILY PRN
Qty: 30 PACKET | Refills: 0 | Status: SHIPPED | OUTPATIENT
Start: 2025-07-15 | End: 2025-08-14

## 2025-07-15 RX ORDER — HYDROMORPHONE HYDROCHLORIDE 1 MG/ML
0.5 INJECTION, SOLUTION INTRAMUSCULAR; INTRAVENOUS; SUBCUTANEOUS ONCE
Status: COMPLETED | OUTPATIENT
Start: 2025-07-15 | End: 2025-07-15

## 2025-07-15 RX ORDER — AMLODIPINE BESYLATE 10 MG/1
10 TABLET ORAL DAILY
Qty: 90 TABLET | Refills: 1 | Status: SHIPPED | OUTPATIENT
Start: 2025-07-15

## 2025-07-15 RX ORDER — OXYCODONE AND ACETAMINOPHEN 5; 325 MG/1; MG/1
1 TABLET ORAL EVERY 6 HOURS PRN
Qty: 12 TABLET | Refills: 0 | Status: SHIPPED | OUTPATIENT
Start: 2025-07-15 | End: 2025-07-18

## 2025-07-15 RX ORDER — METOCLOPRAMIDE 5 MG/1
5 TABLET ORAL
Qty: 120 TABLET | Refills: 1 | Status: SHIPPED | OUTPATIENT
Start: 2025-07-15

## 2025-07-15 RX ADMIN — HYDROMORPHONE HYDROCHLORIDE 0.25 MG: 1 INJECTION, SOLUTION INTRAMUSCULAR; INTRAVENOUS; SUBCUTANEOUS at 09:45

## 2025-07-15 RX ADMIN — ONDANSETRON HYDROCHLORIDE 4 MG: 2 INJECTION INTRAMUSCULAR; INTRAVENOUS at 09:38

## 2025-07-15 RX ADMIN — HYDROMORPHONE HYDROCHLORIDE 0.5 MG: 1 INJECTION, SOLUTION INTRAMUSCULAR; INTRAVENOUS; SUBCUTANEOUS at 13:23

## 2025-07-15 RX ADMIN — ERTAPENEM SODIUM 1000 MG: 1 INJECTION, POWDER, LYOPHILIZED, FOR SOLUTION INTRAMUSCULAR; INTRAVENOUS at 15:20

## 2025-07-15 RX ADMIN — SODIUM CHLORIDE, PRESERVATIVE FREE 40 MG: 5 INJECTION INTRAVENOUS at 09:36

## 2025-07-15 RX ADMIN — HYDROCODONE BITARTRATE AND ACETAMINOPHEN 2 TABLET: 5; 325 TABLET ORAL at 16:41

## 2025-07-15 RX ADMIN — APIXABAN 5 MG: 5 TABLET, FILM COATED ORAL at 09:38

## 2025-07-15 RX ADMIN — TIZANIDINE 4 MG: 4 TABLET ORAL at 01:19

## 2025-07-15 RX ADMIN — MEROPENEM 1000 MG: 1 INJECTION INTRAVENOUS at 01:15

## 2025-07-15 RX ADMIN — MEROPENEM 1000 MG: 1 INJECTION INTRAVENOUS at 09:57

## 2025-07-15 RX ADMIN — HYDROMORPHONE HYDROCHLORIDE 0.25 MG: 1 INJECTION, SOLUTION INTRAMUSCULAR; INTRAVENOUS; SUBCUTANEOUS at 05:19

## 2025-07-15 RX ADMIN — SODIUM CHLORIDE, PRESERVATIVE FREE 10 ML: 5 INJECTION INTRAVENOUS at 09:38

## 2025-07-15 RX ADMIN — METOCLOPRAMIDE 5 MG: 5 INJECTION, SOLUTION INTRAMUSCULAR; INTRAVENOUS at 09:39

## 2025-07-15 ASSESSMENT — PAIN SCALES - GENERAL
PAINLEVEL_OUTOF10: 0
PAINLEVEL_OUTOF10: 5
PAINLEVEL_OUTOF10: 2
PAINLEVEL_OUTOF10: 9
PAINLEVEL_OUTOF10: 8
PAINLEVEL_OUTOF10: 9

## 2025-07-15 ASSESSMENT — PAIN DESCRIPTION - ORIENTATION
ORIENTATION: LEFT;RIGHT
ORIENTATION: LOWER;LEFT;RIGHT

## 2025-07-15 ASSESSMENT — PAIN DESCRIPTION - LOCATION
LOCATION: ABDOMEN

## 2025-07-15 ASSESSMENT — PAIN SCALES - WONG BAKER: WONGBAKER_NUMERICALRESPONSE: HURTS LITTLE MORE

## 2025-07-15 ASSESSMENT — PAIN DESCRIPTION - DESCRIPTORS: DESCRIPTORS: PRESSURE

## 2025-07-15 ASSESSMENT — PAIN - FUNCTIONAL ASSESSMENT: PAIN_FUNCTIONAL_ASSESSMENT: ACTIVITIES ARE NOT PREVENTED

## 2025-07-15 NOTE — PLAN OF CARE
Problem: Pain  Goal: Verbalizes/displays adequate comfort level or baseline comfort level  7/15/2025 1036 by Severino Mullins RN  Outcome: Progressing  Flowsheets (Taken 7/9/2025 0600 by Cindi Martinez RN)  Verbalizes/displays adequate comfort level or baseline comfort level:   Encourage patient to monitor pain and request assistance   Notify Licensed Independent Practitioner if interventions unsuccessful or patient reports new pain   Assess pain using appropriate pain scale   Administer analgesics based on type and severity of pain and evaluate response   Implement non-pharmacological measures as appropriate and evaluate response   Consider cultural and social influences on pain and pain management  7/14/2025 2230 by Lourdes Driver RN  Outcome: Progressing  Flowsheets (Taken 7/9/2025 0600 by Cindi Martinez RN)  Verbalizes/displays adequate comfort level or baseline comfort level:   Encourage patient to monitor pain and request assistance   Notify Licensed Independent Practitioner if interventions unsuccessful or patient reports new pain   Assess pain using appropriate pain scale   Administer analgesics based on type and severity of pain and evaluate response   Implement non-pharmacological measures as appropriate and evaluate response   Consider cultural and social influences on pain and pain management     Problem: ABCDS Injury Assessment  Goal: Absence of physical injury  7/15/2025 1036 by Severino Mullins RN  Outcome: Progressing  Flowsheets (Taken 7/3/2025 1942 by Vicky Gaspar, RN)  Absence of Physical Injury: Implement safety measures based on patient assessment  7/14/2025 2230 by Lourdes Driver RN  Outcome: Progressing  Flowsheets (Taken 7/3/2025 1942 by Vicky Gaspar, RN)  Absence of Physical Injury: Implement safety measures based on patient assessment     Problem: Gastrointestinal - Adult  Goal: Maintains or returns to baseline bowel function  7/15/2025 1036 by Severino Mullins

## 2025-07-15 NOTE — PROGRESS NOTES
Care Mgmt Assistant, assisting Care Mgr Arlene Pollard with Discharge Planning needs for patient.  Updates sent to [employer] for review for IV Abx.    Will follow for further needs.

## 2025-07-15 NOTE — PROGRESS NOTES
Trip number- [id number] with [employer] @ 315pm. Patient aware.    Patient to DC home with sister to address in [place]. CMS to assist with follow up apts.   Cecy aware of DC today. SW to reach out to son as well.       11:20- Teaching completed bedside with Cecy.  Patient to receive dose prior to DC. Patient in agreement with DC pan. GENNY LM for son regarding DC. Cecy to complete nursing care for labs upon DC vs home health care.     [phone]-  [employer] Transport number   RN was not able to have patient outside for transport. SW called transport to reschedule.   New Transport time is : 5pm

## 2025-07-15 NOTE — PROGRESS NOTES
Pt was able to eat a six inch sandwich from [employer] and did not vomit although became nauseous. Reglan given as ordered.

## 2025-07-15 NOTE — PLAN OF CARE
Problem: Pain  Goal: Verbalizes/displays adequate comfort level or baseline comfort level  Outcome: Progressing  Flowsheets (Taken 7/9/2025 0600 by Cindi Martinez, RN)  Verbalizes/displays adequate comfort level or baseline comfort level:   Encourage patient to monitor pain and request assistance   Notify Licensed Independent Practitioner if interventions unsuccessful or patient reports new pain   Assess pain using appropriate pain scale   Administer analgesics based on type and severity of pain and evaluate response   Implement non-pharmacological measures as appropriate and evaluate response   Consider cultural and social influences on pain and pain management     Problem: ABCDS Injury Assessment  Goal: Absence of physical injury  Outcome: Progressing  Flowsheets (Taken 7/3/2025 1942 by Vicky Gaspar, RN)  Absence of Physical Injury: Implement safety measures based on patient assessment     Problem: Gastrointestinal - Adult  Goal: Maintains or returns to baseline bowel function  Outcome: Progressing  Flowsheets (Taken 7/12/2025 1030 by Willa Shipley, RN)  Maintains or returns to baseline bowel function:   Assess bowel function   Encourage oral fluids to ensure adequate hydration   Administer IV fluids as ordered to ensure adequate hydration   Administer ordered medications as needed   Encourage mobilization and activity

## 2025-07-15 NOTE — PROGRESS NOTES
Occupational Therapy Evaluation/Treatment Attempt    Chart reviewed. Attempted Occupational Therapy Evaluation/Treatment, however, patient unable to be seen due to:  []  Other : RN in with patient, will follow up    Will follow up later as patient's schedule allows.   Thank you for this referral.  Kayla Valdes OTONEL, OTR/L

## 2025-07-15 NOTE — DISCHARGE SUMMARY
Order Status: Canceled Specimen: Abdomen     Culture, Body Fluid (with Gram Stain) [[id number]] Collected: 07/03/25 1145    Order Status: Canceled Specimen: Body Fluid from Aspirate     Culture, Fungus [[id number]] Collected: 07/03/25 1145    Order Status: Canceled Specimen: Abdomen           Susceptibility data from last 90 days.  Collected Specimen Info Organism Amikacin Ampicillin Ampicillin + Sulbactam Cefazolin Cefepime Ceftazidime Ceftriaxone Ciprofloxacin Gentamicin Levofloxacin Piperacillin + Tazobactam Spec Grav, Fluid Tobramcyin   07/07/25 Body Fluid from Aspirate Escherichia coli  S  R  R  R  R  R  R  R  S  R  S  S  S   07/01/25 Blood Bacteroides fragilis                07/01/25 Blood Escherichia coli  S  R  I  R  R  R  R  R  S  R  S  S  S     Collected Specimen Info Organism Trimethoprim + Sulfamethoxazole   07/07/25 Body Fluid from Aspirate Escherichia coli  S   07/01/25 Blood Bacteroides fragilis    07/01/25 Blood Escherichia coli  S      Labs: Results:       Chemistry Recent Labs     07/13/25  0424 07/14/25  0338 07/15/25  0402   * 135* 138   K 4.1 4.1 4.1   CL 99 100 100   CO2 23 24 25   BUN 13 14 14   GLOB 4.8*  --   --       CBC w/Diff Recent Labs     07/13/25 0424 07/14/25  0338   WBC 7.7 7.4   RBC 2.88* 3.01*   HGB 8.3* 8.5*   HCT 24.2* 25.4*   * 621*      Cardiac Enzymes No results for input(s): \"CPK\" in the last 72 hours.    Invalid input(s): \"CKRMB\", \"CKND1\", \"TROIP\", \"LENARD\"   Coagulation No results for input(s): \"INR\", \"APTT\" in the last 72 hours.    Invalid input(s): \"PTP\"    Lipid Panel Lab Results   Component Value Date/Time    CHOL 234 06/01/2020 08:26 AM    HDL 69 06/01/2020 08:26 AM     06/01/2020 08:26 AM    VLDL 31 06/01/2020 08:26 AM      BNP Invalid input(s): \"BNPP\"   Liver Enzymes No results for input(s): \"TP\" in the last 72 hours.    Invalid input(s): \"ALB\", \"TBIL\", \"AP\", \"SGOT\", \"GPT\", \"DBIL\"   Thyroid Studies Lab Results   Component Value Date/Time    TSH  the floor.    Dear Kiara Nance, if you are reviewing this note and have a question regarding the medical terminology, please bring it with you to your next PCP visit.  Medical notes are meant to be a communication between medical professionals.  Additionally, portion of this note were created using voice recognition function, syntax and phonetic over may have escaped proofreading.      John Dotson MD  CC: Nae Kaiser, DESHAUN - NP

## 2025-07-15 NOTE — PROGRESS NOTES
Physical Therapy    1333: House keeping currently mopping pt room, will follow up as schedule permits.    1428: Pt on the phone, discharging today.  Refusing PT, no questions or concerns.

## 2025-07-17 NOTE — PROGRESS NOTES
RN selected 1.5mg PRN dose of dilaudid, Omnicell  however dispensed total 3mg (1mg and a 2mg vial) but only prompted 0.5mg dose waste. RN reported error to charge LESLIE Hutchinson who verified the error and called pharmacy who said they were unsure why that would happen and just waste the excess and report any other similar incident.Total of 1.5mg dilaudid wasted and witnessed by charge RN.

## 2025-07-18 ENCOUNTER — TELEPHONE (OUTPATIENT)
Age: 60
End: 2025-07-18

## 2025-07-18 NOTE — TELEPHONE ENCOUNTER
Care Transitions Initial Follow Up Call    Outreach made within 2 business days of discharge: Yes    Patient: Kiara Nance Patient : 1965   MRN: [id number]  Reason for Admission: SBO  Discharge Date: 7/15/25       Spoke with: Kiara    Discharge department/facility: HOME    TCM Interactive Patient Contact:  Was patient able to fill all prescriptions: Yes  Was patient instructed to bring all medications to the follow-up visit: Yes  Is patient taking all medications as directed in the discharge summary? Yes  Does patient understand their discharge instructions: Yes  Does patient have questions or concerns that need addressed prior to 7-14 day follow up office visit: no    Additional needs identified to be addressed with provider  High priority: Pain is very high currently              Scheduled appointment with PCP within 7-14 days    Follow Up  Future Appointments   Date Time Provider Department Center   2025 10:15 AM Nae Kaiser APRN - NP [facility]   2025  1:00 PM Shayla Zhang MD [facility]   2025  2:30 PM Lin Cardenas APRN - NP [facility]   2025  9:00 AM Ursula Li MD [facility]   2025  1:30 PM Lin Cardenas APRN - NP [facility]       Manny Faria MA

## 2025-07-19 ENCOUNTER — APPOINTMENT (OUTPATIENT)
Facility: HOSPITAL | Age: 60
DRG: 721 | End: 2025-07-19
Payer: MEDICAID

## 2025-07-19 ENCOUNTER — HOSPITAL ENCOUNTER (INPATIENT)
Facility: HOSPITAL | Age: 60
LOS: 4 days | Discharge: HOME HEALTH | DRG: 721 | End: 2025-07-23
Attending: EMERGENCY MEDICINE | Admitting: FAMILY MEDICINE
Payer: MEDICAID

## 2025-07-19 DIAGNOSIS — A49.8 INFECTION DUE TO ESBL-PRODUCING ESCHERICHIA COLI: ICD-10-CM

## 2025-07-19 DIAGNOSIS — K65.1 INTRA-ABDOMINAL ABSCESS (HCC): Primary | ICD-10-CM

## 2025-07-19 DIAGNOSIS — I10 HYPERTENSION, UNSPECIFIED TYPE: ICD-10-CM

## 2025-07-19 DIAGNOSIS — R78.81 E COLI BACTEREMIA: ICD-10-CM

## 2025-07-19 DIAGNOSIS — A41.9 SEPSIS, DUE TO UNSPECIFIED ORGANISM, UNSPECIFIED WHETHER ACUTE ORGAN DYSFUNCTION PRESENT (HCC): ICD-10-CM

## 2025-07-19 DIAGNOSIS — B96.20 E COLI BACTEREMIA: ICD-10-CM

## 2025-07-19 DIAGNOSIS — Z16.12 INFECTION DUE TO ESBL-PRODUCING ESCHERICHIA COLI: ICD-10-CM

## 2025-07-19 LAB
ALBUMIN SERPL-MCNC: 2.9 G/DL (ref 3.5–5)
ALBUMIN/GLOB SERPL: 0.4 (ref 1.1–2.2)
ALP SERPL-CCNC: 85 U/L (ref 45–117)
ALT SERPL-CCNC: 23 U/L (ref 12–78)
ANION GAP SERPL CALC-SCNC: 9 MMOL/L (ref 2–12)
APPEARANCE UR: CLEAR
AST SERPL-CCNC: 14 U/L (ref 15–37)
BACTERIA URNS QL MICRO: NEGATIVE /HPF
BILIRUB SERPL-MCNC: 0.3 MG/DL (ref 0.2–1)
BILIRUB UR QL: NEGATIVE
BUN SERPL-MCNC: 8 MG/DL (ref 6–20)
BUN/CREAT SERPL: 8 (ref 12–20)
CALCIUM SERPL-MCNC: 9.5 MG/DL (ref 8.5–10.1)
CHLORIDE SERPL-SCNC: 100 MMOL/L (ref 97–108)
CO2 SERPL-SCNC: 25 MMOL/L (ref 21–32)
COLOR UR: ABNORMAL
COMMENT:: NORMAL
CREAT SERPL-MCNC: 1.02 MG/DL (ref 0.55–1.02)
EKG ATRIAL RATE: 110 BPM
EKG DIAGNOSIS: NORMAL
EKG P AXIS: 63 DEGREES
EKG P-R INTERVAL: 118 MS
EKG Q-T INTERVAL: 350 MS
EKG QRS DURATION: 78 MS
EKG QTC CALCULATION (BAZETT): 473 MS
EKG R AXIS: 51 DEGREES
EKG T AXIS: 71 DEGREES
EKG VENTRICULAR RATE: 110 BPM
EPITH CASTS URNS QL MICRO: ABNORMAL /LPF
ERYTHROCYTE [DISTWIDTH] IN BLOOD BY AUTOMATED COUNT: 13.8 % (ref 11.5–14.5)
GLOBULIN SER CALC-MCNC: 6.5 G/DL (ref 2–4)
GLUCOSE SERPL-MCNC: 172 MG/DL (ref 65–100)
GLUCOSE UR STRIP.AUTO-MCNC: 100 MG/DL
HCT VFR BLD AUTO: 29.1 % (ref 35–47)
HGB BLD-MCNC: 9.3 G/DL (ref 11.5–16)
HGB UR QL STRIP: ABNORMAL
HYALINE CASTS URNS QL MICRO: ABNORMAL /LPF (ref 0–5)
KETONES UR QL STRIP.AUTO: 15 MG/DL
LACTATE SERPL-SCNC: 1.4 MMOL/L (ref 0.4–2)
LACTATE SERPL-SCNC: 1.9 MMOL/L (ref 0.4–2)
LACTATE SERPL-SCNC: 2.2 MMOL/L (ref 0.4–2)
LEUKOCYTE ESTERASE UR QL STRIP.AUTO: NEGATIVE
LIPASE SERPL-CCNC: 30 U/L (ref 13–75)
MCH RBC QN AUTO: 27.7 PG (ref 26–34)
MCHC RBC AUTO-ENTMCNC: 32 G/DL (ref 30–36.5)
MCV RBC AUTO: 86.6 FL (ref 80–99)
NITRITE UR QL STRIP.AUTO: NEGATIVE
NRBC # BLD: 0 K/UL (ref 0–0.01)
NRBC BLD-RTO: 0 PER 100 WBC
PH UR STRIP: 7.5 (ref 5–8)
PLATELET # BLD AUTO: 709 K/UL (ref 150–400)
PMV BLD AUTO: 9 FL (ref 8.9–12.9)
POTASSIUM SERPL-SCNC: 3.4 MMOL/L (ref 3.5–5.1)
PROCALCITONIN SERPL-MCNC: 0.26 NG/ML
PROT SERPL-MCNC: 9.4 G/DL (ref 6.4–8.2)
PROT UR STRIP-MCNC: 100 MG/DL
RBC # BLD AUTO: 3.36 M/UL (ref 3.8–5.2)
RBC #/AREA URNS HPF: ABNORMAL /HPF (ref 0–5)
SODIUM SERPL-SCNC: 134 MMOL/L (ref 136–145)
SP GR UR REFRACTOMETRY: 1.01 (ref 1–1.03)
SPECIMEN HOLD: NORMAL
SPECIMEN HOLD: NORMAL
URINE CULTURE IF INDICATED: ABNORMAL
UROBILINOGEN UR QL STRIP.AUTO: 0.2 EU/DL (ref 0.2–1)
WBC # BLD AUTO: 12.2 K/UL (ref 3.6–11)
WBC URNS QL MICRO: ABNORMAL /HPF (ref 0–4)

## 2025-07-19 PROCEDURE — 83690 ASSAY OF LIPASE: CPT

## 2025-07-19 PROCEDURE — 6370000000 HC RX 637 (ALT 250 FOR IP): Performed by: NURSE PRACTITIONER

## 2025-07-19 PROCEDURE — 96376 TX/PRO/DX INJ SAME DRUG ADON: CPT

## 2025-07-19 PROCEDURE — 96375 TX/PRO/DX INJ NEW DRUG ADDON: CPT

## 2025-07-19 PROCEDURE — 2580000003 HC RX 258: Performed by: EMERGENCY MEDICINE

## 2025-07-19 PROCEDURE — 85027 COMPLETE CBC AUTOMATED: CPT

## 2025-07-19 PROCEDURE — 6360000002 HC RX W HCPCS: Performed by: NURSE PRACTITIONER

## 2025-07-19 PROCEDURE — 6360000002 HC RX W HCPCS: Performed by: EMERGENCY MEDICINE

## 2025-07-19 PROCEDURE — 2580000003 HC RX 258: Performed by: NURSE PRACTITIONER

## 2025-07-19 PROCEDURE — 99285 EMERGENCY DEPT VISIT HI MDM: CPT

## 2025-07-19 PROCEDURE — 70450 CT HEAD/BRAIN W/O DYE: CPT

## 2025-07-19 PROCEDURE — 87040 BLOOD CULTURE FOR BACTERIA: CPT

## 2025-07-19 PROCEDURE — 74177 CT ABD & PELVIS W/CONTRAST: CPT

## 2025-07-19 PROCEDURE — 80053 COMPREHEN METABOLIC PANEL: CPT

## 2025-07-19 PROCEDURE — 84145 PROCALCITONIN (PCT): CPT

## 2025-07-19 PROCEDURE — 96365 THER/PROPH/DIAG IV INF INIT: CPT

## 2025-07-19 PROCEDURE — 71045 X-RAY EXAM CHEST 1 VIEW: CPT

## 2025-07-19 PROCEDURE — 81001 URINALYSIS AUTO W/SCOPE: CPT

## 2025-07-19 PROCEDURE — 83605 ASSAY OF LACTIC ACID: CPT

## 2025-07-19 PROCEDURE — 99221 1ST HOSP IP/OBS SF/LOW 40: CPT | Performed by: SURGERY

## 2025-07-19 PROCEDURE — 6360000004 HC RX CONTRAST MEDICATION: Performed by: EMERGENCY MEDICINE

## 2025-07-19 PROCEDURE — 93005 ELECTROCARDIOGRAM TRACING: CPT | Performed by: EMERGENCY MEDICINE

## 2025-07-19 PROCEDURE — 2500000003 HC RX 250 WO HCPCS: Performed by: NURSE PRACTITIONER

## 2025-07-19 PROCEDURE — 96366 THER/PROPH/DIAG IV INF ADDON: CPT

## 2025-07-19 PROCEDURE — 2060000000 HC ICU INTERMEDIATE R&B

## 2025-07-19 RX ORDER — FENTANYL CITRATE 50 UG/ML
50 INJECTION, SOLUTION INTRAMUSCULAR; INTRAVENOUS
Status: COMPLETED | OUTPATIENT
Start: 2025-07-19 | End: 2025-07-19

## 2025-07-19 RX ORDER — SODIUM CHLORIDE 0.9 % (FLUSH) 0.9 %
5-40 SYRINGE (ML) INJECTION PRN
Status: DISCONTINUED | OUTPATIENT
Start: 2025-07-19 | End: 2025-07-23 | Stop reason: HOSPADM

## 2025-07-19 RX ORDER — SODIUM CHLORIDE 9 MG/ML
INJECTION, SOLUTION INTRAVENOUS CONTINUOUS
Status: DISCONTINUED | OUTPATIENT
Start: 2025-07-19 | End: 2025-07-22

## 2025-07-19 RX ORDER — SODIUM CHLORIDE 0.9 % (FLUSH) 0.9 %
5-40 SYRINGE (ML) INJECTION EVERY 12 HOURS SCHEDULED
Status: DISCONTINUED | OUTPATIENT
Start: 2025-07-19 | End: 2025-07-23 | Stop reason: HOSPADM

## 2025-07-19 RX ORDER — ALBUTEROL SULFATE 90 UG/1
2 INHALANT RESPIRATORY (INHALATION) EVERY 4 HOURS PRN
Status: DISCONTINUED | OUTPATIENT
Start: 2025-07-19 | End: 2025-07-19 | Stop reason: CLARIF

## 2025-07-19 RX ORDER — LIDOCAINE HYDROCHLORIDE 10 MG/ML
50 INJECTION, SOLUTION EPIDURAL; INFILTRATION; INTRACAUDAL; PERINEURAL ONCE
Status: DISCONTINUED | OUTPATIENT
Start: 2025-07-19 | End: 2025-07-23

## 2025-07-19 RX ORDER — MORPHINE SULFATE 2 MG/ML
2 INJECTION, SOLUTION INTRAMUSCULAR; INTRAVENOUS EVERY 4 HOURS PRN
Refills: 0 | Status: DISCONTINUED | OUTPATIENT
Start: 2025-07-19 | End: 2025-07-20

## 2025-07-19 RX ORDER — METOCLOPRAMIDE 10 MG/1
5 TABLET ORAL
Status: DISCONTINUED | OUTPATIENT
Start: 2025-07-19 | End: 2025-07-23 | Stop reason: HOSPADM

## 2025-07-19 RX ORDER — PROPRANOLOL HYDROCHLORIDE 80 MG/1
80 CAPSULE, EXTENDED RELEASE ORAL DAILY
Status: DISCONTINUED | OUTPATIENT
Start: 2025-07-19 | End: 2025-07-23 | Stop reason: HOSPADM

## 2025-07-19 RX ORDER — MORPHINE SULFATE 4 MG/ML
4 INJECTION, SOLUTION INTRAMUSCULAR; INTRAVENOUS
Status: COMPLETED | OUTPATIENT
Start: 2025-07-19 | End: 2025-07-19

## 2025-07-19 RX ORDER — SODIUM CHLORIDE 9 MG/ML
INJECTION, SOLUTION INTRAVENOUS PRN
Status: DISCONTINUED | OUTPATIENT
Start: 2025-07-19 | End: 2025-07-23 | Stop reason: HOSPADM

## 2025-07-19 RX ORDER — AMITRIPTYLINE HYDROCHLORIDE 50 MG/1
50 TABLET ORAL NIGHTLY
Status: DISCONTINUED | OUTPATIENT
Start: 2025-07-19 | End: 2025-07-23 | Stop reason: HOSPADM

## 2025-07-19 RX ORDER — MORPHINE SULFATE 2 MG/ML
2 INJECTION, SOLUTION INTRAMUSCULAR; INTRAVENOUS
Status: COMPLETED | OUTPATIENT
Start: 2025-07-19 | End: 2025-07-19

## 2025-07-19 RX ORDER — ACETAMINOPHEN 325 MG/1
650 TABLET ORAL EVERY 6 HOURS PRN
Status: DISCONTINUED | OUTPATIENT
Start: 2025-07-19 | End: 2025-07-23 | Stop reason: HOSPADM

## 2025-07-19 RX ORDER — 0.9 % SODIUM CHLORIDE 0.9 %
30 INTRAVENOUS SOLUTION INTRAVENOUS ONCE
Status: COMPLETED | OUTPATIENT
Start: 2025-07-19 | End: 2025-07-19

## 2025-07-19 RX ORDER — IOPAMIDOL 755 MG/ML
100 INJECTION, SOLUTION INTRAVASCULAR
Status: COMPLETED | OUTPATIENT
Start: 2025-07-19 | End: 2025-07-19

## 2025-07-19 RX ORDER — POLYETHYLENE GLYCOL 3350 17 G/17G
17 POWDER, FOR SOLUTION ORAL DAILY PRN
Status: DISCONTINUED | OUTPATIENT
Start: 2025-07-19 | End: 2025-07-23 | Stop reason: HOSPADM

## 2025-07-19 RX ORDER — ALBUTEROL SULFATE 0.83 MG/ML
2.5 SOLUTION RESPIRATORY (INHALATION) EVERY 4 HOURS PRN
Status: DISCONTINUED | OUTPATIENT
Start: 2025-07-19 | End: 2025-07-23 | Stop reason: HOSPADM

## 2025-07-19 RX ORDER — LABETALOL HYDROCHLORIDE 5 MG/ML
10 INJECTION, SOLUTION INTRAVENOUS ONCE
Status: COMPLETED | OUTPATIENT
Start: 2025-07-19 | End: 2025-07-19

## 2025-07-19 RX ORDER — ENOXAPARIN SODIUM 100 MG/ML
40 INJECTION SUBCUTANEOUS DAILY
Status: DISCONTINUED | OUTPATIENT
Start: 2025-07-19 | End: 2025-07-20

## 2025-07-19 RX ORDER — AMLODIPINE BESYLATE 5 MG/1
10 TABLET ORAL DAILY
Status: DISCONTINUED | OUTPATIENT
Start: 2025-07-19 | End: 2025-07-23 | Stop reason: HOSPADM

## 2025-07-19 RX ORDER — ACETAMINOPHEN 650 MG/1
650 SUPPOSITORY RECTAL EVERY 6 HOURS PRN
Status: DISCONTINUED | OUTPATIENT
Start: 2025-07-19 | End: 2025-07-23 | Stop reason: HOSPADM

## 2025-07-19 RX ADMIN — SODIUM CHLORIDE 2097 ML: 0.9 INJECTION, SOLUTION INTRAVENOUS at 09:08

## 2025-07-19 RX ADMIN — PROPRANOLOL HYDROCHLORIDE 80 MG: 80 CAPSULE, EXTENDED RELEASE ORAL at 16:30

## 2025-07-19 RX ADMIN — ENOXAPARIN SODIUM 40 MG: 100 INJECTION SUBCUTANEOUS at 16:06

## 2025-07-19 RX ADMIN — AMITRIPTYLINE HYDROCHLORIDE 50 MG: 50 TABLET ORAL at 21:11

## 2025-07-19 RX ADMIN — MORPHINE SULFATE 4 MG: 4 INJECTION INTRAVENOUS at 13:59

## 2025-07-19 RX ADMIN — MORPHINE SULFATE 2 MG: 2 INJECTION, SOLUTION INTRAMUSCULAR; INTRAVENOUS at 11:10

## 2025-07-19 RX ADMIN — SODIUM CHLORIDE, PRESERVATIVE FREE 10 ML: 5 INJECTION INTRAVENOUS at 21:11

## 2025-07-19 RX ADMIN — MEROPENEM 1000 MG: 1 INJECTION INTRAVENOUS at 09:38

## 2025-07-19 RX ADMIN — FENTANYL CITRATE 50 MCG: 50 INJECTION INTRAMUSCULAR; INTRAVENOUS at 09:17

## 2025-07-19 RX ADMIN — LABETALOL HYDROCHLORIDE 10 MG: 5 INJECTION, SOLUTION INTRAVENOUS at 13:59

## 2025-07-19 RX ADMIN — VANCOMYCIN HYDROCHLORIDE 1750 MG: 10 INJECTION, POWDER, LYOPHILIZED, FOR SOLUTION INTRAVENOUS at 12:29

## 2025-07-19 RX ADMIN — AMLODIPINE BESYLATE 10 MG: 5 TABLET ORAL at 16:31

## 2025-07-19 RX ADMIN — IOPAMIDOL 100 ML: 755 INJECTION, SOLUTION INTRAVENOUS at 11:42

## 2025-07-19 RX ADMIN — METOCLOPRAMIDE 5 MG: 10 TABLET ORAL at 16:05

## 2025-07-19 RX ADMIN — SODIUM CHLORIDE: 0.9 INJECTION, SOLUTION INTRAVENOUS at 14:03

## 2025-07-19 RX ADMIN — MORPHINE SULFATE 2 MG: 2 INJECTION, SOLUTION INTRAMUSCULAR; INTRAVENOUS at 17:11

## 2025-07-19 RX ADMIN — PIPERACILLIN SODIUM AND TAZOBACTAM SODIUM 3375 MG: 3; .375 INJECTION, POWDER, LYOPHILIZED, FOR SOLUTION INTRAVENOUS at 23:23

## 2025-07-19 RX ADMIN — PIPERACILLIN AND TAZOBACTAM 4500 MG: 4; .5 INJECTION, POWDER, LYOPHILIZED, FOR SOLUTION INTRAVENOUS at 17:13

## 2025-07-19 RX ADMIN — MORPHINE SULFATE 2 MG: 2 INJECTION, SOLUTION INTRAMUSCULAR; INTRAVENOUS at 21:29

## 2025-07-19 ASSESSMENT — PAIN DESCRIPTION - ORIENTATION
ORIENTATION: MID
ORIENTATION: LEFT;LOWER
ORIENTATION: RIGHT;LOWER
ORIENTATION: MID

## 2025-07-19 ASSESSMENT — PAIN DESCRIPTION - LOCATION
LOCATION: ABDOMEN

## 2025-07-19 ASSESSMENT — PAIN - FUNCTIONAL ASSESSMENT
PAIN_FUNCTIONAL_ASSESSMENT: PREVENTS OR INTERFERES SOME ACTIVE ACTIVITIES AND ADLS
PAIN_FUNCTIONAL_ASSESSMENT: ACTIVITIES ARE NOT PREVENTED
PAIN_FUNCTIONAL_ASSESSMENT: 0-10

## 2025-07-19 ASSESSMENT — PAIN DESCRIPTION - DESCRIPTORS
DESCRIPTORS: ACHING
DESCRIPTORS: ACHING;CRAMPING

## 2025-07-19 ASSESSMENT — PAIN DESCRIPTION - PAIN TYPE: TYPE: ACUTE PAIN

## 2025-07-19 ASSESSMENT — PAIN SCALES - GENERAL
PAINLEVEL_OUTOF10: 10
PAINLEVEL_OUTOF10: 0
PAINLEVEL_OUTOF10: 10

## 2025-07-19 ASSESSMENT — LIFESTYLE VARIABLES
HOW MANY STANDARD DRINKS CONTAINING ALCOHOL DO YOU HAVE ON A TYPICAL DAY: PATIENT DOES NOT DRINK
HOW OFTEN DO YOU HAVE A DRINK CONTAINING ALCOHOL: NEVER

## 2025-07-19 ASSESSMENT — PAIN DESCRIPTION - FREQUENCY: FREQUENCY: CONTINUOUS

## 2025-07-19 NOTE — ED NOTES
Assuming pt care, pt bib family for shakes, confusion, c/o left side abd pain since abd surgery 3 weeks ago. Dirty dressing to abdomen noted, drainage to LLQ, picc line to right upper arm

## 2025-07-19 NOTE — H&P
History and Physical    Date of Service:  7/19/2025  Primary Care Provider: Nae Kaiser APRN - NP  Source of information: Chart review and Spouse/family member    Chief Complaint: Post-op Problem      History of Presenting Illness:   Kiara Nance is a 60 y.o. female with a past medical history significant for anxiety, aortic thrombus, asthma, chronic back pain, depression, headaches, hypertension, memory disorder, mild asthma, neuropathy who presents with intermittent confusion, fatigue and abdominal pain.  Per the patient's son, the patient was recently in [facility] for a few weeks and just discharged on 7/11.  She underwent an ex lap with lysis of adhesions and small bowel resection on 6/29 and developed an infection requiring her to be admitted to the ICU.  A left flank abscess developed and she had a drain placed.  She reportedly was discharged on ertapenem with a PICC line for administration through 8/4/2024.  Patient's son states he called his mother and could not get in touch with her but found out where she was and drove down from [place] today where he found her at her friend's apartment confused and shaking so he brought her to the hospital.  He is unsure if she was taking any of her meds at home.  He states she does not have stable housing and apparently bounces around between friends homes in different states by her own choice.  On review of notes from her previous hospitalization it appears she developed E. coli bacteremia and received meropenem and vancomycin, an aortic thrombus was found but no surgical intervention was warranted, she was to be placed on AC therapy.  She had some left-sided hydronephrosis.  Reportedly she told them she was going to her sons and DC after discharge which did not happen.  Patient's son reports she was weak and shaky when he found her and that she collapsed where he had to catch her off of the elevator when they were leaving her

## 2025-07-19 NOTE — ED TRIAGE NOTES
Pt was brought in by wheelchair with CC of post op complication. Pt received a abdominal surgery last month for GI blockage. Pt was d/c on 7/11 and has been shaking since she has been home. Additional sx are fatigue, intermittent confusion, abdominal pain, and shakiness. Denies n/v/d, chest pain, SOB, nor Ha. Pt last bm was yesterday. Before the surgery the pt was A&O x4.

## 2025-07-19 NOTE — ED PROVIDER NOTES
[facility] EMERGENCY DEPARTMENT  EMERGENCY DEPARTMENT ENCOUNTER      Pt Name: Kiara Nance  MRN: [id number]  Birthdate 1965  Date of evaluation: 7/19/2025  Provider: Rock Rose MD    CHIEF COMPLAINT       Chief Complaint   Patient presents with    Post-op Problem         HISTORY OF PRESENT ILLNESS   (Location/Symptom, Timing/Onset, Context/Setting, Quality, Duration, Modifying Factors, Severity)  Note limiting factors.   HPI  60-year-old female with history of LOAD and small bowel resection c/b abd abscess s/p drain placement last month for gastrointestinal obstruction, discharged 07-, was brought in by wheelchair for evaluation of post-operative complications. Since discharge, she has experienced shaking, fatigue, intermittent confusion, and abdominal pain. She denies vomiting, diarrhea, chest pain, shortness of breath, or headache. Last bowel movement was yesterday. Prior to surgery, she was alert and oriented times four.     Review of External Medical Records:     Nursing Notes were reviewed.    REVIEW OF SYSTEMS    (2-9 systems for level 4, 10 or more for level 5)     Review of Systems    Except as noted above the remainder of the review of systems was reviewed and negative.       PAST MEDICAL HISTORY     Past Medical History:   Diagnosis Date    Anxiety 09/04/24    Since I found out about the aneurysm    Asthma     SOB    Chronic back pain     Chronic pain     joint pain    Depression 09/04/24    Headache     Hypertension     Knee meniscus pain 9/7/2017    Memory disorder     Just be forgetting things sometimes    Mild intermittent asthma without complication 9/7/2017    Neck pain 09/04/24    Neuropathy About 2 years now    Sinus congestion     Vitamin D deficiency          SURGICAL HISTORY       Past Surgical History:   Procedure Laterality Date    GI      recloser cloystomy 2005    IR ASP ABSCESS/HEMATOMA/BULLA/CYST  7/3/2025    IR ASP ABSCESS/HEMATOMA/BULLA/CYST 7/3/2025 Nik

## 2025-07-19 NOTE — ED NOTES
Pt has a picc line in the left upper arm. Dressing appears dry and intact. Pt noted to have one open port with no open end, no type of hub/nipple devices to protect end.

## 2025-07-19 NOTE — CONSULTS
General Surgery Consultation    CC: Confusion    History of Present Illness:      Kiara Nance is a 60 y.o. female who presents with sepsis.  Patient was at [facility] for the last few weeks.  Recently discharged last week home.  Unstable discharge plan per the son.  Patient underwent surgery on 29 June for a bowel obstruction.  [facility]  Postoperatively the patient developed a left flank abscess.  Patient was with friends per the son.  Not sure if he was taking her antibiotics or not.  Still has a drain in the left lateral flank.  Patient is currently confused.  Reports pain that is an 8 or 9 out of 10 and sharp in nature in her left flank.  Radiation to her back.  Pressing the area hurts.  Pain medication has helped some.    Past Medical History:   Diagnosis Date    Anxiety 09/04/24    Since I found out about the aneurysm    Asthma     SOB    Chronic back pain     Chronic pain     joint pain    Depression 09/04/24    Headache     Hypertension     Knee meniscus pain 9/7/2017    Memory disorder     Just be forgetting things sometimes    Mild intermittent asthma without complication 9/7/2017    Neck pain 09/04/24    Neuropathy About 2 years now    Sinus congestion     Vitamin D deficiency      Past Surgical History:   Procedure Laterality Date    GI      recloser cloystomy 2005    IR ASP ABSCESS/HEMATOMA/BULLA/CYST  7/3/2025    IR ASP ABSCESS/HEMATOMA/BULLA/CYST 7/3/2025 Jody Zaragoza, APRN - CNP [facility] RAD ANGIO IR    LAPAROTOMY N/A 6/29/2025    LAPAROTOMY EXPLORATORY, LYSIS OF ADHESIONS,  SMALL BOWEL RESECTION performed by Carlos Palencia MD at [facility] MAIN OR      Family History   Problem Relation Age of Onset    Liver Disease Mother     No Known Problems Father     Lung Cancer Sister     Lung Cancer Brother      Social History     Socioeconomic History    Marital status: Single   Tobacco Use    Smoking status: Never    Smokeless tobacco: Never

## 2025-07-19 NOTE — ED NOTES
TRANSFER - OUT REPORT:    Verbal report given to Tory NELSON on Kiara Nance  being transferred to [facility] for routine progression of patient care       Report consisted of patient's Situation, Background, Assessment and   Recommendations(SBAR).     Information from the following report(s) Nurse Handoff Report and ED SBAR was reviewed with the receiving nurse.    [place] Fall Assessment:    Presents to emergency department  because of falls (Syncope, seizure, or loss of consciousness): No  Age > 70: No  Altered Mental Status, Intoxication with alcohol or substance confusion (Disorientation, impaired judgment, poor safety awaremess, or inability to follow instructions): Yes  Impaired Mobility: Ambulates or transfers with assistive devices or assistance; Unable to ambulate or transer.: No  Nursing Judgement: Yes          Lines:   PICC 06/30/25 Left Brachial (Active)       Peripheral IV 07/19/25 Right Antecubital (Active)   Site Assessment Clean, dry & intact 07/19/25 0737   Line Status Specimen collected;Normal saline locked 07/19/25 0737   Line Care Connections checked and tightened 07/19/25 0737   Phlebitis Assessment No symptoms 07/19/25 0737   Infiltration Assessment 0 07/19/25 0737   Alcohol Cap Used No 07/19/25 0737   Dressing Status Clean, dry & intact 07/19/25 0737   Dressing Type Transparent;Other (Comment) 07/19/25 0737   Dressing Intervention New 07/19/25 0737        Opportunity for questions and clarification was provided.      Patient transported with:  Monitor and Registered Nurse

## 2025-07-20 PROBLEM — A49.8 BACTEROIDES FRAGILIS: Status: ACTIVE | Noted: 2025-07-20

## 2025-07-20 PROBLEM — K56.609 SMALL BOWEL OBSTRUCTION (HCC): Status: ACTIVE | Noted: 2025-07-20

## 2025-07-20 PROBLEM — R41.82 ALTERED MENTAL STATUS: Status: ACTIVE | Noted: 2025-07-20

## 2025-07-20 PROBLEM — Z16.12 INFECTION DUE TO ESBL-PRODUCING ESCHERICHIA COLI: Status: ACTIVE | Noted: 2025-07-20

## 2025-07-20 PROBLEM — I10 POORLY-CONTROLLED HYPERTENSION: Status: ACTIVE | Noted: 2025-07-20

## 2025-07-20 PROBLEM — R78.81 E COLI BACTEREMIA: Status: ACTIVE | Noted: 2025-07-20

## 2025-07-20 PROBLEM — A49.8 INFECTION DUE TO ESBL-PRODUCING ESCHERICHIA COLI: Status: ACTIVE | Noted: 2025-07-20

## 2025-07-20 PROBLEM — B96.20 E COLI BACTEREMIA: Status: ACTIVE | Noted: 2025-07-20

## 2025-07-20 PROBLEM — Z98.890 HISTORY OF COLOSTOMY REVERSAL: Status: ACTIVE | Noted: 2025-07-20

## 2025-07-20 LAB
ALBUMIN SERPL-MCNC: 2.4 G/DL (ref 3.5–5)
ALBUMIN/GLOB SERPL: 0.5 (ref 1.1–2.2)
ALP SERPL-CCNC: 71 U/L (ref 45–117)
ALT SERPL-CCNC: 17 U/L (ref 12–78)
ANION GAP SERPL CALC-SCNC: 5 MMOL/L (ref 2–12)
AST SERPL-CCNC: 15 U/L (ref 15–37)
BACTERIA SPEC CULT: NORMAL
BILIRUB SERPL-MCNC: 0.4 MG/DL (ref 0.2–1)
BUN SERPL-MCNC: 4 MG/DL (ref 6–20)
BUN/CREAT SERPL: 5 (ref 12–20)
CALCIUM SERPL-MCNC: 8.9 MG/DL (ref 8.5–10.1)
CHLORIDE SERPL-SCNC: 110 MMOL/L (ref 97–108)
CO2 SERPL-SCNC: 25 MMOL/L (ref 21–32)
CREAT SERPL-MCNC: 0.79 MG/DL (ref 0.55–1.02)
ERYTHROCYTE [DISTWIDTH] IN BLOOD BY AUTOMATED COUNT: 14.1 % (ref 11.5–14.5)
GLOBULIN SER CALC-MCNC: 4.8 G/DL (ref 2–4)
GLUCOSE SERPL-MCNC: 105 MG/DL (ref 65–100)
HCT VFR BLD AUTO: 27.6 % (ref 35–47)
HGB BLD-MCNC: 8.7 G/DL (ref 11.5–16)
MCH RBC QN AUTO: 28.1 PG (ref 26–34)
MCHC RBC AUTO-ENTMCNC: 31.5 G/DL (ref 30–36.5)
MCV RBC AUTO: 89 FL (ref 80–99)
NRBC # BLD: 0 K/UL (ref 0–0.01)
NRBC BLD-RTO: 0 PER 100 WBC
PLATELET # BLD AUTO: 584 K/UL (ref 150–400)
PMV BLD AUTO: 9 FL (ref 8.9–12.9)
POTASSIUM SERPL-SCNC: 3.7 MMOL/L (ref 3.5–5.1)
PROT SERPL-MCNC: 7.2 G/DL (ref 6.4–8.2)
RBC # BLD AUTO: 3.1 M/UL (ref 3.8–5.2)
SERVICE CMNT-IMP: NORMAL
SODIUM SERPL-SCNC: 140 MMOL/L (ref 136–145)
WBC # BLD AUTO: 6.8 K/UL (ref 3.6–11)

## 2025-07-20 PROCEDURE — 2580000003 HC RX 258: Performed by: INTERNAL MEDICINE

## 2025-07-20 PROCEDURE — 2580000003 HC RX 258: Performed by: HOSPITALIST

## 2025-07-20 PROCEDURE — 6370000000 HC RX 637 (ALT 250 FOR IP): Performed by: NURSE PRACTITIONER

## 2025-07-20 PROCEDURE — 6360000002 HC RX W HCPCS: Performed by: NURSE PRACTITIONER

## 2025-07-20 PROCEDURE — 97165 OT EVAL LOW COMPLEX 30 MIN: CPT

## 2025-07-20 PROCEDURE — 85027 COMPLETE CBC AUTOMATED: CPT

## 2025-07-20 PROCEDURE — 2500000003 HC RX 250 WO HCPCS: Performed by: INTERNAL MEDICINE

## 2025-07-20 PROCEDURE — 80053 COMPREHEN METABOLIC PANEL: CPT

## 2025-07-20 PROCEDURE — 2580000003 HC RX 258: Performed by: EMERGENCY MEDICINE

## 2025-07-20 PROCEDURE — 97161 PT EVAL LOW COMPLEX 20 MIN: CPT

## 2025-07-20 PROCEDURE — 2060000000 HC ICU INTERMEDIATE R&B

## 2025-07-20 PROCEDURE — 97530 THERAPEUTIC ACTIVITIES: CPT

## 2025-07-20 PROCEDURE — 6360000002 HC RX W HCPCS: Performed by: HOSPITALIST

## 2025-07-20 PROCEDURE — 2500000003 HC RX 250 WO HCPCS: Performed by: NURSE PRACTITIONER

## 2025-07-20 PROCEDURE — 6360000002 HC RX W HCPCS: Performed by: INTERNAL MEDICINE

## 2025-07-20 PROCEDURE — 99223 1ST HOSP IP/OBS HIGH 75: CPT | Performed by: INTERNAL MEDICINE

## 2025-07-20 PROCEDURE — 2580000003 HC RX 258: Performed by: NURSE PRACTITIONER

## 2025-07-20 PROCEDURE — 99232 SBSQ HOSP IP/OBS MODERATE 35: CPT | Performed by: SURGERY

## 2025-07-20 PROCEDURE — 97535 SELF CARE MNGMENT TRAINING: CPT

## 2025-07-20 PROCEDURE — 6360000002 HC RX W HCPCS: Performed by: SURGERY

## 2025-07-20 PROCEDURE — 94760 N-INVAS EAR/PLS OXIMETRY 1: CPT

## 2025-07-20 RX ORDER — OXYCODONE HYDROCHLORIDE 5 MG/1
5 TABLET ORAL EVERY 4 HOURS PRN
Refills: 0 | Status: DISCONTINUED | OUTPATIENT
Start: 2025-07-20 | End: 2025-07-23 | Stop reason: HOSPADM

## 2025-07-20 RX ORDER — OXYCODONE HYDROCHLORIDE 5 MG/1
10 TABLET ORAL EVERY 4 HOURS PRN
Refills: 0 | Status: DISCONTINUED | OUTPATIENT
Start: 2025-07-20 | End: 2025-07-23 | Stop reason: HOSPADM

## 2025-07-20 RX ORDER — ENOXAPARIN SODIUM 100 MG/ML
1 INJECTION SUBCUTANEOUS 2 TIMES DAILY
Status: COMPLETED | OUTPATIENT
Start: 2025-07-20 | End: 2025-07-20

## 2025-07-20 RX ORDER — ONDANSETRON 2 MG/ML
4 INJECTION INTRAMUSCULAR; INTRAVENOUS EVERY 6 HOURS PRN
Status: DISCONTINUED | OUTPATIENT
Start: 2025-07-20 | End: 2025-07-23 | Stop reason: HOSPADM

## 2025-07-20 RX ORDER — HYDROMORPHONE HYDROCHLORIDE 1 MG/ML
1 INJECTION, SOLUTION INTRAMUSCULAR; INTRAVENOUS; SUBCUTANEOUS
Refills: 0 | Status: DISCONTINUED | OUTPATIENT
Start: 2025-07-20 | End: 2025-07-23 | Stop reason: HOSPADM

## 2025-07-20 RX ADMIN — MORPHINE SULFATE 2 MG: 2 INJECTION, SOLUTION INTRAMUSCULAR; INTRAVENOUS at 05:53

## 2025-07-20 RX ADMIN — SODIUM CHLORIDE: 0.9 INJECTION, SOLUTION INTRAVENOUS at 11:50

## 2025-07-20 RX ADMIN — MEROPENEM 1000 MG: 1 INJECTION INTRAVENOUS at 16:11

## 2025-07-20 RX ADMIN — HYDROMORPHONE HYDROCHLORIDE 1 MG: 1 INJECTION, SOLUTION INTRAMUSCULAR; INTRAVENOUS; SUBCUTANEOUS at 11:07

## 2025-07-20 RX ADMIN — MEROPENEM 1000 MG: 1 INJECTION INTRAVENOUS at 08:33

## 2025-07-20 RX ADMIN — METOCLOPRAMIDE 5 MG: 10 TABLET ORAL at 06:01

## 2025-07-20 RX ADMIN — SODIUM CHLORIDE: 0.9 INJECTION, SOLUTION INTRAVENOUS at 01:43

## 2025-07-20 RX ADMIN — HYDROMORPHONE HYDROCHLORIDE 1 MG: 1 INJECTION, SOLUTION INTRAMUSCULAR; INTRAVENOUS; SUBCUTANEOUS at 13:56

## 2025-07-20 RX ADMIN — PROPRANOLOL HYDROCHLORIDE 80 MG: 80 CAPSULE, EXTENDED RELEASE ORAL at 08:29

## 2025-07-20 RX ADMIN — SODIUM CHLORIDE, PRESERVATIVE FREE 10 ML: 5 INJECTION INTRAVENOUS at 08:27

## 2025-07-20 RX ADMIN — HYDROMORPHONE HYDROCHLORIDE 1 MG: 1 INJECTION, SOLUTION INTRAMUSCULAR; INTRAVENOUS; SUBCUTANEOUS at 16:16

## 2025-07-20 RX ADMIN — MEROPENEM 1000 MG: 1 INJECTION INTRAVENOUS at 00:48

## 2025-07-20 RX ADMIN — AMLODIPINE BESYLATE 10 MG: 5 TABLET ORAL at 08:29

## 2025-07-20 RX ADMIN — SODIUM CHLORIDE 1250 MG: 0.9 INJECTION, SOLUTION INTRAVENOUS at 11:54

## 2025-07-20 RX ADMIN — HYDROMORPHONE HYDROCHLORIDE 1 MG: 1 INJECTION, SOLUTION INTRAMUSCULAR; INTRAVENOUS; SUBCUTANEOUS at 18:18

## 2025-07-20 RX ADMIN — METOCLOPRAMIDE 5 MG: 10 TABLET ORAL at 11:12

## 2025-07-20 RX ADMIN — HYDROMORPHONE HYDROCHLORIDE 1 MG: 1 INJECTION, SOLUTION INTRAMUSCULAR; INTRAVENOUS; SUBCUTANEOUS at 22:22

## 2025-07-20 RX ADMIN — ENOXAPARIN SODIUM 40 MG: 100 INJECTION SUBCUTANEOUS at 08:33

## 2025-07-20 RX ADMIN — AMITRIPTYLINE HYDROCHLORIDE 50 MG: 50 TABLET ORAL at 21:59

## 2025-07-20 RX ADMIN — ONDANSETRON 4 MG: 2 INJECTION, SOLUTION INTRAMUSCULAR; INTRAVENOUS at 11:07

## 2025-07-20 RX ADMIN — MORPHINE SULFATE 2 MG: 2 INJECTION, SOLUTION INTRAMUSCULAR; INTRAVENOUS at 08:26

## 2025-07-20 RX ADMIN — METOCLOPRAMIDE 5 MG: 10 TABLET ORAL at 16:12

## 2025-07-20 RX ADMIN — SODIUM CHLORIDE: 0.9 INJECTION, SOLUTION INTRAVENOUS at 19:32

## 2025-07-20 RX ADMIN — ENOXAPARIN SODIUM 70 MG: 100 INJECTION SUBCUTANEOUS at 18:19

## 2025-07-20 RX ADMIN — MORPHINE SULFATE 2 MG: 2 INJECTION, SOLUTION INTRAMUSCULAR; INTRAVENOUS at 01:41

## 2025-07-20 ASSESSMENT — PAIN DESCRIPTION - DESCRIPTORS
DESCRIPTORS: ACHING
DESCRIPTORS: PRESSURE

## 2025-07-20 ASSESSMENT — PAIN SCALES - GENERAL
PAINLEVEL_OUTOF10: 10
PAINLEVEL_OUTOF10: 0
PAINLEVEL_OUTOF10: 9
PAINLEVEL_OUTOF10: 10
PAINLEVEL_OUTOF10: 10
PAINLEVEL_OUTOF10: 0
PAINLEVEL_OUTOF10: 10

## 2025-07-20 ASSESSMENT — PAIN - FUNCTIONAL ASSESSMENT: PAIN_FUNCTIONAL_ASSESSMENT: ACTIVITIES ARE NOT PREVENTED

## 2025-07-20 ASSESSMENT — PAIN DESCRIPTION - LOCATION
LOCATION: ABDOMEN

## 2025-07-20 ASSESSMENT — PAIN DESCRIPTION - ORIENTATION
ORIENTATION: LEFT
ORIENTATION: ANTERIOR
ORIENTATION: LEFT

## 2025-07-20 NOTE — CARE COORDINATION
Care Management Initial Assessment       RUR: 21%  Readmission? Yes - 7/19/25  1st IM letter given? No  1st  letter given: No    Patient admitted for post op complication      Residence: Patient resides in one level apt with elevator access   ADL: dependent  DME: none   PCP: Dr. Nae Kaiser   Verified Insurance: [employer]  Emergency Contacts: oleg Kothari [phone] shira Story [phone]  Previous rehab/services: none  Transportation: family    PLEASE NOTE--Encounter / Re-Admission Within 30 Days  This patient has had another encounter or admission within the last 30 days.    Readmission Assessment  Number of Days since last admission?: 8-30 days  Previous Disposition: Home with Family  Who is being Interviewed: Patient  What was the patient's/caregiver's perception as to why they think they needed to return back to the hospital?: Could not/did not make appointment with PCP  Did you visit your Primary Care Physician after you left the hospital, before you returned this time?: No  Why weren't you able to visit your PCP?: Did not have an appointment  Did you see a specialist, such as Cardiac, Pulmonary, Orthopedic Physician, etc. after you left the hospital?: No  Who advised the patient to return to the hospital?: Self-referral  Does the patient report anything that got in the way of taking their medications?: No  In our efforts to provide the best possible care to you and others like you, can you think of anything that we could have done to help you after you left the hospital the first time, so that you might not have needed to return so soon?: Arrange for more help when leaving the hospital, Identify patient's health literacy needs, Improved written discharge instructions, Teaching during hospitalization regarding your illness, Teach back instructions regarding management of illness, Discharge instructions that are concise, clear, and non contradictory, Education on how to

## 2025-07-20 NOTE — PLAN OF CARE
Problem: Occupational Therapy - Adult  Goal: By Discharge: Performs self-care activities at highest level of function for planned discharge setting.  See evaluation for individualized goals.  Description: FUNCTIONAL STATUS PRIOR TO ADMISSION:  Patient lives with friends (plan is to discharge to son's home) and was independent with ADLs. Patient's friend assisted with IADLs as needed.     HOME SUPPORT: Patient lived with friends.    Occupational Therapy Goals:  Initiated 7/20/2025  1.  Patient will perform grooming seated with Supervision within 7 day(s).  2.  Patient will perform upper body dressing seated with Supervision within 7 day(s).  3.  Patient will perform lower body bathing seated with Stand by Assist within 7 day(s).  4.  Patient will perform toilet transfers with Contact Guard Assist and DME PRN within 7 day(s).  5.  Patient will perform all aspects of toileting with Stand by Assist within 7 day(s).  6.  Patient will participate in upper extremity therapeutic exercise/activities with Stand by Assist for 5 minutes within 7 day(s).    7.  Patient will utilize energy conservation techniques during functional activities with verbal cues within 7 day(s).    Outcome: Progressing     OCCUPATIONAL THERAPY EVALUATION    Patient: Kiara Nance (60 y.o. female)  Date: 7/20/2025  Primary Diagnosis: Intra-abdominal abscess (HCC) [K65.1]  Sepsis (HCC) [A41.9]  Hypertension, unspecified type [I10]  Sepsis, due to unspecified organism, unspecified whether acute organ dysfunction present (HCC) [A41.9]         Precautions: Fall Risk                  ASSESSMENT :  The patient is limited by decreased functional mobility, independence in ADLs, high-level IADLs, ROM, strength, activity tolerance, endurance, coordination, balance, increased pain levels, following admission for abdominal abscess. Noted recent ex lap with small bowel resection 6/29 at OSH.    Pt received semi-reclined in bed, amendable to session, on RA.

## 2025-07-20 NOTE — PLAN OF CARE
Problem: Discharge Planning  Goal: Discharge to home or other facility with appropriate resources  7/19/2025 2226 by Pedro Javier RN  Outcome: Progressing  7/19/2025 1653 by Cooksey, Hannah, RN  Outcome: Progressing     Problem: Pain  Goal: Verbalizes/displays adequate comfort level or baseline comfort level  7/19/2025 2226 by Pedro Javier RN  Outcome: Progressing  7/19/2025 1653 by Cooksey, Hannah, RN  Outcome: Progressing     Problem: Skin/Tissue Integrity  Goal: Skin integrity remains intact  Description: 1.  Monitor for areas of redness and/or skin breakdown  2.  Assess vascular access sites hourly  3.  Every 4-6 hours minimum:  Change oxygen saturation probe site  4.  Every 4-6 hours:  If on nasal continuous positive airway pressure, respiratory therapy assess nares and determine need for appliance change or resting period  7/19/2025 2226 by Pedro Javier RN  Outcome: Progressing  Flowsheets (Taken 7/19/2025 2000)  Skin Integrity Remains Intact: Monitor for areas of redness and/or skin breakdown  7/19/2025 1653 by Cooksey, Hannah, RN  Outcome: Progressing     Problem: ABCDS Injury Assessment  Goal: Absence of physical injury  7/19/2025 2226 by Pedro Javier RN  Outcome: Progressing  7/19/2025 1653 by Cooksey, Hannah, RN  Outcome: Progressing     Problem: Safety - Adult  Goal: Free from fall injury  7/19/2025 2226 by Pedro Javier RN  Outcome: Progressing  7/19/2025 1653 by Cooksey, Hannah, RN  Outcome: Progressing

## 2025-07-20 NOTE — ACP (ADVANCE CARE PLANNING)
Advance Care Planning     Advance Care Planning Inpatient Note  [facility] Department    Today's Date: 7/20/2025  Unit: [facility]    Received request from patient.  Upon review of chart and communication with care team, patient's decision making abilities are not in question.. Patient was/were present in the room during visit.    Goals of ACP Conversation:  Discuss advance care planning documents  Facilitate a discussion related to patient's goals of care as they align with the patient's values and beliefs.    Health Care Decision Makers:       Primary Decision Maker: Reynold Kothari - [facility] - [phone]  Summary:  Completed New Documents  Updated Healthcare Decision Maker    Advance Care Planning Documents (Patient Wishes):  Healthcare Power of /Advance Directive Appointment of Health Care Agent  Living Will/Advance Directive  Anatomical Gift/Organ Donation     Assessment:   responded to request for Advance Medical Directive discussion. Kiara discussed her reasons for desiring to appoint her son as her primary agent and her sister as her successor agent. Her questions regarding the AMD, Living Will (My Healthcare Instructions) and Anatomical Gifts sections were discussed and completed. All questions were answered.     Interventions:  Provided education on documents for clarity and greater understanding  Discussed and provided education on state decision maker hierarchy  Assisted in the completion of documents according to patient's wishes at this time  Encouraged ongoing ACP conversation with future decision makers and loved ones    Care Preferences Communicated:   No    Outcomes/Plan:  Assessment:   reviewed the patient's chart responding to request for Advance Medical Directive discussion. Kiara discussed her reasons for desiring to appoint her son as her primary agent and her sister as her successor agent. Both live some distance away by car, so she is comforted by knowing

## 2025-07-20 NOTE — PROGRESS NOTES
Physician Progress Note      PATIENT:               BOONE GOMES  CSN #:                  [id number]  :                       1965  ADMIT DATE:       2025 9:04 PM  DISCH DATE:        7/15/2025 5:59 PM  RESPONDING  PROVIDER #:        John Dotson MD          QUERY TEXT:    Encephalopathy was documented in the medical record IMPN by Elias Jade PA-C   on 2025. The diagnosis was not noted in subsequent documentation. Please   clarify the status of this condition.    The clinical indicators include:  -60 y.o., Severe sepsis, KE, metabolic acidosis, ABLA, hypotension,   malnutrition.    -\"Patient currently unable to elaborate as she is obviously very confused when   asked questions. She is disoriented and confused. Altered mental status?   Encephalopathic. SIRS with concerns of sepsis. Patient meeting SIRS criteria   based on heart rate, respiratory rate. Potential source of infection to   include continued issues related to ischemic bowel versus   perforation/peritonitis versus underlying unknown source. Patient required   Valium 5 mg IV prior to going down to CT due to marked agitation. \" (IMPN by   Elias Jade PA-C on 2025)  -\"25- More awake today flirting with doctors. - Mental status change   and fever overnight. For me oriented not confused following commands.\" (DS   Note by John Dotson MD on 7/15/2025)  -Neurological assessment: On - Disoriented to person, place time and   situation. (EPIC Flowsheet)  -CT Head on : Indication: metabolic encephalopathy. Impression: No evidence   for acute intracranial abnormality. (radiology reports by Jerry Caldwell)  -Labs: Lactic acid on - 3.9 and 3.6 on .  POC HCO3 On - 26.5 and on - 20.7.  POC O2 On - 98 and on - 85.4.  POC pCO2 On - 36.6 and on - 29.6.  POC pH On - 7.47 and on - 7.45.  POC PO2 On - 96 and on - 47.  Ammonia on - .  AST on - 42 and on -

## 2025-07-20 NOTE — PLAN OF CARE
Problem: Physical Therapy - Adult  Goal: By Discharge: Performs mobility at highest level of function for planned discharge setting.  See evaluation for individualized goals.  Description: FUNCTIONAL STATUS PRIOR TO ADMISSION: Patient was independent and active without use of DME prior to abdominal surgery, she has been since using a SPC to mobilize.    HOME SUPPORT PRIOR TO ADMISSION: The patient lived with her sister but did not require assistance prior to abdominal surgery.    Physical Therapy Goals  Initiated 7/20/2025  1.  Patient will move from supine to sit and sit to supine, scoot up and down, and roll side to side in bed with independence within 7 day(s).    2.  Patient will perform sit to stand with supervision/set-up within 7 day(s).  3.  Patient will transfer from bed to chair and chair to bed with supervision/set-up using the least restrictive device within 7 day(s).  4.  Patient will ambulate with supervision/set-up for 50 feet with the least restrictive device within 7 day(s).   Outcome: Progressing     PHYSICAL THERAPY EVALUATION    Patient: Kiara Nnace (60 y.o. female)  Date: 7/20/2025  Primary Diagnosis: Intra-abdominal abscess (HCC) [K65.1]  Sepsis (HCC) [A41.9]  Hypertension, unspecified type [I10]  Sepsis, due to unspecified organism, unspecified whether acute organ dysfunction present (HCC) [A41.9]       Precautions: Restrictions/Precautions  Restrictions/Precautions: Fall Risk            ASSESSMENT :   DEFICITS/IMPAIRMENTS:   This is a 60 year old female who presented to hospital with 10/10 L flank pain, AMS, and fever. Pt code sepsis upon arrival. PMH including ex-lap for SBO at OSH in late June with post op abscess requiring donavon drain. The patient is limited by decreased functional mobility, strength, activity tolerance, balance, increased pain levels. Received in bed after receiving pain medication and agreeable to eval with encouragement. Pt overall requiring min to mod A x 1-2 for

## 2025-07-20 NOTE — PLAN OF CARE
Problem: Discharge Planning  Goal: Discharge to home or other facility with appropriate resources  Outcome: Progressing     Problem: Pain  Goal: Verbalizes/displays adequate comfort level or baseline comfort level  Outcome: Progressing     Problem: Skin/Tissue Integrity  Goal: Skin integrity remains intact  Description: 1.  Monitor for areas of redness and/or skin breakdown  2.  Assess vascular access sites hourly  3.  Every 4-6 hours minimum:  Change oxygen saturation probe site  4.  Every 4-6 hours:  If on nasal continuous positive airway pressure, respiratory therapy assess nares and determine need for appliance change or resting period  Outcome: Progressing     Problem: ABCDS Injury Assessment  Goal: Absence of physical injury  Outcome: Progressing     Problem: Safety - Adult  Goal: Free from fall injury  Outcome: Progressing     Problem: Occupational Therapy - Adult  Goal: By Discharge: Performs self-care activities at highest level of function for planned discharge setting.  See evaluation for individualized goals.  Description: FUNCTIONAL STATUS PRIOR TO ADMISSION:  Patient lives with friends (plan is to discharge to son's home) and was independent with ADLs. Patient's friend assisted with IADLs as needed.     HOME SUPPORT: Patient lived with friends.    Occupational Therapy Goals:  Initiated 7/20/2025  1.  Patient will perform grooming seated with Supervision within 7 day(s).  2.  Patient will perform upper body dressing seated with Supervision within 7 day(s).  3.  Patient will perform lower body bathing seated with Stand by Assist within 7 day(s).  4.  Patient will perform toilet transfers with Contact Guard Assist and DME PRN within 7 day(s).  5.  Patient will perform all aspects of toileting with Stand by Assist within 7 day(s).  6.  Patient will participate in upper extremity therapeutic exercise/activities with Stand by Assist for 5 minutes within 7 day(s).    7.  Patient will utilize energy

## 2025-07-21 ENCOUNTER — APPOINTMENT (OUTPATIENT)
Facility: HOSPITAL | Age: 60
DRG: 721 | End: 2025-07-21
Payer: MEDICAID

## 2025-07-21 LAB
ALBUMIN SERPL-MCNC: 2.2 G/DL (ref 3.5–5)
ALBUMIN/GLOB SERPL: 0.5 (ref 1.1–2.2)
ALP SERPL-CCNC: 64 U/L (ref 45–117)
ALT SERPL-CCNC: 16 U/L (ref 12–78)
ANION GAP SERPL CALC-SCNC: 5 MMOL/L (ref 2–12)
AST SERPL-CCNC: 14 U/L (ref 15–37)
BACTERIA SPEC CULT: NORMAL
BASOPHILS # BLD: 0.06 K/UL (ref 0–0.1)
BASOPHILS NFR BLD: 0.9 % (ref 0–1)
BILIRUB SERPL-MCNC: 0.3 MG/DL (ref 0.2–1)
BUN SERPL-MCNC: 4 MG/DL (ref 6–20)
BUN/CREAT SERPL: 4 (ref 12–20)
CALCIUM SERPL-MCNC: 8.6 MG/DL (ref 8.5–10.1)
CHLORIDE SERPL-SCNC: 114 MMOL/L (ref 97–108)
CO2 SERPL-SCNC: 21 MMOL/L (ref 21–32)
CREAT SERPL-MCNC: 0.89 MG/DL (ref 0.55–1.02)
DIFFERENTIAL METHOD BLD: ABNORMAL
EOSINOPHIL # BLD: 0.14 K/UL (ref 0–0.4)
EOSINOPHIL NFR BLD: 2.2 % (ref 0–7)
ERYTHROCYTE [DISTWIDTH] IN BLOOD BY AUTOMATED COUNT: 14.3 % (ref 11.5–14.5)
GLOBULIN SER CALC-MCNC: 4.6 G/DL (ref 2–4)
GLUCOSE SERPL-MCNC: 102 MG/DL (ref 65–100)
HCT VFR BLD AUTO: 27.3 % (ref 35–47)
HGB BLD-MCNC: 8.6 G/DL (ref 11.5–16)
IMM GRANULOCYTES # BLD AUTO: 0.02 K/UL (ref 0–0.04)
IMM GRANULOCYTES NFR BLD AUTO: 0.3 % (ref 0–0.5)
LYMPHOCYTES # BLD: 1.37 K/UL (ref 0.8–3.5)
LYMPHOCYTES NFR BLD: 21.4 % (ref 12–49)
MAGNESIUM SERPL-MCNC: 1.7 MG/DL (ref 1.6–2.4)
MCH RBC QN AUTO: 28.4 PG (ref 26–34)
MCHC RBC AUTO-ENTMCNC: 31.5 G/DL (ref 30–36.5)
MCV RBC AUTO: 90.1 FL (ref 80–99)
MONOCYTES # BLD: 0.6 K/UL (ref 0–1)
MONOCYTES NFR BLD: 9.4 % (ref 5–13)
NEUTS SEG # BLD: 4.22 K/UL (ref 1.8–8)
NEUTS SEG NFR BLD: 65.8 % (ref 32–75)
NRBC # BLD: 0 K/UL (ref 0–0.01)
NRBC BLD-RTO: 0 PER 100 WBC
PLATELET # BLD AUTO: 587 K/UL (ref 150–400)
PMV BLD AUTO: 9.1 FL (ref 8.9–12.9)
POTASSIUM SERPL-SCNC: 3.9 MMOL/L (ref 3.5–5.1)
PROT SERPL-MCNC: 6.8 G/DL (ref 6.4–8.2)
RBC # BLD AUTO: 3.03 M/UL (ref 3.8–5.2)
SERVICE CMNT-IMP: NORMAL
SODIUM SERPL-SCNC: 140 MMOL/L (ref 136–145)
VANCOMYCIN SERPL-MCNC: 14.1 UG/ML
WBC # BLD AUTO: 6.4 K/UL (ref 3.6–11)

## 2025-07-21 PROCEDURE — 83735 ASSAY OF MAGNESIUM: CPT

## 2025-07-21 PROCEDURE — 6360000002 HC RX W HCPCS: Performed by: NURSE PRACTITIONER

## 2025-07-21 PROCEDURE — 80053 COMPREHEN METABOLIC PANEL: CPT

## 2025-07-21 PROCEDURE — 85025 COMPLETE CBC W/AUTO DIFF WBC: CPT

## 2025-07-21 PROCEDURE — 99232 SBSQ HOSP IP/OBS MODERATE 35: CPT | Performed by: SURGERY

## 2025-07-21 PROCEDURE — 6370000000 HC RX 637 (ALT 250 FOR IP): Performed by: SURGERY

## 2025-07-21 PROCEDURE — 2500000003 HC RX 250 WO HCPCS: Performed by: NURSE PRACTITIONER

## 2025-07-21 PROCEDURE — 6370000000 HC RX 637 (ALT 250 FOR IP): Performed by: NURSE PRACTITIONER

## 2025-07-21 PROCEDURE — 2060000000 HC ICU INTERMEDIATE R&B

## 2025-07-21 PROCEDURE — 2580000003 HC RX 258: Performed by: NURSE PRACTITIONER

## 2025-07-21 PROCEDURE — 2580000003 HC RX 258: Performed by: HOSPITALIST

## 2025-07-21 PROCEDURE — 6360000002 HC RX W HCPCS: Performed by: SURGERY

## 2025-07-21 PROCEDURE — 6360000002 HC RX W HCPCS: Performed by: PHYSICIAN ASSISTANT

## 2025-07-21 PROCEDURE — 80202 ASSAY OF VANCOMYCIN: CPT

## 2025-07-21 PROCEDURE — 99232 SBSQ HOSP IP/OBS MODERATE 35: CPT | Performed by: INTERNAL MEDICINE

## 2025-07-21 PROCEDURE — 97530 THERAPEUTIC ACTIVITIES: CPT

## 2025-07-21 PROCEDURE — 2580000003 HC RX 258: Performed by: INTERNAL MEDICINE

## 2025-07-21 PROCEDURE — 6360000002 HC RX W HCPCS: Performed by: INTERNAL MEDICINE

## 2025-07-21 PROCEDURE — 74176 CT ABD & PELVIS W/O CONTRAST: CPT

## 2025-07-21 RX ORDER — FENTANYL CITRATE 50 UG/ML
INJECTION, SOLUTION INTRAMUSCULAR; INTRAVENOUS PRN
Status: COMPLETED | OUTPATIENT
Start: 2025-07-21 | End: 2025-07-21

## 2025-07-21 RX ADMIN — HYDROMORPHONE HYDROCHLORIDE 1 MG: 1 INJECTION, SOLUTION INTRAMUSCULAR; INTRAVENOUS; SUBCUTANEOUS at 07:14

## 2025-07-21 RX ADMIN — MEROPENEM 1000 MG: 1 INJECTION INTRAVENOUS at 15:54

## 2025-07-21 RX ADMIN — HYDROMORPHONE HYDROCHLORIDE 1 MG: 1 INJECTION, SOLUTION INTRAMUSCULAR; INTRAVENOUS; SUBCUTANEOUS at 22:48

## 2025-07-21 RX ADMIN — MEROPENEM 1000 MG: 1 INJECTION INTRAVENOUS at 23:52

## 2025-07-21 RX ADMIN — SODIUM CHLORIDE 1250 MG: 0.9 INJECTION, SOLUTION INTRAVENOUS at 12:16

## 2025-07-21 RX ADMIN — HYDROMORPHONE HYDROCHLORIDE 1 MG: 1 INJECTION, SOLUTION INTRAMUSCULAR; INTRAVENOUS; SUBCUTANEOUS at 04:46

## 2025-07-21 RX ADMIN — HYDROMORPHONE HYDROCHLORIDE 1 MG: 1 INJECTION, SOLUTION INTRAMUSCULAR; INTRAVENOUS; SUBCUTANEOUS at 15:45

## 2025-07-21 RX ADMIN — SODIUM CHLORIDE, PRESERVATIVE FREE 10 ML: 5 INJECTION INTRAVENOUS at 08:50

## 2025-07-21 RX ADMIN — AMITRIPTYLINE HYDROCHLORIDE 50 MG: 50 TABLET ORAL at 21:08

## 2025-07-21 RX ADMIN — OXYCODONE 10 MG: 5 TABLET ORAL at 21:08

## 2025-07-21 RX ADMIN — SODIUM CHLORIDE: 0.9 INJECTION, SOLUTION INTRAVENOUS at 23:58

## 2025-07-21 RX ADMIN — AMLODIPINE BESYLATE 10 MG: 5 TABLET ORAL at 08:47

## 2025-07-21 RX ADMIN — HYDROMORPHONE HYDROCHLORIDE 1 MG: 1 INJECTION, SOLUTION INTRAMUSCULAR; INTRAVENOUS; SUBCUTANEOUS at 12:25

## 2025-07-21 RX ADMIN — PROPRANOLOL HYDROCHLORIDE 80 MG: 80 CAPSULE, EXTENDED RELEASE ORAL at 08:48

## 2025-07-21 RX ADMIN — SODIUM CHLORIDE: 0.9 INJECTION, SOLUTION INTRAVENOUS at 04:46

## 2025-07-21 RX ADMIN — MEROPENEM 1000 MG: 1 INJECTION INTRAVENOUS at 00:36

## 2025-07-21 RX ADMIN — FENTANYL CITRATE 25 MCG: 50 INJECTION INTRAMUSCULAR; INTRAVENOUS at 14:45

## 2025-07-21 RX ADMIN — HYDROMORPHONE HYDROCHLORIDE 1 MG: 1 INJECTION, SOLUTION INTRAMUSCULAR; INTRAVENOUS; SUBCUTANEOUS at 02:29

## 2025-07-21 RX ADMIN — HYDROMORPHONE HYDROCHLORIDE 1 MG: 1 INJECTION, SOLUTION INTRAMUSCULAR; INTRAVENOUS; SUBCUTANEOUS at 10:19

## 2025-07-21 RX ADMIN — HYDROMORPHONE HYDROCHLORIDE 1 MG: 1 INJECTION, SOLUTION INTRAMUSCULAR; INTRAVENOUS; SUBCUTANEOUS at 19:51

## 2025-07-21 RX ADMIN — MEROPENEM 1000 MG: 1 INJECTION INTRAVENOUS at 10:17

## 2025-07-21 ASSESSMENT — PAIN DESCRIPTION - DESCRIPTORS
DESCRIPTORS: ACHING
DESCRIPTORS: ACHING
DESCRIPTORS: STABBING;SHARP
DESCRIPTORS: PRESSURE
DESCRIPTORS: PRESSURE
DESCRIPTORS: SORE;PRESSURE
DESCRIPTORS: ACHING
DESCRIPTORS: ACHING
DESCRIPTORS: OTHER (COMMENT)
DESCRIPTORS: SORE
DESCRIPTORS: SORE

## 2025-07-21 ASSESSMENT — PAIN DESCRIPTION - ORIENTATION
ORIENTATION: ANTERIOR
ORIENTATION: OTHER (COMMENT)
ORIENTATION: OTHER (COMMENT)
ORIENTATION: LEFT
ORIENTATION: OTHER (COMMENT)
ORIENTATION: ANTERIOR
ORIENTATION: LEFT
ORIENTATION: ANTERIOR
ORIENTATION: OTHER (COMMENT)
ORIENTATION: ANTERIOR
ORIENTATION: ANTERIOR

## 2025-07-21 ASSESSMENT — PAIN DESCRIPTION - PAIN TYPE: TYPE: ACUTE PAIN

## 2025-07-21 ASSESSMENT — PAIN SCALES - GENERAL
PAINLEVEL_OUTOF10: 8
PAINLEVEL_OUTOF10: 8
PAINLEVEL_OUTOF10: 10
PAINLEVEL_OUTOF10: 9
PAINLEVEL_OUTOF10: 10

## 2025-07-21 ASSESSMENT — PAIN DESCRIPTION - LOCATION
LOCATION: ABDOMEN
LOCATION: FLANK
LOCATION: FLANK
LOCATION: ABDOMEN
LOCATION: FLANK
LOCATION: ABDOMEN
LOCATION: FLANK

## 2025-07-21 ASSESSMENT — PAIN DESCRIPTION - FREQUENCY: FREQUENCY: CONTINUOUS

## 2025-07-21 NOTE — H&P
INTERVENTIONAL RADIOLOGY  Preoperative History and Physical      Patient:  Kiara Nance  :  1965  Age:  60 y.o.  MRN:  [id number]  Today's Date:  2025      CC / HPI   Kiara Nance is a 60 y.o. female s/p ex lap at the end of  for bowel obstruction with resection at OSH; now with postoperative abscess and a malpositioned drainage catheter who presents for CT guided drainage catheter placement retroperitoneal fluid collection.    PAST MEDICAL HISTORY  Past Medical History:   Diagnosis Date    Anxiety 24    Since I found out about the aneurysm    Asthma     SOB    Chronic back pain     Chronic pain     joint pain    Depression 24    Headache     Hypertension     Knee meniscus pain 2017    Memory disorder     Just be forgetting things sometimes    Mild intermittent asthma without complication 2017    Neck pain 24    Neuropathy About 2 years now    Sinus congestion     Vitamin D deficiency        PAST SURGICAL HISTORY  Past Surgical History:   Procedure Laterality Date    GI      recloser cloystomy     IR ASP ABSCESS/HEMATOMA/BULLA/CYST  7/3/2025    IR ASP ABSCESS/HEMATOMA/BULLA/CYST 7/3/2025 Jody Zaragoza, APRN - CNP [facility] RAD ANGIO IR    LAPAROTOMY N/A 2025    LAPAROTOMY EXPLORATORY, LYSIS OF ADHESIONS,  SMALL BOWEL RESECTION performed by Carlos Palencia MD at [facility] MAIN OR       SOCIAL HISTORY  Social History     Socioeconomic History    Marital status: Single     Spouse name: Not on file    Number of children: Not on file    Years of education: Not on file    Highest education level: Not on file   Occupational History    Not on file   Tobacco Use    Smoking status: Never    Smokeless tobacco: Never   Vaping Use    Vaping status: Never Used   Substance and Sexual Activity    Alcohol use: Yes     Alcohol/week: 1.0 standard drink of alcohol     Types: 1 Cans of beer per week     Comment: weekend beers    Drug use: Not Currently     Types: Cocaine     Comment: 24

## 2025-07-21 NOTE — CARE COORDINATION
Transition of Care Plan:    ID, General Surgery, PT/OT following. Therapy recommending IPR at this time. The attending MD wants to wait before making that plan to see if the patient makes progress. Insurance auth with patient's medicaid will be needed for IPR. The patient likely has no SNF benefit with medicaid. Noted from previous admission, the patient is open with [facility] for IV antibiotics. CM to follow up with [facility] if plans is home and resume home IV abx. Transport TBD.     RUR: 21% High  Prior Level of Functioning: Independent in ADLs/IADLs   Disposition: Home  JOSE: 7/28/25  If SNF or IPR: Date FOC offered: TBD  Date FOC received: TBD  Accepting facility: TBD  Date authorization started with reference number: TBD  Date authorization received and expires: TBD  DME needed: TBD  Transportation at discharge: TBD  IM/IMM Medicare/ letter given: No  Is patient a  and connected with VA?No   If yes, was  transfer form completed and VA notified?   Caregiver Contact: oleg Kothari [phone] shira Story [phone]   Discharge Caregiver contacted prior to discharge?Y   Care Conference needed? No  Barriers to discharge: None    Starla Isbell RN/CRM  [phone]

## 2025-07-21 NOTE — WOUND CARE
Wound Care Note:     New consult placed by nurse request for abdomen    Chart shows:  Admitted for sepsis  Past Medical History:   Diagnosis Date    Anxiety 09/04/24    Since I found out about the aneurysm    Asthma     SOB    Chronic back pain     Chronic pain     joint pain    Depression 09/04/24    Headache     Hypertension     Knee meniscus pain 9/7/2017    Memory disorder     Just be forgetting things sometimes    Mild intermittent asthma without complication 9/7/2017    Neck pain 09/04/24    Neuropathy About 2 years now    Sinus congestion     Vitamin D deficiency      WBC = 6.4 on 7/21/25  Admitted from home    Assessment:   Patient is A&O x 4, communicative, continent with no assistance needed in repositioning.    Bed: Immerse  Diet: NPO  Patient reports some pain with staple removal.  34 staples removed from midline abdomen; Dr. Burrows requested they be removed today.      1. POA midline abdomen staples removed, there are 2 wounds, proximal wound measures 0.5 cm x 0.4 cm x 1.5 cm, distal wound measures 0.3 cm x 0.3 cm  0.7 cm, wound edges are open, sundar-wound intact, small serous/tan drainage.  1/4 inch packing strip loosely filled and covered     Spoke with Dr. Burrows, wound care orders obtained.    Patient repositioned supine.  Heels offloaded on pillows.     Recommendations:    Abdomen- Daily cleanse with VASHE, loosely fill with 1/4 inch packing strip that was moistened with VASHE and cover.    Skin Care & Pressure Prevention:  Minimize layers of linen/pads under patient to optimize support surface.    Turn/reposition approximately every 2 hours and offload heels.  Manage incontinence / promote continence   Nourishing Skin Cream to dry skin, minimize use of briefs when able    Discussed above plan with patient & LESLIE Centeno    Transition of Care: Plan to follow as needed while admitted to hospital.    Misty \"Dianelys\" AMELIA Vyas, RN, CWON  Certified

## 2025-07-21 NOTE — CONSULTS
Infectious Disease Consult    Date of Consultation:  July 20, 2025  Reason for Consultation:pt recently at [facility] and discharged on ertapenem through 8/4/2025, now admitted here with sepsis   Referring Physician: LEBRON Yao  Date of Admission:7/19/2025      Impression    ESBL E. Coli & B fragilis BL + bacteremia   Blood cultures 7/1+  for B fragilis and ESBL E. Coli at [facility]  Negative repeat cultures 7/3, 7/6    Small bowel obstruction  H/o colostomy reversal 1995  S/p laparotomy, lysis of adhesions, small bowel resection 6/29    Intra-abdominal abscess  S/p CT-guided drainage by IR 7/7  Fluid culture 7/7+ for ESBL E. Coli, no anaerobes  Patient was placed by ID on ertapenem IV with planned end date 8/4  CT abdomen/pelvis 7/19+ for L/flank pigtail catheter is superficial to collection.  Collection has increased in size now measuring 6.7 x 3.7 x 16.6 cm    Change in mental status  Improved    H/o aortic thrombus  S/p evaluation by vascular surgery at OSH  No surgery indicated, pt was to be on anticoagulation    Poorly controlled hypertension  Improved    Plan  Continue vancomycin and meropenem IV  IR to re position drain  Please send cultures aerobic, anaerobic, fungal  Adequate fluids, daily probiotic    ID service to follow  Abx  Zosyn-7/19  Vancomycin 7/19  Meropenem 7/19    Extensive review of chart notes, labs, imaging, cultures done  Additionally review of done: Recent reports-Labs, cultures, imaging  D/w - RN    Ms Kiara Nance is a 60 y.o. female with PMH of diabetes, multiple intra-abdominal surgeries status post colostomy reversal ( 1995)  who presented to OSH with abdominal pain, nausea, vomiting, and finding of SBO.  She underwent laparotomy, lysis of adhesions, small bowel resection On 6/29.  Subsequently IR drain placement 7/7.  Patient was diagnosed with ESBL E. coli and Bacteroides bacteremia.  ESBL E. coli on culture from drain.  Patient had been discharged on

## 2025-07-21 NOTE — PLAN OF CARE
Problem: Physical Therapy - Adult  Goal: By Discharge: Performs mobility at highest level of function for planned discharge setting.  See evaluation for individualized goals.  Description: FUNCTIONAL STATUS PRIOR TO ADMISSION: Patient was independent and active without use of DME prior to abdominal surgery, she has been since using a SPC to mobilize.    HOME SUPPORT PRIOR TO ADMISSION: The patient lived with her sister but did not require assistance prior to abdominal surgery.    Physical Therapy Goals  Initiated 7/20/2025  1.  Patient will move from supine to sit and sit to supine, scoot up and down, and roll side to side in bed with independence within 7 day(s).    2.  Patient will perform sit to stand with supervision/set-up within 7 day(s).  3.  Patient will transfer from bed to chair and chair to bed with supervision/set-up using the least restrictive device within 7 day(s).  4.  Patient will ambulate with supervision/set-up for 50 feet with the least restrictive device within 7 day(s).   Outcome: Progressing   PHYSICAL THERAPY TREATMENT    Patient: Kiara Nance (60 y.o. female)  Date: 7/21/2025  Diagnosis: Intra-abdominal abscess (HCC) [K65.1]  Sepsis (HCC) [A41.9]  Hypertension, unspecified type [I10]  Sepsis, due to unspecified organism, unspecified whether acute organ dysfunction present ([facility]) [A41.9] Sepsis (HCC)      Precautions: Restrictions/Precautions  Restrictions/Precautions: Fall Risk            ASSESSMENT:  Patient continues to benefit from skilled PT services and is slowly progressing towards goals. Pt remains limited by generalized weakness, impaired balance, and pain. She was received in bed declining up to the chair but agreeable to participating in a couple of transfers to utilize a bedside commode for toileting for voiding. Overall provided her min assist for bed mobility and transfers. She remains a fall risk and presents below her reported baseline level of function. Continue to

## 2025-07-21 NOTE — TELEPHONE ENCOUNTER
Nae Kaiser, APRN - NP  You3 hours ago (7:56 AM)       She was readmitted in [place] for confusion

## 2025-07-22 LAB
ALBUMIN SERPL-MCNC: 2.1 G/DL (ref 3.5–5)
ALBUMIN/GLOB SERPL: 0.5 (ref 1.1–2.2)
ALP SERPL-CCNC: 57 U/L (ref 45–117)
ALT SERPL-CCNC: 14 U/L (ref 12–78)
ANION GAP SERPL CALC-SCNC: 8 MMOL/L (ref 2–12)
AST SERPL-CCNC: 12 U/L (ref 15–37)
BASOPHILS # BLD: 0.05 K/UL (ref 0–0.1)
BASOPHILS NFR BLD: 0.6 % (ref 0–1)
BILIRUB SERPL-MCNC: 0.2 MG/DL (ref 0.2–1)
BUN SERPL-MCNC: 3 MG/DL (ref 6–20)
BUN/CREAT SERPL: 3 (ref 12–20)
CALCIUM SERPL-MCNC: 8.5 MG/DL (ref 8.5–10.1)
CHLORIDE SERPL-SCNC: 109 MMOL/L (ref 97–108)
CO2 SERPL-SCNC: 23 MMOL/L (ref 21–32)
CREAT SERPL-MCNC: 0.93 MG/DL (ref 0.55–1.02)
DIFFERENTIAL METHOD BLD: ABNORMAL
EOSINOPHIL # BLD: 0.26 K/UL (ref 0–0.4)
EOSINOPHIL NFR BLD: 3 % (ref 0–7)
ERYTHROCYTE [DISTWIDTH] IN BLOOD BY AUTOMATED COUNT: 14.2 % (ref 11.5–14.5)
GLOBULIN SER CALC-MCNC: 4.3 G/DL (ref 2–4)
GLUCOSE SERPL-MCNC: 147 MG/DL (ref 65–100)
HCT VFR BLD AUTO: 27.8 % (ref 35–47)
HGB BLD-MCNC: 8.5 G/DL (ref 11.5–16)
IMM GRANULOCYTES # BLD AUTO: 0.04 K/UL (ref 0–0.04)
IMM GRANULOCYTES NFR BLD AUTO: 0.5 % (ref 0–0.5)
LYMPHOCYTES # BLD: 1.5 K/UL (ref 0.8–3.5)
LYMPHOCYTES NFR BLD: 17.1 % (ref 12–49)
MAGNESIUM SERPL-MCNC: 1.6 MG/DL (ref 1.6–2.4)
MCH RBC QN AUTO: 27.7 PG (ref 26–34)
MCHC RBC AUTO-ENTMCNC: 30.6 G/DL (ref 30–36.5)
MCV RBC AUTO: 90.6 FL (ref 80–99)
MONOCYTES # BLD: 0.57 K/UL (ref 0–1)
MONOCYTES NFR BLD: 6.5 % (ref 5–13)
NEUTS SEG # BLD: 6.33 K/UL (ref 1.8–8)
NEUTS SEG NFR BLD: 72.3 % (ref 32–75)
NRBC # BLD: 0 K/UL (ref 0–0.01)
NRBC BLD-RTO: 0 PER 100 WBC
PLATELET # BLD AUTO: 565 K/UL (ref 150–400)
PMV BLD AUTO: 9.4 FL (ref 8.9–12.9)
POTASSIUM SERPL-SCNC: 3.7 MMOL/L (ref 3.5–5.1)
PROT SERPL-MCNC: 6.4 G/DL (ref 6.4–8.2)
RBC # BLD AUTO: 3.07 M/UL (ref 3.8–5.2)
SODIUM SERPL-SCNC: 140 MMOL/L (ref 136–145)
WBC # BLD AUTO: 8.8 K/UL (ref 3.6–11)

## 2025-07-22 PROCEDURE — 6370000000 HC RX 637 (ALT 250 FOR IP): Performed by: SURGERY

## 2025-07-22 PROCEDURE — 6360000002 HC RX W HCPCS: Performed by: SURGERY

## 2025-07-22 PROCEDURE — 2500000003 HC RX 250 WO HCPCS: Performed by: NURSE PRACTITIONER

## 2025-07-22 PROCEDURE — 97530 THERAPEUTIC ACTIVITIES: CPT

## 2025-07-22 PROCEDURE — 80053 COMPREHEN METABOLIC PANEL: CPT

## 2025-07-22 PROCEDURE — 6360000002 HC RX W HCPCS: Performed by: INTERNAL MEDICINE

## 2025-07-22 PROCEDURE — 99232 SBSQ HOSP IP/OBS MODERATE 35: CPT | Performed by: INTERNAL MEDICINE

## 2025-07-22 PROCEDURE — 6370000000 HC RX 637 (ALT 250 FOR IP): Performed by: NURSE PRACTITIONER

## 2025-07-22 PROCEDURE — 85025 COMPLETE CBC W/AUTO DIFF WBC: CPT

## 2025-07-22 PROCEDURE — 83735 ASSAY OF MAGNESIUM: CPT

## 2025-07-22 PROCEDURE — 6370000000 HC RX 637 (ALT 250 FOR IP): Performed by: HOSPITALIST

## 2025-07-22 PROCEDURE — 2580000003 HC RX 258: Performed by: INTERNAL MEDICINE

## 2025-07-22 PROCEDURE — 99232 SBSQ HOSP IP/OBS MODERATE 35: CPT | Performed by: SURGERY

## 2025-07-22 PROCEDURE — 2060000000 HC ICU INTERMEDIATE R&B

## 2025-07-22 RX ORDER — LACTOBACILLUS RHAMNOSUS GG 10B CELL
2 CAPSULE ORAL 2 TIMES DAILY WITH MEALS
Status: DISCONTINUED | OUTPATIENT
Start: 2025-07-22 | End: 2025-07-23 | Stop reason: HOSPADM

## 2025-07-22 RX ADMIN — APIXABAN 5 MG: 5 TABLET, FILM COATED ORAL at 20:14

## 2025-07-22 RX ADMIN — APIXABAN 5 MG: 5 TABLET, FILM COATED ORAL at 08:39

## 2025-07-22 RX ADMIN — PROPRANOLOL HYDROCHLORIDE 80 MG: 80 CAPSULE, EXTENDED RELEASE ORAL at 08:34

## 2025-07-22 RX ADMIN — MEROPENEM 1000 MG: 1 INJECTION INTRAVENOUS at 16:41

## 2025-07-22 RX ADMIN — Medication 2 CAPSULE: at 10:36

## 2025-07-22 RX ADMIN — Medication 2 CAPSULE: at 16:41

## 2025-07-22 RX ADMIN — AMITRIPTYLINE HYDROCHLORIDE 50 MG: 50 TABLET ORAL at 20:14

## 2025-07-22 RX ADMIN — SODIUM CHLORIDE, PRESERVATIVE FREE 10 ML: 5 INJECTION INTRAVENOUS at 08:36

## 2025-07-22 RX ADMIN — MEROPENEM 1000 MG: 1 INJECTION INTRAVENOUS at 08:42

## 2025-07-22 RX ADMIN — SODIUM CHLORIDE, PRESERVATIVE FREE 20 ML: 5 INJECTION INTRAVENOUS at 08:35

## 2025-07-22 RX ADMIN — HYDROMORPHONE HYDROCHLORIDE 1 MG: 1 INJECTION, SOLUTION INTRAMUSCULAR; INTRAVENOUS; SUBCUTANEOUS at 06:43

## 2025-07-22 RX ADMIN — SODIUM CHLORIDE, PRESERVATIVE FREE 10 ML: 5 INJECTION INTRAVENOUS at 20:16

## 2025-07-22 RX ADMIN — AMLODIPINE BESYLATE 10 MG: 5 TABLET ORAL at 08:35

## 2025-07-22 RX ADMIN — ACETAMINOPHEN 650 MG: 325 TABLET ORAL at 12:19

## 2025-07-22 RX ADMIN — HYDROMORPHONE HYDROCHLORIDE 1 MG: 1 INJECTION, SOLUTION INTRAMUSCULAR; INTRAVENOUS; SUBCUTANEOUS at 02:56

## 2025-07-22 RX ADMIN — OXYCODONE 10 MG: 5 TABLET ORAL at 20:14

## 2025-07-22 RX ADMIN — METOCLOPRAMIDE 5 MG: 10 TABLET ORAL at 06:43

## 2025-07-22 ASSESSMENT — PAIN SCALES - GENERAL
PAINLEVEL_OUTOF10: 0
PAINLEVEL_OUTOF10: 0
PAINLEVEL_OUTOF10: 6
PAINLEVEL_OUTOF10: 0
PAINLEVEL_OUTOF10: 10

## 2025-07-22 ASSESSMENT — PAIN DESCRIPTION - DESCRIPTORS
DESCRIPTORS: PRESSURE
DESCRIPTORS: ACHING

## 2025-07-22 ASSESSMENT — PAIN DESCRIPTION - ORIENTATION
ORIENTATION: ANTERIOR
ORIENTATION: ANTERIOR
ORIENTATION: OTHER (COMMENT)
ORIENTATION: LEFT

## 2025-07-22 ASSESSMENT — PAIN DESCRIPTION - LOCATION
LOCATION: ABDOMEN
LOCATION: FLANK
LOCATION: ABDOMEN
LOCATION: ABDOMEN

## 2025-07-22 NOTE — PLAN OF CARE
Problem: Occupational Therapy - Adult  Goal: By Discharge: Performs self-care activities at highest level of function for planned discharge setting.  See evaluation for individualized goals.  Description: FUNCTIONAL STATUS PRIOR TO ADMISSION:  Patient lives with friends (plan is to discharge to son's home) and was independent with ADLs. Patient's friend assisted with IADLs as needed.     HOME SUPPORT: Patient lived with friends.    Occupational Therapy Goals:  Initiated 7/20/2025  1.  Patient will perform grooming seated with Supervision within 7 day(s).  2.  Patient will perform upper body dressing seated with Supervision within 7 day(s).  3.  Patient will perform lower body bathing seated with Stand by Assist within 7 day(s).  4.  Patient will perform toilet transfers with Contact Guard Assist and DME PRN within 7 day(s).  5.  Patient will perform all aspects of toileting with Stand by Assist within 7 day(s).  6.  Patient will participate in upper extremity therapeutic exercise/activities with Stand by Assist for 5 minutes within 7 day(s).    7.  Patient will utilize energy conservation techniques during functional activities with verbal cues within 7 day(s).    Outcome: Progressing     OCCUPATIONAL THERAPY TREATMENT  Patient: Kiara Nance (60 y.o. female)  Date: 7/22/2025  Primary Diagnosis: Intra-abdominal abscess (HCC) [K65.1]  Sepsis (HCC) [A41.9]  Hypertension, unspecified type [I10]  Sepsis, due to unspecified organism, unspecified whether acute organ dysfunction present (HCC) [A41.9]       Precautions: Fall Risk                Chart, occupational therapy assessment, plan of care, and goals were reviewed.    ASSESSMENT  Patient continues to benefit from skilled OT services and is progressing towards goals. Continues to be limited by impaired endurance, mobility/balance, AROM, activity tolerance, functional strength, and overall general deconditioning impacting independence in ADLs/IADLs. Received

## 2025-07-22 NOTE — PLAN OF CARE
INFECTIOUS DISEASE discharge plan:    Diagnosis: Abdominal abscess with ESBL E coli    Antibiotic: Ertapenem 1g IV q24hrs through 8/4/25, inclusive    Midline insertion for outpatient antibiotic.     Routine Midline Care including PRN catheter flow management    Weekly labs with results fax to # [phone]. Call critical lab values to [phone].   [x] CBC w/diff  [x] BUN/Creatinine  [x] AST, ALT      Home Health to pull midline after last dose or send to IR for Gamal removal    May send to IR for line evaluation or replacement PRN Phone # [phone]    Follow up with surgery Dr. Burrows.

## 2025-07-22 NOTE — PLAN OF CARE
Problem: Physical Therapy - Adult  Goal: By Discharge: Performs mobility at highest level of function for planned discharge setting.  See evaluation for individualized goals.  Description: FUNCTIONAL STATUS PRIOR TO ADMISSION: Patient was independent and active without use of DME prior to abdominal surgery, she has been since using a SPC to mobilize.    HOME SUPPORT PRIOR TO ADMISSION: The patient lived with her sister but did not require assistance prior to abdominal surgery.    Physical Therapy Goals  Initiated 7/20/2025  1.  Patient will move from supine to sit and sit to supine, scoot up and down, and roll side to side in bed with independence within 7 day(s).    2.  Patient will perform sit to stand with supervision/set-up within 7 day(s).  3.  Patient will transfer from bed to chair and chair to bed with supervision/set-up using the least restrictive device within 7 day(s).  4.  Patient will ambulate with supervision/set-up for 50 feet with the least restrictive device within 7 day(s).   Outcome: Adequate for Discharge   PHYSICAL THERAPY TREATMENT    Patient: Kiara Nance (60 y.o. female)  Date: 7/22/2025  Diagnosis: Intra-abdominal abscess (HCC) [K65.1]  Sepsis (HCC) [A41.9]  Hypertension, unspecified type [I10]  Sepsis, due to unspecified organism, unspecified whether acute organ dysfunction present (HCC) [A41.9] Sepsis (HCC)      Precautions: Restrictions/Precautions  Restrictions/Precautions: Fall Risk            ASSESSMENT:  Patient continues to benefit from skilled PT services and is progressing towards goals. Patient seen for ongoing acute PT intervention with notable improvements in functional mobility and activity tolerance this date.  Focus of session on progression of functional mobility, activity tolerance, and stair training to prepare for eventual transition home upon discharge.  Patient now performing all mobility at an overall supervision level and able to ambulate household distances to

## 2025-07-23 VITALS
RESPIRATION RATE: 18 BRPM | WEIGHT: 158.07 LBS | DIASTOLIC BLOOD PRESSURE: 69 MMHG | OXYGEN SATURATION: 93 % | HEART RATE: 85 BPM | BODY MASS INDEX: 29.84 KG/M2 | HEIGHT: 61 IN | TEMPERATURE: 98.7 F | SYSTOLIC BLOOD PRESSURE: 139 MMHG

## 2025-07-23 LAB
ALBUMIN SERPL-MCNC: 2 G/DL (ref 3.5–5)
ALBUMIN/GLOB SERPL: 0.5 (ref 1.1–2.2)
ALP SERPL-CCNC: 57 U/L (ref 45–117)
ALT SERPL-CCNC: 12 U/L (ref 12–78)
ANION GAP SERPL CALC-SCNC: 8 MMOL/L (ref 2–12)
AST SERPL-CCNC: 12 U/L (ref 15–37)
BASOPHILS # BLD: 0.01 K/UL (ref 0–0.1)
BASOPHILS NFR BLD: 0.1 % (ref 0–1)
BILIRUB SERPL-MCNC: 0.2 MG/DL (ref 0.2–1)
BUN SERPL-MCNC: 4 MG/DL (ref 6–20)
BUN/CREAT SERPL: 5 (ref 12–20)
CALCIUM SERPL-MCNC: 8.2 MG/DL (ref 8.5–10.1)
CHLORIDE SERPL-SCNC: 109 MMOL/L (ref 97–108)
CO2 SERPL-SCNC: 25 MMOL/L (ref 21–32)
CREAT SERPL-MCNC: 0.83 MG/DL (ref 0.55–1.02)
DIFFERENTIAL METHOD BLD: ABNORMAL
EOSINOPHIL # BLD: 0.3 K/UL (ref 0–0.4)
EOSINOPHIL NFR BLD: 3.6 % (ref 0–7)
ERYTHROCYTE [DISTWIDTH] IN BLOOD BY AUTOMATED COUNT: 14.2 % (ref 11.5–14.5)
GLOBULIN SER CALC-MCNC: 4.3 G/DL (ref 2–4)
GLUCOSE SERPL-MCNC: 137 MG/DL (ref 65–100)
HCT VFR BLD AUTO: 24.5 % (ref 35–47)
HGB BLD-MCNC: 7.6 G/DL (ref 11.5–16)
IMM GRANULOCYTES # BLD AUTO: 0.03 K/UL (ref 0–0.04)
IMM GRANULOCYTES NFR BLD AUTO: 0.4 % (ref 0–0.5)
LYMPHOCYTES # BLD: 1.67 K/UL (ref 0.8–3.5)
LYMPHOCYTES NFR BLD: 20.2 % (ref 12–49)
MAGNESIUM SERPL-MCNC: 1.7 MG/DL (ref 1.6–2.4)
MCH RBC QN AUTO: 27.8 PG (ref 26–34)
MCHC RBC AUTO-ENTMCNC: 31 G/DL (ref 30–36.5)
MCV RBC AUTO: 89.7 FL (ref 80–99)
MONOCYTES # BLD: 0.6 K/UL (ref 0–1)
MONOCYTES NFR BLD: 7.3 % (ref 5–13)
NEUTS SEG # BLD: 5.66 K/UL (ref 1.8–8)
NEUTS SEG NFR BLD: 68.4 % (ref 32–75)
NRBC # BLD: 0 K/UL (ref 0–0.01)
NRBC BLD-RTO: 0 PER 100 WBC
PLATELET # BLD AUTO: 508 K/UL (ref 150–400)
PMV BLD AUTO: 9.4 FL (ref 8.9–12.9)
POTASSIUM SERPL-SCNC: 3.4 MMOL/L (ref 3.5–5.1)
PROT SERPL-MCNC: 6.3 G/DL (ref 6.4–8.2)
RBC # BLD AUTO: 2.73 M/UL (ref 3.8–5.2)
SODIUM SERPL-SCNC: 142 MMOL/L (ref 136–145)
WBC # BLD AUTO: 8.3 K/UL (ref 3.6–11)

## 2025-07-23 PROCEDURE — 2580000003 HC RX 258: Performed by: HOSPITALIST

## 2025-07-23 PROCEDURE — 99232 SBSQ HOSP IP/OBS MODERATE 35: CPT | Performed by: SURGERY

## 2025-07-23 PROCEDURE — 2500000003 HC RX 250 WO HCPCS: Performed by: NURSE PRACTITIONER

## 2025-07-23 PROCEDURE — 36410 VNPNXR 3YR/> PHY/QHP DX/THER: CPT

## 2025-07-23 PROCEDURE — 83735 ASSAY OF MAGNESIUM: CPT

## 2025-07-23 PROCEDURE — 05HC33Z INSERTION OF INFUSION DEVICE INTO LEFT BASILIC VEIN, PERCUTANEOUS APPROACH: ICD-10-PCS | Performed by: HOSPITALIST

## 2025-07-23 PROCEDURE — 80053 COMPREHEN METABOLIC PANEL: CPT

## 2025-07-23 PROCEDURE — 85025 COMPLETE CBC W/AUTO DIFF WBC: CPT

## 2025-07-23 PROCEDURE — 6370000000 HC RX 637 (ALT 250 FOR IP): Performed by: NURSE PRACTITIONER

## 2025-07-23 PROCEDURE — 6370000000 HC RX 637 (ALT 250 FOR IP): Performed by: HOSPITALIST

## 2025-07-23 PROCEDURE — 6360000002 HC RX W HCPCS: Performed by: HOSPITALIST

## 2025-07-23 PROCEDURE — 6360000002 HC RX W HCPCS: Performed by: INTERNAL MEDICINE

## 2025-07-23 PROCEDURE — 6370000000 HC RX 637 (ALT 250 FOR IP): Performed by: SURGERY

## 2025-07-23 PROCEDURE — 2580000003 HC RX 258: Performed by: INTERNAL MEDICINE

## 2025-07-23 RX ORDER — SODIUM CHLORIDE 0.9 % (FLUSH) 0.9 %
5-40 SYRINGE (ML) INJECTION EVERY 12 HOURS SCHEDULED
Status: DISCONTINUED | OUTPATIENT
Start: 2025-07-23 | End: 2025-07-23 | Stop reason: HOSPADM

## 2025-07-23 RX ORDER — OXYCODONE HYDROCHLORIDE 5 MG/1
5 TABLET ORAL EVERY 4 HOURS PRN
Qty: 15 TABLET | Refills: 0 | Status: SHIPPED | OUTPATIENT
Start: 2025-07-23 | End: 2025-07-23 | Stop reason: HOSPADM

## 2025-07-23 RX ORDER — LIDOCAINE HYDROCHLORIDE AND EPINEPHRINE 10; 10 MG/ML; UG/ML
20 INJECTION, SOLUTION INFILTRATION; PERINEURAL ONCE
Status: DISCONTINUED | OUTPATIENT
Start: 2025-07-23 | End: 2025-07-23 | Stop reason: HOSPADM

## 2025-07-23 RX ORDER — LACTOBACILLUS RHAMNOSUS GG 10B CELL
2 CAPSULE ORAL 2 TIMES DAILY WITH MEALS
Qty: 56 CAPSULE | Refills: 0 | Status: SHIPPED | OUTPATIENT
Start: 2025-07-23 | End: 2025-08-06

## 2025-07-23 RX ORDER — SODIUM CHLORIDE 0.9 % (FLUSH) 0.9 %
5-40 SYRINGE (ML) INJECTION PRN
Status: DISCONTINUED | OUTPATIENT
Start: 2025-07-23 | End: 2025-07-23 | Stop reason: HOSPADM

## 2025-07-23 RX ORDER — LIDOCAINE HYDROCHLORIDE 10 MG/ML
50 INJECTION, SOLUTION EPIDURAL; INFILTRATION; INTRACAUDAL; PERINEURAL ONCE
Status: DISCONTINUED | OUTPATIENT
Start: 2025-07-23 | End: 2025-07-23 | Stop reason: HOSPADM

## 2025-07-23 RX ORDER — SODIUM CHLORIDE 9 MG/ML
INJECTION, SOLUTION INTRAVENOUS PRN
Status: DISCONTINUED | OUTPATIENT
Start: 2025-07-23 | End: 2025-07-23 | Stop reason: HOSPADM

## 2025-07-23 RX ORDER — LACTOBACILLUS RHAMNOSUS GG 10B CELL
2 CAPSULE ORAL 2 TIMES DAILY WITH MEALS
Qty: 56 CAPSULE | Refills: 0 | Status: SHIPPED | OUTPATIENT
Start: 2025-07-23 | End: 2025-07-23 | Stop reason: HOSPADM

## 2025-07-23 RX ORDER — POTASSIUM CHLORIDE 750 MG/1
40 TABLET, EXTENDED RELEASE ORAL ONCE
Status: COMPLETED | OUTPATIENT
Start: 2025-07-23 | End: 2025-07-23

## 2025-07-23 RX ORDER — OXYCODONE HYDROCHLORIDE 5 MG/1
5 TABLET ORAL EVERY 4 HOURS PRN
Qty: 15 TABLET | Refills: 0 | Status: SHIPPED | OUTPATIENT
Start: 2025-07-23 | End: 2025-07-26

## 2025-07-23 RX ADMIN — MEROPENEM 1000 MG: 1 INJECTION INTRAVENOUS at 09:36

## 2025-07-23 RX ADMIN — Medication 2 CAPSULE: at 09:34

## 2025-07-23 RX ADMIN — POTASSIUM CHLORIDE 40 MEQ: 750 TABLET, FILM COATED, EXTENDED RELEASE ORAL at 09:35

## 2025-07-23 RX ADMIN — SODIUM CHLORIDE, PRESERVATIVE FREE 10 ML: 5 INJECTION INTRAVENOUS at 09:46

## 2025-07-23 RX ADMIN — OXYCODONE 10 MG: 5 TABLET ORAL at 00:16

## 2025-07-23 RX ADMIN — OXYCODONE 10 MG: 5 TABLET ORAL at 10:52

## 2025-07-23 RX ADMIN — MEROPENEM 1000 MG: 1 INJECTION INTRAVENOUS at 00:22

## 2025-07-23 RX ADMIN — PROPRANOLOL HYDROCHLORIDE 80 MG: 80 CAPSULE, EXTENDED RELEASE ORAL at 09:34

## 2025-07-23 RX ADMIN — METOCLOPRAMIDE 5 MG: 10 TABLET ORAL at 16:14

## 2025-07-23 RX ADMIN — METOCLOPRAMIDE 5 MG: 10 TABLET ORAL at 10:56

## 2025-07-23 RX ADMIN — METOCLOPRAMIDE 5 MG: 10 TABLET ORAL at 06:00

## 2025-07-23 RX ADMIN — Medication 2 CAPSULE: at 16:14

## 2025-07-23 RX ADMIN — SODIUM CHLORIDE, PRESERVATIVE FREE 10 ML: 5 INJECTION INTRAVENOUS at 09:47

## 2025-07-23 RX ADMIN — APIXABAN 5 MG: 5 TABLET, FILM COATED ORAL at 09:35

## 2025-07-23 RX ADMIN — ERTAPENEM SODIUM 1000 MG: 1 INJECTION INTRAMUSCULAR; INTRAVENOUS at 16:14

## 2025-07-23 RX ADMIN — AMLODIPINE BESYLATE 10 MG: 5 TABLET ORAL at 09:35

## 2025-07-23 RX ADMIN — OXYCODONE 10 MG: 5 TABLET ORAL at 06:04

## 2025-07-23 ASSESSMENT — PAIN DESCRIPTION - LOCATION
LOCATION: ABDOMEN

## 2025-07-23 ASSESSMENT — PAIN SCALES - GENERAL
PAINLEVEL_OUTOF10: 10
PAINLEVEL_OUTOF10: 0
PAINLEVEL_OUTOF10: 10
PAINLEVEL_OUTOF10: 0
PAINLEVEL_OUTOF10: 9

## 2025-07-23 ASSESSMENT — PAIN DESCRIPTION - ORIENTATION
ORIENTATION: LEFT

## 2025-07-23 ASSESSMENT — PAIN DESCRIPTION - DESCRIPTORS
DESCRIPTORS: HEAVINESS;CRAMPING
DESCRIPTORS: PRESSURE
DESCRIPTORS: PRESSURE

## 2025-07-23 ASSESSMENT — PAIN - FUNCTIONAL ASSESSMENT: PAIN_FUNCTIONAL_ASSESSMENT: PREVENTS OR INTERFERES SOME ACTIVE ACTIVITIES AND ADLS

## 2025-07-23 NOTE — PLAN OF CARE
Problem: Discharge Planning  Goal: Discharge to home or other facility with appropriate resources  7/23/2025 1218 by Mitali Schultz RN  Outcome: Progressing     Problem: Pain  Goal: Verbalizes/displays adequate comfort level or baseline comfort level  7/23/2025 1218 by Mitali Schultz RN  Outcome: Progressing  7/23/2025 0201 by Nasreen Fuentes RN  Flowsheets (Taken 7/23/2025 0201)  Verbalizes/displays adequate comfort level or baseline comfort level:   Encourage patient to monitor pain and request assistance   Assess pain using appropriate pain scale   Administer analgesics based on type and severity of pain and evaluate response   Implement non-pharmacological measures as appropriate and evaluate response     Problem: Skin/Tissue Integrity  Goal: Skin integrity remains intact  Description: 1.  Monitor for areas of redness and/or skin breakdown  2.  Assess vascular access sites hourly  3.  Every 4-6 hours minimum:  Change oxygen saturation probe site  4.  Every 4-6 hours:  If on nasal continuous positive airway pressure, respiratory therapy assess nares and determine need for appliance change or resting period  7/23/2025 1218 by Mitali Schultz RN  Outcome: Progressing  7/23/2025 0201 by Nasreen Fuentes RN  Flowsheets  Taken 7/23/2025 0201  Skin Integrity Remains Intact: Monitor for areas of redness and/or skin breakdown  Taken 7/22/2025 2100  Skin Integrity Remains Intact: Monitor for areas of redness and/or skin breakdown     Problem: ABCDS Injury Assessment  Goal: Absence of physical injury  7/23/2025 1218 by Mitali Schultz RN  Outcome: Progressing  7/23/2025 0201 by Nasreen Fuentes RN  Flowsheets (Taken 7/23/2025 0201)  Absence of Physical Injury: Implement safety measures based on patient assessment     Problem: Safety - Adult  Goal: Free from fall injury  7/23/2025 1218 by Mitali Schultz RN  Outcome: Progressing  7/23/2025 0201 by Nasreen Fuentes RN  Flowsheets (Taken 7/23/2025 0201)  Free From Fall

## 2025-07-23 NOTE — PLAN OF CARE
Problem: Discharge Planning  Goal: Discharge to home or other facility with appropriate resources  Flowsheets  Taken 7/23/2025 0201  Discharge to home or other facility with appropriate resources: Identify barriers to discharge with patient and caregiver  Taken 7/22/2025 2100  Discharge to home or other facility with appropriate resources: Identify barriers to discharge with patient and caregiver     Problem: Pain  Goal: Verbalizes/displays adequate comfort level or baseline comfort level  Flowsheets (Taken 7/23/2025 0201)  Verbalizes/displays adequate comfort level or baseline comfort level:   Encourage patient to monitor pain and request assistance   Assess pain using appropriate pain scale   Administer analgesics based on type and severity of pain and evaluate response   Implement non-pharmacological measures as appropriate and evaluate response     Problem: Skin/Tissue Integrity  Goal: Skin integrity remains intact  Description: 1.  Monitor for areas of redness and/or skin breakdown  2.  Assess vascular access sites hourly  3.  Every 4-6 hours minimum:  Change oxygen saturation probe site  4.  Every 4-6 hours:  If on nasal continuous positive airway pressure, respiratory therapy assess nares and determine need for appliance change or resting period  Flowsheets  Taken 7/23/2025 0201  Skin Integrity Remains Intact: Monitor for areas of redness and/or skin breakdown  Taken 7/22/2025 2100  Skin Integrity Remains Intact: Monitor for areas of redness and/or skin breakdown     Problem: ABCDS Injury Assessment  Goal: Absence of physical injury  Flowsheets (Taken 7/23/2025 0201)  Absence of Physical Injury: Implement safety measures based on patient assessment     Problem: Safety - Adult  Goal: Free from fall injury  Flowsheets (Taken 7/23/2025 0201)  Free From Fall Injury: Instruct family/caregiver on patient safety

## 2025-07-23 NOTE — PROGRESS NOTES
[facility]                                                                                          Hospitalist Progress Note  Renetta Caldera MD  Office Phone: [phone]        Date of Service:  2025  NAME:  Kiara Nance  :  1965  MRN:  [id number]       Admission Summary:   60 y.o. female with a past medical history significant for anxiety, aortic thrombus, asthma, chronic back pain, depression, headaches, hypertension, memory disorder, mild asthma, neuropathy who presents with intermittent confusion, fatigue and abdominal pain.  Per the patient's son, the patient was recently in [facility] for a few weeks and just discharged on .  She underwent an ex lap with lysis of adhesions and small bowel resection on  and developed an infection requiring her to be admitted to the ICU.  A left flank abscess developed and she had a drain placed.  She reportedly was discharged on ertapenem with a PICC line for administration through 2024.  Admitted here for sepsis with work-up showing abd drain not in abscess.        Interval history / Subjective:   Pain better. Tolerating diet. Nothing to drain yesterday per IR.       Assessment & Plan:     Recent intra-abdominal abscess now increased due to malpositioned drain  Recent SBO s/p bowel resection  Sepsis  -d/c IVF  -continue IV meropenem with plan for outpt ertapenem   -will need PICC reinsertion   -CT-guided IR drainage: fluid significantly improved, nothing to drain  -general surgery recs appreciated  -d/c IV pain meds, continue PO oxycodone    AMS: acute metabolic encephalopathy  -resolved     History of aortic thrombus  -resume apixaban    HTN  -stable on current regimen    Type 2 DM    Depression  Chronic back pain     Code status: full  Prophylaxis: sc enoxaparin  Care Plan discussed with: pt yair weaver  Anticipated Disposition: home w/ hh tomorrow  Inpatient  Cardiac monitoring: 
        [facility]                                                                                          Hospitalist Progress Note  Renetta Caldera MD  Office Phone: [phone]        Date of Service:  2025  NAME:  Kiara Nance  :  1965  MRN:  [id number]       Admission Summary:   60 y.o. female with a past medical history significant for anxiety, aortic thrombus, asthma, chronic back pain, depression, headaches, hypertension, memory disorder, mild asthma, neuropathy who presents with intermittent confusion, fatigue and abdominal pain.  Per the patient's son, the patient was recently in [facility] for a few weeks and just discharged on .  She underwent an ex lap with lysis of adhesions and small bowel resection on  and developed an infection requiring her to be admitted to the ICU.  A left flank abscess developed and she had a drain placed.  She reportedly was discharged on ertapenem with a PICC line for administration through 2024.  Admitted here for sepsis with work-up showing abd drain not in abscess.        Interval history / Subjective:   States pain better than yesterday     Assessment & Plan:     Recent intra-abdominal abscess now increased due to malpositioned drain  Recent SBO s/p bowel resection  Sepsis  -continue IVF  -continue IV meropenem and vancomycin per ID  -PICC removed  -IR for insertion of new abdominal drain pending  -general surgery recs noted  -escalate analgesic regimen    AMS: acute metabolic encephalopathy  -resoled     History of aortic thrombus  -apixaban held for possible surgery  -received a dose of lovenox last evening, will resume post procedure    HTN  -stable on current regimen    Type 2 DM    Depression  Chronic back pain     Code status: full  Prophylaxis: sc enoxaparin  Care Plan discussed with: pt  Anticipated Disposition:   Inpatient  Cardiac monitoring: Telemetry  Central Line:       CRITICAL 
        [facility]                                                                                          Hospitalist Progress Note  Renetta Caldera MD  Office Phone: [phone]        Date of Service:  2025  NAME:  Kiara Nance  :  1965  MRN:  [id number]       Admission Summary:   60 y.o. female with a past medical history significant for anxiety, aortic thrombus, asthma, chronic back pain, depression, headaches, hypertension, memory disorder, mild asthma, neuropathy who presents with intermittent confusion, fatigue and abdominal pain.  Per the patient's son, the patient was recently in [facility] for a few weeks and just discharged on .  She underwent an ex lap with lysis of adhesions and small bowel resection on  and developed an infection requiring her to be admitted to the ICU.  A left flank abscess developed and she had a drain placed.  She reportedly was discharged on ertapenem with a PICC line for administration through 2024.  Admitted here for sepsis with work-up showing abd drain not in abscess.        Interval history / Subjective:   States abd pain uncontrolled  No n/v     Assessment & Plan:     Recent intra-abdominal abscess now increased due to malpositioned drain  Recent SBO s/p bowel resection  Sepsis  -continue IVF  -continue IV meropenem and vancomycin per ID  -PICC removed  -IR for insertion of new abdominal drain  -general surgery recs noted  -escalate analgesic regimen    AMS: acute metabolic encephalopathy  -resoled     History of aortic thrombus  -apixaban held for possible surgery  -d/w gen surgery will give a dose therapeutic Lovenox today and hold for procedure tomorrow    HTN  -stable on current regimen    Type 2 DM    Depression  Chronic back pain     Code status: full  Prophylaxis: sc enoxaparin  Care Plan discussed with: pt  Anticipated Disposition:   Inpatient  Cardiac monitoring: Telemetry  Central 
    Infectious Disease Progress Note       Assessment / Plan     60-year-old female with recent hospitalizations at [facility] for ex lap with lysis of adhesions and small bowel resection complicated by abdominal abscess status post drain placement and prolonged to ertapenem course admitted for chills and persistent left flank pain by drain exit site    Continue meropenem.    Possibly exit site pain is related to stitch?  Especially that given abscess has improved significantly.    Alternative explanation is that symptoms were from PICC line, improved since removal.    Follow blood cultures x 2 sets, no growth to date.    Stop vancomycin.    If continues to do well, then will need new midline placement to complete ertapenem until 8/4/25, inclusive.         Subjective:      Went to IR for possible drain exchange although procedure aborted due to markedly improved abscess, afebrile, complains of pain at drain exit site    Objective:    Vitals:   Patient Vitals for the past 24 hrs:   Temp Pulse Resp BP SpO2   07/21/25 1951 -- -- 18 -- --   07/21/25 1924 99 °F (37.2 °C) 84 22 134/62 --   07/21/25 1822 -- 84 -- -- --   07/21/25 1545 -- -- 16 -- --   07/21/25 1505 -- 82 22 (!) 145/67 93 %   07/21/25 1450 -- 82 17 132/79 99 %   07/21/25 1445 -- 82 21 (!) 148/80 99 %   07/21/25 1440 -- 82 18 (!) 148/84 99 %   07/21/25 1435 -- 83 20 (!) 144/81 98 %   07/21/25 1400 98.7 °F (37.1 °C) 83 18 (!) 162/81 95 %   07/21/25 1225 -- -- 18 -- --   07/21/25 1219 -- -- 20 -- --   07/21/25 1205 -- 85 -- -- --   07/21/25 1102 99.7 °F (37.6 °C) 83 19 (!) 143/65 95 %   07/21/25 1024 -- 91 -- -- --   07/21/25 1019 -- -- 20 -- --   07/21/25 0848 -- 87 -- -- --   07/21/25 0847 -- -- -- (!) 148/74 --   07/21/25 0733 -- 86 19 -- --   07/21/25 0718 99 °F (37.2 °C) 90 17 (!) 138/92 95 %   07/21/25 0600 -- 81 -- -- --   07/21/25 0413 98.7 °F (37.1 °C) 86 24 (!) 123/57 95 %   07/21/25 0351 -- 83 -- -- --   07/21/25 0229 -- 82 19 -- 98 % 
Advance Care Planning     Advance Care Planning Inpatient Note  [facility] Department    Today's Date: 7/20/2025  Unit: [facility]    Received request from patient.  Upon review of chart and communication with care team, patient's decision making abilities are not in question.. Patient was/were present in the room during visit.    Goals of ACP Conversation:  Discuss advance care planning documents  Facilitate a discussion related to patient's goals of care as they align with the patient's values and beliefs.    Health Care Decision Makers:       Primary Decision Maker: Reynold Kothari - [facility] - [phone]  Summary:  Completed New Documents  Updated Healthcare Decision Maker    Advance Care Planning Documents (Patient Wishes):  Healthcare Power of /Advance Directive Appointment of Health Care Agent  Living Will/Advance Directive  Anatomical Gift/Organ Donation     Assessment:   reviewed the patient's chart responding to request for Advance Medical Directive discussion. Kiara discussed her reasons for desiring to appoint her son as her primary agent and her sister as her successor agent. Both live some distance away by car, so she is comforted by knowing that one of them would be able to respond relatively soon if needed. Her questions regarding the AMD, Living Will (My Healthcare Instructions) and Anatomical Gifts sections were discussed and completed. All questions were answered.     Interventions:  Provided education on documents for clarity and greater understanding  Discussed and provided education on state decision maker hierarchy  Assisted in the completion of documents according to patient's wishes at this time  Encouraged ongoing ACP conversation with future decision makers and loved ones    Care Preferences Communicated:   No    Outcomes/Plan:  ACP Discussion: Completed  New advance directive completed.  Returned original document(s) to patient, as well as copies for distribution to 
Comprehensive Nutrition Assessment    Type and Reason for Visit: Initial, Positive nutrition screen    Nutrition Recommendations/Plan:   Continue regular diet as tolerated.  Added lactose control d/t pt reports of not being able to do dairy.  Declined ONS.  If becomes receptive, recommend trial of Ensure Clear.       Malnutrition Assessment:  Malnutrition Status:  Insufficient data (07/22/25 1413)    Context:  Acute Illness     Findings of the 6 clinical characteristics of malnutrition:  Energy Intake:  Unable to assess  Weight Loss:  Unable to assess (but seems mild, ~5 lb/ 3% in 3 weeks)     Body Fat Loss:  Unable to assess     Muscle Mass Loss:  Unable to assess    Fluid Accumulation:  No fluid accumulation     Strength:  Not Performed       Nutrition Assessment:    PMHx includes anxiety, aortic thrombus, asthma, chronic back pain, depression, headaches, hypertension, memory disorder, mild asthma, neuropathy who presents with intermittent confusion, fatigue and abdominal pain.  Per the patient's son, the patient was recently in [facility] for a few weeks and just discharged on 7/11.  She underwent an ex lap with lysis of adhesions and small bowel resection on 6/29 and developed an infection requiring her to be admitted to the ICU.  A left flank abscess developed and she had a drain placed.  She reportedly was discharged on ertapenem with a PICC line for administration through 8/4/2024.  Admitted here for sepsis with work-up showing abd drain not in abscess.  Pt admitted with Recent intra-abdominal abscess now increased due to malpositioned drain, Recent SBO s/p bowel resection, Sepsis, AMS - now resolved.  -pt now s/p CT guided drain placed by IR on 7/21    MST received for unsure of weight loss and eating poorly PTA.  Visited with pt.  She was initially asleep with covers over her head.  Awoke/ uncovered herself after a few name calls.  Reports UBW of 147-148 lb.  Did not offer much in regards 
ID weekend cross coverage  Chart and labs reviewed.  Ms Kiara Nance is a 60 y.o. female with PMH of diabetes, multiple intra-abdominal surgeries status post colostomy reversal ( 1995)  who presented to OSH with abdominal pain, nausea, vomiting, and finding of SBO.  She underwent laparotomy, lysis of adhesions, small bowel resection On 6/29.  Subsequently IR drain placement 7/7.  Patient was diagnosed with ESBL E. coli and Bacteroides bacteremia.  ESBL E. coli on culture from drain.  Patient had been discharged on ertapenem IV planned end date 8/4  Plan was to follow-up with Elvia Li.  Patient admitted here with complaints of shaking, fatigue intermittent confusion and abdominal pain.  CT abdomen/pelvis + for incomplete colectomy, distended loops of small bowel in right mid abdomen unchanged.  Left flank pigtail catheter+.  The pigtail is withdrawn and is now superficial to collection.  Collection has increased in size now measuring 6.7x 3.7 by approximately 16.6 cm & extends into the lateral abdominal wall.  Plan is for IR to exchange drain, repeat cultures  Vancomycin and meropenem pending blood cultures and abscess cultures  ID service to see.  
Midline Insertion Procedure Note    Procedure: Insertion of #4 FR/18G Midline    Indications:  Long Term IV therapy    Procedure Details    Risks of hemorrhage, and adverse drug reaction were discussed.  Vessel assessment revealed compressible well defined vessels.  Multiple sticks noted to BUE prior to PICC RN arrival. UE Midline placed without complication for patient.  2ml of Lidocaine 1% administered via infiltration prior to Midline insertion.  Time-Out performed at bedside with LESLIE MERCADO.      #4 FR/18G Midline inserted to the L Basilic vein per hospital protocol.   Blood return:  yes    Catheter length 12 cm, with 0 cm exposed. Mid upper arm circumference is 32 cm.   Catheter was flushed with 20 cc NS. Patient did tolerate procedure well. Upon completion of procedure, all MIDLINE kit components accounted for and intact.  Post Procedure hand-off given to LESLIE MERCADO.      Midline Brochure given to patient with teaching instruction. Bed returned to locked position with call bell in reach.   
Occupational Therapy  07/21/25    Chart reviewed up to this date. Patient currently off the floor in IR. Will defer and continue to follow up as able/medically appropriate.     Socorro Jarquin, OTONEL, OTR/L    
Patient received a discharge order. The patient was made aware of the discharge and agreed to going home. The patient's peripheral IV was removed without any bleeding or complications. The patient and son were given discharge instructions which included new, changed, and discontinued medication, where they can  their prescriptions, side effects of new medications, follow up appointments, and signs and symptoms of heart attack and stroke. Patient and her son were given thorough instructions on how to use the midline to administer IV antibiotics. Education included, aseptic technique, flushing the line, using alcohol caps and alcohol wipes, keeping the line tangle free and kept in a way that it won't get pulled or tugged. I gave the patient and her son thorough instructions on how to flush the PILAR drain. I provided the patient with supplies to be able to take care of the IV and the drain. I made sure they knew about and were planning to attend all their follow up appts. The patient and her son were given specific instructions on antibiotics and the need to complete the entire regimen.The patient's belongings were packed up and the patient verified they had everything that belongs to them. The patient was wheeled down to the discharge area via wheelchair. Vital signs were stable at the time of discharge.      
Pharmacist Note - Vancomycin Dosing    Consult provided for this 60 y.o. female for indication of intra-abdominal infection.  Antibiotic regimen(s): Vancomycin + Zosyn  Patient on vancomycin PTA? NO     Recent Labs     25  0729   WBC 12.2*   CREATININE 1.02   BUN 8     Frequency of BMP: daily through   Height: 154.9 cm  Weight: 69.9 kg  Est CrCl: 51 ml/min; UO: -- ml/kg/hr  Temp (24hrs), Av.9 °F (37.2 °C), Min:98.6 °F (37 °C), Max:99.2 °F (37.3 °C)    Cultures:  : Blood, percutaneous x 2 - in process    MRSA Swab ordered (if applicable)? NO    The plan below is expected to result in a target range of AUC/MICHAEL 400-600    Therapy will be initiated with a loading dose of 1750 mg IV x 1 to be followed by a maintenance dose of 1250 mg IV every 24 hours.  Pharmacy to follow patient daily and order levels / make dose adjustments as appropriate.    Luis Fernando Simpson, PharmD  Clinical Pharmacist    *Vancomycin has been dosed used Bayesian kinetics software to target an AUC/MICHAEL of 400-600, which provides adequate exposure for an assumed infection due to MRSA with an MICHAEL of 1 or less while reducing the risk of nephrotoxicity as seen with traditional trough based dosing goals.    
Pharmacist Note - Vancomycin Dosing  Therapy day 3  Indication: IAI  Current regimen: 1250 mg IV Q 24 hours    Recent Labs     07/19/25  0729 07/20/25  0311 07/21/25  0458   WBC 12.2* 6.8 6.4   CREATININE 1.02 0.79 0.89   BUN 8 4* 4*       A random vancomycin level of 14.1 mcg/mL was obtained and from this level, the patient's AUC24 is calculated to be 512 with the current regimen.     Goal target range AUC/MICHAEL 400-600      Plan: Continue current regimen. Pharmacy will continue to monitor this patient daily for changes in clinical status and renal function.    *Random vancomycin levels are used to calculate AUC/MICHAEL, this level should not be interpreted as a trough. Vancomycin has been dosed using Bayesian kinetics software to target an AUC24:MICHAEL of 400-600, which provides adequate exposure for as assumed infection due to MRSA with an MICHAEL of 1 or less while reducing the risk of nephrotoxicity as seen with traditional trough based dosing goals.    
Physical Therapy  07/22/2025    Patient seen for PT treatment session.  Notable improvement in mobility this date, now performing all mobility at an overall supervision level and able to ambulate household distances to safely negotiate her home environment.  Now recommending discharge home with HHPT once medically appropriate. Acute PT will continue to follow to maximize functioning throughout acute care stay, however patient is cleared from a PT perspective for discharge given current functional level and support available at home.  Full treatment note to follow.    Thank you,  Mita Shaw, PT, DPT   
Physical Therapy 7/20/25    Orders received, chart reviewed and patient evaluated by physical therapy. Pending progression with skilled acute physical therapy, recommend:    High intensity/comprehensive skilled physical therapy in a multidisciplinary setting as patient is working towards tolerating up to 3 hours of therapy/day 5-7x/week    Recommend with nursing OOB to chair 3x/day and walking daily with two assist and rolling walker. Thank you for completing as able in order to maintain patient strength, endurance and independence.     Full evaluation to follow.     Thank you for your consideration,    Alexandra Young, PT, DPT   
Surgery Progress Note    7/20/2025    Admit Date: 7/19/2025  7:18 AM    CC: Left side pain      Subjective:     Patient still with left-sided pain.  Mentally coherent today.  No nausea    Constitutional: No fever or chills  Neurologic: No headache  Eyes: No scleral icterus or irritated eyes  Nose: No nasal pain or drainage  Mouth: No oral lesions or sore throat  Cardiac: No palpations or chest pain  Pulmonary: No cough or shortness of breath  Gastrointestinal: Left-sided abdominal pain, no nausea, emesis, diarrhea, or constipation  Genitourinary: No dysuria  Musculoskeletal: No muscle or joint tenderness  Skin: No rashes or lesions  Psychiatric: No anxiety or depressed mood    Objective:     Vitals:    07/20/25 1000   BP:    Pulse: 83   Resp:    Temp:    SpO2:        General: No acute distress, conversant  Eyes: PERRLA, no scleral icterus  HENT: Normocephalic without oral lesions  Neck: Trachea midline without LAD  Cardiac: Normal pulse rate and rhythm  Pulmonary: Symmetric chest rise with normal effort  GI: Soft, tender left flank, PILAR drain with serosanguineous fluid, ND, no hernia, no splenomegaly, staples at midline  Skin: Warm without rash  Extremities: No edema or joint stiffness  Psych: Appropriate mood and affect    Labs, vital signs, and I/O reviewed.    Assessment:     60-year-old female status post ex lap at the end of June for bowel obstruction with resection at OSH and now postoperative abscess    Plan:     As needed pain control.  I increased her regimen  N.p.o. pending decision for drain today or not.  If no drain today can have regular diet and n.p.o. at midnight  Hold Eliquis  Continue antibiotics per ID  Continue drain.  Flush every shift.  Remove staples tomorrow.    Johnny Burrows MD, FACS, [facility]  Bariatric and General Surgeon  [facility]    
Surgery Progress Note    7/21/2025    Admit Date: 7/19/2025  7:18 AM    CC: Left side pain      Subjective:     Hungry.  Wondering about drain.  Pain is still on left side.    Constitutional: No fever or chills  Neurologic: No headache  Eyes: No scleral icterus or irritated eyes  Nose: No nasal pain or drainage  Mouth: No oral lesions or sore throat  Cardiac: No palpations or chest pain  Pulmonary: No cough or shortness of breath  Gastrointestinal: Left-sided abdominal pain, no nausea, emesis, diarrhea, or constipation  Genitourinary: No dysuria  Musculoskeletal: No muscle or joint tenderness  Skin: No rashes or lesions  Psychiatric: No anxiety or depressed mood    Objective:     Vitals:    07/21/25 0848   BP:    Pulse: 87   Resp:    Temp:    SpO2:        General: No acute distress, conversant  Eyes: PERRLA, no scleral icterus  HENT: Normocephalic without oral lesions  Neck: Trachea midline without LAD  Cardiac: Normal pulse rate and rhythm  Pulmonary: Symmetric chest rise with normal effort  GI: Soft, tender left flank, PILAR drain with serosanguineous fluid, ND, no hernia, no splenomegaly, staples at midline  Skin: Warm without rash  Extremities: No edema or joint stiffness  Psych: Appropriate mood and affect    Labs, vital signs, and I/O reviewed.    Assessment:     60-year-old female status post ex lap at the end of June for bowel obstruction with resection at OSH and now postoperative abscess    Plan:     As needed pain control.  I increased her regimen  N.p.o. pending drain.  Hold Eliquis.  1 dose of Lovenox given last night.  Continue antibiotics per ID  Continue drain.  Flush every shift.  Remove staples per nurse  Hopefully IR drain placement today    Johnny Burrows MD, FACS, [facility]  Bariatric and General Surgeon  [facility]    
Surgery Progress Note    7/22/2025    Admit Date: 7/19/2025  7:18 AM    CC: Left side pain    Subjective:     No acute events.  Underwent CT scan yesterday showing great improvement from flushing drain.    Constitutional: No fever or chills  Neurologic: No headache  Eyes: No scleral icterus or irritated eyes  Nose: No nasal pain or drainage  Mouth: No oral lesions or sore throat  Cardiac: No palpations or chest pain  Pulmonary: No cough or shortness of breath  Gastrointestinal: Left-sided abdominal pain, no nausea, emesis, diarrhea, or constipation  Genitourinary: No dysuria  Musculoskeletal: No muscle or joint tenderness  Skin: No rashes or lesions  Psychiatric: No anxiety or depressed mood    Objective:     Vitals:    07/22/25 0712   BP:    Pulse:    Resp:    Temp:    SpO2: 92%       General: No acute distress, conversant  Eyes: PERRLA, no scleral icterus  HENT: Normocephalic without oral lesions  Neck: Trachea midline without LAD  Cardiac: Normal pulse rate and rhythm  Pulmonary: Symmetric chest rise with normal effort  GI: Soft, tender left flank, PILAR drain with serosanguineous fluid, ND, no hernia, no splenomegaly, staples at midline  Skin: Warm without rash  Extremities: No edema or joint stiffness  Psych: Appropriate mood and affect    Labs, vital signs, and I/O reviewed.    Assessment:     60-year-old female status post ex lap at the end of June for bowel obstruction with resection at OSH and now postoperative abscess    Plan:     As needed pain control.   Regular diet  Resume  Continue antibiotics per ID  Continue drain.  Flush every shift.  Can discharge once home health is arranged and antibiotic plan is confirmed.  I will see her back in the office in about 10 days for repeat CT scan    Johnny Burrows MD, [facility], [facility]  Bariatric and General Surgeon  [facility]    
Surgery Progress Note    7/23/2025    Admit Date: 7/19/2025  7:18 AM    CC: Left side pain    Subjective:     No events overnight.  Son at bedside.  Pain controlled.  Tolerating diet.  Wants to go home.    Constitutional: No fever or chills  Neurologic: No headache  Eyes: No scleral icterus or irritated eyes  Nose: No nasal pain or drainage  Mouth: No oral lesions or sore throat  Cardiac: No palpations or chest pain  Pulmonary: No cough or shortness of breath  Gastrointestinal: Left-sided abdominal pain minimal, no nausea, emesis, diarrhea, or constipation  Genitourinary: No dysuria  Musculoskeletal: No muscle or joint tenderness  Skin: No rashes or lesions  Psychiatric: No anxiety or depressed mood    Objective:     Vitals:    07/23/25 0634   BP:    Pulse:    Resp: 19   Temp:    SpO2:        General: No acute distress, conversant  Eyes: PERRLA, no scleral icterus  HENT: Normocephalic without oral lesions  Neck: Trachea midline without LAD  Cardiac: Normal pulse rate and rhythm  Pulmonary: Symmetric chest rise with normal effort  GI: Soft, tender left flank improving, PILAR drain with serosanguineous fluid, ND, no hernia, no splenomegaly, staples at midline  Skin: Warm without rash  Extremities: No edema or joint stiffness  Psych: Appropriate mood and affect    Labs, vital signs, and I/O reviewed.    Assessment:     60-year-old female status post ex lap at the end of June for bowel obstruction with resection at OSH and now postoperative abscess which is improving with drainage    Plan:     As needed pain control.   Regular diet  Resume  Continue antibiotics per ID  Continue drain.  Flush every shift.  Discharge instructions placed on chart.  Plan for repeat CT scan as an outpatient in 10 days and see me back in the office later on that day.    Johnny Burrows MD, FACS, [facility]  Bariatric and General Surgeon  [facility]    
TRANSFER - IN REPORT:    Verbal report received from LESLIE Centeno on Kiara Nance  being received from [phone] for ordered procedure      Report consisted of patient's Situation, Background, Assessment and   Recommendations(SBAR).     Information from the following report(s) Nurse Handoff Report was reviewed with the receiving nurse.    Opportunity for questions and clarification was provided.      Assessment completed upon patient's arrival to unit and care assumed.     
TRANSFER - OUT REPORT:    Verbal report given to LESLIE Centeno on Kiara Nance  being transferred to [facility] for routine progression of patient care       Report consisted of patient's Situation, Background, Assessment and   Recommendations(SBAR).     Information from the following report(s) Nurse Handoff Report was reviewed with the receiving nurse.           Lines:   Peripheral IV 07/21/25 Left;Proximal;Anterior Forearm (Active)        Opportunity for questions and clarification was provided.      Patient transported with:  Tech        
   MCHC 30.6 30.0 - 36.5 g/dL    RDW 14.2 11.5 - 14.5 %    Platelets 565 (H) 150 - 400 K/uL    MPV 9.4 8.9 - 12.9 FL    Nucleated RBCs 0.0 0  WBC    nRBC 0.00 0.00 - 0.01 K/uL    Neutrophils % 72.3 32.0 - 75.0 %    Lymphocytes % 17.1 12.0 - 49.0 %    Monocytes % 6.5 5.0 - 13.0 %    Eosinophils % 3.0 0.0 - 7.0 %    Basophils % 0.6 0.0 - 1.0 %    Immature Granulocytes % 0.5 0.0 - 0.5 %    Neutrophils Absolute 6.33 1.80 - 8.00 K/UL    Lymphocytes Absolute 1.50 0.80 - 3.50 K/UL    Monocytes Absolute 0.57 0.00 - 1.00 K/UL    Eosinophils Absolute 0.26 0.00 - 0.40 K/UL    Basophils Absolute 0.05 0.00 - 0.10 K/UL    Immature Granulocytes Absolute 0.04 0.00 - 0.04 K/UL    Differential Type AUTOMATED             Micro:     Blood: No growth to date   Abscess: 7/7 with ESBL E. coli    Imaging: Considerably improved abscess now 3 cm size    Thank you Dr. Caldera for the opportunity to participate in the care of this patient.   Please contact with questions or concerns.      Ben Mclaughlin MD    A total time of 35 minutes was spent on today's encounter.  Greater than 50% of the time was spent on the following:  Preparing for visit and chart review.  Obtaining and/or reviewing separately obtained history  Performing a medically appropriate exam and/or evaluation  Counseling and educating a patient/family/caregiver as noted above  Placing relevant orders  Referring and communicating with other professionals (not separately reported)  Independently interpreting results (not separately reported) and communicating results to the patient/family/caregiver  Care coordination (not separately reported) as noted above  Documenting clinical information in the electronic health records (e.g. problem list, visit note) on the day of the encounter

## 2025-07-23 NOTE — DISCHARGE INSTRUCTIONS
Antibiotics per ID    Flush abdominal drain twice a day with 10 cc of saline.  Continue drain to bulb suction    Follow-up with Dr. Burrows in 10 days.  Have a CT scan done before the office visit on the same day.    Please call central scheduling at [phone] to arrange the CT

## 2025-07-23 NOTE — DISCHARGE SUMMARY
Discharge Summary       PATIENT ID: Kiara Nance  MRN: [id number]   YOB: 1965    DATE OF ADMISSION: 7/19/2025  7:18 AM    DATE OF DISCHARGE: 7/23/2025   PRIMARY CARE PROVIDER: Nae Kaiser APRN - NP     ATTENDING PHYSICIAN: Werner  DISCHARGING PROVIDER: Renetta Caldera MD    To contact this individual call [phone] and ask the  to page.  If unavailable ask to be transferred the Adult Hospitalist Department.    CONSULTATIONS: IP CONSULT TO PHARMACY  IP CONSULT TO PHARMACY  IP CONSULT TO GENERAL SURGERY  IP CONSULT TO INFECTIOUS DISEASES  IP CONSULT TO VASCULAR ACCESS TEAM  IP CONSULT TO SPIRITUAL SERVICES  IP CONSULT TO INTERVENTIONAL RADIOLOGY  IP CONSULT TO CASE MANAGEMENT  IP CONSULT TO VASCULAR ACCESS TEAM    PROCEDURES/SURGERIES: * No surgery found *     ADMITTING DIAGNOSES & HOSPITAL COURSE:   60 y.o. female with a past medical history significant for anxiety, aortic thrombus, asthma, chronic back pain, depression, headaches, hypertension, memory disorder, mild asthma, neuropathy who presents with intermittent confusion, fatigue and abdominal pain.  Per the patient's son, the patient was recently in [facility] for a few weeks and just discharged on 7/11.  She underwent an ex lap with lysis of adhesions and small bowel resection on 6/29 and developed an infection requiring her to be admitted to the ICU.  A left flank abscess developed and she had a drain placed.  She reportedly was discharged on ertapenem with a PICC line for administration through 8/4/2024.  Admitted here for sepsis with work-up showing abd drain not in abscess.       Recent intra-abdominal abscess now increased due to malpositioned drain  Recent SBO s/p bowel resection  Sepsis  -IV meropenem here back to outpt ertapenem   -CT-guided IR drainage: fluid significantly improved, nothing to drain  -general surgery recs appreciated, repeat CT in 10 days and f/u outpatient     AMS: acute

## 2025-07-23 NOTE — CARE COORDINATION
Transition Of Care:    Update 2:28pm: Patient now going to [facility] to stay with her son, Reynold Kothari ([phone]) at [address], [address]. No home health out of state can be arranged. Attending MD agreeable to plan. CM cancelled [facility] and [facility] will continue services and provide the skilled nursing and patient's son agreeable to drive his mother into [place] [facility] suite to have labs drawn and check her midline once per week.     Update 1:45pm: [facility] accepted. [facility] will resume services. Midline placed and IV abx orders written and accepted by [facility].     9:48am: ID, General Surgery, PT/OT following. Therapy now recommending home health. The patient is open with [facility] for home iv abx. ID final orders are written and referral pending with [facility]. Referrals pending with home health. Transport TBD.     RUR: 22% High  Prior Level of Functioning: Independent in ADLs/IADLs   Disposition: Home  JOSE: 7/28/25  If SNF or IPR: Date FOC offered: TBD  Date FOC received: TBD  Accepting facility: TBD  Date authorization started with reference number: TBD  Date authorization received and expires: TBD  DME needed: TBD  Transportation at discharge: TBD  IM/[facility] Medicare/[facility] letter given: No  Is patient a  and connected with VA?No              If yes, was [place] transfer form completed and VA notified?   Caregiver Contact: oleg Kothari [phone] shira Story [phone]   Discharge Caregiver contacted prior to discharge?Y   Care Conference needed? No  Barriers to discharge: None     Starla Isbell RN/CRM  [phone]

## 2025-07-24 LAB
BACTERIA SPEC CULT: NORMAL
BACTERIA SPEC CULT: NORMAL
SERVICE CMNT-IMP: NORMAL
SERVICE CMNT-IMP: NORMAL

## 2025-07-24 ASSESSMENT — ENCOUNTER SYMPTOMS: NAUSEA: 1

## 2025-07-27 LAB
BACTERIA SPEC CULT: NORMAL
SERVICE CMNT-IMP: NORMAL

## 2025-07-28 LAB
BACTERIA SPEC CULT: NORMAL
SERVICE CMNT-IMP: NORMAL

## 2025-08-01 ENCOUNTER — OFFICE VISIT (OUTPATIENT)
Age: 60
End: 2025-08-01
Payer: MEDICAID

## 2025-08-01 ENCOUNTER — APPOINTMENT (OUTPATIENT)
Facility: HOSPITAL | Age: 60
End: 2025-08-01
Payer: MEDICAID

## 2025-08-01 ENCOUNTER — HOSPITAL ENCOUNTER (OUTPATIENT)
Facility: HOSPITAL | Age: 60
Setting detail: OBSERVATION
Discharge: HOME | End: 2025-08-02
Attending: STUDENT IN AN ORGANIZED HEALTH CARE EDUCATION/TRAINING PROGRAM | Admitting: SURGERY
Payer: MEDICAID

## 2025-08-01 VITALS
HEART RATE: 68 BPM | OXYGEN SATURATION: 98 % | RESPIRATION RATE: 21 BRPM | DIASTOLIC BLOOD PRESSURE: 100 MMHG | SYSTOLIC BLOOD PRESSURE: 180 MMHG

## 2025-08-01 DIAGNOSIS — Z09 HOSPITAL DISCHARGE FOLLOW-UP: Primary | ICD-10-CM

## 2025-08-01 DIAGNOSIS — R10.84 GENERALIZED ABDOMINAL PAIN: Primary | ICD-10-CM

## 2025-08-01 PROBLEM — R10.9 ABDOMINAL PAIN: Status: ACTIVE | Noted: 2025-08-01

## 2025-08-01 LAB
ALBUMIN SERPL-MCNC: 2.8 G/DL (ref 3.5–5.2)
ALBUMIN/GLOB SERPL: 0.5 (ref 1.1–2.2)
ALP SERPL-CCNC: 93 U/L (ref 35–104)
ALT SERPL-CCNC: 8 U/L (ref 10–50)
ANION GAP SERPL CALC-SCNC: 12 MMOL/L (ref 2–14)
AST SERPL-CCNC: 19 U/L (ref 10–35)
BASOPHILS # BLD: 0.03 K/UL (ref 0–0.1)
BASOPHILS NFR BLD: 0.4 % (ref 0–1)
BILIRUB SERPL-MCNC: 0.2 MG/DL (ref 0–1.2)
BUN SERPL-MCNC: 4 MG/DL (ref 8–23)
BUN/CREAT SERPL: 6 (ref 12–20)
CALCIUM SERPL-MCNC: 9.2 MG/DL (ref 8.8–10.2)
CHLORIDE SERPL-SCNC: 103 MMOL/L (ref 98–107)
CO2 SERPL-SCNC: 24 MMOL/L (ref 20–29)
COMMENT:: NORMAL
COMMENT:: NORMAL
CREAT SERPL-MCNC: 0.71 MG/DL (ref 0.6–1)
DIFFERENTIAL METHOD BLD: ABNORMAL
EKG ATRIAL RATE: 113 BPM
EKG DIAGNOSIS: NORMAL
EKG P AXIS: 68 DEGREES
EKG P-R INTERVAL: 146 MS
EKG Q-T INTERVAL: 332 MS
EKG QRS DURATION: 80 MS
EKG QTC CALCULATION (BAZETT): 455 MS
EKG R AXIS: 59 DEGREES
EKG T AXIS: 81 DEGREES
EKG VENTRICULAR RATE: 113 BPM
EOSINOPHIL # BLD: 0.16 K/UL (ref 0–0.4)
EOSINOPHIL NFR BLD: 2 % (ref 0–7)
ERYTHROCYTE [DISTWIDTH] IN BLOOD BY AUTOMATED COUNT: 14.7 % (ref 11.5–14.5)
GLOBULIN SER CALC-MCNC: 5.5 G/DL (ref 2–4)
GLUCOSE SERPL-MCNC: 118 MG/DL (ref 65–100)
HCT VFR BLD AUTO: 28.2 % (ref 35–47)
HGB BLD-MCNC: 8.8 G/DL (ref 11.5–16)
IMM GRANULOCYTES # BLD AUTO: 0.04 K/UL (ref 0–0.04)
IMM GRANULOCYTES NFR BLD AUTO: 0.5 % (ref 0–0.5)
LYMPHOCYTES # BLD: 2.17 K/UL (ref 0.8–3.5)
LYMPHOCYTES NFR BLD: 26.5 % (ref 12–49)
MCH RBC QN AUTO: 26.9 PG (ref 26–34)
MCHC RBC AUTO-ENTMCNC: 31.2 G/DL (ref 30–36.5)
MCV RBC AUTO: 86.2 FL (ref 80–99)
MONOCYTES # BLD: 0.55 K/UL (ref 0–1)
MONOCYTES NFR BLD: 6.7 % (ref 5–13)
NEUTS SEG # BLD: 5.25 K/UL (ref 1.8–8)
NEUTS SEG NFR BLD: 63.9 % (ref 32–75)
NRBC # BLD: 0 K/UL (ref 0–0.01)
NRBC BLD-RTO: 0 PER 100 WBC
PLATELET # BLD AUTO: 723 K/UL (ref 150–400)
PMV BLD AUTO: 9 FL (ref 8.9–12.9)
POTASSIUM SERPL-SCNC: 3.7 MMOL/L (ref 3.5–5.1)
PROCALCITONIN SERPL-MCNC: 0.06 NG/ML
PROT SERPL-MCNC: 8.3 G/DL (ref 6.4–8.3)
RBC # BLD AUTO: 3.27 M/UL (ref 3.8–5.2)
SODIUM SERPL-SCNC: 139 MMOL/L (ref 136–145)
SPECIMEN HOLD: NORMAL
SPECIMEN HOLD: NORMAL
TROPONIN T SERPL HS-MCNC: 8.4 NG/L (ref 0–14)
WBC # BLD AUTO: 8.2 K/UL (ref 3.6–11)

## 2025-08-01 PROCEDURE — 99221 1ST HOSP IP/OBS SF/LOW 40: CPT | Performed by: SURGERY

## 2025-08-01 PROCEDURE — 84145 PROCALCITONIN (PCT): CPT

## 2025-08-01 PROCEDURE — 6360000002 HC RX W HCPCS: Performed by: STUDENT IN AN ORGANIZED HEALTH CARE EDUCATION/TRAINING PROGRAM

## 2025-08-01 PROCEDURE — 74177 CT ABD & PELVIS W/CONTRAST: CPT

## 2025-08-01 PROCEDURE — 99214 OFFICE O/P EST MOD 30 MIN: CPT | Performed by: INTERNAL MEDICINE

## 2025-08-01 PROCEDURE — 87040 BLOOD CULTURE FOR BACTERIA: CPT

## 2025-08-01 PROCEDURE — 2580000003 HC RX 258: Performed by: STUDENT IN AN ORGANIZED HEALTH CARE EDUCATION/TRAINING PROGRAM

## 2025-08-01 PROCEDURE — G0378 HOSPITAL OBSERVATION PER HR: HCPCS

## 2025-08-01 PROCEDURE — 96376 TX/PRO/DX INJ SAME DRUG ADON: CPT

## 2025-08-01 PROCEDURE — 84484 ASSAY OF TROPONIN QUANT: CPT

## 2025-08-01 PROCEDURE — 93005 ELECTROCARDIOGRAM TRACING: CPT | Performed by: STUDENT IN AN ORGANIZED HEALTH CARE EDUCATION/TRAINING PROGRAM

## 2025-08-01 PROCEDURE — 85025 COMPLETE CBC W/AUTO DIFF WBC: CPT

## 2025-08-01 PROCEDURE — 96361 HYDRATE IV INFUSION ADD-ON: CPT

## 2025-08-01 PROCEDURE — 71045 X-RAY EXAM CHEST 1 VIEW: CPT

## 2025-08-01 PROCEDURE — 6360000004 HC RX CONTRAST MEDICATION: Performed by: RADIOLOGY

## 2025-08-01 PROCEDURE — 96375 TX/PRO/DX INJ NEW DRUG ADDON: CPT

## 2025-08-01 PROCEDURE — 99285 EMERGENCY DEPT VISIT HI MDM: CPT

## 2025-08-01 PROCEDURE — 80053 COMPREHEN METABOLIC PANEL: CPT

## 2025-08-01 RX ORDER — MAGNESIUM SULFATE IN WATER 40 MG/ML
2000 INJECTION, SOLUTION INTRAVENOUS PRN
Status: DISCONTINUED | OUTPATIENT
Start: 2025-08-01 | End: 2025-08-02 | Stop reason: HOSPADM

## 2025-08-01 RX ORDER — POLYETHYLENE GLYCOL 3350 17 G/17G
17 POWDER, FOR SOLUTION ORAL DAILY PRN
Status: DISCONTINUED | OUTPATIENT
Start: 2025-08-01 | End: 2025-08-02 | Stop reason: HOSPADM

## 2025-08-01 RX ORDER — SODIUM CHLORIDE 9 MG/ML
INJECTION, SOLUTION INTRAVENOUS PRN
Status: DISCONTINUED | OUTPATIENT
Start: 2025-08-01 | End: 2025-08-02 | Stop reason: HOSPADM

## 2025-08-01 RX ORDER — AMLODIPINE BESYLATE 5 MG/1
10 TABLET ORAL DAILY
Status: DISCONTINUED | OUTPATIENT
Start: 2025-08-02 | End: 2025-08-02 | Stop reason: HOSPADM

## 2025-08-01 RX ORDER — ALBUTEROL SULFATE 90 UG/1
1 INHALANT RESPIRATORY (INHALATION)
Status: DISCONTINUED | OUTPATIENT
Start: 2025-08-02 | End: 2025-08-02 | Stop reason: HOSPADM

## 2025-08-01 RX ORDER — PROPRANOLOL HYDROCHLORIDE 80 MG/1
80 CAPSULE, EXTENDED RELEASE ORAL DAILY
Status: DISCONTINUED | OUTPATIENT
Start: 2025-08-02 | End: 2025-08-02 | Stop reason: HOSPADM

## 2025-08-01 RX ORDER — DEXTROSE MONOHYDRATE, SODIUM CHLORIDE, AND POTASSIUM CHLORIDE 50; 1.49; 4.5 G/1000ML; G/1000ML; G/1000ML
INJECTION, SOLUTION INTRAVENOUS CONTINUOUS
Status: DISCONTINUED | OUTPATIENT
Start: 2025-08-01 | End: 2025-08-02 | Stop reason: HOSPADM

## 2025-08-01 RX ORDER — AMITRIPTYLINE HYDROCHLORIDE 50 MG/1
50 TABLET ORAL NIGHTLY
Status: DISCONTINUED | OUTPATIENT
Start: 2025-08-02 | End: 2025-08-02 | Stop reason: HOSPADM

## 2025-08-01 RX ORDER — HYDRALAZINE HYDROCHLORIDE 20 MG/ML
20 INJECTION INTRAMUSCULAR; INTRAVENOUS EVERY 4 HOURS PRN
Status: DISCONTINUED | OUTPATIENT
Start: 2025-08-01 | End: 2025-08-02 | Stop reason: HOSPADM

## 2025-08-01 RX ORDER — ONDANSETRON 4 MG/1
4 TABLET, ORALLY DISINTEGRATING ORAL EVERY 6 HOURS PRN
Status: DISCONTINUED | OUTPATIENT
Start: 2025-08-01 | End: 2025-08-02 | Stop reason: HOSPADM

## 2025-08-01 RX ORDER — SODIUM CHLORIDE 0.9 % (FLUSH) 0.9 %
5-40 SYRINGE (ML) INJECTION PRN
Status: DISCONTINUED | OUTPATIENT
Start: 2025-08-01 | End: 2025-08-02 | Stop reason: HOSPADM

## 2025-08-01 RX ORDER — OXYCODONE HYDROCHLORIDE 5 MG/1
10 TABLET ORAL EVERY 4 HOURS PRN
Status: DISCONTINUED | OUTPATIENT
Start: 2025-08-01 | End: 2025-08-02 | Stop reason: HOSPADM

## 2025-08-01 RX ORDER — OXYCODONE HYDROCHLORIDE 5 MG/1
5 TABLET ORAL EVERY 4 HOURS PRN
Status: DISCONTINUED | OUTPATIENT
Start: 2025-08-01 | End: 2025-08-02 | Stop reason: HOSPADM

## 2025-08-01 RX ORDER — ACETAMINOPHEN 325 MG/1
650 TABLET ORAL EVERY 4 HOURS PRN
Status: DISCONTINUED | OUTPATIENT
Start: 2025-08-01 | End: 2025-08-02 | Stop reason: HOSPADM

## 2025-08-01 RX ORDER — ONDANSETRON 2 MG/ML
4 INJECTION INTRAMUSCULAR; INTRAVENOUS EVERY 6 HOURS PRN
Status: DISCONTINUED | OUTPATIENT
Start: 2025-08-01 | End: 2025-08-02 | Stop reason: HOSPADM

## 2025-08-01 RX ORDER — HYDROMORPHONE HYDROCHLORIDE 1 MG/ML
1 INJECTION, SOLUTION INTRAMUSCULAR; INTRAVENOUS; SUBCUTANEOUS
Status: COMPLETED | OUTPATIENT
Start: 2025-08-01 | End: 2025-08-01

## 2025-08-01 RX ORDER — HYDROMORPHONE HYDROCHLORIDE 1 MG/ML
1 INJECTION, SOLUTION INTRAMUSCULAR; INTRAVENOUS; SUBCUTANEOUS
Status: DISCONTINUED | OUTPATIENT
Start: 2025-08-01 | End: 2025-08-02 | Stop reason: HOSPADM

## 2025-08-01 RX ORDER — 0.9 % SODIUM CHLORIDE 0.9 %
30 INTRAVENOUS SOLUTION INTRAVENOUS ONCE
Status: COMPLETED | OUTPATIENT
Start: 2025-08-01 | End: 2025-08-01

## 2025-08-01 RX ORDER — SODIUM CHLORIDE 0.9 % (FLUSH) 0.9 %
5-40 SYRINGE (ML) INJECTION EVERY 12 HOURS SCHEDULED
Status: DISCONTINUED | OUTPATIENT
Start: 2025-08-01 | End: 2025-08-02 | Stop reason: HOSPADM

## 2025-08-01 RX ORDER — DIPHENHYDRAMINE HYDROCHLORIDE 50 MG/ML
25 INJECTION, SOLUTION INTRAMUSCULAR; INTRAVENOUS EVERY 6 HOURS PRN
Status: DISCONTINUED | OUTPATIENT
Start: 2025-08-01 | End: 2025-08-02 | Stop reason: HOSPADM

## 2025-08-01 RX ORDER — POLYETHYLENE GLYCOL 3350 17 G/17G
17 POWDER, FOR SOLUTION ORAL DAILY PRN
Status: DISCONTINUED | OUTPATIENT
Start: 2025-08-01 | End: 2025-08-02 | Stop reason: SDUPTHER

## 2025-08-01 RX ORDER — IOPAMIDOL 755 MG/ML
100 INJECTION, SOLUTION INTRAVASCULAR
Status: COMPLETED | OUTPATIENT
Start: 2025-08-01 | End: 2025-08-01

## 2025-08-01 RX ORDER — ONDANSETRON 2 MG/ML
4 INJECTION INTRAMUSCULAR; INTRAVENOUS ONCE
Status: COMPLETED | OUTPATIENT
Start: 2025-08-01 | End: 2025-08-01

## 2025-08-01 RX ADMIN — SODIUM CHLORIDE 2112 ML: 0.9 INJECTION, SOLUTION INTRAVENOUS at 13:47

## 2025-08-01 RX ADMIN — IOPAMIDOL 100 ML: 755 INJECTION, SOLUTION INTRAVENOUS at 16:03

## 2025-08-01 RX ADMIN — ONDANSETRON 4 MG: 2 INJECTION, SOLUTION INTRAMUSCULAR; INTRAVENOUS at 13:46

## 2025-08-01 RX ADMIN — HYDROMORPHONE HYDROCHLORIDE 1 MG: 1 INJECTION, SOLUTION INTRAMUSCULAR; INTRAVENOUS; SUBCUTANEOUS at 13:43

## 2025-08-01 RX ADMIN — HYDROMORPHONE HYDROCHLORIDE 1 MG: 1 INJECTION, SOLUTION INTRAMUSCULAR; INTRAVENOUS; SUBCUTANEOUS at 18:22

## 2025-08-01 ASSESSMENT — PAIN SCALES - GENERAL
PAINLEVEL_OUTOF10: 0
PAINLEVEL_OUTOF10: 10
PAINLEVEL_OUTOF10: 0
PAINLEVEL_OUTOF10: 10
PAINLEVEL_OUTOF10: 0
PAINLEVEL_OUTOF10: 10

## 2025-08-01 ASSESSMENT — PAIN DESCRIPTION - LOCATION
LOCATION: ABDOMEN
LOCATION: OTHER (COMMENT)
LOCATION: ABDOMEN

## 2025-08-01 ASSESSMENT — PAIN DESCRIPTION - DESCRIPTORS
DESCRIPTORS: ACHING
DESCRIPTORS: ACHING
DESCRIPTORS: ACHING;BURNING;CRAMPING

## 2025-08-01 ASSESSMENT — PAIN - FUNCTIONAL ASSESSMENT
PAIN_FUNCTIONAL_ASSESSMENT: NONE - DENIES PAIN
PAIN_FUNCTIONAL_ASSESSMENT: NONE - DENIES PAIN
PAIN_FUNCTIONAL_ASSESSMENT: ACTIVITIES ARE NOT PREVENTED
PAIN_FUNCTIONAL_ASSESSMENT: ACTIVITIES ARE NOT PREVENTED

## 2025-08-01 ASSESSMENT — PAIN DESCRIPTION - ORIENTATION
ORIENTATION: OTHER (COMMENT)
ORIENTATION: RIGHT
ORIENTATION: MID

## 2025-08-01 NOTE — H&P
General Surgery History and Physical    CC: Jaz    History of Present Illness:      Kiara Nance is a 60 y.o. female who presents after recent admission for concern of sepsis.  Patient was seen a few weeks ago after having surgery at an outside facility.  Had an intra-abdominal abscess with a drain in place.  This was flushed and antibiotics continued and she improved.  Discharged home about a week and a half ago back to [place] with her son.  Reports going to a different facility a few days ago because of sepsis and concerns for shakiness.  Saw her PCP today and did not feel well.  Sent to the ER.  She reports 10 out of 10 pain still.  Reports pain at midline and the drain site.  Pain medication helps.  Pressing the abdomen worsens the pain.  Radiation of pain to both left and right sides of her abdomen.  Does not think that the episodes are related to her blood sugar.    Past Medical History:   Diagnosis Date    Anxiety 09/04/24    Since I found out about the aneurysm    Asthma     SOB    Chronic back pain     Chronic pain     joint pain    Depression 09/04/24    Headache     Hypertension     Knee meniscus pain 9/7/2017    Memory disorder     Just be forgetting things sometimes    Mild intermittent asthma without complication 9/7/2017    Neck pain 09/04/24    Neuropathy About 2 years now    Sinus congestion     Vitamin D deficiency      Past Surgical History:   Procedure Laterality Date    GI      recloser cloystomy 2005    IR ASP ABSCESS/HEMATOMA/BULLA/CYST  7/3/2025    IR ASP ABSCESS/HEMATOMA/BULLA/CYST 7/3/2025 Jody Zaragoza, APRN - CNP [facility] RAD ANGIO IR    LAPAROTOMY N/A 6/29/2025    LAPAROTOMY EXPLORATORY, LYSIS OF ADHESIONS,  SMALL BOWEL RESECTION performed by Carlos Palencia MD at [facility] MAIN OR      Family History   Problem Relation Age of Onset    Liver Disease Mother     No Known Problems Father     Lung Cancer Sister     Lung Cancer Brother      Social History     Socioeconomic History    Marital

## 2025-08-01 NOTE — ED TRIAGE NOTES
Patient arrives via EMS with complaints of abdominal abscess. Has been admitted multiple times for the same. Has abdominal drain, PICC line. IV abx finished yesterday.

## 2025-08-01 NOTE — ED PROVIDER NOTES
HISTORY OF PRESENT ILLNESS  60-year-old female with a history of anxiety, aortic thrombus, diabetes mellitus, asthma, chronic back pain, depression, headaches, hypertension, neuropathy, bowel obstructions (recent surgery at [facility]), and left arm deep vein thrombosis requiring prior PICC removal and ongoing anticoagulation with Eliquis, presents with severe abdominal pain, chills, body aches, generalized weakness, chest pain, and shortness of breath that began this morning after feeling well yesterday (per patient). She has had multiple recent admissions for intra-abdominal abscesses requiring surgical intervention, was recently discharged from a hospital in [place] on 07-, and currently has a PILAR drain in the left lower abdomen and a right upper arm PICC line for daily ertapenem. She reports receiving daily ertapenem via PICC line. Chart review includes a recent discharge summary from [place] and details of IR drainage and PILAR drain placement. She denies vomiting, diarrhea, or recent travel.    HEALTHCARE PROVIDERS  - Surgeon - name not provided (recent surgery for bowel obstructions at [facility])  - Interventional Radiologist - name not provided (IR drainage and PILAR drain placement at hospital in [place])  - Hospitalist - name not provided (recent discharge from hospital in [place] on 07-)    PAST MEDICAL HISTORY  - Anxiety  - Aortic thrombus  - Diabetes mellitus  - Asthma  - Chronic back pain  - Depression  - Headaches  - Hypertension  - Neuropathy  - Bowel obstructions  - Deep vein thrombosis (left arm), previously required PICC removal and anticoagulation with Eliquis    PAST SURGICAL HISTORY  - Multiple surgical interventions for intra-abdominal abscesses  - Surgery for bowel obstructions at [facility]  - PILAR drain placement in left lower abdomen  - PICC line removal from left arm  - PICC line placement in right upper  Terry WOODRUFF MD  08/01/25 3235

## 2025-08-01 NOTE — PROGRESS NOTES
Called and gave report to charge nurse, Socorro.     Patient had come into the office not appearing well for her hospital follow up. Was pulled back to get vitals. Reports that she felt chills, abdominal pain at a 10 with a HA.     ALEX was elevated at 180/100. Respirations at 21.     Has had 3 hospital admissions in the last month for an abscess and bowel obstruction. Just discharged from an [facility] for this same 2 days ago. Reports giving herself a her IV abx through the PICC last night.     Noted thrombosis found to the left arm and was placed in eliquis     EMS was called and report given to them.

## 2025-08-02 VITALS
WEIGHT: 155.2 LBS | SYSTOLIC BLOOD PRESSURE: 78 MMHG | TEMPERATURE: 97.9 F | OXYGEN SATURATION: 98 % | DIASTOLIC BLOOD PRESSURE: 62 MMHG | HEART RATE: 111 BPM | BODY MASS INDEX: 29.33 KG/M2 | RESPIRATION RATE: 16 BRPM

## 2025-08-02 LAB
BASOPHILS # BLD: 0.05 K/UL (ref 0–0.1)
BASOPHILS NFR BLD: 0.7 % (ref 0–1)
DIFFERENTIAL METHOD BLD: ABNORMAL
EOSINOPHIL # BLD: 0.28 K/UL (ref 0–0.4)
EOSINOPHIL NFR BLD: 3.9 % (ref 0–7)
ERYTHROCYTE [DISTWIDTH] IN BLOOD BY AUTOMATED COUNT: 15 % (ref 11.5–14.5)
HCT VFR BLD AUTO: 27.2 % (ref 35–47)
HGB BLD-MCNC: 8.3 G/DL (ref 11.5–16)
IMM GRANULOCYTES # BLD AUTO: 0.05 K/UL (ref 0–0.04)
IMM GRANULOCYTES NFR BLD AUTO: 0.7 % (ref 0–0.5)
LYMPHOCYTES # BLD: 1.67 K/UL (ref 0.8–3.5)
LYMPHOCYTES NFR BLD: 23.2 % (ref 12–49)
MCH RBC QN AUTO: 26.9 PG (ref 26–34)
MCHC RBC AUTO-ENTMCNC: 30.5 G/DL (ref 30–36.5)
MCV RBC AUTO: 88.3 FL (ref 80–99)
MONOCYTES # BLD: 0.54 K/UL (ref 0–1)
MONOCYTES NFR BLD: 7.5 % (ref 5–13)
NEUTS SEG # BLD: 4.6 K/UL (ref 1.8–8)
NEUTS SEG NFR BLD: 64 % (ref 32–75)
NRBC # BLD: 0 K/UL (ref 0–0.01)
NRBC BLD-RTO: 0 PER 100 WBC
PLATELET # BLD AUTO: 642 K/UL (ref 150–400)
PMV BLD AUTO: 9 FL (ref 8.9–12.9)
RBC # BLD AUTO: 3.08 M/UL (ref 3.8–5.2)
WBC # BLD AUTO: 7.2 K/UL (ref 3.6–11)

## 2025-08-02 PROCEDURE — G0378 HOSPITAL OBSERVATION PER HR: HCPCS

## 2025-08-02 PROCEDURE — 6360000002 HC RX W HCPCS: Performed by: SURGERY

## 2025-08-02 PROCEDURE — 94640 AIRWAY INHALATION TREATMENT: CPT

## 2025-08-02 PROCEDURE — 2580000003 HC RX 258: Performed by: SURGERY

## 2025-08-02 PROCEDURE — 99238 HOSP IP/OBS DSCHRG MGMT 30/<: CPT | Performed by: SURGERY

## 2025-08-02 PROCEDURE — 85025 COMPLETE CBC W/AUTO DIFF WBC: CPT

## 2025-08-02 PROCEDURE — 6370000000 HC RX 637 (ALT 250 FOR IP): Performed by: SURGERY

## 2025-08-02 PROCEDURE — 96366 THER/PROPH/DIAG IV INF ADDON: CPT

## 2025-08-02 PROCEDURE — 96376 TX/PRO/DX INJ SAME DRUG ADON: CPT

## 2025-08-02 PROCEDURE — 2500000003 HC RX 250 WO HCPCS: Performed by: SURGERY

## 2025-08-02 PROCEDURE — 96365 THER/PROPH/DIAG IV INF INIT: CPT

## 2025-08-02 PROCEDURE — 94760 N-INVAS EAR/PLS OXIMETRY 1: CPT

## 2025-08-02 RX ORDER — ONDANSETRON 4 MG/1
4 TABLET, ORALLY DISINTEGRATING ORAL EVERY 6 HOURS PRN
Qty: 30 TABLET | Refills: 1 | Status: SHIPPED | OUTPATIENT
Start: 2025-08-02

## 2025-08-02 RX ORDER — OXYCODONE HYDROCHLORIDE 5 MG/1
5 TABLET ORAL EVERY 6 HOURS PRN
Qty: 10 TABLET | Refills: 0 | Status: SHIPPED | OUTPATIENT
Start: 2025-08-02 | End: 2025-08-05

## 2025-08-02 RX ADMIN — AMLODIPINE BESYLATE 10 MG: 5 TABLET ORAL at 09:13

## 2025-08-02 RX ADMIN — ALBUTEROL SULFATE 1 PUFF: 90 INHALANT RESPIRATORY (INHALATION) at 07:42

## 2025-08-02 RX ADMIN — AMITRIPTYLINE HYDROCHLORIDE 50 MG: 50 TABLET ORAL at 00:42

## 2025-08-02 RX ADMIN — SODIUM CHLORIDE, PRESERVATIVE FREE 10 ML: 5 INJECTION INTRAVENOUS at 00:28

## 2025-08-02 RX ADMIN — HYDROMORPHONE HYDROCHLORIDE 1 MG: 1 INJECTION, SOLUTION INTRAMUSCULAR; INTRAVENOUS; SUBCUTANEOUS at 00:26

## 2025-08-02 RX ADMIN — HYDROMORPHONE HYDROCHLORIDE 1 MG: 1 INJECTION, SOLUTION INTRAMUSCULAR; INTRAVENOUS; SUBCUTANEOUS at 09:38

## 2025-08-02 RX ADMIN — PROPRANOLOL HYDROCHLORIDE 80 MG: 80 CAPSULE, EXTENDED RELEASE ORAL at 09:14

## 2025-08-02 RX ADMIN — DEXTROSE MONOHYDRATE, SODIUM CHLORIDE, AND POTASSIUM CHLORIDE: 50; 4.5; 1.49 INJECTION, SOLUTION INTRAVENOUS at 00:42

## 2025-08-02 RX ADMIN — APIXABAN 5 MG: 5 TABLET, FILM COATED ORAL at 09:13

## 2025-08-02 RX ADMIN — MEROPENEM 1000 MG: 1 INJECTION INTRAVENOUS at 09:28

## 2025-08-02 RX ADMIN — HYDROMORPHONE HYDROCHLORIDE 1 MG: 1 INJECTION, SOLUTION INTRAMUSCULAR; INTRAVENOUS; SUBCUTANEOUS at 04:15

## 2025-08-02 RX ADMIN — MEROPENEM 1000 MG: 1 INJECTION INTRAVENOUS at 00:34

## 2025-08-02 ASSESSMENT — PAIN DESCRIPTION - ORIENTATION
ORIENTATION: LEFT
ORIENTATION: LEFT

## 2025-08-02 ASSESSMENT — PAIN SCALES - GENERAL
PAINLEVEL_OUTOF10: 9
PAINLEVEL_OUTOF10: 10
PAINLEVEL_OUTOF10: 10

## 2025-08-02 ASSESSMENT — PAIN DESCRIPTION - LOCATION
LOCATION: ABDOMEN
LOCATION: FLANK
LOCATION: ABDOMEN

## 2025-08-02 ASSESSMENT — PAIN DESCRIPTION - DESCRIPTORS
DESCRIPTORS: OTHER (COMMENT)
DESCRIPTORS: ACHING

## 2025-08-02 NOTE — CARE COORDINATION
Transition of Care Plan  RUR observation status    Patient was discharged before CM was able to secure outpatient observation letter    Admitted to [facility] 7/19/25--7/23/25 sepsis,    discharged with IV abx--[facility]     Admitted yesterday for abdominal pain.      CM did call patient to ask is she has her IV abx for the next two days-- she is open to [facility] --she told  she received a shipment of IV abx today-- She is open to [facility]..  She said she is staying with her family.  Her IV abx (ertapenem) until  8/4/25.    Patient will call  if there are any concerns.      Contact  Homero Kothari [phone]    JANINA CARRINGTON

## 2025-08-02 NOTE — DISCHARGE INSTRUCTIONS
Continue IV antibiotics until 8/4/2025    Follow-up with Dr. Mclaughlin after that for PICC line removal    No further follow-up with Dr. Burrows needed

## 2025-08-02 NOTE — ED NOTES
ED TO INPATIENT SBAR HANDOFF    Patient Name: Kiara Nance   :  1965  60 y.o.   MRN:  [id number]  ED Room #:  ER05/05     Situation  Code Status: Prior   Allergies: Latex, Meloxicam, Nsaids, Sulfa antibiotics, Aspirin, and Gabapentin  Weight: Patient Vitals for the past 96 hrs (Last 3 readings):   Weight   25 1200 70.4 kg (155 lb 3.3 oz)       Arrived from: home    Chief Complaint:   Chief Complaint   Patient presents with    Abdominal Pain       Hospital Problem/Diagnosis:  Principal Problem:    Abdominal pain  Resolved Problems:    * No resolved hospital problems. *      Mobility: no current mobility problem   ED Fall Risk: Presents to emergency department  because of falls (Syncope, seizure, or loss of consciousness): No, Age > 70: No, Altered Mental Status, Intoxication with alcohol or substance confusion (Disorientation, impaired judgment, poor safety awaremess, or inability to follow instructions): No, Impaired Mobility: Ambulates or transfers with assistive devices or assistance; Unable to ambulate or transer.: No, Nursing Judgement: No   Fell in ED or prior to admission: no   Restraints: no     Sitter: no   Family/Caregiver Present: no    Neet to know social/safety information: N/A    Background  History:   Past Medical History:   Diagnosis Date    Anxiety 24    Since I found out about the aneurysm    Asthma     SOB    Chronic back pain     Chronic pain     joint pain    Depression 24    Headache     Hypertension     Knee meniscus pain 2017    Memory disorder     Just be forgetting things sometimes    Mild intermittent asthma without complication 2017    Neck pain 24    Neuropathy About 2 years now    Sinus congestion     Vitamin D deficiency        Assessment    Abnormal Assessment Findings: Abdominal Abscess  Imaging:   CT ABDOMEN PELVIS W IV CONTRAST Additional Contrast? None   Final Result      1. Interval replacement of the left-sided drain. The large left flank  Aching  Functional Pain Assessment: Activities are not prevented  Non-Pharmaceutical Pain Intervention(s): Environmental changes  Last documented pain score: (0-10 scale) Pain Level: 0  Last documented pain medication administered: 1mg Dilaudid @ 1822     Mental Status: oriented  Orientation Level:    NIH Score:    C-SSRS: Risk of Suicide: No Risk    PO Status: No Diet Order  Bedside swallow: 3 oz Water Swallow Screen: Pass  Backus Coma Scale (GCS): Backus Coma Scale  Eye Opening: Spontaneous  Best Verbal Response: Oriented  Best Motor Response: Obeys commands  Backus Coma Scale Score: 15    Active LDA's:   Peripheral IV 08/01/25 Posterior;Right Forearm (Active)     Pertinent or High Risk Medications/Drips: no   If Yes, please provide details: N/A  Titratable drips: no   Pending Blood Product Administration: no     Sepsis: Sepsis Risk Score   Predictive Model Details          13 (Low)  Factor Value    Calculated 8/1/2025 23:21 10% Respirations 25    [facility] EARLY DETECTION OF SEPSIS VERSION 2 Model 8% O2 Delivery Method ROOM AIR     7% Pulse 106     7% Count of Active LDAs 5     -6% Duration of Encounter .5 days     6% Platelet Count 723 K/uL     6% Age 60 years old     -5% Total Active Inpatient Meds 0     4% Albumin 2.8 g/dL     -3% Antirheumatic Admins Last 24 Hours 0      Recent Labs     08/01/25  1214   WBC 8.2     Blood Cultures Drawn: Yes 1230 pm  Repeat LA: Time Due N/A  Antibiotic Given: Yes  Fluid Resuscitation: Total needed 2L, Status completed, amount 2L   VS x 2 post-fluid resuscitation: N/A    Recommendation    Plan for next 24 hours: Monitor for increasing s/s of infection, Manage Pain, Monitor Labs  Additional Comments: N/A     Isolation:Patient no longer requires any isolation precautions.  Pending orders: SEE CHART  Consults ordered: None    Consulted provider:      If any further questions, please call Sending RN at [phone]  Electronically signed by: Electronically signed by Enrique Bravo RN

## 2025-08-02 NOTE — DISCHARGE SUMMARY
dose possible to manage pain Max Daily Amount: 20 mg  Qty: 10 tablet, Refills: 0    Comments: Reduce doses taken as pain becomes manageable Reduce doses taken as pain becomes manageable  Associated Diagnoses: Generalized abdominal pain      ondansetron (ZOFRAN-ODT) 4 MG disintegrating tablet Take 1 tablet by mouth every 6 hours as needed for Nausea or Vomiting  Qty: 30 tablet, Refills: 1           CONTINUE these medications which have NOT CHANGED    Details   lactobacillus (CULTURELLE) capsule Take 2 capsules by mouth 2 times daily (with meals) for 14 days  Qty: 56 capsule, Refills: 0      tiZANidine (ZANAFLEX) 4 MG tablet Take 1 tablet by mouth every 8 hours as needed (pain back spasm)  Qty: 30 tablet, Refills: 0    Associated Diagnoses: Low back pain, unspecified      apixaban (ELIQUIS) 5 MG TABS tablet Take 1 tablet by mouth 2 times daily  Qty: 60 tablet, Refills: 0      metoclopramide (REGLAN) 5 MG tablet Take 1 tablet by mouth 3 times daily (before meals)  Qty: 120 tablet, Refills: 1      polyethylene glycol (GLYCOLAX) 17 g packet Take 1 packet by mouth daily as needed for Constipation  Qty: 30 packet, Refills: 0      amLODIPine (NORVASC) 10 MG tablet Take 1 tablet by mouth daily  Qty: 90 tablet, Refills: 1    Associated Diagnoses: Essential (primary) hypertension      amitriptyline (ELAVIL) 50 MG tablet Take 1 tablet by mouth nightly  Qty: 90 tablet, Refills: 0    Associated Diagnoses: Chronic migraine without aura without status migrainosus, not intractable      propranolol (INDERAL LA) 80 MG extended release capsule Take 1 capsule by mouth daily  Qty: 30 capsule, Refills: 0    Associated Diagnoses: Chronic migraine without aura without status migrainosus, not intractable      albuterol sulfate HFA (PROVENTIL;VENTOLIN;PROAIR) 108 (90 Base) MCG/ACT inhaler TAKE 2 PUFFS BY MOUTH EVERY 4 HOURS AS NEEDED FOR WHEEZE  Qty: 18 each, Refills: 1    Associated Diagnoses: Mild intermittent asthma without complication

## 2025-08-03 LAB
BACTERIA SPEC CULT: NORMAL
SERVICE CMNT-IMP: NORMAL

## 2025-08-04 ENCOUNTER — PATIENT MESSAGE (OUTPATIENT)
Age: 60
End: 2025-08-04

## 2025-08-04 ENCOUNTER — TELEPHONE (OUTPATIENT)
Age: 60
End: 2025-08-04

## 2025-08-07 ENCOUNTER — TELEPHONE (OUTPATIENT)
Age: 60
End: 2025-08-07

## 2025-08-07 ENCOUNTER — OFFICE VISIT (OUTPATIENT)
Age: 60
End: 2025-08-07
Payer: MEDICAID

## 2025-08-07 VITALS
RESPIRATION RATE: 22 BRPM | SYSTOLIC BLOOD PRESSURE: 148 MMHG | WEIGHT: 148.6 LBS | HEIGHT: 61 IN | BODY MASS INDEX: 28.05 KG/M2 | HEART RATE: 79 BPM | OXYGEN SATURATION: 99 % | DIASTOLIC BLOOD PRESSURE: 72 MMHG

## 2025-08-07 DIAGNOSIS — Z78.9 TRANSITION OF CARE: ICD-10-CM

## 2025-08-07 DIAGNOSIS — R73.03 PREDIABETES: ICD-10-CM

## 2025-08-07 DIAGNOSIS — Z87.19 HX SBO: ICD-10-CM

## 2025-08-07 DIAGNOSIS — G47.00 INSOMNIA, UNSPECIFIED TYPE: ICD-10-CM

## 2025-08-07 DIAGNOSIS — Z78.9 TRANSITION OF CARE: Primary | ICD-10-CM

## 2025-08-07 DIAGNOSIS — I10 ESSENTIAL (PRIMARY) HYPERTENSION: ICD-10-CM

## 2025-08-07 LAB
ALBUMIN SERPL-MCNC: 3.1 G/DL (ref 3.5–5.2)
ALBUMIN/GLOB SERPL: 0.6 (ref 1.1–2.2)
ALP SERPL-CCNC: 96 U/L (ref 35–104)
ALT SERPL-CCNC: 11 U/L (ref 10–35)
ANION GAP SERPL CALC-SCNC: 15 MMOL/L (ref 2–14)
AST SERPL-CCNC: 18 U/L (ref 10–35)
BASOPHILS # BLD: 0.05 K/UL (ref 0–0.1)
BASOPHILS NFR BLD: 0.6 % (ref 0–1)
BILIRUB SERPL-MCNC: 0.2 MG/DL (ref 0–1.2)
BUN SERPL-MCNC: 6 MG/DL (ref 8–23)
BUN/CREAT SERPL: 10 (ref 12–20)
CALCIUM SERPL-MCNC: 9.8 MG/DL (ref 8.8–10.2)
CHLORIDE SERPL-SCNC: 103 MMOL/L (ref 98–107)
CO2 SERPL-SCNC: 24 MMOL/L (ref 20–29)
CREAT SERPL-MCNC: 0.67 MG/DL (ref 0.6–1)
DIFFERENTIAL METHOD BLD: ABNORMAL
EOSINOPHIL # BLD: 0.25 K/UL (ref 0–0.4)
EOSINOPHIL NFR BLD: 3.2 % (ref 0–7)
ERYTHROCYTE [DISTWIDTH] IN BLOOD BY AUTOMATED COUNT: 15.7 % (ref 11.5–14.5)
EST. AVERAGE GLUCOSE BLD GHB EST-MCNC: 128 MG/DL
GLOBULIN SER CALC-MCNC: 5.1 G/DL (ref 2–4)
GLUCOSE SERPL-MCNC: 99 MG/DL (ref 65–100)
HBA1C MFR BLD: 6.1 % (ref 4–5.6)
HCT VFR BLD AUTO: 31.1 % (ref 35–47)
HGB BLD-MCNC: 9.5 G/DL (ref 11.5–16)
IMM GRANULOCYTES # BLD AUTO: 0.02 K/UL (ref 0–0.04)
IMM GRANULOCYTES NFR BLD AUTO: 0.3 % (ref 0–0.5)
LYMPHOCYTES # BLD: 1.82 K/UL (ref 0.8–3.5)
LYMPHOCYTES NFR BLD: 23 % (ref 12–49)
MCH RBC QN AUTO: 27.1 PG (ref 26–34)
MCHC RBC AUTO-ENTMCNC: 30.5 G/DL (ref 30–36.5)
MCV RBC AUTO: 88.6 FL (ref 80–99)
MONOCYTES # BLD: 0.54 K/UL (ref 0–1)
MONOCYTES NFR BLD: 6.8 % (ref 5–13)
NEUTS SEG # BLD: 5.22 K/UL (ref 1.8–8)
NEUTS SEG NFR BLD: 66.1 % (ref 32–75)
NRBC # BLD: 0 K/UL (ref 0–0.01)
NRBC BLD-RTO: 0 PER 100 WBC
PLATELET # BLD AUTO: 765 K/UL (ref 150–400)
PMV BLD AUTO: 9 FL (ref 8.9–12.9)
POTASSIUM SERPL-SCNC: 4 MMOL/L (ref 3.5–5.1)
PROT SERPL-MCNC: 8.2 G/DL (ref 6.4–8.3)
RBC # BLD AUTO: 3.51 M/UL (ref 3.8–5.2)
RBC MORPH BLD: ABNORMAL
SODIUM SERPL-SCNC: 142 MMOL/L (ref 136–145)
WBC # BLD AUTO: 7.9 K/UL (ref 3.6–11)

## 2025-08-07 PROCEDURE — 99215 OFFICE O/P EST HI 40 MIN: CPT | Performed by: INTERNAL MEDICINE

## 2025-08-07 PROCEDURE — 3078F DIAST BP <80 MM HG: CPT | Performed by: INTERNAL MEDICINE

## 2025-08-07 PROCEDURE — 3077F SYST BP >= 140 MM HG: CPT | Performed by: INTERNAL MEDICINE

## 2025-08-07 RX ORDER — ACETAMINOPHEN AND CODEINE PHOSPHATE 300; 30 MG/1; MG/1
1 TABLET ORAL EVERY 8 HOURS PRN
Qty: 15 TABLET | Refills: 0 | Status: SHIPPED | OUTPATIENT
Start: 2025-08-07 | End: 2025-08-12

## 2025-08-07 RX ORDER — HYDROCHLOROTHIAZIDE 25 MG/1
25 TABLET ORAL DAILY
COMMUNITY

## 2025-08-07 RX ORDER — ZOLPIDEM TARTRATE 5 MG/1
5 TABLET ORAL NIGHTLY PRN
Qty: 30 TABLET | Refills: 1 | Status: SHIPPED | OUTPATIENT
Start: 2025-08-07 | End: 2025-10-06

## 2025-08-07 ASSESSMENT — ENCOUNTER SYMPTOMS
ABDOMINAL PAIN: 1
COUGH: 0

## 2025-08-19 ENCOUNTER — TELEPHONE (OUTPATIENT)
Age: 60
End: 2025-08-19

## 2025-08-24 DIAGNOSIS — G43.709 CHRONIC MIGRAINE WITHOUT AURA WITHOUT STATUS MIGRAINOSUS, NOT INTRACTABLE: ICD-10-CM

## 2025-08-25 RX ORDER — PROPRANOLOL HYDROCHLORIDE 80 MG/1
CAPSULE, EXTENDED RELEASE ORAL DAILY
Qty: 30 CAPSULE | Refills: 0 | Status: SHIPPED | OUTPATIENT
Start: 2025-08-25

## 2025-09-03 DIAGNOSIS — M54.50 LOW BACK PAIN, UNSPECIFIED: ICD-10-CM

## 2025-09-03 DIAGNOSIS — G43.709 CHRONIC MIGRAINE WITHOUT AURA WITHOUT STATUS MIGRAINOSUS, NOT INTRACTABLE: ICD-10-CM

## 2025-09-03 RX ORDER — AMITRIPTYLINE HYDROCHLORIDE 50 MG/1
50 TABLET ORAL NIGHTLY
Qty: 90 TABLET | Refills: 0 | Status: SHIPPED | OUTPATIENT
Start: 2025-09-03

## 2025-09-04 ENCOUNTER — COMMUNITY OUTREACH (OUTPATIENT)
Dept: FAMILY MEDICINE CLINIC | Facility: CLINIC | Age: 60
End: 2025-09-04

## (undated) DEVICE — GAUZE,SPONGE,FLUFF,6"X6.75",STRL,5/TRAY: Brand: MEDLINE

## (undated) DEVICE — Device

## (undated) DEVICE — 1LYRTR 16FR10ML100%SIL UMS SNP: Brand: MEDLINE INDUSTRIES, INC.

## (undated) DEVICE — SUTURE VICRYL + SZ 3-0 L18IN ABSRB UD SH 1/2 CIR TAPERCUT NDL VCP864D

## (undated) DEVICE — SYRINGE MED 20ML STD CLR PLAS LUERLOCK TIP N CTRL DISP

## (undated) DEVICE — 3M™ STERI-DRAPE™ INSTRUMENT POUCH 1018: Brand: STERI-DRAPE™

## (undated) DEVICE — WOUND RETRACTOR AND PROTECTOR: Brand: ALEXIS WOUND PROTECTOR-RETRACTOR

## (undated) DEVICE — GLOVE SURG SZ 75 L12IN FNGR THK79MIL GRN LTX FREE

## (undated) DEVICE — SUTURE VICRYL COAT  + LIGAPAK LIG REEL 54 IN SZ 0 UD BRAID VCP287G

## (undated) DEVICE — BLADE ES L6IN ELASTOMERIC COAT EXT DURABLE BEND UPTO 90DEG

## (undated) DEVICE — PAD,ABDOMINAL,8"X10",STERILE,LF,1/PK: Brand: MEDLINE

## (undated) DEVICE — SUTURE VICRYL + SZ 2-0 L27IN ABSRB WHT SH 1/2 CIR TAPERCUT VCP417H

## (undated) DEVICE — DRESSING FOAM POST OPERATIVE 4X10 IN MEPILEX BORDER AG

## (undated) DEVICE — RELOAD STPL 40MM H1.5-4.7MM WIRE 0.2MM REG TISS GRN CRV

## (undated) DEVICE — SUTURE PDS + SZ 0 L27IN ABSRB VLT L36MM CT 1 1 2 CIR PDP340H

## (undated) DEVICE — GLOVE ORANGE PI 7 1/2   MSG9075

## (undated) DEVICE — GARMENT,MEDLINE,DVT,INT,CALF,MED, GEN2: Brand: MEDLINE

## (undated) DEVICE — SUTURE VICRYL PLUS SZ 0 54IN TIE VCP608H

## (undated) DEVICE — SUTURE PDS + SZ 1 L96IN ABSRB VLT L65MM TP-1 1/2 CIR PDP880G

## (undated) DEVICE — SHEET,DRAPE,40X58,STERILE: Brand: MEDLINE

## (undated) DEVICE — HYPODERMIC SAFETY NEEDLE: Brand: MAGELLAN

## (undated) DEVICE — DRAPE FLD WRM W44XL66IN C6L FOR INTRATEMP SYS THERMABASIN

## (undated) DEVICE — BLUNTFILL: Brand: MONOJECT

## (undated) DEVICE — DRAPE,UNDERBUTTOCKS,PCH,STERILE: Brand: MEDLINE

## (undated) DEVICE — SOLUTION IRRIG 500ML 0.9% SOD CHLO USP POUR PLAS BTL

## (undated) DEVICE — GAUZE,SPONGE,4"X4",12PLY,STRL,LF,10/TRAY: Brand: MEDLINE

## (undated) DEVICE — PENCIL SMK EVAC TELSCP 3 M TBNG

## (undated) DEVICE — SUTURE VICRYL + SZ 3-0 L27IN ABSRB UD L26MM SH 1/2 CIR VCP416H

## (undated) DEVICE — MINOR: Brand: MEDLINE INDUSTRIES, INC.